# Patient Record
Sex: FEMALE | Race: OTHER | Employment: UNEMPLOYED | URBAN - METROPOLITAN AREA
[De-identification: names, ages, dates, MRNs, and addresses within clinical notes are randomized per-mention and may not be internally consistent; named-entity substitution may affect disease eponyms.]

---

## 2017-11-22 ENCOUNTER — APPOINTMENT (OUTPATIENT)
Dept: GENERAL RADIOLOGY | Age: 62
DRG: 460 | End: 2017-11-22
Attending: PHYSICIAN ASSISTANT
Payer: MEDICAID

## 2017-11-22 ENCOUNTER — HOSPITAL ENCOUNTER (INPATIENT)
Age: 62
LOS: 8 days | Discharge: HOME | DRG: 460 | End: 2017-11-30
Attending: EMERGENCY MEDICINE | Admitting: INTERNAL MEDICINE
Payer: MEDICAID

## 2017-11-22 ENCOUNTER — APPOINTMENT (OUTPATIENT)
Dept: ULTRASOUND IMAGING | Age: 62
DRG: 460 | End: 2017-11-22
Attending: INTERNAL MEDICINE
Payer: MEDICAID

## 2017-11-22 DIAGNOSIS — S92.911A CLOSED DISPLACED FRACTURE OF PROXIMAL PHALANX OF TOE OF RIGHT FOOT: ICD-10-CM

## 2017-11-22 DIAGNOSIS — N17.9 AKI (ACUTE KIDNEY INJURY) (HCC): Primary | ICD-10-CM

## 2017-11-22 DIAGNOSIS — E87.5 ACUTE HYPERKALEMIA: ICD-10-CM

## 2017-11-22 PROBLEM — E87.29 HIGH ANION GAP METABOLIC ACIDOSIS: Status: ACTIVE | Noted: 2017-11-22

## 2017-11-22 PROBLEM — S92.909A FOOT FRACTURE: Status: ACTIVE | Noted: 2017-11-22

## 2017-11-22 LAB
AMORPH CRY URNS QL MICRO: ABNORMAL
ANION GAP SERPL CALC-SCNC: 12 MMOL/L (ref 5–15)
ANION GAP SERPL CALC-SCNC: 19 MMOL/L (ref 5–15)
APPEARANCE UR: CLEAR
ATRIAL RATE: 75 BPM
BACTERIA URNS QL MICRO: ABNORMAL /HPF
BASOPHILS # BLD: 0.1 K/UL (ref 0–0.1)
BASOPHILS NFR BLD: 1 % (ref 0–1)
BILIRUB UR QL: NEGATIVE
BUN SERPL-MCNC: 92 MG/DL (ref 6–20)
BUN SERPL-MCNC: 98 MG/DL (ref 6–20)
BUN/CREAT SERPL: 17 (ref 12–20)
BUN/CREAT SERPL: 17 (ref 12–20)
CALCIUM SERPL-MCNC: 8.1 MG/DL (ref 8.5–10.1)
CALCIUM SERPL-MCNC: 8.4 MG/DL (ref 8.5–10.1)
CALCULATED P AXIS, ECG09: 46 DEGREES
CALCULATED R AXIS, ECG10: 19 DEGREES
CALCULATED T AXIS, ECG11: 31 DEGREES
CHLORIDE SERPL-SCNC: 106 MMOL/L (ref 97–108)
CHLORIDE SERPL-SCNC: 109 MMOL/L (ref 97–108)
CO2 SERPL-SCNC: 12 MMOL/L (ref 21–32)
CO2 SERPL-SCNC: 18 MMOL/L (ref 21–32)
COLOR UR: ABNORMAL
CREAT SERPL-MCNC: 5.4 MG/DL (ref 0.55–1.02)
CREAT SERPL-MCNC: 5.78 MG/DL (ref 0.55–1.02)
CREAT UR-MCNC: 61.11 MG/DL
CREAT UR-MCNC: 93.1 MG/DL
CRP SERPL-MCNC: 1.68 MG/DL
DIAGNOSIS, 93000: NORMAL
DIFFERENTIAL METHOD BLD: ABNORMAL
EOSINOPHIL # BLD: 0.2 K/UL (ref 0–0.4)
EOSINOPHIL #/AREA URNS HPF: 2 /[HPF]
EOSINOPHIL NFR BLD: 2 % (ref 0–7)
EPITH CASTS URNS QL MICRO: ABNORMAL /LPF
ERYTHROCYTE [DISTWIDTH] IN BLOOD BY AUTOMATED COUNT: 15 % (ref 11.5–14.5)
ERYTHROCYTE [SEDIMENTATION RATE] IN BLOOD: 115 MM/HR (ref 0–30)
GLUCOSE BLD STRIP.AUTO-MCNC: 180 MG/DL (ref 65–100)
GLUCOSE SERPL-MCNC: 133 MG/DL (ref 65–100)
GLUCOSE SERPL-MCNC: 237 MG/DL (ref 65–100)
GLUCOSE UR STRIP.AUTO-MCNC: 100 MG/DL
HCT VFR BLD AUTO: 31.5 % (ref 35–47)
HGB BLD-MCNC: 10.1 G/DL (ref 11.5–16)
HGB UR QL STRIP: ABNORMAL
KETONES UR QL STRIP.AUTO: NEGATIVE MG/DL
LACTATE SERPL-SCNC: 1.3 MMOL/L (ref 0.4–2)
LEUKOCYTE ESTERASE UR QL STRIP.AUTO: NEGATIVE
LYMPHOCYTES # BLD: 1.6 K/UL (ref 0.8–3.5)
LYMPHOCYTES NFR BLD: 19 % (ref 12–49)
MAGNESIUM SERPL-MCNC: 2.2 MG/DL (ref 1.6–2.4)
MAGNESIUM SERPL-MCNC: 2.3 MG/DL (ref 1.6–2.4)
MCH RBC QN AUTO: 25.9 PG (ref 26–34)
MCHC RBC AUTO-ENTMCNC: 32.1 G/DL (ref 30–36.5)
MCV RBC AUTO: 80.8 FL (ref 80–99)
MONOCYTES # BLD: 0.3 K/UL (ref 0–1)
MONOCYTES NFR BLD: 4 % (ref 5–13)
MUCOUS THREADS URNS QL MICRO: ABNORMAL /LPF
NEUTS SEG # BLD: 6.2 K/UL (ref 1.8–8)
NEUTS SEG NFR BLD: 74 % (ref 32–75)
NITRITE UR QL STRIP.AUTO: NEGATIVE
P-R INTERVAL, ECG05: 110 MS
PH UR STRIP: 5.5 [PH] (ref 5–8)
PLATELET # BLD AUTO: 416 K/UL (ref 150–400)
POTASSIUM SERPL-SCNC: 4.7 MMOL/L (ref 3.5–5.1)
POTASSIUM SERPL-SCNC: 5.9 MMOL/L (ref 3.5–5.1)
PROT UR STRIP-MCNC: >300 MG/DL
PROT UR-MCNC: 246 MG/DL (ref 0–11.9)
Q-T INTERVAL, ECG07: 386 MS
QRS DURATION, ECG06: 66 MS
QTC CALCULATION (BEZET), ECG08: 431 MS
RBC # BLD AUTO: 3.9 M/UL (ref 3.8–5.2)
RBC #/AREA URNS HPF: ABNORMAL /HPF (ref 0–5)
SERVICE CMNT-IMP: ABNORMAL
SODIUM SERPL-SCNC: 136 MMOL/L (ref 136–145)
SODIUM SERPL-SCNC: 140 MMOL/L (ref 136–145)
SODIUM UR-SCNC: 38 MMOL/L
SP GR UR REFRACTOMETRY: 1.02 (ref 1–1.03)
UROBILINOGEN UR QL STRIP.AUTO: 0.2 EU/DL (ref 0.2–1)
VENTRICULAR RATE, ECG03: 75 BPM
WBC # BLD AUTO: 8.4 K/UL (ref 3.6–11)
WBC URNS QL MICRO: ABNORMAL /HPF (ref 0–4)

## 2017-11-22 PROCEDURE — 84155 ASSAY OF PROTEIN SERUM: CPT | Performed by: INTERNAL MEDICINE

## 2017-11-22 PROCEDURE — 80048 BASIC METABOLIC PNL TOTAL CA: CPT | Performed by: PHYSICIAN ASSISTANT

## 2017-11-22 PROCEDURE — 83605 ASSAY OF LACTIC ACID: CPT | Performed by: INTERNAL MEDICINE

## 2017-11-22 PROCEDURE — 87086 URINE CULTURE/COLONY COUNT: CPT | Performed by: INTERNAL MEDICINE

## 2017-11-22 PROCEDURE — 83883 ASSAY NEPHELOMETRY NOT SPEC: CPT | Performed by: INTERNAL MEDICINE

## 2017-11-22 PROCEDURE — 87205 SMEAR GRAM STAIN: CPT | Performed by: INTERNAL MEDICINE

## 2017-11-22 PROCEDURE — 74011636637 HC RX REV CODE- 636/637: Performed by: PHYSICIAN ASSISTANT

## 2017-11-22 PROCEDURE — 93005 ELECTROCARDIOGRAM TRACING: CPT

## 2017-11-22 PROCEDURE — 86140 C-REACTIVE PROTEIN: CPT | Performed by: PHYSICIAN ASSISTANT

## 2017-11-22 PROCEDURE — 87077 CULTURE AEROBIC IDENTIFY: CPT | Performed by: INTERNAL MEDICINE

## 2017-11-22 PROCEDURE — 84300 ASSAY OF URINE SODIUM: CPT | Performed by: INTERNAL MEDICINE

## 2017-11-22 PROCEDURE — 81001 URINALYSIS AUTO W/SCOPE: CPT | Performed by: PHYSICIAN ASSISTANT

## 2017-11-22 PROCEDURE — 74011250637 HC RX REV CODE- 250/637: Performed by: INTERNAL MEDICINE

## 2017-11-22 PROCEDURE — 87186 SC STD MICRODIL/AGAR DIL: CPT | Performed by: INTERNAL MEDICINE

## 2017-11-22 PROCEDURE — 76770 US EXAM ABDO BACK WALL COMP: CPT

## 2017-11-22 PROCEDURE — 83735 ASSAY OF MAGNESIUM: CPT | Performed by: INTERNAL MEDICINE

## 2017-11-22 PROCEDURE — 83735 ASSAY OF MAGNESIUM: CPT | Performed by: PHYSICIAN ASSISTANT

## 2017-11-22 PROCEDURE — 65660000000 HC RM CCU STEPDOWN

## 2017-11-22 PROCEDURE — 85025 COMPLETE CBC W/AUTO DIFF WBC: CPT | Performed by: PHYSICIAN ASSISTANT

## 2017-11-22 PROCEDURE — 77030013140 HC MSK NEB VYRM -A

## 2017-11-22 PROCEDURE — 82570 ASSAY OF URINE CREATININE: CPT | Performed by: INTERNAL MEDICINE

## 2017-11-22 PROCEDURE — 83520 IMMUNOASSAY QUANT NOS NONAB: CPT | Performed by: INTERNAL MEDICINE

## 2017-11-22 PROCEDURE — 74011000250 HC RX REV CODE- 250: Performed by: INTERNAL MEDICINE

## 2017-11-22 PROCEDURE — 99283 EMERGENCY DEPT VISIT LOW MDM: CPT

## 2017-11-22 PROCEDURE — 74011000258 HC RX REV CODE- 258: Performed by: PHYSICIAN ASSISTANT

## 2017-11-22 PROCEDURE — 74011000250 HC RX REV CODE- 250: Performed by: PHYSICIAN ASSISTANT

## 2017-11-22 PROCEDURE — 85652 RBC SED RATE AUTOMATED: CPT | Performed by: PHYSICIAN ASSISTANT

## 2017-11-22 PROCEDURE — 74011250636 HC RX REV CODE- 250/636: Performed by: INTERNAL MEDICINE

## 2017-11-22 PROCEDURE — 77030013175 HC SHOE PSTOP DJOR -A

## 2017-11-22 PROCEDURE — 74011000258 HC RX REV CODE- 258: Performed by: INTERNAL MEDICINE

## 2017-11-22 PROCEDURE — 94640 AIRWAY INHALATION TREATMENT: CPT

## 2017-11-22 PROCEDURE — 74011250636 HC RX REV CODE- 250/636: Performed by: PHYSICIAN ASSISTANT

## 2017-11-22 PROCEDURE — 80048 BASIC METABOLIC PNL TOTAL CA: CPT | Performed by: INTERNAL MEDICINE

## 2017-11-22 PROCEDURE — 36415 COLL VENOUS BLD VENIPUNCTURE: CPT | Performed by: INTERNAL MEDICINE

## 2017-11-22 PROCEDURE — 74011636637 HC RX REV CODE- 636/637: Performed by: INTERNAL MEDICINE

## 2017-11-22 PROCEDURE — 84156 ASSAY OF PROTEIN URINE: CPT | Performed by: INTERNAL MEDICINE

## 2017-11-22 PROCEDURE — 96360 HYDRATION IV INFUSION INIT: CPT

## 2017-11-22 PROCEDURE — 73630 X-RAY EXAM OF FOOT: CPT

## 2017-11-22 PROCEDURE — 82962 GLUCOSE BLOOD TEST: CPT

## 2017-11-22 RX ORDER — SODIUM POLYSTYRENE SULFONATE 15 G/60ML
30 SUSPENSION ORAL; RECTAL
Status: COMPLETED | OUTPATIENT
Start: 2017-11-22 | End: 2017-11-22

## 2017-11-22 RX ORDER — FUROSEMIDE 40 MG/1
40 TABLET ORAL 2 TIMES DAILY
COMMUNITY
End: 2017-11-30

## 2017-11-22 RX ORDER — METOPROLOL TARTRATE 100 MG/1
100 TABLET ORAL
COMMUNITY
End: 2017-12-08 | Stop reason: SDUPTHER

## 2017-11-22 RX ORDER — SODIUM CHLORIDE 0.9 % (FLUSH) 0.9 %
5-10 SYRINGE (ML) INJECTION EVERY 8 HOURS
Status: DISCONTINUED | OUTPATIENT
Start: 2017-11-22 | End: 2017-11-30 | Stop reason: HOSPADM

## 2017-11-22 RX ORDER — ALBUTEROL SULFATE 0.83 MG/ML
2.5 SOLUTION RESPIRATORY (INHALATION)
Status: COMPLETED | OUTPATIENT
Start: 2017-11-22 | End: 2017-11-22

## 2017-11-22 RX ORDER — INSULIN LISPRO 100 [IU]/ML
INJECTION, SOLUTION INTRAVENOUS; SUBCUTANEOUS
Status: DISCONTINUED | OUTPATIENT
Start: 2017-11-22 | End: 2017-11-30 | Stop reason: HOSPADM

## 2017-11-22 RX ORDER — METOPROLOL TARTRATE 50 MG/1
50 TABLET ORAL 2 TIMES DAILY
Status: DISCONTINUED | OUTPATIENT
Start: 2017-11-22 | End: 2017-11-27

## 2017-11-22 RX ORDER — HEPARIN SODIUM 5000 [USP'U]/ML
5000 INJECTION, SOLUTION INTRAVENOUS; SUBCUTANEOUS EVERY 8 HOURS
Status: DISCONTINUED | OUTPATIENT
Start: 2017-11-22 | End: 2017-11-30 | Stop reason: HOSPADM

## 2017-11-22 RX ORDER — SODIUM CHLORIDE 0.9 % (FLUSH) 0.9 %
5-10 SYRINGE (ML) INJECTION AS NEEDED
Status: DISCONTINUED | OUTPATIENT
Start: 2017-11-22 | End: 2017-11-30 | Stop reason: HOSPADM

## 2017-11-22 RX ORDER — LEVOFLOXACIN 750 MG/1
750 TABLET ORAL
Status: DISCONTINUED | OUTPATIENT
Start: 2017-11-22 | End: 2017-11-24

## 2017-11-22 RX ORDER — DEXTROSE 50 % IN WATER (D50W) INTRAVENOUS SYRINGE
12.5-25 AS NEEDED
Status: DISCONTINUED | OUTPATIENT
Start: 2017-11-22 | End: 2017-11-30 | Stop reason: HOSPADM

## 2017-11-22 RX ORDER — SODIUM POLYSTYRENE SULFONATE 15 G/60ML
15 SUSPENSION ORAL; RECTAL
Status: DISCONTINUED | OUTPATIENT
Start: 2017-11-22 | End: 2017-11-22 | Stop reason: SDUPTHER

## 2017-11-22 RX ORDER — MAGNESIUM SULFATE 100 %
4 CRYSTALS MISCELLANEOUS AS NEEDED
Status: DISCONTINUED | OUTPATIENT
Start: 2017-11-22 | End: 2017-11-30 | Stop reason: HOSPADM

## 2017-11-22 RX ORDER — LANOLIN ALCOHOL/MO/W.PET/CERES
325 CREAM (GRAM) TOPICAL DAILY
COMMUNITY

## 2017-11-22 RX ORDER — METOPROLOL TARTRATE 100 MG/1
TABLET ORAL 2 TIMES DAILY
Status: ON HOLD | COMMUNITY
End: 2017-11-22

## 2017-11-22 RX ORDER — NALOXONE HYDROCHLORIDE 0.4 MG/ML
0.4 INJECTION, SOLUTION INTRAMUSCULAR; INTRAVENOUS; SUBCUTANEOUS AS NEEDED
Status: DISCONTINUED | OUTPATIENT
Start: 2017-11-22 | End: 2017-11-30 | Stop reason: HOSPADM

## 2017-11-22 RX ORDER — DEXTROSE MONOHYDRATE 25 G/50ML
50 INJECTION, SOLUTION INTRAVENOUS ONCE
Status: COMPLETED | OUTPATIENT
Start: 2017-11-22 | End: 2017-11-22

## 2017-11-22 RX ADMIN — DEXTROSE MONOHYDRATE 25 G: 25 INJECTION, SOLUTION INTRAVENOUS at 15:01

## 2017-11-22 RX ADMIN — METOPROLOL TARTRATE 50 MG: 50 TABLET ORAL at 22:59

## 2017-11-22 RX ADMIN — ALBUTEROL SULFATE 2.5 MG: 2.5 SOLUTION RESPIRATORY (INHALATION) at 15:01

## 2017-11-22 RX ADMIN — ALBUTEROL SULFATE 2.5 MG: 2.5 SOLUTION RESPIRATORY (INHALATION) at 14:13

## 2017-11-22 RX ADMIN — SODIUM CHLORIDE 1000 ML: 900 INJECTION, SOLUTION INTRAVENOUS at 13:57

## 2017-11-22 RX ADMIN — ALBUTEROL SULFATE 2.5 MG: 2.5 SOLUTION RESPIRATORY (INHALATION) at 15:11

## 2017-11-22 RX ADMIN — HEPARIN SODIUM 5000 UNITS: 5000 INJECTION, SOLUTION INTRAVENOUS; SUBCUTANEOUS at 22:59

## 2017-11-22 RX ADMIN — Medication 10 ML: at 23:00

## 2017-11-22 RX ADMIN — HUMAN INSULIN 5 UNITS: 100 INJECTION, SUSPENSION SUBCUTANEOUS at 20:00

## 2017-11-22 RX ADMIN — SODIUM BICARBONATE: 84 INJECTION, SOLUTION INTRAVENOUS at 15:01

## 2017-11-22 RX ADMIN — SODIUM POLYSTYRENE SULFONATE 30 G: 15 SUSPENSION ORAL; RECTAL at 15:02

## 2017-11-22 RX ADMIN — LEVOFLOXACIN 750 MG: 750 TABLET, FILM COATED ORAL at 19:26

## 2017-11-22 RX ADMIN — SODIUM BICARBONATE: 84 INJECTION, SOLUTION INTRAVENOUS at 21:22

## 2017-11-22 RX ADMIN — HUMAN INSULIN 10 UNITS: 100 INJECTION, SOLUTION SUBCUTANEOUS at 15:00

## 2017-11-22 RX ADMIN — DOXYCYCLINE 100 MG: 100 INJECTION, POWDER, LYOPHILIZED, FOR SOLUTION INTRAVENOUS at 19:27

## 2017-11-22 NOTE — ROUTINE PROCESS
TRANSFER - OUT REPORT:    Verbal report given to [facility] crew(name) on Josselyn Akers  being transferred to [address] [place] [address] 3rd floor(unit) for routine progression of care       Report consisted of patients Situation, Background, Assessment and   Recommendations(SBAR). Information from the following report(s) SBAR, ED Summary, MAR, Recent Results and Cardiac Rhythm sinus was reviewed with the receiving nurse. Lines:   Peripheral IV 09/02/14 Right Hand (Active)       Peripheral IV 11/22/17 Right;Mid Forearm (Active)   Site Assessment Clean, dry, & intact 11/22/2017 12:18 PM   Phlebitis Assessment 0 11/22/2017 12:18 PM   Infiltration Assessment 0 11/22/2017 12:18 PM   Dressing Status Clean, dry, & intact 11/22/2017 12:18 PM   Dressing Type Transparent 11/22/2017 12:18 PM   Hub Color/Line Status Pink;Patent; Flushed 11/22/2017 12:18 PM        Opportunity for questions and clarification was provided.       Patient transported with:   Monitor  Tech

## 2017-11-22 NOTE — ED TRIAGE NOTES
Interpretor #[id number] used to gain hx information, medication hx. Family at bedside and explained labs and reason for admission with them as well as pt.

## 2017-11-22 NOTE — IP AVS SNAPSHOT
Alie Tamayo 104 1007 [facility] 
[phone] Patient: Luis Jordan MRN: [id number] MULU:[birth year] About your hospitalization You were admitted on:  November 22, 2017 You last received care in the:  [facility] 4M POST SURG ORT 2 You were discharged on:  November 30, 2017 Why you were hospitalized Your primary diagnosis was:  Not on File Your diagnoses also included:  Og (Acute Kidney Injury) (Hcc), Type 2 Diabetes Mellitus With Renal Complication (Hcc), Htn (Hypertension), Hyperlipidemia, Foot Fracture, High Anion Gap Metabolic Acidosis, Ckd (Chronic Kidney Disease) Stage 5, Gfr Less Than 15 Ml/Min (Hcc), Diabetic Foot Infection (Hcc) Things You Need To Do (next 8 weeks) Follow up with Luiz Nunez DPM in 1 day(s) Phone:  [phone] Where:  [address], 1007 [facility] Schedule an appointment with Lacie Reyes MD as soon as possible for a visit today  
to schedule follow up and review of lab results Phone:  [phone] Where:  [address] [facility] [place], [address] [facility] [address], [facility] Thad Joni Rangelargo [phone] Discharge Orders None A check conner indicates which time of day the medication should be taken. My Medications STOP taking these medications EPOETIN GAETANO INJECTION  
   
  
 furosemide 40 mg tablet Commonly known as:  LASIX  
   
  
 hydroCHLOROthiazide 50 mg tablet Commonly known as:  HYDRODIURIL  
   
  
 irbesartan 300 mg tablet Commonly known as:  AVAPRO promethazine 25 mg tablet Commonly known as:  PHENERGAN  
   
  
 raNITIdine hcl 150 mg capsule TAKE these medications as instructed Instructions Each Dose to Equal  
 Morning Noon Evening Bedtime  
 amLODIPine 10 mg tablet Commonly known as:  Chuck Conine Start taking on:  12/1/2017 Your last dose was: Your next dose is: Take 1 Tab by mouth daily for 30 days. 10 mg  
    
   
   
   
  
 amoxicillin-clavulanate 875-125 mg per tablet Commonly known as:  AUGMENTIN Your last dose was: Your next dose is: Take 1 Tab by mouth daily for 7 days. 1 Tab  
    
   
   
   
  
 ciprofloxacin HCl 0.3 % ophthalmic solution Commonly known as:  Lily Mackenzie Your last dose was: Your next dose is:    
   
   
 Administer 1 Drop to right eye every two (2) hours. 1 Drop  
    
   
   
   
  
 ferrous sulfate 325 mg (65 mg iron) tablet Your last dose was: Your next dose is: Take 325 mg by mouth daily. 325 mg  
    
   
   
   
  
 insulin NPH/insulin regular 100 unit/mL (70-30) injection Commonly known as:  HumuLIN 70/30 Your last dose was: Your next dose is:    
   
   
 10 Units by SubCUTAneous route Before breakfast and dinner. 10 Units  
    
   
   
   
  
 metoprolol tartrate 100 mg IR tablet Commonly known as:  LOPRESSOR Your last dose was: Your next dose is: Take 100 mg by mouth nightly. 100 mg Where to Get Your Medications Information on where to get these meds will be given to you by the nurse or doctor. ! Ask your nurse or doctor about these medications  
  amLODIPine 10 mg tablet  
 amoxicillin-clavulanate 875-125 mg per tablet  
 insulin NPH/insulin regular 100 unit/mL (70-30) injection Discharge Instructions [facility] Podiatric Medicine - Foot Surgery [address] [place] [address]. [address] OFFICE [phone] CELL    [phone] You have just had surgery on your foot and/or ankle. Proper care during the post-operative period is an integral part of your surgical treatment program.  It is imperative that these instructions are followed to insure proper healing and to obtain the best results. 1. GO directly home and keep your feet elevated on the way. 2. DRESSING OR CAST - Keep your dressing or cast clean and dry. Change dressings daily with Santyl and Dry sterle dressings. 3. ELEVATION - Place two pillows under the knee and leg. Make sure to support underneath your knees. You should elevate your leg whenever you are sitting or lying down. 4. Exercise your legs frequently by bending your knees to stimulate circulation and speed healing. 5. You are to be:  
NON-WEIGHT BEARING - you are not allowed to touch your foot to the floor and/or ground. You must use crutches or a walker at all times. 6. PRESCRIPTIONS - Please fill and take as directed. If you have any difficulties or experience any side effects after taking this medication please discontinue and call the office and/or go to your closest Emergency Room immediately. Call our office to make your next appointment; Also, if you have any of the following: · Temperature above 100.5 degrees · Your bandage becomes overly stained, falls off or gets wet · You bump or injure the wound site. · Your medication does not stop discomfort WE WANT YOUR RECOVERY TO BE SUCCESSFUL AND AS COMFORTABLE AS POSSIBLE. THANK YOU FOR FOLLOWING THESE INSTRUCTIONS. IF YOU HAVE ANY PROBLEMS OR QUESTIONS PLEASE CALL OUR OFFICE. HOSPITALIST DISCHARGE INSTRUCTIONS 
 
NAME:             Maribeth Singh :  1955 MRN:  [id number] Date:     2017 ADMIT DATE: 2017 DISCHARGE DATE: 2017 PRINCIPAL ADMITTING DIAGNOSIS: 
MELVA (acute kidney injury) ([facility] DISCHARGE DIAGNOSES: 
Active Problems: 
  Diabetic foot infection ([facility] (2017) Foot fracture (2017) MELVA (acute kidney injury) ([facility].) (2017) CKD (chronic kidney disease) stage 5, GFR less than 15 ml/min ([facility]) (2017) Type 2 diabetes mellitus with renal complication ([facility]) (3/88/1274) HTN (hypertension) (2014) Hyperlipidemia (6/13/2014) MEDICATIONS: 
 
· It is important that medications are taken exactly as they are prescribed on the discharge medication instructions and keep them your  in the bottles provided by the pharmacist.  
· Keep a list of the medication names, dosages, and times to be taken at all times. · Do not take other medications without consulting your doctor. · Have blood drawn for a BMP ar Care A Van Monday 12/4 to monitor kidney function Recommended diet:  Diabetic Diet and Renal Diet Recommended activity: Activity as tolerated Post discharge care: 
 
Notify follow up health care provider or return to the emergency department if you cannot get hold of your doctor if you feel worse or experience symptoms similar to those that brought you to hospital 
 
Follow-up Information Follow up With Details Comments Contact Info Luiz Nunez DPM In 1 day  [address] [phone] Enoc Wyman MD Schedule an appointment as soon as possible for a visit to schedule follow up and review of lab results [facility] [place] [facility] [address] [place] [address] [address] [phone] Information obtained by : 
I understand that if any problems occur once I am at home I am to contact my physician and I understand and acknowledge receipt of the instructions indicated above. Physician's or R.N.'s Signature                                                                  Date/Time Patient or Representative Signature                                                          Date/Time MyChart Announcement We are excited to announce that we are making your provider's discharge notes available to you in WePlannhart. You will see these notes when they are completed and signed by the physician that discharged you from your recent hospital stay. If you have any questions or concerns about any information you see in YAZUOt, please call the Health Information Department where you were seen or reach out to your Primary Care Provider for more information about your plan of care. Introducing [facility]! [facility] introduce portal paciente MyChart . Ahora se puede acceder a partes de deras expediente médico, enviar por correo electrónico la oficina de deras médico y solicitar renovaciones de medicamentos en línea. En deras navegador de Internet , Alba Figueroa a [email or web address]-[email or web address]. [email or web address] Argenis clic en el usuario por Williams Massa? Dev Ill clic aquí en la sesión Tyron Giron. Verá la página de registro [place]. Ingrese deras código de Bank [facility] marcelo y tim aparece a continuación. Usted no tendrá que [employer] código después de dougie completado el proceso de registro . Si usted no se inscribe antes de la fecha de caducidad , debe solicitar un nuevo código. · MyCVergas Código de acceso : [id number] Expires: 2/20/2018 11:19 AM 
 
Ingresa los últimos cuatro dígitos de deras Número de Seguro Social ( xxxx ) y fecha de nacimiento ( dd / mm / aaaa ) tim se indica y argenis clic en Enviar. Godwin será llevado a la siguiente página de registro . Crear un ID MyChart . Esta será deras ID de inicio de sesión de MyChart y no puede ser [place] , por lo que pensar en tia que es Tacy Click y fácil de recordar . Crear tia contraseña MyChart . ted puede cambiar deras contraseña en cualquier momento . Ingrese deras Password Reset de preguntas y Garcia . [place] se puede utilizar en un momento posterior si usted olvida deras contraseña.  
Introduzca deras dirección de correo electrónico . Delvin Serna recibirá Prasanna Samayoa notificación por correo electrónico cuando la nueva información está disponible en MyChart . Malcolm Mchugh brayan en Registrarse. Denisha Sermon attila y descargar porciones de deras expediente médico. 
Atul brayan en el enlace de descarga del menú Resumen para descargar tia copia portátil de deras información médica . Si tiene [employer] , por favor visite la sección de preguntas frecuentes del sitio web MyChart . Recuerde, MyChart NO es que se utilizará para las necesidades urgentes. Para emergencias médicas , llame al 911 . Ahora disponible en deras iPhone y Android ! Providers Seen During Your Hospitalization Provider Specialty Primary office phone Saw Barillas MD Emergency Medicine [phone] Jc Joseph MD Internal Medicine [phone] [facility] Internal Medicine [phone] Jose Farnsworth MD Internal Medicine [phone] Immunizations Administered for This Admission Name Date Influenza Vaccine (Quad) PF 11/30/2017 Your Primary Care Physician (PCP) Primary Care Physician Office Phone Office Fax OTHER, PHYS ** None ** ** None ** You are allergic to the following No active allergies Recent Documentation Height Weight Breastfeeding? BMI OB Status Smoking Status 1.499 m 63.5 kg No 28.27 kg/m2 Postmenopausal Never Smoker Emergency Contacts Name Discharge Info Relation Home Work Mobile Cancino,Afua DISCHARGE CAREGIVER [3] Spouse [3] [phone] JulitaAdriana  Child [2] [phone] Alda Em  Child [2]   [phone] Patient Belongings The following personal items are in your possession at time of discharge: 
  Dental Appliances: None  Visual Aid: Glasses, At bedside, With patient      Home Medications: None   Jewelry: None  Clothing: Footwear, Undergarments, Jacket/Coat, Pajamas    Other Valuables:  (blanket) Please provide this summary of care documentation to your next provider. Signatures-by signing, you are acknowledging that this After Visit Summary has been reviewed with you and you have received a copy. Patient Signature:  ____________________________________________________________ Date:  ____________________________________________________________  
  
Tammy Thomas Provider Signature:  ____________________________________________________________ Date:  ____________________________________________________________  
  
  
   
  
[address] [place] [address] Road [facility] 788 [phone] Patient: Rebecca Gregg MRN: [id number] ITY:[birth year] Sobre carrillo hospitalización Le admitieron el:  November 22, 2017 Carrillo tratamiento más reciente fue el:  [facility] 4M POST SURG ORT 2 Le dieron de yonatan el:  November 30, 2017 Por qué le ingresaron Carrillo diagnosis primaria es:  No está archivado/a Carrillo diagnosis también incluye:  Og (Acute Kidney Injury) (Hcc), Type 2 Diabetes Mellitus With Renal Complication (Hcc), Htn (Hypertension), Hyperlipidemia, Foot Fracture, High Anion Gap Metabolic Acidosis, Ckd (Chronic Kidney Disease) Stage 5, Gfr Less Than 15 Ml/Min (Hcc), Diabetic Foot Infection (Hcc) Things You Need To Do (next 8 weeks) Follow up with Luiz Nunez DPM in 1 day(s) Phone:  [phone] Where:  [address], [address] Schedule an appointment with Kaleb Brown MD as soon as possible for a visit today  
to schedule follow up and review of lab results Phone:  [phone] Where:  [address] [place], 301 [facility] 200, [facility] [address] Discharge Orders StatAce A check conner indicates which time of day the medication should be taken. My Medications STOP taking these medications EPOETIN GAETANO INJECTION  
   
  
 furosemide 40 mg tablet También conocido tim:  LASIX  
   
  
 hydroCHLOROthiazide 50 mg tablet También conocido tim:  HYDRODIURIL  
   
  
 irbesartan 300 mg tablet También conocido tim:  AVAPRO promethazine 25 mg tablet También conocido tim: PHENERGAN  
   
  
 raNITIdine hcl 150 mg capsule TAKE these medications as instructed Instructions Each Dose to Equal  
 Morning Noon Evening Bedtime  
 amLODIPine 10 mg tablet También conocido tim:  Jo-Ann Warnerting Empiece a luh el:  12/1/2017 Your last dose was: Your next dose is: Take 1 Tab by mouth daily for 30 days. 10 mg  
    
   
   
   
  
 amoxicillin-clavulanate 875-125 mg per tablet También conocido tim:  AUGMENTIN Your last dose was: Your next dose is: Take 1 Tab by mouth daily for 7 days. 1 Tab  
    
   
   
   
  
 ciprofloxacin HCl 0.3 % ophthalmic solution También conocido tim:  Lisa Your last dose was: Your next dose is:    
   
   
 Administer 1 Drop to right eye every two (2) hours. 1 Drop  
    
   
   
   
  
 ferrous sulfate 325 mg (65 mg iron) tablet Your last dose was: Your next dose is: Take 325 mg by mouth daily. 325 mg  
    
   
   
   
  
 insulin NPH/insulin regular 100 unit/mL (70-30) injection También conocido tim:  HumuLIN 70/30 Your last dose was: Your next dose is:    
   
   
 10 Units by SubCUTAneous route Before breakfast and dinner. 10 Units  
    
   
   
   
  
 metoprolol tartrate 100 mg IR tablet También conocido tim:  Linda Garcia Your last dose was: Your next dose is: Take 100 mg by mouth nightly. 100 mg  
    
   
   
   
  
  
  
Dónde puede recoger rafy medicamentos Información sobre dónde obtener estos medicamentos se le dará a usted por la enfermera o el médico.   
 ! Pregunte a deras médico o enfermero/a sobre estos medicamentos  
  amLODIPine 10 mg tablet  
 amoxicillin-clavulanate 875-125 mg per tablet insulin NPH/insulin regular 100 unit/mL (70-30) injection Instrucciones a mohsen rinaldi [facility] Podiatric Medicine - Foot Surgery [address] [place], [place]. [address] OFFICE [phone] CELL    [phone] You have just had surgery on your foot and/or ankle. Proper care during the post-operative period is an integral part of your surgical treatment program.  It is imperative that these instructions are followed to insure proper healing and to obtain the best results. 1. GO directly home and keep your feet elevated on the way. 2. DRESSING OR CAST - Keep your dressing or cast clean and dry. Change dressings daily with Santyl and Dry sterle dressings. 3. ELEVATION - Place two pillows under the knee and leg. Make sure to support underneath your knees. You should elevate your leg whenever you are sitting or lying down. 4. Exercise your legs frequently by bending your knees to stimulate circulation and speed healing. 5. You are to be:  
NON-WEIGHT BEARING - you are not allowed to touch your foot to the floor and/or ground. You must use crutches or a walker at all times. 6. PRESCRIPTIONS - Please fill and take as directed. If you have any difficulties or experience any side effects after taking this medication please discontinue and call the office and/or go to your closest Emergency Room immediately. Call our office to make your next appointment; Also, if you have any of the following: · Temperature above 100.5 degrees · Your bandage becomes overly stained, falls off or gets wet · You bump or injure the wound site. · Your medication does not stop discomfort WE WANT YOUR RECOVERY TO BE SUCCESSFUL AND AS COMFORTABLE AS POSSIBLE. THANK YOU FOR FOLLOWING THESE INSTRUCTIONS. IF YOU HAVE ANY PROBLEMS OR QUESTIONS PLEASE CALL OUR OFFICE. HOSPITALIST DISCHARGE INSTRUCTIONS 
 
NAME:             Ann Haim :  1955 MRN:  [id number] Date:     11/30/2017 ADMIT DATE: 11/22/2017 DISCHARGE DATE: 11/30/2017 PRINCIPAL ADMITTING DIAGNOSIS: 
MELVA (acute kidney injury) ([facility] DISCHARGE DIAGNOSES: 
Active Problems: 
  Diabetic foot infection ([facility].) (11/30/2017) Foot fracture (11/22/2017) MELVA (acute kidney injury) ([facility].) (11/22/2017) CKD (chronic kidney disease) stage 5, GFR less than 15 ml/min ([facility]) (11/30/2017) Type 2 diabetes mellitus with renal complication ([facility]) (3/06/1056) HTN (hypertension) (6/12/2014) Hyperlipidemia (6/13/2014) MEDICATIONS: 
 
· It is important that medications are taken exactly as they are prescribed on the discharge medication instructions and keep them your  in the bottles provided by the pharmacist.  
· Keep a list of the medication names, dosages, and times to be taken at all times. · Do not take other medications without consulting your doctor. · Have blood drawn for a BMP ar [facility] A Missael Monday 12/4 to monitor kidney function Recommended diet:  Diabetic Diet and Renal Diet Recommended activity: Activity as tolerated Post discharge care: 
 
Notify follow up health care provider or return to the emergency department if you cannot get hold of your doctor if you feel worse or experience symptoms similar to those that brought you to hospital 
 
Follow-up Information Follow up With Details Comments Contact Info Luiz Nunez DPM In 1 day  [address] 100[address] [facility] 
[phone] Mayela Taylor MD Schedule an appointment as soon as possible for a visit to schedule follow up and review of lab results Lis [address]ott [address] [place] [address] 
[phone] Information obtained by : 
I understand that if any problems occur once I am at home I am to contact my physician and I understand and acknowledge receipt of the instructions indicated above. Physician's or R.N.'s Signature                                                                  Date/Time Patient or Representative Signature                                                          Date/Time [employer] Times Restaurantshart Announcement We are excited to announce that we are making your provider's discharge notes available to you in Zliot. You will see these notes when they are completed and signed by the physician that discharged you from your recent hospital stay. If you have any questions or concerns about any information you see in Zliot, please call the Health Information Department where you were seen or reach out to your Primary Care Provider for more information about your plan of care. Introducing [facility] SERVICES! Nic Vasquez introduce portal paciente [employer] . Ahora se puede acceder a partes de deras expediente médico, enviar por correo electrónico la oficina de deras médico y solicitar renovaciones de medicamentos en línea. En deras navegador de Internet , Bj tran [email or web address] Netmining. com/Collective IPt Argenis brayan en el usuario por Delilah Luciano? Lidya schmidt aquí en la sesión Jelly Barfield. Verá la página de registro [place]. Ingrese deras código de Bank of [employer]a marcelo y tim aparece a continuación. Usted no tendrá que [employer] código después de dougie completado el proceso de registro . Si usted no se inscribe antes de la fecha de caducidad , debe solicitar un nuevo código. · MyCGarfield Código de acceso : [id number] Expires: 2/20/2018 11:19 AM 
 
Ingresa los últimos cuatro dígitos de deras Número de Seguro Social ( xxxx ) y fecha de nacimiento ( dd / mm / aaaa ) tim se indica y argenis clic en Enviar. Usted será llevado a la siguiente página de registro . Crear un ID MyChart . Esta será carrillo ID de inicio de sesión de MyChart y no puede ser [place] , por lo que pensar en tia que es Harriet Thurston y fácil de recordar . Crear tia contraseña MyChart . Usted puede cambiar carrillo contraseña en cualquier momento . Ingrese carrillo Password Reset de preguntas y Garcia . Bright se puede utilizar en un momento posterior si usted olvida carrillo contraseña. Introduzca carrillo dirección de correo electrónico . Tahira Mcgowan recibirá tia notificación por correo electrónico cuando la nueva información está disponible en MyChart . Jaky Gaw clic en Registrarse. Niecy Valentin attila y descargar porciones de carrillo expediente médico. 
Atul clic en el enlace de descarga del menú Resumen para descargar tia copia portátil de carrillo información médica . Si tiene [employer] , por favor visite la sección de preguntas frecuentes del sitio web MyChart . Recuerde, MyChart NO es que se utilizará para las necesidades urgentes. Para emergencias médicas , llame al 911 . Ahora disponible en carrillo iPhone y Android ! Providers Seen During Your Hospitalization Personal Médico Especialidad Teléfono principal de la oficina Genia Toribio MD Emergency Medicine [phone] Alec Alfred MD Internal Medicine [phone] 2449 [facility] Internal Medicine [phone] Anthony Sargent MD Internal Medicine [phone] Silvia Booker Administradas en esta admisión:    
   
 Aisha Bernal Influenza Vaccine (Quad) PF 11/30/2017 Carrillo médico de atención primaria (PCP ) Primary Care Physician Office Phone Office Fax OTHER, PHYS ** None ** ** None ** Tiene alergias a lo siguiente No tiene alergias Documentación recientes Height Weight Está amamantando? BMI [facility]) Estado obstétrico Estatus de tabaquísmo 1.499 m 63.5 kg No 28.27 kg/m2 Postmenopausal Never Smoker Emergency Contacts Name Discharge Info Relation Home Work Mobile Afua Cancino DISCHARGE CAREGIVER [3] Spouse [3] [phone] Adriana Cancino  Child [2] [phone] Jeffery Amen  Child [2]   [phone] Patient Belongings The following personal items are in your possession at time of discharge: 
  Dental Appliances: None  Visual Aid: Glasses, At bedside, With patient      Home Medications: None   Jewelry: None  Clothing: Footwear, Undergarments, Jacket/Coat, Pajamas    Other Valuables:  (blanket) Please provide this summary of care documentation to your next provider. Signatures-by signing, you are acknowledging that this After Visit Summary has been reviewed with you and you have received a copy. Patient Signature:  ____________________________________________________________ Date:  ____________________________________________________________  
  
Josph Perfect Provider Signature:  ____________________________________________________________ Date:  ____________________________________________________________

## 2017-11-22 NOTE — ED PROVIDER NOTES
HPI Comments: Alia Colunga is a 65 y.o. 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 Pt notes that pain to toes came on randomly several days ago and has continued to worsen since onset. Notes pain spreading up her foot. Notes bruising and swelling to the toes and distal foot. Denies any injury/trauma. No medicine at home for sx. Notes insulin dependent and takes 40units in the morning and 15 at night. Does not regularly check her glucose at home and cannot remember the last time she did check it at home. No other complaints. She specifically denies any fevers, chills, nausea, vomiting, chest pain, shortness of breath, headache, rash, diarrhea, abdominal pain, urinary/bowel changes, sweating or weight loss. Hx limited secondary to pt being a poor historian and language barrier despite     PCP: Julieta Aguayo, MD   PMHx significant for: Past Medical History:  No date: Arrhythmia      Comment: fast heart rate and palpitations dec 2010  No date: Arthritis      Comment: generalized  No date: Asthma  No date: Chronic pain      Comment: headaches and stomach pain x several months  No date: Diabetes ([facility] [address]  No date: Diabetic foot ulcer associated with type 2 nicki*  No date: GERD (gastroesophageal reflux disease)  No date: Hypertension  No date: Psychiatric disorder      Comment: depression    PSHx significant for:  Past Surgical History:  No date: HX GYN      Comment: LBTL        -- Pcn (Penicillins) -- Hives    --  Patient states she is not allergic 09/2/14    There are no other complaints, changes or physical findings at this time. The history is provided by the patient. The history is limited by a language barrier. A  was used (Nayatek  #[id number]).         Past Medical History:   Diagnosis Date    Arrhythmia fast heart rate and palpitations dec 2010    Arthritis     generalized    Asthma     Chronic pain     headaches and stomach pain x several months    Diabetes ([facility] [address]     Diabetic foot ulcer associated with type 2 diabetes mellitus ([facility] [address]     GERD (gastroesophageal reflux disease)     Hypertension     Psychiatric disorder     depression       Past Surgical History:   Procedure Laterality Date    HX GYN      LBTL         Family History:   Problem Relation Age of Onset    Heart Disease Mother     Diabetes Father     Heart Disease Father     Diabetes Brother     Cancer Paternal Aunt 40     uterine cancer    Diabetes Brother        Social History     Social History    Marital status: LEGALLY      Spouse name: N/A    Number of children: N/A    Years of education: N/A     Occupational History    Not on file. Social History Main Topics    Smoking status: Never Smoker    Smokeless tobacco: Never Used    Alcohol use No    Drug use: No    Sexual activity: Not on file     Other Topics Concern    Not on file     Social History Narrative         ALLERGIES: Pcn [penicillins]    Review of Systems   Constitutional: Negative. Negative for appetite change, chills, fatigue and fever. HENT: Negative. Negative for congestion and sore throat. Eyes: Negative. Negative for visual disturbance. Respiratory: Negative. Negative for cough and shortness of breath. Cardiovascular: Negative. Negative for chest pain, palpitations and leg swelling. Gastrointestinal: Negative. Negative for abdominal pain, constipation, diarrhea, nausea and vomiting. Genitourinary: Negative. Negative for dysuria, flank pain and hematuria. Musculoskeletal: Positive for arthralgias (R foot and 3rd, 4th, 5th toe pain, swelling). Negative for back pain and neck pain. Skin: Negative. Negative for rash. Neurological: Negative. Negative for dizziness, syncope, weakness, numbness and headaches.    Hematological: Negative. Psychiatric/Behavioral: Negative. All other systems reviewed and are negative. Vitals:    11/22/17 1133   BP: 145/66   Pulse: 79   Resp: 16   Temp: 98 °F (36.7 °C)   SpO2: 99%   Weight: 64.3 kg (141 lb 12.1 oz)            Physical Exam   Constitutional: She is oriented to person, place, and time. She appears well-developed and well-nourished. No distress. HENT:   Head: Normocephalic and atraumatic. Mouth/Throat: Oropharynx is clear and moist.   Neck: Normal range of motion. Cardiovascular: Normal rate, regular rhythm, S1 normal, S2 normal, normal heart sounds, intact distal pulses and normal pulses. Exam reveals no gallop and no friction rub. No murmur heard. Pulses:       Dorsalis pedis pulses are 2+ on the right side, and 2+ on the left side. Pulmonary/Chest: Effort normal and breath sounds normal. No accessory muscle usage. No respiratory distress. She has no decreased breath sounds. She has no wheezes. She has no rhonchi. She has no rales. Abdominal: Soft. Normal appearance and bowel sounds are normal. She exhibits no distension. There is no hepatosplenomegaly. There is no tenderness. There is no rebound, no guarding and no CVA tenderness. Musculoskeletal: Normal range of motion. She exhibits no edema or tenderness. Neurological: She is alert and oriented to person, place, and time. She has normal strength. No sensory deficit. Skin: Skin is warm and dry. No rash noted. She is not diaphoretic. No erythema. No pallor. Right 3rd and 4th toe TTP with overlying edema and ecchymosis to proximal 3rd and 4th toe and distal portion foot over 3rd and 4th metatarsals. Minimal erythema, warmth, no erythematous streaking. NVI distally, DP pulse 2+. Brisk cap refill all digits. Ambulatory without difficulty. FROM all joints RLE without difficulty  Prior left midfoot amputation. Psychiatric: She has a normal mood and affect.  Her behavior is normal.   Nursing note and vitals reviewed. MDM  Number of Diagnoses or Management Options  Acute hyperkalemia:   MELVA (acute kidney injury) [facility]):   Closed displaced fracture of proximal phalanx of toe of right foot:   Diagnosis management comments: DDx: cellulitis, osteomyelitis, diabetic foot infection       Amount and/or Complexity of Data Reviewed  Clinical lab tests: ordered and reviewed  Tests in the radiology section of CPT®: ordered and reviewed  Tests in the medicine section of CPT®: ordered and reviewed  Discuss the patient with other providers: yes (Dr. Jalen Weber)  Independent visualization of images, tracings, or specimens: yes    Critical Care  Total time providing critical care: 30-74 minutes    Patient Progress  Patient progress: stable    ED Course       Procedures                       12:16 PM   Glenis Weston PA-C discussed patient with Bettie Bernal MD who is in agreement with care plan as outlined. No further recommendations. Glenis Weston PA-C          1:33 PM  Glenis Weston PA-C  into reevaluate patient at this time. Updated on available results. All questions answered. Pt continues to deny any injury to her foot. Denies any hx of kidney disease. Denies any cardiac symptoms. Discussed plan to admit for ARF and hyperkalemia. All questions answered. Glenis Weston PA-C     CONSULT NOTE:   1:35 PM  Glenis Weston PA-C  spoke with Dr. Kaci Gonzales,   Specialty: hospitalist  Discussed pt's hx, disposition, and available diagnostic and imaging results. Reviewed care plans. Consultant agrees with plans as outlined. Will admit patient. Glenis Weston PA-C    Total critical care time spent exclusive of procedures:  35minutes. Pt in ARF with hyperkalemia. tx in ER with insulin, albuterol nebs x 3, kayexelate. Glenis Weston PA-C      1:35 PM  Patient is being admitted to the hospital.  The results of their tests and reasons for their admission have been discussed with them and/or available family.   They convey agreement and understanding for the need to be admitted and for their admission diagnosis. Consultation has been made with the inpatient physician specialist for hospitalization. LABORATORY TESTS:  Recent Results (from the past 12 hour(s))   CBC WITH AUTOMATED DIFF    Collection Time: 11/22/17 12:13 PM   Result Value Ref Range    WBC 8.4 3.6 - 11.0 K/uL    RBC 3.90 3.80 - 5.20 M/uL    HGB 10.1 (L) 11.5 - 16.0 g/dL    HCT 31.5 (L) 35.0 - 47.0 %    MCV 80.8 80.0 - 99.0 FL    MCH 25.9 (L) 26.0 - 34.0 PG    MCHC 32.1 30.0 - 36.5 g/dL    RDW 15.0 (H) 11.5 - 14.5 %    PLATELET 931 (H) 658 - 400 K/uL    NEUTROPHILS 74 32 - 75 %    LYMPHOCYTES 19 12 - 49 %    MONOCYTES 4 (L) 5 - 13 %    EOSINOPHILS 2 0 - 7 %    BASOPHILS 1 0 - 1 %    ABS. NEUTROPHILS 6.2 1.8 - 8.0 K/UL    ABS. LYMPHOCYTES 1.6 0.8 - 3.5 K/UL    ABS. MONOCYTES 0.3 0.0 - 1.0 K/UL    ABS. EOSINOPHILS 0.2 0.0 - 0.4 K/UL    ABS. BASOPHILS 0.1 0.0 - 0.1 K/UL    DF AUTOMATED     METABOLIC PANEL, BASIC    Collection Time: 11/22/17 12:13 PM   Result Value Ref Range    Sodium 136 136 - 145 mmol/L    Potassium 5.9 (H) 3.5 - 5.1 mmol/L    Chloride 106 97 - 108 mmol/L    CO2 18 (L) 21 - 32 mmol/L    Anion gap 12 5 - 15 mmol/L    Glucose 133 (H) 65 - 100 mg/dL    BUN 98 (H) 6 - 20 MG/DL    Creatinine 5.78 (H) 0.55 - 1.02 MG/DL    BUN/Creatinine ratio 17 12 - 20      GFR est AA 9 (L) >60 ml/min/1.73m2    GFR est non-AA 7 (L) >60 ml/min/1.73m2    Calcium 8.4 (L) 8.5 - 10.1 MG/DL   SED RATE (ESR)    Collection Time: 11/22/17 12:13 PM   Result Value Ref Range    Sed rate, automated 115 (H) 0 - 30 mm/hr   C REACTIVE PROTEIN, QT    Collection Time: 11/22/17 12:13 PM   Result Value Ref Range    C-Reactive protein 1.68 (H) <0.60 mg/dL       IMAGING RESULTS:  XR FOOT RT MIN 3 V   Final Result        Xr Foot Rt Min 3 V    Result Date: 11/22/2017  EXAM:  XR FOOT RT MIN 3 V INDICATION:   r/o osteo; pain, swelling 3-5th finger. COMPARISON:  None.  FINDINGS:  Three views of the right foot demonstrate fractures of the proximal phalanges of the third and fourth toes with overriding of the fracture fragments. There is no radiographic evidence of osteomyelitis. The soft tissues show no foreign body or emphysema. Vascular calcifications are noted. IMPRESSION:  Fractures of the right third and fourth toes, proximal phalanges. MEDICATIONS GIVEN:  Medications   sodium chloride 0.9 % bolus infusion 1,000 mL (not administered)       IMPRESSION:  1. MELVA (acute kidney injury) ([address]    2. Closed displaced fracture of proximal phalanx of toe of right foot    3. Acute hyperkalemia        PLAN:  1. Admit to hospitalist          2:04 PM  Juan Antonio Vela PA-C reviewed results with patient's daughter using [employer] interpretor #[id number]. All questions answered. Juan Antonio Vela PA-C     EKG interpretation: (Preliminary)  Rhythm: normal sinus rhythm; and regular .  Rate (approx.): 75; Axis: normal; NV interval: short; QRS interval: normal ; ST/T wave: normal;  Other findings: normal. Juan Antonio Vela PA-C

## 2017-11-22 NOTE — IP AVS SNAPSHOT
Tony Ego 
 
 
 [address] 
[phone] Patient: Felipa Rivas MRN: [id number] WRJ:8/3/1905 My Medications STOP taking these medications EPOETIN GAETANO INJECTION  
   
  
 furosemide 40 mg tablet Commonly known as:  LASIX  
   
  
 hydroCHLOROthiazide 50 mg tablet Commonly known as:  HYDRODIURIL  
   
  
 irbesartan 300 mg tablet Commonly known as:  AVAPRO promethazine 25 mg tablet Commonly known as:  PHENERGAN  
   
  
 raNITIdine hcl 150 mg capsule TAKE these medications as instructed Instructions Each Dose to Equal  
 Morning Noon Evening Bedtime  
 amLODIPine 10 mg tablet Commonly known as:  Maria Del Rosario Rock Start taking on:  12/1/2017 Your last dose was: Your next dose is: Take 1 Tab by mouth daily for 30 days. 10 mg  
    
   
   
   
  
 amoxicillin-clavulanate 875-125 mg per tablet Commonly known as:  AUGMENTIN Your last dose was: Your next dose is: Take 1 Tab by mouth daily for 7 days. 1 Tab  
    
   
   
   
  
 ciprofloxacin HCl 0.3 % ophthalmic solution Commonly known as:  Valri Pulling Your last dose was: Your next dose is:    
   
   
 Administer 1 Drop to right eye every two (2) hours. 1 Drop  
    
   
   
   
  
 ferrous sulfate 325 mg (65 mg iron) tablet Your last dose was: Your next dose is: Take 325 mg by mouth daily. 325 mg  
    
   
   
   
  
 insulin NPH/insulin regular 100 unit/mL (70-30) injection Commonly known as:  HumuLIN 70/30 Your last dose was: Your next dose is:    
   
   
 10 Units by SubCUTAneous route Before breakfast and dinner. 10 Units  
    
   
   
   
  
 metoprolol tartrate 100 mg IR tablet Commonly known as:  LOPRESSOR Your last dose was: Your next dose is: Take 100 mg by mouth nightly.   
 100 mg  
    
   
 Where to Get Your Medications Information on where to get these meds will be given to you by the nurse or doctor. ! Ask your nurse or doctor about these medications  
  amLODIPine 10 mg tablet  
 amoxicillin-clavulanate 875-125 mg per tablet  
 insulin NPH/insulin regular 100 unit/mL (70-30) injection

## 2017-11-23 LAB
ALBUMIN SERPL-MCNC: 2.5 G/DL (ref 3.5–5)
ALBUMIN SERPL-MCNC: 2.6 G/DL (ref 3.5–5)
ALBUMIN/GLOB SERPL: 0.6 {RATIO} (ref 1.1–2.2)
ALP SERPL-CCNC: 128 U/L (ref 45–117)
ALT SERPL-CCNC: 13 U/L (ref 12–78)
ANION GAP SERPL CALC-SCNC: 12 MMOL/L (ref 5–15)
ANION GAP SERPL CALC-SCNC: 13 MMOL/L (ref 5–15)
AST SERPL-CCNC: 15 U/L (ref 15–37)
BASOPHILS # BLD: 0 K/UL (ref 0–0.1)
BASOPHILS NFR BLD: 0 % (ref 0–1)
BILIRUB SERPL-MCNC: 0.1 MG/DL (ref 0.2–1)
BUN SERPL-MCNC: 81 MG/DL (ref 6–20)
BUN SERPL-MCNC: 86 MG/DL (ref 6–20)
BUN/CREAT SERPL: 17 (ref 12–20)
BUN/CREAT SERPL: 17 (ref 12–20)
CALCIUM SERPL-MCNC: 7.9 MG/DL (ref 8.5–10.1)
CALCIUM SERPL-MCNC: 8 MG/DL (ref 8.5–10.1)
CHLORIDE SERPL-SCNC: 107 MMOL/L (ref 97–108)
CHLORIDE SERPL-SCNC: 108 MMOL/L (ref 97–108)
CHOLEST SERPL-MCNC: 199 MG/DL
CO2 SERPL-SCNC: 20 MMOL/L (ref 21–32)
CO2 SERPL-SCNC: 24 MMOL/L (ref 21–32)
CREAT SERPL-MCNC: 4.89 MG/DL (ref 0.55–1.02)
CREAT SERPL-MCNC: 4.93 MG/DL (ref 0.55–1.02)
EOSINOPHIL # BLD: 0.1 K/UL (ref 0–0.4)
EOSINOPHIL NFR BLD: 1 % (ref 0–7)
ERYTHROCYTE [DISTWIDTH] IN BLOOD BY AUTOMATED COUNT: 15.2 % (ref 11.5–14.5)
EST. AVERAGE GLUCOSE BLD GHB EST-MCNC: 200 MG/DL
GLOBULIN SER CALC-MCNC: 4.3 G/DL (ref 2–4)
GLUCOSE BLD STRIP.AUTO-MCNC: 100 MG/DL (ref 65–100)
GLUCOSE BLD STRIP.AUTO-MCNC: 103 MG/DL (ref 65–100)
GLUCOSE BLD STRIP.AUTO-MCNC: 158 MG/DL (ref 65–100)
GLUCOSE BLD STRIP.AUTO-MCNC: 79 MG/DL (ref 65–100)
GLUCOSE BLD STRIP.AUTO-MCNC: 91 MG/DL (ref 65–100)
GLUCOSE SERPL-MCNC: 143 MG/DL (ref 65–100)
GLUCOSE SERPL-MCNC: 79 MG/DL (ref 65–100)
HBA1C MFR BLD: 8.6 % (ref 4.2–6.3)
HCT VFR BLD AUTO: 25 % (ref 35–47)
HDLC SERPL-MCNC: 40 MG/DL
HDLC SERPL: 5 {RATIO} (ref 0–5)
HGB BLD-MCNC: 8.2 G/DL (ref 11.5–16)
LDLC SERPL CALC-MCNC: 102.4 MG/DL (ref 0–100)
LIPID PROFILE,FLP: ABNORMAL
LYMPHOCYTES # BLD: 1.4 K/UL (ref 0.8–3.5)
LYMPHOCYTES NFR BLD: 20 % (ref 12–49)
MAGNESIUM SERPL-MCNC: 1.9 MG/DL (ref 1.6–2.4)
MCH RBC QN AUTO: 25.7 PG (ref 26–34)
MCHC RBC AUTO-ENTMCNC: 32.8 G/DL (ref 30–36.5)
MCV RBC AUTO: 78.4 FL (ref 80–99)
MONOCYTES # BLD: 0.3 K/UL (ref 0–1)
MONOCYTES NFR BLD: 4 % (ref 5–13)
NEUTS SEG # BLD: 5.4 K/UL (ref 1.8–8)
NEUTS SEG NFR BLD: 75 % (ref 32–75)
PHOSPHATE SERPL-MCNC: 4.9 MG/DL (ref 2.6–4.7)
PHOSPHATE SERPL-MCNC: 5.1 MG/DL (ref 2.6–4.7)
PLATELET # BLD AUTO: 344 K/UL (ref 150–400)
POTASSIUM SERPL-SCNC: 4.1 MMOL/L (ref 3.5–5.1)
POTASSIUM SERPL-SCNC: 4.5 MMOL/L (ref 3.5–5.1)
PROT SERPL-MCNC: 6.8 G/DL (ref 6.4–8.2)
RBC # BLD AUTO: 3.19 M/UL (ref 3.8–5.2)
SERVICE CMNT-IMP: ABNORMAL
SERVICE CMNT-IMP: ABNORMAL
SERVICE CMNT-IMP: NORMAL
SODIUM SERPL-SCNC: 141 MMOL/L (ref 136–145)
SODIUM SERPL-SCNC: 143 MMOL/L (ref 136–145)
TRIGL SERPL-MCNC: 283 MG/DL (ref ?–150)
VLDLC SERPL CALC-MCNC: 56.6 MG/DL
WBC # BLD AUTO: 7.2 K/UL (ref 3.6–11)

## 2017-11-23 PROCEDURE — 74011000250 HC RX REV CODE- 250: Performed by: INTERNAL MEDICINE

## 2017-11-23 PROCEDURE — 84100 ASSAY OF PHOSPHORUS: CPT | Performed by: INTERNAL MEDICINE

## 2017-11-23 PROCEDURE — 74011250637 HC RX REV CODE- 250/637: Performed by: INTERNAL MEDICINE

## 2017-11-23 PROCEDURE — 74011250636 HC RX REV CODE- 250/636: Performed by: INTERNAL MEDICINE

## 2017-11-23 PROCEDURE — 80061 LIPID PANEL: CPT | Performed by: INTERNAL MEDICINE

## 2017-11-23 PROCEDURE — 74011000258 HC RX REV CODE- 258: Performed by: INTERNAL MEDICINE

## 2017-11-23 PROCEDURE — 80053 COMPREHEN METABOLIC PANEL: CPT | Performed by: INTERNAL MEDICINE

## 2017-11-23 PROCEDURE — 85025 COMPLETE CBC W/AUTO DIFF WBC: CPT | Performed by: INTERNAL MEDICINE

## 2017-11-23 PROCEDURE — 74011636637 HC RX REV CODE- 636/637: Performed by: INTERNAL MEDICINE

## 2017-11-23 PROCEDURE — 65270000029 HC RM PRIVATE

## 2017-11-23 PROCEDURE — 83036 HEMOGLOBIN GLYCOSYLATED A1C: CPT | Performed by: INTERNAL MEDICINE

## 2017-11-23 PROCEDURE — 80069 RENAL FUNCTION PANEL: CPT | Performed by: INTERNAL MEDICINE

## 2017-11-23 PROCEDURE — 83735 ASSAY OF MAGNESIUM: CPT | Performed by: INTERNAL MEDICINE

## 2017-11-23 PROCEDURE — 36415 COLL VENOUS BLD VENIPUNCTURE: CPT | Performed by: INTERNAL MEDICINE

## 2017-11-23 PROCEDURE — 82962 GLUCOSE BLOOD TEST: CPT

## 2017-11-23 RX ORDER — HYDROCHLOROTHIAZIDE 50 MG/1
50 TABLET ORAL DAILY
COMMUNITY
End: 2017-11-30

## 2017-11-23 RX ORDER — ATORVASTATIN CALCIUM 20 MG/1
40 TABLET, FILM COATED ORAL
Status: DISCONTINUED | OUTPATIENT
Start: 2017-11-23 | End: 2017-11-30 | Stop reason: HOSPADM

## 2017-11-23 RX ORDER — CIPROFLOXACIN HYDROCHLORIDE 3.5 MG/ML
1 SOLUTION/ DROPS TOPICAL
COMMUNITY
End: 2018-03-12

## 2017-11-23 RX ORDER — HYDRALAZINE HYDROCHLORIDE 20 MG/ML
10 INJECTION INTRAMUSCULAR; INTRAVENOUS
Status: DISCONTINUED | OUTPATIENT
Start: 2017-11-23 | End: 2017-11-30 | Stop reason: HOSPADM

## 2017-11-23 RX ORDER — IRBESARTAN 300 MG/1
300 TABLET ORAL DAILY
COMMUNITY
End: 2017-11-30

## 2017-11-23 RX ORDER — LANOLIN ALCOHOL/MO/W.PET/CERES
325 CREAM (GRAM) TOPICAL DAILY
Status: DISCONTINUED | OUTPATIENT
Start: 2017-11-23 | End: 2017-11-30 | Stop reason: HOSPADM

## 2017-11-23 RX ORDER — METOPROLOL TARTRATE 50 MG/1
50 TABLET ORAL ONCE
Status: COMPLETED | OUTPATIENT
Start: 2017-11-24 | End: 2017-11-23

## 2017-11-23 RX ORDER — VALSARTAN 320 MG/1
300 TABLET ORAL DAILY
Status: ON HOLD | COMMUNITY
End: 2017-11-23 | Stop reason: CLARIF

## 2017-11-23 RX ADMIN — METOPROLOL TARTRATE 50 MG: 50 TABLET ORAL at 17:52

## 2017-11-23 RX ADMIN — METOPROLOL TARTRATE 50 MG: 50 TABLET ORAL at 09:43

## 2017-11-23 RX ADMIN — HEPARIN SODIUM 5000 UNITS: 5000 INJECTION, SOLUTION INTRAVENOUS; SUBCUTANEOUS at 06:46

## 2017-11-23 RX ADMIN — METOPROLOL TARTRATE 50 MG: 50 TABLET ORAL at 23:28

## 2017-11-23 RX ADMIN — DOXYCYCLINE 100 MG: 100 INJECTION, POWDER, LYOPHILIZED, FOR SOLUTION INTRAVENOUS at 09:43

## 2017-11-23 RX ADMIN — HEPARIN SODIUM 5000 UNITS: 5000 INJECTION, SOLUTION INTRAVENOUS; SUBCUTANEOUS at 21:34

## 2017-11-23 RX ADMIN — ATORVASTATIN CALCIUM 40 MG: 20 TABLET, FILM COATED ORAL at 21:33

## 2017-11-23 RX ADMIN — HEPARIN SODIUM 5000 UNITS: 5000 INJECTION, SOLUTION INTRAVENOUS; SUBCUTANEOUS at 14:55

## 2017-11-23 RX ADMIN — Medication 10 ML: at 14:56

## 2017-11-23 RX ADMIN — HUMAN INSULIN 10 UNITS: 100 INJECTION, SUSPENSION SUBCUTANEOUS at 08:38

## 2017-11-23 RX ADMIN — Medication 10 ML: at 21:35

## 2017-11-23 RX ADMIN — HYDRALAZINE HYDROCHLORIDE 10 MG: 20 INJECTION INTRAMUSCULAR; INTRAVENOUS at 21:34

## 2017-11-23 RX ADMIN — DOXYCYCLINE 100 MG: 100 INJECTION, POWDER, LYOPHILIZED, FOR SOLUTION INTRAVENOUS at 21:34

## 2017-11-23 RX ADMIN — FERROUS SULFATE TAB 325 MG (65 MG ELEMENTAL FE) 325 MG: 325 (65 FE) TAB at 09:43

## 2017-11-23 RX ADMIN — Medication 10 ML: at 06:00

## 2017-11-23 RX ADMIN — SODIUM BICARBONATE: 84 INJECTION, SOLUTION INTRAVENOUS at 09:52

## 2017-11-23 RX ADMIN — HUMAN INSULIN 5 UNITS: 100 INJECTION, SUSPENSION SUBCUTANEOUS at 17:51

## 2017-11-23 NOTE — PROGRESS NOTES
Problem: Falls - Risk of  Goal: *Absence of Falls  Document Zuly Fall Risk and appropriate interventions in the flowsheet.    Outcome: Progressing Towards Goal  Fall Risk Interventions:            Medication Interventions: Patient to call before getting OOB

## 2017-11-23 NOTE — H&P
[place] [address]  [phone]    Hospitalist Admission Note      NAME:  Ann Whitmore   :   1955   MRN:  [id number]     PCP:  Brittaney Zapien MD     Date/Time:  2017 7:15 PM          Subjective:     CHIEF COMPLAINT: R foot pain    HISTORY OF PRESENT ILLNESS:     Ms. Jade Jaime is a 65 y.o. female w/ hx of DM, HTN who presents with R foot pain. Tells me it's been going on for ~9 days, involving toes, sharp, intermittent, severe, with small skin opening with scant drainage, no bleeding. Febrile to 39.0 celsius at home. No trauma. ED workup showed fractures of the right third and fourth toes, proximal phalanges. Cr was 5.78 with K 5.9. Ms. Jade Jaime is admitted for further evaluation and management of severe MELVA with hyperkalemia.        Past Medical History:   Diagnosis Date    Arrhythmia     fast heart rate and palpitations dec 2010    Arthritis     generalized    Asthma     Chronic pain     headaches and stomach pain x several months    Diabetes ([address]     Diabetic foot ulcer associated with type 2 diabetes mellitus (HCC)     GERD (gastroesophageal reflux disease)     Hypertension     Psychiatric disorder     depression        Past Surgical History:   Procedure Laterality Date    HX CATARACT REMOVAL      cataract removal and lens placement in right eye    HX GYN      LBTL    HX ORTHOPAEDIC      partial amputation of left foot       Social History   Substance Use Topics    Smoking status: Never Smoker    Smokeless tobacco: Never Used    Alcohol use No        Family History   Problem Relation Age of Onset    Heart Disease Mother     Diabetes Father     Heart Disease Father     Diabetes Brother     Cancer Paternal Aunt 39     uterine cancer    Diabetes Brother         Allergies   Allergen Reactions    Pcn [Penicillins] Hives     Patient states she is not allergic 14        Prior to Admission medications Medication Sig Start Date End Date Taking? Authorizing Provider   Tamra Pinaluten 30mg po qd   Yes Phys MD Dede   metoprolol (LOPRESSOR) 100 mg tablet Take 1 tablet by mouth two (2) times a day. Patient taking differently: Take 100 mg by mouth daily. 10/9/14  Yes CHRISTIANO Moffett   insulin NPH/insulin regular (NOVOLIN 70/30, HUMULIN 70/30) 100 unit/mL (70-30) injection 25 ac B, 12 ac S  Patient taking differently: by SubCUTAneous route. 40 units in AM and 15 units in PM 8/21/14  Yes Chuckie Jesus MD   CIPROFLOXACIN HCL/DEXAMETH (CIPRODEX OT) by Otic route. For right eye    Phys MD Dede       Review of Systems:  (bold if positive, if negative)    Gen:  feverEyes:  ENT:  CVS:  Pulm:  GI:    :    MS:  PainswellingSkin:  Psych:  Endo:    Hem:  Renal:    Neuro:            Objective:      VITALS:    Vital signs reviewed; most recent are:    Visit Vitals    /69    Pulse (!) 108    Temp 98 °F (36.7 °C)    Resp 18    Ht 4' 11.06\" (1.5 m)    Wt 64.3 kg (141 lb 12.1 oz)    SpO2 98%    BMI 28.58 kg/m2     SpO2 Readings from Last 6 Encounters:   11/22/17 98%   09/02/14 98%   08/07/14 99%   01/25/11 94%        No intake or output data in the 24 hours ending 11/22/17 1915         Exam:     Physical Exam:    Gen:  Well-developed, well-nourished, in no acute distress  HEENT:  No scleral icterus, hearing intact to voice, moist mucous membranes  Neck:  Supple, without masses. Resp:  No accessory muscle use. CTAB without wheezing, rales, rhonchi  Card: RRR. Normal S1 and S2 without murmurs, rubs, or gallops. No peripheral lower extremity edema. No JVD. Peripheral pulses in tact. Abd:  Normoactive bowel sounds. Soft, non-tender, non-distended. No rebound, no guarding. No appreciable hepatosplenomegaly   Lymph:  No cervical adenopathy  Musc:  No cyanosis or clubbing. R 3rd and 4th toe TTP. Scant drainage  Skin:  No rashes or ulcers; turgor intact.    Neuro:  Cranial nerves are grossly intact, no focal motor weakness, follows commands appropriately  Psych:  Good insight, normal affect. Alert, oriented x 3. Answers questions appropriately       Labs:    Recent Labs      11/22/17   1213   WBC  8.4   HGB  10.1*   HCT  31.5*   PLT  416*     Recent Labs      11/22/17   1213   NA  136   K  5.9*   CL  106   CO2  18*   GLU  133*   BUN  98*   CREA  5.78*   CA  8.4*   MG  2.3     No components found for: GLPOC  No results for input(s): PH, PCO2, PO2, HCO3, FIO2 in the last 72 hours. No results for input(s): INR in the last 72 hours. No lab exists for component: INREXT  Lab Results   Component Value Date/Time    Specimen Description: URINE 09/14/2010 02:20 PM     Lab Results   Component Value Date/Time    Culture result: NO SIGNIFICANT GROWTH 06/02/2014 10:04 AM    Culture result: MIXED UROGENITAL CORINNE ISOLATED 09/14/2010 02:20 PM     All other current labs reviewed in the computer. Imaging/Studies:    R foot xray:  IMPRESSION:  Fractures of the right third and fourth toes, proximal phalanges. EKG: SR, no ST-T changes. Hattie 110  EKG personally reviewed         Assessment / Plan:         MELVA (acute kidney injury): severe, unclear etiology. States she has been taking acetaminophen at home for pain, and not NSAIDS (Motrin, Aleve, ibuprofen, Naproxen, etc). Renal US showed no acute process  -- BMP repeated, now with worsening gap acidosis. Check lactate. Was started on bicarb gtt at [facility], increase bicarb concentration. No fevers or leukocytosis to suggest sepsis/severe sepsis  -- send urine lytes, eos  -- nephrology consulted and following  -- on Levaquin as below, has pyuria and possible UTI. Follow urine culture     Diabetes ([facility] [address] (6/12/2014): type 2, appears uncontrolled. On Novolin 70/30 mix 40u in AM and 15u in PM  -- given severe MELVA, use NPH alone, 5u QPM and 10u QAM  -- SSI  -- send A1c      Foot fracture: unclear etiology. Scant drainage. May have diabetic foot infection.  No radiographic evidence of osteomyelitis on plain film  -- send wound culture  -- start Levaquin and doxycyline  -- ortho consult. Defer to ortho on need for MRI      HTN (hypertension) (6/12/2014): BP controlled  -- cont metoprolol       Hyperlipidemia (6/13/2014)  -- not on statin, check FLP. May benefit from it given DM      Code Status: FULL     Surrogate decision maker: , who is also admitted at [facility]      ED notes and lab results reviewed.        Total time spent with patient: 79 Minutes     Risk of deterioration: High                 Care Plan discussed with: ED provider, Patient, Family, Nursing Staff and >50% of time spent in counseling and coordination of care    Discussed:  Care Plan and D/C Planning    Prophylaxis:  Hep SQ    Disposition:  Home w/Family       ___________________________________________________    Attending Physician: Octavio Almazan MD

## 2017-11-23 NOTE — PROGRESS NOTES
Bedside and Verbal shift change report given to Sinha Labs RN (oncoming nurse) by Dwayne Lares RN (offgoing nurse). Report included the following information SBAR, Kardex, Procedure Summary, Intake/Output, MAR, Accordion, Recent Results, Med Rec Status and Cardiac Rhythm sinus rhythm. Put in consult for inpatient diabetes education to assess patient's home diet and knowledge of how to manage her diabetes. 3:01 PM  Spoke with  [id number] to explain medications given and discuss eye drops sent down to pharmacy. Will call pharmacy to clarify eye drops that patient needs and takes every 5 hours. 4:32 PM  Patient's BG at approximately 3:57 was 79, gave two orange juice and upon recheck at approximately 4:20 it as 103    5:45 PM  Spoke wit Dr Kesha Hendrix about results from lab work.   Sodium Bicarb drip can be stopped at 8pm.

## 2017-11-23 NOTE — PROGRESS NOTES
[facility]  [address] [facility], [address] [place] [address]  [phone]      Medical Progress Note      NAME: Laura Christianson   :  1955  MRM:  [id number]    Date/Time: 2017  1:29 PM       Assessment and Plan:       MEVLA (acute kidney injury) / Hyperkalemia / Non-anion gap metabolic acidosis: severe, unclear etiology. Improving on bicarb gtt and with assistance of Nephrology. K+ is down as well. Continue bicarb gtt at lower rate per nephrology. Serologies sent but may actually be acute on chronic that was exacerbated be dehydration/illness.      Diabetes ([facility] [address] (2014): type 2, appears uncontrolled. On Novolin 70/30 mix 40u in AM and 15u in PM at home. Continue NPH alone. A1c in 8.6, goal for her is less than 7. Continue SSI and FSBG.     Foot fracture: unclear etiology. Scant drainage. May have diabetic foot infection. No radiographic evidence of osteomyelitis on plain film. Wound culture sent. ESR very high, CRP mildly elevated. Continue levaquin and doxycycline. Ortho consult. May need MRI.      HTN (hypertension) (2014): BP controlled. Continue metoprolol     Hyperlipidemia (2014). LDL above 100, will add high intensity statin given DM presence          Subjective:     Chief Complaint:  Patient seen and examined. Chart reviewed. Patient reports that her foot doesn't hurt very much at this time. ROS:  (bold if positive, if negative)      Tolerating PT  Tolerating Diet        Objective:     Last 24hrs VS reviewed since prior progress note.  Most recent are:    Visit Vitals    /83 (BP 1 Location: Right arm, BP Patient Position: At rest;Supine)    Pulse 93    Temp 98.2 °F (36.8 °C)    Resp 18    Ht 4' 11.06\" (1.5 m)    Wt 63.5 kg (139 lb 15.9 oz)    SpO2 96%    BMI 28.22 kg/m2     SpO2 Readings from Last 6 Encounters:   17 96%   14 98%   14 99%   11 94%          Intake/Output Summary (Last 24 hours) at 17 1329  Last data filed at 11/22/17 2303   Gross per 24 hour   Intake                0 ml   Output              825 ml   Net             -825 ml        Physical Exam:    Gen:  Well-developed, well-nourished, in no acute distress  HEENT:  Pink conjunctivae, PERRL, hearing intact to voice, moist mucous membranes  Neck:  Supple, without masses, thyroid non-tender  Resp:  No accessory muscle use, clear breath sounds without wheezes rales or rhonchi  Card:  No murmurs, normal S1, S2 without thrills, bruits or peripheral edema  Abd:  Soft, non-tender, non-distended, normoactive bowel sounds are present, no palpable organomegaly and no detectable hernias  Lymph:  No cervical or inguinal adenopathy  Musc:  No cyanosis or clubbing  Skin:  Right foot with discoloration of 2nd through 5th toes and MTP heads.  Left TMA  Neuro:  Cranial nerves are grossly intact, no focal motor weakness, follows commands appropriately  Psych:  Good insight, oriented to person, place and time, alert    Telemetry reviewed:   normal sinus rhythm  __________________________________________________________________  Medications Reviewed: (see below)  Medications:     Current Facility-Administered Medications   Medication Dose Route Frequency    ferrous sulfate tablet 325 mg  325 mg Oral DAILY    sodium bicarbonate (8.4%) 100 mEq in sterile water 1,000 mL infusion   IntraVENous CONTINUOUS    sodium chloride (NS) flush 5-10 mL  5-10 mL IntraVENous Q8H    sodium chloride (NS) flush 5-10 mL  5-10 mL IntraVENous PRN    naloxone (NARCAN) injection 0.4 mg  0.4 mg IntraVENous PRN    insulin lispro (HUMALOG) injection   SubCUTAneous AC&HS    glucose chewable tablet 16 g  4 Tab Oral PRN    dextrose (D50W) injection syrg 12.5-25 g  12.5-25 g IntraVENous PRN    glucagon (GLUCAGEN) injection 1 mg  1 mg IntraMUSCular PRN    levoFLOXacin (LEVAQUIN) tablet 750 mg  750 mg Oral Q48H    doxycycline (VIBRAMYCIN) 100 mg in 0.9% sodium chloride (MBP/ADV) 100 mL  100 mg IntraVENous Q12H    heparin (porcine) injection 5,000 Units  5,000 Units SubCUTAneous Q8H    metoprolol tartrate (LOPRESSOR) tablet 50 mg  50 mg Oral BID    insulin NPH (NOVOLIN N, HUMULIN N) injection 5 Units  5 Units SubCUTAneous ACD    insulin NPH (NOVOLIN N, HUMULIN N) injection 10 Units  10 Units SubCUTAneous ACB        Lab Data Reviewed: (see below)  Lab Review:     Recent Labs      11/23/17   0116  11/22/17   1213   WBC  7.2  8.4   HGB  8.2*  10.1*   HCT  25.0*  31.5*   PLT  344  416*     Recent Labs      11/23/17   0116 11/22/17 2008 11/22/17   1806  11/22/17   1213   NA  141   --   140  136   K  4.5   --   4.7  5.9*   CL  108   --   109*  106   CO2  20*   --   12*  18*   GLU  143*   --   237*  133*   BUN  86*   --   92*  98*   CREA  4.93*   --   5.40*  5.78*   CA  7.9*   --   8.1*  8.4*   MG  1.9  2.2   --   2.3   PHOS  5.1*   --    --    --    ALB  2.5*   --    --    --    TBILI  0.1*   --    --    --    SGOT  15   --    --    --    ALT  13   --    --    --      Lab Results   Component Value Date/Time    Glucose (POC) 100 11/23/2017 11:20 AM    Glucose (POC) 91 11/23/2017 07:38 AM    Glucose (POC) 180 11/22/2017 09:22 PM    Glucose (POC) 158 09/02/2014 10:19 AM    Glucose (POC) 100 08/07/2014 10:11 PM    Glucose  non fasting 09/11/2014 01:43 PM    Glucose  08/21/2014 11:01 AM     No results for input(s): PH, PCO2, PO2, HCO3, FIO2 in the last 72 hours. No results for input(s): INR in the last 72 hours.     No lab exists for component: INREXT  All Micro Results     Procedure Component Value Units Date/Time    LAKESHA, Yadira Galindo [[id number]] Collected:  11/22/17 2015    Order Status:  Completed Specimen:  Wound from Foot Updated:  11/23/17 1121     Special Requests: NO SPECIAL REQUESTS        GRAM STAIN RARE WBCS SEEN         NO ORGANISMS SEEN        Culture result:         CULTURE IN [address] UPDATES TO FOLLOW    CULTURE, URINE [[id number]] Collected:  11/22/17 6065 Order Status:  Completed Specimen:  Urine from Clean catch Updated:  11/22/17 8438          I have reviewed notes of prior 24hr.     Other pertinent lab: None    Total time spent with patient: 35 mins                  Care Plan discussed with: Patient, Family, Nursing Staff and >50% of time spent in counseling and coordination of care    Discussed:  Care Plan and D/C Planning    Prophylaxis:  Hep SQ    Disposition:   PT, OT, RN           ___________________________________________________    Attending Physician: Kimberly Dc, DO

## 2017-11-23 NOTE — CONSULTS
[facility]                                                       Renal Physician Consult Note     Patient: Joanie Hanson MRN: [id number]  PATIENT PCP:Julieta Aguayo MD   YOB: 1955  Age: 65 y.o. Sex: female      ADMITTED:  11/22/2017 to Rosanne Mcdowell MD by Rosanne Mcdowell MD for MELVA (acute kidney injury) [facility])  ADMIT DATE:11/22/2017    CONSULTATION DATE:11/22/2017     REASON FOR CONSULTATION: I have been asked to see this patient by  for Advise/Opinion for Renal Failure    IMPRESSION:  Severe renal failure. Hyperkalemia. NAGMA  Heavy proteinuria. DM foot infection. Long term DM. Prob underlying DM nephropathy.     Unclear if acute or chronic.     We have no labs since 2014. At that time she had slight increase creat, proteinuria and hyperkallemia.     No prior renal evaluation.     RECOMMEND:  1. Repeat K and control. 2. Agree with IV. 3. Scan bladder. May need U/S.  4. Serologies. 5. Serial labs. Subjective:   HPI: 65 y.o. female w/ hx of DM, HTN who presents with R foot pain for ~9 days, involving toes, sharp, intermittent, severe. Febrile. No trauma. ED workup showed fractures of the right third and fourth toes, proximal phalanges. Cr was 5.78 with K 5.9. Patient has no episode of hypotension. Patient has no history of exposure to intravenous iodinated contrast.   Patient has no history of significant NSAID use. Patient indicates no previous knowledge of renal failure. The patient has no previous history of hematuria; there is no history of proteinuria but prior U/A and PCR suggests otherwise. The patient has no history of nephrolithiasis and no knowledge of pyelonephritis. Patient has  no history of hepatitis, jaundice. Review of Systems: Total of 11 systems reviewed they are negative except per HPI.   Allergies   Allergen Reactions    Pcn [Penicillins] Hives     Patient states she is not allergic 09/2/14       PMHx: has a past medical history of Arrhythmia; Arthritis; Asthma; Chronic pain; Diabetes ([address] Diabetic foot ulcer associated with type 2 diabetes mellitus ([address] GERD (gastroesophageal reflux disease); Hypertension; and Psychiatric disorder. PSurgHx:  has a past surgical history that includes gyn; cataract removal; and orthopaedic. PSocHx:  reports that she has never smoked. She has never used smokeless tobacco. She reports that she does not drink alcohol or use illicit drugs. Family History   Problem Relation Age of Onset    Heart Disease Mother     Diabetes Father     Heart Disease Father     Diabetes Brother     Cancer Paternal Aunt 39     uterine cancer    Diabetes Brother           Objective:    Vitals:    Vitals:    11/22/17 1630 11/22/17 1700 11/22/17 1753 11/22/17 2048   BP: 138/53 110/47 141/69 135/77   Pulse: 99 (!) 105 (!) 108 (!) 106   Resp: 18 21 18 16   Temp:    98.5 °F (36.9 °C)   SpO2: 97% 95% 98% 100%   Weight:       Height:            Physical Exam:  General:Alert, No distress,   Eyes:No scleral icterus, No conjunctival pallor  Neck:Supple,no mass palpable,no thyromegaly  Lungs:Clears to auscultation Bilaterally, normal respiratory effort  CVS:RRR, S1 S2 normal,  No rub, no LE edema  Abdomen:Soft, Non tender, No hepatosplenomegaly  Extremities:No cyanosis, No clubbing, normal gait. Skin:No rash or lesions, Warm and DRY with foot lesions as described. Psych: AAO X 3, appropriate affect   : not examined  NEURO: A&O x 3, non-focal.            Care Plan discussed with:  Pt, nurse   Chart reviewed.   Total time spent with patient:  45 minutes    Lab Data Personally Reviewed: (see below)  Recent Labs      11/22/17   1213   WBC  8.4   HGB  10.1*   HCT  31.5*   PLT  416*     Recent Labs      11/22/17 2008 11/22/17   1806  11/22/17   1213   NA   --   140  136   K   --   4.7  5.9*   CL   --   109*  106   CO2   --   12*  18*   BUN   --   92*  98*   CREA   --   5.40*  5.78*   GLU   --   237* 133*   CA   --   8.1*  8.4*   MG  2.2   --   2.3     Lab Results   Component Value Date/Time    Color YELLOW/STRAW 2017 01:54 PM    Appearance CLEAR 2017 01:54 PM    Specific gravity 1.020 2017 01:54 PM    Specific gravity 1.017 2014 09:59 PM    pH (UA) 5.5 2017 01:54 PM    Protein >300 2017 01:54 PM    Glucose 100 2017 01:54 PM    Ketone NEGATIVE  2017 01:54 PM    Bilirubin NEGATIVE  2017 01:54 PM    Urobilinogen 0.2 2017 01:54 PM    Nitrites NEGATIVE  2017 01:54 PM    Leukocyte Esterase NEGATIVE  2017 01:54 PM    Epithelial cells FEW 2017 01:54 PM    Bacteria 1+ 2017 01:54 PM    WBC 10-20 2017 01:54 PM    RBC 0-5 2017 01:54 PM     Lab Results   Component Value Date/Time    Specimen Description: URINE 2010 02:20 PM     Lab Results   Component Value Date/Time    Culture result: NO SIGNIFICANT GROWTH 2014 10:04 AM    Culture result: MIXED UROGENITAL CORINNE ISOLATED 2010 02:20 PM     Prior to Admission Medications   Prescriptions Last Dose Informant Patient Reported? Taking? CIPROFLOXACIN HCL/DEXAMETH (CIPRODEX OT) Unknown at Unknown time  Yes No   Sig: Administer  to right eye every two (2) hours (while awake). For right eye   EPOETIN GAETANO INJECTION   Yes Yes   Sig: by Injection route. ferrous sulfate 325 mg (65 mg iron) tablet   Yes Yes   Sig: Take 325 mg by mouth daily. furosemide (LASIX) 40 mg tablet   Yes Yes   Sig: Take 40 mg by mouth two (2) times a day. insulin NPH/insulin regular (HUMULIN 70/30) 100 unit/mL (70-30) injection   Yes Yes   Si Units by SubCUTAneous route Daily (before breakfast). insulin NPH/insulin regular (HUMULIN 70/30) 100 unit/mL (70-30) injection   Yes Yes   Sig: 15 Units by SubCUTAneous route Daily (before dinner). metoprolol tartrate (LOPRESSOR) 100 mg IR tablet   Yes Yes   Sig: Take 100 mg by mouth nightly.       Facility-Administered Medications: None Current Medications list Personally Reviewed   [x]     Yes        []           No      Thank you for allowing us to participate in the care this patient. We will follow patient with you. Signed By: Dodie Raygoza MD                         11/22/2017    [facility]  [address]  [place], [address] [place] [address]  Phone - [phone]  Fax - [phone]  [email or web address]

## 2017-11-23 NOTE — CONSULTS
Orthopedic History and Physical     Patient: Angel Mata MRN: [id number]  SSN: [id number]    YOB: 1955  Age: 65 y.o. Sex: female          Subjective:     Patient is a 65 y.o.  female who complains of pain in 3rd and 4th right toes. Pt admitted for hyperkalemia, MELVA. Pt reports she fell one month ago injuring her right foot. She reports ongoing complaints of pain. Xrays confirm fractures of the right third and fourth toes, proximal phalanges, without evidence of osteo. Pt denies fevers/ chills. Ortho consulted for evaluation. HPI limited due to language barrier. Ramona helped translate. Hx of left foot transmetatarsal amputation in [place] several years ago due to infection. Patient Active Problem List    Diagnosis Date Noted    MELVA (acute kidney injury) ([address] 11/22/2017    Foot fracture 11/22/2017    High anion gap metabolic acidosis 06/22/1301    Hyperlipidemia 06/13/2014    Anemia 06/13/2014    Diabetes ([address] 06/12/2014    HTN (hypertension) 06/12/2014    Edema 06/12/2014    Diabetic foot ulcer ([address] 06/12/2014     Past Medical History:   Diagnosis Date    Arrhythmia     fast heart rate and palpitations dec 2010    Arthritis     generalized    Asthma     Chronic pain     headaches and stomach pain x several months    Diabetes ([address]     Diabetic foot ulcer associated with type 2 diabetes mellitus ([address]     GERD (gastroesophageal reflux disease)     Hypertension     Psychiatric disorder     depression      Past Surgical History:   Procedure Laterality Date    HX CATARACT REMOVAL      cataract removal and lens placement in right eye    HX GYN      LBTL    HX ORTHOPAEDIC      partial amputation of left foot      Prior to Admission medications    Medication Sig Start Date End Date Taking? Authorizing Provider   irbesartan (AVAPRO) 300 mg tablet Take 300 mg by mouth daily.    Yes Historical Provider   hydroCHLOROthiazide (HYDRODIURIL) 50 mg tablet Take 50 mg by mouth daily. Yes Historical Provider   ciprofloxacin HCl (CILOXAN) 0.3 % ophthalmic solution Administer 1 Drop to right eye every two (2) hours. Yes Historical Provider   insulin NPH/insulin regular (HUMULIN 70/30) 100 unit/mL (70-30) injection 40 Units by SubCUTAneous route Daily (before breakfast). Yes Historical Provider   insulin NPH/insulin regular (HUMULIN 70/30) 100 unit/mL (70-30) injection 15 Units by SubCUTAneous route Daily (before dinner). Yes Historical Provider   metoprolol tartrate (LOPRESSOR) 100 mg IR tablet Take 100 mg by mouth nightly. Yes Historical Provider   furosemide (LASIX) 40 mg tablet Take 40 mg by mouth two (2) times a day. Yes Historical Provider   ferrous sulfate 325 mg (65 mg iron) tablet Take 325 mg by mouth daily. Yes Historical Provider   EPOETIN GAETANO INJECTION by Injection route.    Yes Historical Provider     Current Facility-Administered Medications   Medication Dose Route Frequency    ferrous sulfate tablet 325 mg  325 mg Oral DAILY    sodium bicarbonate (8.4%) 100 mEq in sterile water 1,000 mL infusion   IntraVENous CONTINUOUS    sodium chloride (NS) flush 5-10 mL  5-10 mL IntraVENous Q8H    sodium chloride (NS) flush 5-10 mL  5-10 mL IntraVENous PRN    naloxone (NARCAN) injection 0.4 mg  0.4 mg IntraVENous PRN    insulin lispro (HUMALOG) injection   SubCUTAneous AC&HS    glucose chewable tablet 16 g  4 Tab Oral PRN    dextrose (D50W) injection syrg 12.5-25 g  12.5-25 g IntraVENous PRN    glucagon (GLUCAGEN) injection 1 mg  1 mg IntraMUSCular PRN    levoFLOXacin (LEVAQUIN) tablet 750 mg  750 mg Oral Q48H    doxycycline (VIBRAMYCIN) 100 mg in 0.9% sodium chloride (MBP/ADV) 100 mL  100 mg IntraVENous Q12H    heparin (porcine) injection 5,000 Units  5,000 Units SubCUTAneous Q8H    metoprolol tartrate (LOPRESSOR) tablet 50 mg  50 mg Oral BID    insulin NPH (NOVOLIN N, HUMULIN N) injection 5 Units  5 Units SubCUTAneous ACD    insulin NPH (NOVOLIN N, HUMULIN N) injection 10 Units  10 Units SubCUTAneous ACB      Allergies   Allergen Reactions    Pcn [Penicillins] Hives     Patient states she is not allergic 14      Social History   Substance Use Topics    Smoking status: Never Smoker    Smokeless tobacco: Never Used    Alcohol use No      Family History   Problem Relation Age of Onset    Heart Disease Mother     Diabetes Father     Heart Disease Father     Diabetes Brother     Cancer Paternal Aunt 39     uterine cancer    Diabetes Brother         Review of Systems  A comprehensive review of systems was negative except for that written in the HPI. Objective:     Patient Vitals for the past 1 hrs:   BP Temp Pulse Resp SpO2   17 1119 191/83 98.2 °F (36.8 °C) 93 18 96 %     Temp (24hrs), Av.3 °F (36.8 °C), Min:98 °F (36.7 °C), Max:98.6 °F (37 °C)      EXAM:   Physical Exam:   Patient appears generally well, mood and affect are appropriate and pleasant. Extremities: Right foot with minor bruising noted at 4th and 5th phalanx. Scant amount of tissue weeping noted between 4th and 5th toe. Skin appears macerated. No wound noted. No opening of skin. Foot warm. PP +2. Full sensation noted. Vascular: 2+ dorsalis pedis pulses bilaterally. Imaging Review    Imaging Review:EXAM:  XR FOOT RT MIN 3 V     INDICATION:   r/o osteo; pain, swelling 3-5th toes.     COMPARISON:  None.     FINDINGS:  Three views of the right foot demonstrate fractures of the proximal  phalanges of the third and fourth toes with overriding of the fracture  fragments. There is no radiographic evidence of osteomyelitis. The soft tissues  show no foreign body or emphysema.  Vascular calcifications are noted.     IMPRESSION  IMPRESSION:  Fractures of the right third and fourth toes, proximal phalanges.       Labs:   Recent Results (from the past 12 hour(s))   METABOLIC PANEL, COMPREHENSIVE    Collection Time: 17  1:16 AM   Result Value Ref Range Sodium 141 136 - 145 mmol/L    Potassium 4.5 3.5 - 5.1 mmol/L    Chloride 108 97 - 108 mmol/L    CO2 20 (L) 21 - 32 mmol/L    Anion gap 13 5 - 15 mmol/L    Glucose 143 (H) 65 - 100 mg/dL    BUN 86 (H) 6 - 20 MG/DL    Creatinine 4.93 (H) 0.55 - 1.02 MG/DL    BUN/Creatinine ratio 17 12 - 20      GFR est AA 11 (L) >60 ml/min/1.73m2    GFR est non-AA 9 (L) >60 ml/min/1.73m2    Calcium 7.9 (L) 8.5 - 10.1 MG/DL    Bilirubin, total 0.1 (L) 0.2 - 1.0 MG/DL    ALT (SGPT) 13 12 - 78 U/L    AST (SGOT) 15 15 - 37 U/L    Alk. phosphatase 128 (H) 45 - 117 U/L    Protein, total 6.8 6.4 - 8.2 g/dL    Albumin 2.5 (L) 3.5 - 5.0 g/dL    Globulin 4.3 (H) 2.0 - 4.0 g/dL    A-G Ratio 0.6 (L) 1.1 - 2.2     CBC WITH AUTOMATED DIFF    Collection Time: 11/23/17  1:16 AM   Result Value Ref Range    WBC 7.2 3.6 - 11.0 K/uL    RBC 3.19 (L) 3.80 - 5.20 M/uL    HGB 8.2 (L) 11.5 - 16.0 g/dL    HCT 25.0 (L) 35.0 - 47.0 %    MCV 78.4 (L) 80.0 - 99.0 FL    MCH 25.7 (L) 26.0 - 34.0 PG    MCHC 32.8 30.0 - 36.5 g/dL    RDW 15.2 (H) 11.5 - 14.5 %    PLATELET 924 946 - 375 K/uL    NEUTROPHILS 75 32 - 75 %    LYMPHOCYTES 20 12 - 49 %    MONOCYTES 4 (L) 5 - 13 %    EOSINOPHILS 1 0 - 7 %    BASOPHILS 0 0 - 1 %    ABS. NEUTROPHILS 5.4 1.8 - 8.0 K/UL    ABS. LYMPHOCYTES 1.4 0.8 - 3.5 K/UL    ABS. MONOCYTES 0.3 0.0 - 1.0 K/UL    ABS. EOSINOPHILS 0.1 0.0 - 0.4 K/UL    ABS.  BASOPHILS 0.0 0.0 - 0.1 K/UL   PHOSPHORUS    Collection Time: 11/23/17  1:16 AM   Result Value Ref Range    Phosphorus 5.1 (H) 2.6 - 4.7 MG/DL   HEMOGLOBIN A1C WITH EAG    Collection Time: 11/23/17  1:16 AM   Result Value Ref Range    Hemoglobin A1c 8.6 (H) 4.2 - 6.3 %    Est. average glucose 200 mg/dL   MAGNESIUM    Collection Time: 11/23/17  1:16 AM   Result Value Ref Range    Magnesium 1.9 1.6 - 2.4 mg/dL   LIPID PANEL    Collection Time: 11/23/17  1:16 AM   Result Value Ref Range    LIPID PROFILE          Cholesterol, total 199 <200 MG/DL    Triglyceride 283 (H) <150 MG/DL    HDL Cholesterol 40 MG/DL    LDL, calculated 102.4 (H) 0 - 100 MG/DL    VLDL, calculated 56.6 MG/DL    CHOL/HDL Ratio 5.0 0 - 5.0     GLUCOSE, POC    Collection Time: 11/23/17  7:38 AM   Result Value Ref Range    Glucose (POC) 91 65 - 100 mg/dL    Performed by Swapnil Elam (PCT)    GLUCOSE, POC    Collection Time: 11/23/17 11:20 AM   Result Value Ref Range    Glucose (POC) 100 65 - 100 mg/dL    Performed by Swapnil Elam (PCT)        Assessment:     Patient Active Problem List    Diagnosis Date Noted    [facility] 11/22/2017    Foot fracture 11/22/2017    High anion gap metabolic acidosis 53/20/9446    Hyperlipidemia 06/13/2014    Anemia 06/13/2014    Diabetes ([facility] 06/12/2014    HTN (hypertension) 06/12/2014    Edema 06/12/2014    Diabetic foot ulcer ([facility] 06/12/2014         Plan:   Right 4th and 5th toe fractures without evidence of osteomyelitis, no wound, wbc normal, afebrile. Post op shoe, haile tape right 4th and 5th toes. No need for MRI at this time. Recommend outpatient follow up. Discussed case with Dr. Arian Gifford, he agrees with above plan.     Martha Bradley NP  Orthopaedic Nurse Practitioner    [address] [place] [address] (P:  [phone])

## 2017-11-23 NOTE — PROGRESS NOTES
[facility]: MED RECONCILIATION    Family brought in three of patient's home medications. Medications appear to have been purchased outside of the [facility]. Pharmacist updated the three medications on the PTA medication list based on the medication bottles provided. 1. Hydrochlorothiazide 50 mg tablets  2. Irbesartan 300 mg tablets  3. Ciprodex (Ciprofloxacin + Dexamethasone) ophthalmic suspension. Note: Ciprodex ophthalmic suspension is not available in the [place]. If medication is needed for eye infection, [facility] pharmacy stocks Ciprofloxacin opthalmic.     Thank you,    Skipper Romberg, Pharmacist

## 2017-11-23 NOTE — PROGRESS NOTES
..                              [address] [facility]  YOB: 1955          Assessment & Plan:   1. MELVA  - Cr is better  - UO almost 1L  - No indication for RRT   - Watch for now  2. M. Acidosis  - Improved on bicarb gtt  - cut back to prevent overshoot  - repeat labs later today  3. DM2 nephropathy/CKD/renal US with atrophic right kidney         Subjective:   CC: MELVA/CKD - unknown stage  HPI: History limited by language barrier. However, she denied cp, sob, fever, abd pain, edema. ROS: see above   Current Facility-Administered Medications   Medication Dose Route Frequency    ferrous sulfate tablet 325 mg  325 mg Oral DAILY    sodium chloride (NS) flush 5-10 mL  5-10 mL IntraVENous Q8H    sodium chloride (NS) flush 5-10 mL  5-10 mL IntraVENous PRN    naloxone (NARCAN) injection 0.4 mg  0.4 mg IntraVENous PRN    insulin lispro (HUMALOG) injection   SubCUTAneous AC&HS    glucose chewable tablet 16 g  4 Tab Oral PRN    dextrose (D50W) injection syrg 12.5-25 g  12.5-25 g IntraVENous PRN    glucagon (GLUCAGEN) injection 1 mg  1 mg IntraMUSCular PRN    levoFLOXacin (LEVAQUIN) tablet 750 mg  750 mg Oral Q48H    doxycycline (VIBRAMYCIN) 100 mg in 0.9% sodium chloride (MBP/ADV) 100 mL  100 mg IntraVENous Q12H    heparin (porcine) injection 5,000 Units  5,000 Units SubCUTAneous Q8H    metoprolol tartrate (LOPRESSOR) tablet 50 mg  50 mg Oral BID    sodium bicarbonate (8.4%) 150 mEq in sterile water 1,000 mL infusion   IntraVENous CONTINUOUS    insulin NPH (NOVOLIN N, HUMULIN N) injection 5 Units  5 Units SubCUTAneous ACD    insulin NPH (NOVOLIN N, HUMULIN N) injection 10 Units  10 Units SubCUTAneous ACB          Objective:     Vitals:  Blood pressure 160/81, pulse 96, temperature 98.6 °F (37 °C), resp. rate 20, height 4' 11.06\" (1.5 m), weight 63.5 kg (139 lb 15.9 oz), SpO2 99 %.   Temp (24hrs), Av.4 °F (36.9 °C), Min:98 °F (36.7 °C), Max:98.6 °F (37 °C)      Intake and Output:     11/21 1901 - 11/23 0700  In: -   Out: 825 [Urine:825]    Physical Exam:                Patient is intubated:  No    Physical Examination:   GENERAL ASSESSMENT: NAD  HEENT:Nontraumatic   CHEST: CTA  HEART: S1S2  ABDOMEN: Soft,NT,  :Valentin:   EXTREMITY: EDEMA  NEURO:Grossly non focal          ECG/rhythm:    Data Review      No results for input(s): TNIPOC in the last 72 hours. No lab exists for component: ITNL   No results for input(s): CPK, CKMB, TROIQ in the last 72 hours. Recent Labs      11/23/17   0116  11/22/17 2008 11/22/17   1806  11/22/17   1213   NA  141   --   140  136   K  4.5   --   4.7  5.9*   CL  108   --   109*  106   CO2  20*   --   12*  18*   BUN  86*   --   92*  98*   CREA  4.93*   --   5.40*  5.78*   GLU  143*   --   237*  133*   PHOS  5.1*   --    --    --    MG  1.9  2.2   --   2.3   CA  7.9*   --   8.1*  8.4*   ALB  2.5*   --    --    --    WBC  7.2   --    --   8.4   HGB  8.2*   --    --   10.1*   HCT  25.0*   --    --   31.5*   PLT  344   --    --   416*      No results for input(s): INR, PTP, APTT in the last 72 hours. No lab exists for component: INREXT  Needs: urine analysis, urine sodium, protein and creatinine  Lab Results   Component Value Date/Time    Sodium urine, random 38 11/22/2017 01:54 PM    Creatinine, urine 61.11 11/22/2017 08:08 PM         Discussed with:  Pt     : Lacey Hickman MD  11/23/2017        [facility]:  [email or web address]  Jerica Gray office:  [address] [place] [address] [address]  [place], [address] [facility]  Phone: [phone]  Fax :     [phone]    [place] office:  [address] [facility], [address] [place], [place] [address]y  Phone - [phone]  Fax - [phone]

## 2017-11-23 NOTE — PROGRESS NOTES
[facility]: MED RECONCILIATION      Valsartan  - pt may be taking this (entered as other, non-formulary as Vassluten 30mg daily- this is not a medication)  - pt states takes something for kidney and pressure and grand-daughter has medication but she did know her phone number  - never a prescription at [facility] (had some records from 2014)  - admitting MD aware of this    Metoprolol  - pt states taking only at night  - prescribed as BID    Medications added:   · Furosemide  · Epo  · Ferrous sulfate      Medications adjusted:  · CiproDex- uncertain if this is correct. Pt no longer had medications with her (entered by [employer])- did not remove    Information obtained from: Patient (used [employer] phone)      Allergies: Pcn [penicillins]    Prior to Admission Medications:     Medication Documentation Review Audit       Reviewed by Ximena Robb. Gabo Carroll (Pharmacist) on 11/22/17 at 2028         Medication Sig Documenting Provider Last Dose Status Taking? CIPROFLOXACIN HCL/DEXAMETH (CIPRODEX OT) Administer  to right eye every two (2) hours (while awake). For right eye Phys Other, MD Unknown Unknown time Active No             Med Note (Carla Rodriguez. Wed Nov 22, 2017  8:26 PM): Entered by [employer]. Pt no longer had medications with her, nor did she have number of her grand-daughter whom had the medications. Stated right eye every 2 hours      EPOETIN GAETANO INJECTION by Injection route. Historical Provider  Active Yes             Med Note (Carla Semen. Wed Nov 22, 2017  8:21 PM): Pt states 2x/week but has not had recently      ferrous sulfate 325 mg (65 mg iron) tablet Take 325 mg by mouth daily. Historical Provider  Active Yes    furosemide (LASIX) 40 mg tablet Take 40 mg by mouth two (2) times a day. Historical Provider  Active Yes             Med Note (Carla Semen. Wed Nov 22, 2017  8:25 PM):  May take another BP medication- possibly valsartan [employer] entered as Vassluten 30mg daily?). [facility] pharmacy does not have this in their records from 2014 (unlike other meds which are on file from 2014) but pt states it's for kidney and pressure      insulin NPH/insulin regular (HUMULIN 70/30) 100 unit/mL (70-30) injection 40 Units by SubCUTAneous route Daily (before breakfast). Historical Provider  Active Yes    insulin NPH/insulin regular (HUMULIN 70/30) 100 unit/mL (70-30) injection 15 Units by SubCUTAneous route Daily (before dinner). Historical Provider  Active Yes    metoprolol tartrate (LOPRESSOR) 100 mg IR tablet Take 100 mg by mouth nightly. Historical Provider  Active Yes             Med Note (Andrew Caballero. Wed Nov 22, 2017  8:28 PM): Prescribed as BID but pt only takes at night                      Thank you,  Mckayla Rodríguez, Pharm. D.

## 2017-11-23 NOTE — PROGRESS NOTES
TRANSFER - IN REPORT:    Verbal report received from [facility]) on Roxanne Domingo  being received from [facility] ED(unit) for routine progression of care      Report consisted of patients Situation, Background, Assessment and   Recommendations(SBAR). Information from the following report(s) SBAR, Kardex, [facility], Recent Results and Cardiac Rhythm   was reviewed with the receiving nurse. Opportunity for questions and clarification was provided. Assessment completed upon patients arrival to unit and care assumed. Communicated with patient Via Russian. Patient had good understanding. Blue phone is a bedside. Meal heated for patient. Primary Nurse Amy Garsia RN and Blanca RN performed a dual skin assessment on this patient Impairment noted- see wound doc flow sheet  Javier score is 20      New orders to send urine and lactic acid. Multiple labs drawn and sent down. Wound culture sent to lab. New bicarb orders started     Critical CO2 reported to Dr. Fer Quijano. IV infiltrated. New IV placed by Jose Maria Cadena RN    Notified pharmacy of med brought in my grand daughter barbi irbesartan 300 mg daily. Bedside and Verbal shift change report given to [facility] (oncoming nurse) by Sandra Wheatley RN (offgoing nurse). Report included the following information SBAR, Kardex, MAR, Recent Results and Cardiac Rhythm  .

## 2017-11-24 ENCOUNTER — APPOINTMENT (OUTPATIENT)
Dept: MRI IMAGING | Age: 62
DRG: 460 | End: 2017-11-24
Attending: INTERNAL MEDICINE
Payer: MEDICAID

## 2017-11-24 LAB
ALBUMIN SERPL-MCNC: 2.4 G/DL (ref 3.5–5)
ANION GAP SERPL CALC-SCNC: 12 MMOL/L (ref 5–15)
BACTERIA SPEC CULT: NORMAL
BASOPHILS # BLD: 0 K/UL (ref 0–0.1)
BASOPHILS NFR BLD: 0 % (ref 0–1)
BUN SERPL-MCNC: 74 MG/DL (ref 6–20)
BUN/CREAT SERPL: 16 (ref 12–20)
CALCIUM SERPL-MCNC: 8.3 MG/DL (ref 8.5–10.1)
CC UR VC: NORMAL
CHLORIDE SERPL-SCNC: 109 MMOL/L (ref 97–108)
CO2 SERPL-SCNC: 22 MMOL/L (ref 21–32)
CREAT SERPL-MCNC: 4.75 MG/DL (ref 0.55–1.02)
EOSINOPHIL # BLD: 0.2 K/UL (ref 0–0.4)
EOSINOPHIL NFR BLD: 2 % (ref 0–7)
ERYTHROCYTE [DISTWIDTH] IN BLOOD BY AUTOMATED COUNT: 15.2 % (ref 11.5–14.5)
GLUCOSE BLD STRIP.AUTO-MCNC: 128 MG/DL (ref 65–100)
GLUCOSE BLD STRIP.AUTO-MCNC: 156 MG/DL (ref 65–100)
GLUCOSE BLD STRIP.AUTO-MCNC: 179 MG/DL (ref 65–100)
GLUCOSE BLD STRIP.AUTO-MCNC: 181 MG/DL (ref 65–100)
GLUCOSE SERPL-MCNC: 187 MG/DL (ref 65–100)
HCT VFR BLD AUTO: 25.9 % (ref 35–47)
HGB BLD-MCNC: 8.5 G/DL (ref 11.5–16)
LYMPHOCYTES # BLD: 1.7 K/UL (ref 0.8–3.5)
LYMPHOCYTES NFR BLD: 24 % (ref 12–49)
MAGNESIUM SERPL-MCNC: 1.9 MG/DL (ref 1.6–2.4)
MCH RBC QN AUTO: 25.9 PG (ref 26–34)
MCHC RBC AUTO-ENTMCNC: 32.8 G/DL (ref 30–36.5)
MCV RBC AUTO: 79 FL (ref 80–99)
MONOCYTES # BLD: 0.2 K/UL (ref 0–1)
MONOCYTES NFR BLD: 3 % (ref 5–13)
NEUTS SEG # BLD: 5 K/UL (ref 1.8–8)
NEUTS SEG NFR BLD: 71 % (ref 32–75)
PHOSPHATE SERPL-MCNC: 4.5 MG/DL (ref 2.6–4.7)
PLATELET # BLD AUTO: 334 K/UL (ref 150–400)
POTASSIUM SERPL-SCNC: 4.7 MMOL/L (ref 3.5–5.1)
RBC # BLD AUTO: 3.28 M/UL (ref 3.8–5.2)
SERVICE CMNT-IMP: ABNORMAL
SERVICE CMNT-IMP: NORMAL
SODIUM SERPL-SCNC: 143 MMOL/L (ref 136–145)
WBC # BLD AUTO: 7.1 K/UL (ref 3.6–11)

## 2017-11-24 PROCEDURE — 74011250636 HC RX REV CODE- 250/636: Performed by: INTERNAL MEDICINE

## 2017-11-24 PROCEDURE — 97161 PT EVAL LOW COMPLEX 20 MIN: CPT

## 2017-11-24 PROCEDURE — 74011000250 HC RX REV CODE- 250: Performed by: INTERNAL MEDICINE

## 2017-11-24 PROCEDURE — 80069 RENAL FUNCTION PANEL: CPT | Performed by: INTERNAL MEDICINE

## 2017-11-24 PROCEDURE — 83735 ASSAY OF MAGNESIUM: CPT | Performed by: INTERNAL MEDICINE

## 2017-11-24 PROCEDURE — 74011636637 HC RX REV CODE- 636/637: Performed by: INTERNAL MEDICINE

## 2017-11-24 PROCEDURE — 74011250637 HC RX REV CODE- 250/637: Performed by: INTERNAL MEDICINE

## 2017-11-24 PROCEDURE — 85025 COMPLETE CBC W/AUTO DIFF WBC: CPT | Performed by: INTERNAL MEDICINE

## 2017-11-24 PROCEDURE — 65660000000 HC RM CCU STEPDOWN

## 2017-11-24 PROCEDURE — 74011000258 HC RX REV CODE- 258: Performed by: INTERNAL MEDICINE

## 2017-11-24 PROCEDURE — 97116 GAIT TRAINING THERAPY: CPT

## 2017-11-24 PROCEDURE — 77030019895 HC PCKNG STRP IODO -A

## 2017-11-24 PROCEDURE — 73718 MRI LOWER EXTREMITY W/O DYE: CPT

## 2017-11-24 PROCEDURE — 36415 COLL VENOUS BLD VENIPUNCTURE: CPT | Performed by: INTERNAL MEDICINE

## 2017-11-24 PROCEDURE — 82962 GLUCOSE BLOOD TEST: CPT

## 2017-11-24 RX ORDER — AMLODIPINE BESYLATE 5 MG/1
10 TABLET ORAL DAILY
Status: DISCONTINUED | OUTPATIENT
Start: 2017-11-24 | End: 2017-11-30 | Stop reason: HOSPADM

## 2017-11-24 RX ORDER — TOBRAMYCIN AND DEXAMETHASONE 3; 1 MG/ML; MG/ML
1 SUSPENSION/ DROPS OPHTHALMIC
Status: DISCONTINUED | OUTPATIENT
Start: 2017-11-24 | End: 2017-11-24 | Stop reason: CLARIF

## 2017-11-24 RX ORDER — LEVOFLOXACIN 5 MG/ML
500 INJECTION, SOLUTION INTRAVENOUS
Status: DISCONTINUED | OUTPATIENT
Start: 2017-11-24 | End: 2017-11-25

## 2017-11-24 RX ORDER — ONDANSETRON 2 MG/ML
4 INJECTION INTRAMUSCULAR; INTRAVENOUS
Status: DISCONTINUED | OUTPATIENT
Start: 2017-11-24 | End: 2017-11-30 | Stop reason: HOSPADM

## 2017-11-24 RX ORDER — NEOMYCIN SULFATE, POLYMYXIN B SULFATE AND DEXAMETHASONE 3.5; 10000; 1 MG/ML; [USP'U]/ML; MG/ML
1 SUSPENSION/ DROPS OPHTHALMIC
Status: DISCONTINUED | OUTPATIENT
Start: 2017-11-24 | End: 2017-11-30 | Stop reason: HOSPADM

## 2017-11-24 RX ORDER — DOXYCYCLINE HYCLATE 100 MG
100 TABLET ORAL EVERY 12 HOURS
Status: DISCONTINUED | OUTPATIENT
Start: 2017-11-24 | End: 2017-11-25

## 2017-11-24 RX ORDER — LEVOFLOXACIN 5 MG/ML
750 INJECTION, SOLUTION INTRAVENOUS
Status: DISCONTINUED | OUTPATIENT
Start: 2017-11-24 | End: 2017-11-24

## 2017-11-24 RX ADMIN — Medication 10 ML: at 06:35

## 2017-11-24 RX ADMIN — AMLODIPINE BESYLATE 10 MG: 5 TABLET ORAL at 09:00

## 2017-11-24 RX ADMIN — LEVOFLOXACIN 500 MG: 500 INJECTION, SOLUTION INTRAVENOUS at 20:33

## 2017-11-24 RX ADMIN — FERROUS SULFATE TAB 325 MG (65 MG ELEMENTAL FE) 325 MG: 325 (65 FE) TAB at 09:17

## 2017-11-24 RX ADMIN — INSULIN LISPRO 2 UNITS: 100 INJECTION, SOLUTION INTRAVENOUS; SUBCUTANEOUS at 11:30

## 2017-11-24 RX ADMIN — DOXYCYCLINE 100 MG: 100 INJECTION, POWDER, LYOPHILIZED, FOR SOLUTION INTRAVENOUS at 09:17

## 2017-11-24 RX ADMIN — HEPARIN SODIUM 5000 UNITS: 5000 INJECTION, SOLUTION INTRAVENOUS; SUBCUTANEOUS at 06:35

## 2017-11-24 RX ADMIN — METOPROLOL TARTRATE 50 MG: 50 TABLET ORAL at 17:26

## 2017-11-24 RX ADMIN — NEOMYCIN SULFATE, POLYMYXIN B SULFATE AND DEXAMETHASONE 1 DROP: 3.5; 10000; 1 SUSPENSION/ DROPS OPHTHALMIC at 23:50

## 2017-11-24 RX ADMIN — HEPARIN SODIUM 5000 UNITS: 5000 INJECTION, SOLUTION INTRAVENOUS; SUBCUTANEOUS at 22:19

## 2017-11-24 RX ADMIN — HEPARIN SODIUM 5000 UNITS: 5000 INJECTION, SOLUTION INTRAVENOUS; SUBCUTANEOUS at 15:01

## 2017-11-24 RX ADMIN — HUMAN INSULIN 5 UNITS: 100 INJECTION, SUSPENSION SUBCUTANEOUS at 17:25

## 2017-11-24 RX ADMIN — NEOMYCIN SULFATE, POLYMYXIN B SULFATE AND DEXAMETHASONE 1 DROP: 3.5; 10000; 1 SUSPENSION/ DROPS OPHTHALMIC at 13:00

## 2017-11-24 RX ADMIN — ONDANSETRON 4 MG: 2 INJECTION INTRAMUSCULAR; INTRAVENOUS at 00:25

## 2017-11-24 RX ADMIN — HUMAN INSULIN 10 UNITS: 100 INJECTION, SUSPENSION SUBCUTANEOUS at 09:15

## 2017-11-24 RX ADMIN — INSULIN LISPRO 2 UNITS: 100 INJECTION, SOLUTION INTRAVENOUS; SUBCUTANEOUS at 08:15

## 2017-11-24 RX ADMIN — DOXYCYCLINE HYCLATE 100 MG: 100 TABLET, COATED ORAL at 20:33

## 2017-11-24 RX ADMIN — METOPROLOL TARTRATE 50 MG: 50 TABLET ORAL at 09:14

## 2017-11-24 RX ADMIN — Medication 10 ML: at 10:20

## 2017-11-24 RX ADMIN — ATORVASTATIN CALCIUM 40 MG: 20 TABLET, FILM COATED ORAL at 22:18

## 2017-11-24 NOTE — DIABETES MGMT
DTC Consult Note    Recommendations/ Comments: Spoke with patient using [employer] [id number] and  is present. Pt reports was being seen by Bar Muhammad but has not been back in a while. No one has ever provided her with a meter to check her BS. She is having sx of low BS at home and eats fruit or drinks juice to feel better but can't check. She only recently has decreased her portions since she was feeling bad of rice and noodles. Pt gets very little exercise. Pt reports vision issues and needs print material to be big print. Provided our [place] DM self care guide and if print not big enough her  can read it to her per pt. Pt has had low BS in past 24 hours. Is not on mixed insulin at this time and had not received any correction insulin prior to low BS. She may need the morning NPH dose decreased to 8 units if low BS mid afternoon continues     Pt will need to get a meter at [facility] so she can test twice a day once she is home. Pharmacist will be able to help her if she has written list of what she needs. Consult received for:  [x]             Assessment of home management                Chart reviewed and initial evaluation complete on Josselyn Aviles. Patient is a 65 y.o. female with known  Type 2 Diabetes on insulin injections: NPH/Reg 70/30: Humulin 70/30 40 units at breakfast; 15 units at supper at home.     BG monitoring at home 0 times per day    Assessed and instructed patient on the following:   ·  interpretation of lab results-  Pt is not aware of A1c test; explained importance and need to be < 7%; her result  · complications of diabetes mellitus,   · hypoglycemia prevention and treatment- is experiencing sx but has no way to check BS  · Exercise- walks as able   · SMBG skills - has never been provided a meter per pt  · Nutrition- has recently cut back on portions of food; showed her food section of book to sheryl    Encouraged the following:   · dietary modifications: eat 3 meals a day limit portions of rice, beans, tortillas  ·  regular blood sugar monitorin times daily before breakfast and supper    Provided patient with the following: [x]             Survival skills education materials-Wolof version                []             Insulin education materials               []             CHO counting education materials               [x]             Outpatient DTC contact number               []             Glucometer                 A1c:   Lab Results   Component Value Date/Time    Hemoglobin A1c 8.6 2017 01:16 AM       Recent Glucose Results: Lab Results   Component Value Date/Time     (H) 2017 05:19 AM    GLU 79 2017 03:24 PM    GLUCPOC 179 (H) 2017 07:14 AM    GLUCPOC 158 (H) 2017 09:29 PM    GLUCPOC 103 (H) 2017 04:22 PM        Lab Results   Component Value Date/Time    Creatinine 4.75 2017 05:19 AM     Estimated Creatinine Clearance: 10.7 mL/min (based on Cr of 4.75). Active Orders   Diet    DIET DIABETIC CONSISTENT CARB Regular; AHA-LOW-CHOL FAT        PO intake: Patient Vitals for the past 72 hrs:   % Diet Eaten   17 1447 75 %       Current hospital DM medication: NPH 10 units am; 5 units pm;  Lispro insulin correction scale    Will continue to follow as needed. Thank you.     Mariia Lay RD, CDE

## 2017-11-24 NOTE — PROGRESS NOTES
11- CASE MANAGEMENT NOTE:  I met with the pt (does not speak [place]), daughter and son-in-law, Iraida Almazan ([phone]) who acted as a  to determine potential discharge needs. The pt and her  are here from [place] visiting their daughter, son-in-law and Sharon Masker and staying in their 2-story house. She is independent with her ADL's but only walks short distances. She does have a wheelchait and per the MD order, I sent a referral thru [employer] to [facility] for a Jr. Rolling walker and got it [facility]. In the past when she was here she has used the [facility] for medical services. No further discharge needs are anticipated at this time. Care Management Interventions  PCP Verified by CM:  Yes (No local PCP-lives in [address] [facility])  Transition of Care Consult (CM Consult):  Joshua Sink)  Discharge Durable Medical Equipment: Yes Joshua Sink from [facility])  Physical Therapy Consult: Yes  Occupational Therapy Consult: Yes  Current Support Network:  (Pt and  here visiting daughter and son-in-law in their 2-story house)  Confirm Follow Up Transport: Family  Plan discussed with Pt/Family/Caregiver: Yes  Freedom of Choice Offered: Yes  Discharge Location  Discharge Placement:  (Home with family)    Robbin Enriquez, [address], CM

## 2017-11-24 NOTE — PROGRESS NOTES
[address] Dosing Services: Antimicrobial Stewardship Progress Note  Levaquin dose change per P&T renal protocol  Physician: Dr Iva Barboza  Indication: UTI/Diabetic foot infection possible  Day of Therapy: 3    Plan:  Non-Kinetic Antimicrobial Dosing:   Current Regimen:  Levaquin 750 mg IV q48hr  Recommendation: Changed Levaquin to 500 mg IV q48hr    Other Antimicrobial  (not dosed by pharmacist)   Doxycycline 100 mg po q12hr   Cultures     11/22/2017: Foot: pending  11/22/2017: Urine: NG final  UA: 1+bactereia   Serum Creatinine     Lab Results   Component Value Date/Time    Creatinine 4.75 11/24/2017 05:19 AM    Creatinine (POC) 0.8 12/02/2010 05:52 PM       Creatinine Clearance Estimated Creatinine Clearance: 10.7 mL/min (based on Cr of 4.75).      Temp   98.2 °F (36.8 °C)    WBC   Lab Results   Component Value Date/Time    WBC 7.1 11/24/2017 05:19 AM       H/H   Lab Results   Component Value Date/Time    HGB 8.5 11/24/2017 05:19 AM        Platelets   Lab Results   Component Value Date/Time    PLATELET 178 10/32/0974 05:19 AM        Pharmacist: Signed Wilfredo Roblero Contact information: [phone]

## 2017-11-24 NOTE — PROGRESS NOTES
..                              [address] [facility]  YOB: 1955          Assessment & Plan:   1.  1.  MELVA  - Cr is better  - UO recorded  - No indication for RRT   - Watch for now  2. M. Acidosis  - resolved  - stopped bicarb gtt  3. DM2 nephropathy/CKD/renal US with atrophic right kidney       Subjective:   CC: MELVA/CKD unknown stage  HPI: Patient seen. Sat on translation phone for 10 min but no one would come on (yes it was done correctly from our side.) Pt's  translated. She still has some toe discomfort but otherwise feels well. No f/c/ns, sob/cp, n/v/d/abdominal pain.    ROS:See HPI  Current Facility-Administered Medications   Medication Dose Route Frequency    ondansetron (ZOFRAN) injection 4 mg  4 mg IntraVENous Q8H PRN    amLODIPine (NORVASC) tablet 10 mg  10 mg Oral DAILY    tobramycin-dexamethasone (TOBRADEX) 0.3-0.1 % ophthalmic suspension 1 Drop  1 Drop Right Eye Q4HWA    ferrous sulfate tablet 325 mg  325 mg Oral DAILY    atorvastatin (LIPITOR) tablet 40 mg  40 mg Oral QHS    artificial tears (dextran 70-hypromellose) (NATURAL BALANCE) 0.1-0.3 % ophthalmic solution 1 Drop  1 Drop Both Eyes PRN    hydrALAZINE (APRESOLINE) 20 mg/mL injection 10 mg  10 mg IntraVENous Q6H PRN    sodium chloride (NS) flush 5-10 mL  5-10 mL IntraVENous Q8H    sodium chloride (NS) flush 5-10 mL  5-10 mL IntraVENous PRN    naloxone (NARCAN) injection 0.4 mg  0.4 mg IntraVENous PRN    insulin lispro (HUMALOG) injection   SubCUTAneous AC&HS    glucose chewable tablet 16 g  4 Tab Oral PRN    dextrose (D50W) injection syrg 12.5-25 g  12.5-25 g IntraVENous PRN    glucagon (GLUCAGEN) injection 1 mg  1 mg IntraMUSCular PRN    heparin (porcine) injection 5,000 Units  5,000 Units SubCUTAneous Q8H    metoprolol tartrate (LOPRESSOR) tablet 50 mg  50 mg Oral BID    insulin NPH (NOVOLIN N, HUMULIN N) injection 5 Units  5 Units SubCUTAneous ACD    insulin NPH (Deidra Lo N, HUMULIN N) injection 10 Units  10 Units SubCUTAneous ACB          Objective:     Vitals:  Blood pressure 180/90, pulse 75, temperature 97.7 °F (36.5 °C), resp. rate 12, height 4' 11.06\" (1.5 m), weight 63.5 kg (139 lb 15.9 oz), SpO2 96 %. Temp (24hrs), Av.2 °F (36.8 °C), Min:97.7 °F (36.5 °C), Max:98.5 °F (36.9 °C)      Intake and Output:      1901 -  0700  In: 580 [P.O.:480; I.V.:100]  Out: 525 [Urine:525]    Physical Exam:                Patient is intubated: NO    Physical Examination:   GENERAL ASSESSMENT: NAD  HEENT:Nontraumatic   CHEST: CTA  HEART: S1S2  ABDOMEN: Soft,NT,  :Valentin:   EXTREMITY: EDEMA  NEURO:Grossly non focal          ECG/rhythm:    Data Review      No results for input(s): TNIPOC in the last 72 hours. No lab exists for component: ITNL   No results for input(s): CPK, CKMB, TROIQ in the last 72 hours. Recent Labs      17   0519  17   1524  17   0116  17   2008   17   1213   NA  143  143  141   --    < >  136   K  4.7  4.1  4.5   --    < >  5.9*   CL  109*  107  108   --    < >  106   CO2  22  24  20*   --    < >  18*   BUN  74*  81*  86*   --    < >  98*   CREA  4.75*  4.89*  4.93*   --    < >  5.78*   GLU  187*  79  143*   --    < >  133*   PHOS  4.5  4.9*  5.1*   --    --    --    MG  1.9   --   1.9  2.2   --   2.3   CA  8.3*  8.0*  7.9*   --    < >  8.4*   ALB  2.4*  2.6*  2.5*   --    --    --    WBC  7.1   --   7.2   --    --   8.4   HGB  8.5*   --   8.2*   --    --   10.1*   HCT  25.9*   --   25.0*   --    --   31.5*   PLT  334   --   344   --    --   416*    < > = values in this interval not displayed. No results for input(s): INR, PTP, APTT in the last 72 hours.     No lab exists for component: INREXT  Needs: urine analysis, urine sodium, protein and creatinine  Lab Results   Component Value Date/Time    Sodium urine, random 38 2017 01:54 PM    Creatinine, urine 61.11 2017 08:08 PM         Discussed with: Pt and her     : Tonny Reyes MD  11/24/2017        [facility]:  [email or web address]  Xander Cazares office:  [address] [place], [address] [place] [address],[address]  [address]  Phone: [phone]  Fax :     [phone]    [facility] office:  [address] [facility]  Phone - [phone]  Fax - [phone]

## 2017-11-24 NOTE — PROGRESS NOTES
Problem: Mobility Impaired (Adult and Pediatric)  Goal: *Acute Goals and Plan of Care (Insert Text)  Physical Therapy Goals  Initiated 11/24/2017  1. Patient will transfer from bed to chair and chair to bed with modified independence using the least restrictive device within 7 day(s). 2.  Patient will perform sit to stand with modified independence within 7 day(s). 3.  Patient will ambulate with modified independence for 100 feet with the least restrictive device within 7 day(s). 4.  Patient will ascend/descend 13 stairs with single handrail(s) with supervision/set-up within 7 day(s). physical Therapy EVALUATION  Patient: Darleen Hung (53 y.o. female)  Date: 11/24/2017  Primary Diagnosis: MELVA (acute kidney injury) ([facility] [address]        Precautions:   Fall, WBAT (RLE)    ASSESSMENT :  Based on the objective data described below, the patient presents with impaired gait and balance s/p admission for c/o R foot pain. Patient found to have R 3rd and 4th toe fractures being managed conservatively with haile tape and post-op shoe. Patient with history of L midfoot amputation. Patient received supine in bed and agreeable to therapy. Family present. Patient is in town from [facility] visiting family. Prior to admission, pt was ambulatory without assistive device, lives with spouse. Patient currently is staying at her daughter and son-in-law's home. Patient reported she has been walking short distances and has a wheelchair for long distances. Patient tolerated evaluation well. Patient completed bed mobility independently, sit<>stand without AD with standby assist. Patient ambulated 50 feet without assistive device and patient reported L foot pain > R, and noted antalgic gait pattern and minor path deviations. Provided patient with a RW and patient reported much less pain and noted improved gait mechanics and improved balance; pt also reported she felt more steady with the support of the RW.  Patient will continue to benefit from physical therapy to progress mobility, improve balance and gait, continue gait training with RW. Would recommend RW upon discharge; placed order today. Anticipate no further PT needs upon discharge. Patient will benefit from skilled intervention to address the above impairments. Patients rehabilitation potential is considered to be Good  Factors which may influence rehabilitation potential include:   []         None noted  []         Mental ability/status  []         Medical condition  []         Home/family situation and support systems  []         Safety awareness  [x]         Pain tolerance/management  []         Other:      PLAN :  Recommendations and Planned Interventions:  []           Bed Mobility Training             [x]    Neuromuscular Re-Education  [x]           Transfer Training                   []    Orthotic/Prosthetic Training  [x]           Gait Training                         []    Modalities  [x]           Therapeutic Exercises           []    Edema Management/Control  [x]           Therapeutic Activities            [x]    Patient and Family Training/Education  []           Other (comment):    Frequency/Duration: Patient will be followed by physical therapy  5 times a week to address goals. Discharge Recommendations: None  Further Equipment Recommendations for Discharge: anne marie rolling walker     SUBJECTIVE:   Patient stated I feel fine.     OBJECTIVE DATA SUMMARY:   HISTORY:    Past Medical History:   Diagnosis Date    Arrhythmia     fast heart rate and palpitations dec 2010    Arthritis     generalized    Asthma     Chronic pain     headaches and stomach pain x several months    Diabetes ([facility] [address]     Diabetic foot ulcer associated with type 2 diabetes mellitus (HCC)     GERD (gastroesophageal reflux disease)     Hypertension     Psychiatric disorder     depression     Past Surgical History:   Procedure Laterality Date    HX CATARACT REMOVAL      cataract removal and lens placement in right eye    HX GYN      LBTL    HX ORTHOPAEDIC      partial amputation of left foot     Prior Level of Function/Home Situation: see above  Personal factors and/or comorbidities impacting plan of care:     Home Situation  Home Environment: Private residence  One/Two Story Residence: Two story  Living Alone: No  Support Systems: Spouse/Significant Other/Partner, Child(tadeo), Family member(s)  Patient Expects to be Discharged to[de-identified] Private residence  Current DME Used/Available at Home: None    EXAMINATION/PRESENTATION/DECISION MAKING:   Critical Behavior:              Hearing: Auditory  Auditory Impairment: None  Skin:    Edema:   Range Of Motion:  AROM: Within functional limits           PROM: Within functional limits           Strength:    Strength: Within functional limits                    Tone & Sensation:                                  Coordination:     Vision:      Functional Mobility:  Bed Mobility:     Supine to Sit: Independent  Sit to Supine: Independent     Transfers:  Sit to Stand: Stand-by asssistance  Stand to Sit: Stand-by asssistance                       Balance:   Sitting: Intact  Standing: Impaired; Without support  Standing - Static: Good  Standing - Dynamic : Good (w/RW)  Ambulation/Gait Training:  Distance (ft): 100 Feet (ft)  Assistive Device: Gait belt;Walker, rolling (RW--30 feet)  Ambulation - Level of Assistance: Contact guard assistance;Stand-by asssistance        Gait Abnormalities: Antalgic        Base of Support: Narrowed     Speed/Helen: Fluctuations  Step Length: Right shortened;Left shortened       Functional Measure:  Tinetti test:    Sitting Balance: 1  Arises: 1  Attempts to Rise: 2  Immediate Standing Balance: 2  Standing Balance: 2  Nudged: 1  Eyes Closed: 1  Turn 360 Degrees - Continuous/Discontinuous: 0  Turn 360 Degrees - Steady/Unsteady: 1  Sitting Down: 1  Balance Score: 12  Indication of Gait: 1  R Step Length/Height: 1  L Step Length/Height: 1  R Foot Clearance: 1  L Foot Clearance: 1  Step Symmetry: 1  Step Continuity: 1  Path: 1  Trunk: 0  Walking Time: 1  Gait Score: 9  Total Score: 21       Tinetti Test and G-code impairment scale:  Percentage of Impairment CH    0%   CI    1-19% CJ    20-39% CK    40-59% CL    60-79% CM    80-99% CN     100%   Tinetti  Score 0-28 28 23-27 17-22 12-16 6-11 1-5 0       Tinetti Tool Score Risk of Falls  <19 = High Fall Risk  19-24 = Moderate Fall Risk  25-28 = Low Fall Risk  Tinetti ME. Performance-Oriented Assessment of Mobility Problems in Elderly Patients. [facility]; [id number]. (Scoring Description: PT Bulletin Feb. 10, 1993)    Older adults: Sonam Bardales et al, 2009; n = 1000 [facility] elderly evaluated with ABC, BRIE, ADL, and IADL)  · Mean BRIE score for males aged 69-68 years = 26.21(3.40)  · Mean BRIE score for females age 69-68 years = 25.16(4.30)  · Mean BRIE score for males over 80 years = 23.29(6.02)  · Mean BRIE score for females over 80 years = 17.20(8.32)         G codes: In compliance with CMSs Claims Based Outcome Reporting, the following G-code set was chosen for this patient based on their primary functional limitation being treated: The outcome measure chosen to determine the severity of the functional limitation was the Tinetti with a score of 21/28 which was correlated with the impairment scale. ? Mobility - Walking and Moving Around:     - CURRENT STATUS: CJ - 20%-39% impaired, limited or restricted    - GOAL STATUS: CI - 1%-19% impaired, limited or restricted    - D/C STATUS:  ---------------To be determined---------------       Based on the above components, the patient evaluation is determined to be of the following complexity level: LOW     Pain:  Pain Scale 1: Numeric (0 - 10)  Pain Intensity 1: 0              Activity Tolerance:   Good. VSS  Please refer to the flowsheet for vital signs taken during this treatment.   After treatment:   []         Patient left in no apparent distress sitting up in chair  [x]         Patient left in no apparent distress in bed  [x]         Call bell left within reach  [x]         Nursing notified  [x]         Caregiver present  []         Bed alarm activated    COMMUNICATION/EDUCATION:   The patients plan of care was discussed with: Registered Nurse. [x]         Fall prevention education was provided and the patient/caregiver indicated understanding. [x]         Patient/family have participated as able in goal setting and plan of care. [x]         Patient/family agree to work toward stated goals and plan of care. []         Patient understands intent and goals of therapy, but is neutral about his/her participation. []         Patient is unable to participate in goal setting and plan of care.     Thank you for this referral.  Felisha Taylor, PT, DPT   Time Calculation: 15 mins

## 2017-11-24 NOTE — WOUND CARE
Wound care consult: Initial inpatient wound care consult to evaluate wound to right foot. Orthopedic PA present, changing \"buddy tape\" on right 4th and 5th toe. Pt is non english speaking, family present, used blue phone to explain care. Pt in agreement. Assessment  All skin folds and bony prominences assessed, turned with staff assistance. All bony prominences intact. Left transmetatarsal amputation noted. To right foot there is swelling near the 3rd, 4th and 5th toes. Between the 4th and 5th toes there is skin breakdown, skin is macerated and white between toes, on the lateral 4th toe there is small ulceration measuring 0.5 x 0.3 x 0.4 cm, with yellow slough and serous drainage. No foul smell. Treatment  Packing strip placed inside the ulceration, calcium alginate placed between the 4th and 5th toe, gauze wrapped between and around toes, secured with tape. Repositioned in bed   Heels floated    Recommendations/Plan  Remove packing strip from right 4th toe ulceration daily, replace calcium alginate placed between the 4th and 5th toe, gauze wrapped between and around toes, secured with tape. Change daily and as needed to manage drainage. Turn, reposition every 2 hours as tolerated, float heels, place in prevalon boots. Incontinent care with comfort shields. Apply Zinc to all open areas, moisture barrier as needed. Will follow, reconsult as needed.     Thank you,   Mariusz Puckett RN

## 2017-11-24 NOTE — PROGRESS NOTES
Problem: Falls - Risk of  Goal: *Absence of Falls  Document Zuly Fall Risk and appropriate interventions in the flowsheet. Outcome: Progressing Towards Goal  Fall Risk Interventions:            0700- Bedside and Verbal shift change report given to Jaime Zimmerman RN (oncoming nurse) by TIFFANY vasquez (offgoing nurse). Report included the following information SBAR, Kardex and Recent Results.

## 2017-11-24 NOTE — PROGRESS NOTES
Problem: Falls - Risk of  Goal: *Absence of Falls  Document Zuly Fall Risk and appropriate interventions in the flowsheet. Outcome: Progressing Towards Goal  Fall Risk Interventions:    1500- TRANSFER - OUT REPORT:    Verbal report given to [employer] (name) on Joanna Nguyen  being transferred to [facility] (unit) for routine progression of care       Report consisted of patients Situation, Background, Assessment and   Recommendations(SBAR). Information from the following report(s) SBAR, Kardex and Recent Results was reviewed with the receiving nurse. Lines:   Peripheral IV 09/02/14 Right Hand (Active)       Peripheral IV 11/22/17 Left Antecubital (Active)   Site Assessment Clean, dry, & intact 11/24/2017  9:00 AM   Phlebitis Assessment 0 11/24/2017  9:00 AM   Infiltration Assessment 0 11/24/2017  9:00 AM   Dressing Status Clean, dry, & intact 11/24/2017  9:00 AM   Dressing Type Transparent 11/24/2017  9:00 AM   Hub Color/Line Status Blue;Capped;Flushed 11/24/2017  9:00 AM   Action Taken Open ports on tubing capped 11/24/2017  9:00 AM   Alcohol Cap Used Yes 11/24/2017  9:00 AM        Opportunity for questions and clarification was provided.       Patient transported with:   [employer]

## 2017-11-24 NOTE — PROGRESS NOTES
[facility]o [facility]  [address] [facility], [address] [place] [address]  [phone]      Medical Progress Note      NAME: Noe Miller   :  1955  MRM:  [id number]    Date/Time: 2017  1:29 PM       Assessment and Plan:     MELVA (acute kidney injury) / Hyperkalemia / Non-anion gap metabolic acidosis: severe, unclear etiology. SCr slowly improving. Acidosis resolved. Can stop bicarb gtt. Continue to hold ARB and loop. Serologies sent but may actually be acute on chronic that was exacerbated be dehydration/illness.     Diabetes ([facility] [address] (2014): type 2, appears uncontrolled. On Novolin 70/30 mix 40u in AM and 15u in PM at home. Continue NPH alone. A1c in 8.6, goal for her is less than 7. Continue SSI and FSBG.     Foot fracture: unclear etiology. Scant drainage. May have diabetic foot infection. No radiographic evidence of osteomyelitis on plain film. Wound culture NGTD. Conservative management per Ortho.     HTN (hypertension) (2014): BP elevated. Continue metoprolol. Add amlodipine intermittently.     Hyperlipidemia (2014). LDL above 100, will add high intensity statin given DM presence    Right eye pain. Apparently had recent cataract surgery. Has been on ciprodex for 15 days but still having pain. No evidence of intraocular infection. Continue artifical tears. Add tobradex if available.          Subjective:     Chief Complaint:  Patient seen and examined. Chart reviewed. Patient reports that her foot is hurting today. ROS:  (bold if positive, if negative)      Tolerating PT  Tolerating Diet        Objective:     Last 24hrs VS reviewed since prior progress note.  Most recent are:    Visit Vitals    /90    Pulse 75    Temp 97.7 °F (36.5 °C)    Resp 12    Ht 4' 11.06\" (1.5 m)    Wt 63.5 kg (139 lb 15.9 oz)    SpO2 96%    BMI 28.22 kg/m2     SpO2 Readings from Last 6 Encounters:   17 96%   14 98%   14 99%   11 94%            Intake/Output Summary (Last 24 hours) at 11/24/17 1004  Last data filed at 11/24/17 0651   Gross per 24 hour   Intake              580 ml   Output                0 ml   Net              580 ml        Physical Exam:    Gen:  Well-developed, well-nourished, in no acute distress  HEENT:  Pink conjunctivae, PERRL, hearing intact to voice, moist mucous membranes  Neck:  Supple, without masses, thyroid non-tender  Resp:  No accessory muscle use, clear breath sounds without wheezes rales or rhonchi  Card:  No murmurs, normal S1, S2 without thrills, bruits or peripheral edema  Abd:  Soft, non-tender, non-distended, normoactive bowel sounds are present, no palpable organomegaly and no detectable hernias  Lymph:  No cervical or inguinal adenopathy  Musc:  No cyanosis or clubbing  Skin:  Right foot with discoloration of 2nd through 5th toes and MTP heads.  Left TMA  Neuro:  Cranial nerves are grossly intact, no focal motor weakness, follows commands appropriately  Psych:  Good insight, oriented to person, place and time, alert    Telemetry reviewed:   normal sinus rhythm  __________________________________________________________________  Medications Reviewed: (see below)  Medications:     Current Facility-Administered Medications   Medication Dose Route Frequency    ondansetron (ZOFRAN) injection 4 mg  4 mg IntraVENous Q8H PRN    amLODIPine (NORVASC) tablet 10 mg  10 mg Oral DAILY    tobramycin-dexamethasone (TOBRADEX) 0.3-0.1 % ophthalmic suspension 1 Drop  1 Drop Right Eye Q4HWA    ferrous sulfate tablet 325 mg  325 mg Oral DAILY    atorvastatin (LIPITOR) tablet 40 mg  40 mg Oral QHS    artificial tears (dextran 70-hypromellose) (NATURAL BALANCE) 0.1-0.3 % ophthalmic solution 1 Drop  1 Drop Both Eyes PRN    hydrALAZINE (APRESOLINE) 20 mg/mL injection 10 mg  10 mg IntraVENous Q6H PRN    sodium chloride (NS) flush 5-10 mL  5-10 mL IntraVENous Q8H    sodium chloride (NS) flush 5-10 mL  5-10 mL IntraVENous PRN    naloxone (NARCAN) injection 0.4 mg  0.4 mg IntraVENous PRN    insulin lispro (HUMALOG) injection   SubCUTAneous AC&HS    glucose chewable tablet 16 g  4 Tab Oral PRN    dextrose (D50W) injection syrg 12.5-25 g  12.5-25 g IntraVENous PRN    glucagon (GLUCAGEN) injection 1 mg  1 mg IntraMUSCular PRN    levoFLOXacin (LEVAQUIN) tablet 750 mg  750 mg Oral Q48H    doxycycline (VIBRAMYCIN) 100 mg in 0.9% sodium chloride (MBP/ADV) 100 mL  100 mg IntraVENous Q12H    heparin (porcine) injection 5,000 Units  5,000 Units SubCUTAneous Q8H    metoprolol tartrate (LOPRESSOR) tablet 50 mg  50 mg Oral BID    insulin NPH (NOVOLIN N, HUMULIN N) injection 5 Units  5 Units SubCUTAneous ACD    insulin NPH (NOVOLIN N, HUMULIN N) injection 10 Units  10 Units SubCUTAneous ACB        Lab Data Reviewed: (see below)  Lab Review:     Recent Labs      11/24/17 0519 11/23/17   0116  11/22/17   1213   WBC  7.1  7.2  8.4   HGB  8.5*  8.2*  10.1*   HCT  25.9*  25.0*  31.5*   PLT  334  344  416*     Recent Labs      11/24/17 0519 11/23/17   1524  11/23/17 0116 11/22/17 2008   NA  143  143  141   --    K  4.7  4.1  4.5   --    CL  109*  107  108   --    CO2  22  24  20*   --    GLU  187*  79  143*   --    BUN  74*  81*  86*   --    CREA  4.75*  4.89*  4.93*   --    CA  8.3*  8.0*  7.9*   --    MG  1.9   --   1.9  2.2   PHOS  4.5  4.9*  5.1*   --    ALB  2.4*  2.6*  2.5*   --    TBILI   --    --   0.1*   --    SGOT   --    --   15   --    ALT   --    --   13   --      Lab Results   Component Value Date/Time    Glucose (POC) 179 11/24/2017 07:14 AM    Glucose (POC) 158 11/23/2017 09:29 PM    Glucose (POC) 103 11/23/2017 04:22 PM    Glucose (POC) 79 11/23/2017 03:57 PM    Glucose (POC) 100 11/23/2017 11:20 AM     No results for input(s): PH, PCO2, PO2, HCO3, FIO2 in the last 72 hours. No results for input(s): INR in the last 72 hours.     No lab exists for component: INREXT, INREXT  All Micro Results     Procedure Component Value Units Date/Time CULTURE, URINE [[id number]] Collected:  11/22/17 1830    Order Status:  Completed Specimen:  Urine from Clean catch Updated:  11/24/17 0915     Special Requests: --     URINE CULTURE ADD  ON FROM PREVIOUS UA       [place] Count --        [id number]  COLONIES/mL       Culture result:         MIXED UROGENITAL CORINNE ISOLATED    CULTURE, Germanemma Elias STAIN [[id number]] Collected:  11/22/17 2015    Order Status:  Completed Specimen:  Wound from Foot Updated:  11/23/17 1121     Special Requests: NO SPECIAL REQUESTS        GRAM STAIN RARE WBCS SEEN         NO ORGANISMS SEEN        Culture result:         CULTURE IN PROGRESS,FURTHER UPDATES TO FOLLOW          I have reviewed notes of prior 24hr.     Other pertinent lab: None    Total time spent with patient: 40 mins                  Care Plan discussed with: Patient, Family, Nursing Staff and >50% of time spent in counseling and coordination of care    Discussed:  Care Plan and D/C Planning    Prophylaxis:  Hep SQ    Disposition:   PT, OT, RN           ___________________________________________________    Attending Physician: Jacki Velazco DO

## 2017-11-24 NOTE — PROGRESS NOTES
2100 Bedside and Verbal shift change report given to Davis Fritz (oncoming nurse) by Naila Veliz (offgoing nurse). Report included the following information SBAR, Kardex, [facility] and Cardiac Rhythm NSR.     2130  Patient's BP up to 190's, called to Dr. Danita Haji. Order given for hydralazine 10mg IV prn for BP>160.    2200  Patient's right toe washed with soap and water per patient request, and left open to air per PT instructions. 0015  Patient found vomiting. Dr. Danita Haji called, no meds available for N&V. Order given for zofran PRN. 0730  Bedside and Verbal shift change report given to Billie (oncoming nurse) by Naila Veliz (offgoing nurse). Report included the following information SBAR, Kardex, MAR and Recent Results.

## 2017-11-24 NOTE — PROGRESS NOTES
TRANSFER - OUT REPORT:    Verbal report given to [facility] RN(name) on Tori Simmonds  being transferred to 5th Floor(unit) for routine progression of care       Report consisted of patients Situation, Background, Assessment and   Recommendations(SBAR). Information from the following report(s) SBAR, Kardex, Procedure Summary, Intake/Output, MAR, Accordion, Recent Results, Med Rec Status and Cardiac Rhythm sinus rhythm was reviewed with the receiving nurse. Lines:   Peripheral IV 09/02/14 Right Hand (Active)       Peripheral IV 11/22/17 Left Antecubital (Active)   Site Assessment Clean, dry, & intact 11/23/2017  4:30 PM   Phlebitis Assessment 0 11/23/2017  4:30 PM   Infiltration Assessment 0 11/23/2017  4:30 PM   Dressing Status Clean, dry, & intact 11/23/2017  4:30 PM   Dressing Type Transparent 11/23/2017  4:30 PM   Hub Color/Line Status Flushed; Infusing 11/23/2017  4:30 PM   Action Taken Open ports on tubing capped 11/23/2017  4:30 PM   Alcohol Cap Used Yes 11/23/2017  4:30 PM        Opportunity for questions and clarification was provided. Patient transported with:   Monitor  Tech     8:52 PM  Spoke with Dr. Funmilayo Fraga about patient's persistent high blood pressure, transfer is being discontinued and patient will stay on telemetry status.    9:01 PM  Bedside and Verbal shift change report given to JuanM iguel Yanez (oncoming nurse) by Samm Bae RN (offgoing nurse). Report included the following information SBAR, Kardex, Procedure Summary, Intake/Output, MAR, Accordion, Recent Results, Med Rec Status and Cardiac Rhythm sinus rhythm.

## 2017-11-25 LAB
ALBUMIN SERPL-MCNC: 2.5 G/DL (ref 3.5–5)
ANION GAP SERPL CALC-SCNC: 12 MMOL/L (ref 5–15)
BACTERIA SPEC CULT: ABNORMAL
BACTERIA SPEC CULT: ABNORMAL
BUN SERPL-MCNC: 70 MG/DL (ref 6–20)
BUN/CREAT SERPL: 15 (ref 12–20)
CALCIUM SERPL-MCNC: 8.5 MG/DL (ref 8.5–10.1)
CHLORIDE SERPL-SCNC: 107 MMOL/L (ref 97–108)
CO2 SERPL-SCNC: 21 MMOL/L (ref 21–32)
CREAT SERPL-MCNC: 4.66 MG/DL (ref 0.55–1.02)
GLUCOSE BLD STRIP.AUTO-MCNC: 128 MG/DL (ref 65–100)
GLUCOSE BLD STRIP.AUTO-MCNC: 132 MG/DL (ref 65–100)
GLUCOSE BLD STRIP.AUTO-MCNC: 174 MG/DL (ref 65–100)
GLUCOSE BLD STRIP.AUTO-MCNC: 185 MG/DL (ref 65–100)
GLUCOSE SERPL-MCNC: 163 MG/DL (ref 65–100)
GRAM STN SPEC: ABNORMAL
GRAM STN SPEC: ABNORMAL
MAGNESIUM SERPL-MCNC: 1.6 MG/DL (ref 1.6–2.4)
PHOSPHATE SERPL-MCNC: 4.1 MG/DL (ref 2.6–4.7)
POTASSIUM SERPL-SCNC: 4.9 MMOL/L (ref 3.5–5.1)
SERVICE CMNT-IMP: ABNORMAL
SODIUM SERPL-SCNC: 140 MMOL/L (ref 136–145)

## 2017-11-25 PROCEDURE — 36415 COLL VENOUS BLD VENIPUNCTURE: CPT | Performed by: INTERNAL MEDICINE

## 2017-11-25 PROCEDURE — 74011250637 HC RX REV CODE- 250/637: Performed by: INTERNAL MEDICINE

## 2017-11-25 PROCEDURE — 65660000000 HC RM CCU STEPDOWN

## 2017-11-25 PROCEDURE — 80069 RENAL FUNCTION PANEL: CPT | Performed by: INTERNAL MEDICINE

## 2017-11-25 PROCEDURE — 82962 GLUCOSE BLOOD TEST: CPT

## 2017-11-25 PROCEDURE — 74011000258 HC RX REV CODE- 258: Performed by: INTERNAL MEDICINE

## 2017-11-25 PROCEDURE — 83735 ASSAY OF MAGNESIUM: CPT | Performed by: INTERNAL MEDICINE

## 2017-11-25 PROCEDURE — 74011250636 HC RX REV CODE- 250/636: Performed by: INTERNAL MEDICINE

## 2017-11-25 PROCEDURE — 74011636637 HC RX REV CODE- 636/637: Performed by: INTERNAL MEDICINE

## 2017-11-25 RX ORDER — AMPICILLIN 1 G/1
2 INJECTION, POWDER, FOR SOLUTION INTRAMUSCULAR; INTRAVENOUS EVERY 12 HOURS
Status: DISCONTINUED | OUTPATIENT
Start: 2017-11-25 | End: 2017-11-25 | Stop reason: SDUPTHER

## 2017-11-25 RX ADMIN — HEPARIN SODIUM 5000 UNITS: 5000 INJECTION, SOLUTION INTRAVENOUS; SUBCUTANEOUS at 13:21

## 2017-11-25 RX ADMIN — HUMAN INSULIN 10 UNITS: 100 INJECTION, SUSPENSION SUBCUTANEOUS at 08:26

## 2017-11-25 RX ADMIN — HEPARIN SODIUM 5000 UNITS: 5000 INJECTION, SOLUTION INTRAVENOUS; SUBCUTANEOUS at 22:41

## 2017-11-25 RX ADMIN — METOPROLOL TARTRATE 50 MG: 50 TABLET ORAL at 17:06

## 2017-11-25 RX ADMIN — Medication 10 ML: at 06:00

## 2017-11-25 RX ADMIN — HUMAN INSULIN 5 UNITS: 100 INJECTION, SUSPENSION SUBCUTANEOUS at 17:06

## 2017-11-25 RX ADMIN — INSULIN LISPRO 2 UNITS: 100 INJECTION, SOLUTION INTRAVENOUS; SUBCUTANEOUS at 17:07

## 2017-11-25 RX ADMIN — NEOMYCIN SULFATE, POLYMYXIN B SULFATE AND DEXAMETHASONE 1 DROP: 3.5; 10000; 1 SUSPENSION/ DROPS OPHTHALMIC at 06:00

## 2017-11-25 RX ADMIN — Medication 10 ML: at 22:42

## 2017-11-25 RX ADMIN — FERROUS SULFATE TAB 325 MG (65 MG ELEMENTAL FE) 325 MG: 325 (65 FE) TAB at 13:20

## 2017-11-25 RX ADMIN — DOXYCYCLINE HYCLATE 100 MG: 100 TABLET, COATED ORAL at 08:25

## 2017-11-25 RX ADMIN — AMPICILLIN SODIUM 2 G: 2 INJECTION, POWDER, FOR SOLUTION INTRAMUSCULAR; INTRAVENOUS at 17:14

## 2017-11-25 RX ADMIN — AMLODIPINE BESYLATE 10 MG: 5 TABLET ORAL at 08:25

## 2017-11-25 RX ADMIN — ATORVASTATIN CALCIUM 40 MG: 20 TABLET, FILM COATED ORAL at 22:41

## 2017-11-25 RX ADMIN — METOPROLOL TARTRATE 50 MG: 50 TABLET ORAL at 08:25

## 2017-11-25 RX ADMIN — HEPARIN SODIUM 5000 UNITS: 5000 INJECTION, SOLUTION INTRAVENOUS; SUBCUTANEOUS at 06:00

## 2017-11-25 RX ADMIN — NEOMYCIN SULFATE, POLYMYXIN B SULFATE AND DEXAMETHASONE 1 DROP: 3.5; 10000; 1 SUSPENSION/ DROPS OPHTHALMIC at 17:07

## 2017-11-25 RX ADMIN — INSULIN LISPRO 2 UNITS: 100 INJECTION, SOLUTION INTRAVENOUS; SUBCUTANEOUS at 08:25

## 2017-11-25 RX ADMIN — NEOMYCIN SULFATE, POLYMYXIN B SULFATE AND DEXAMETHASONE 1 DROP: 3.5; 10000; 1 SUSPENSION/ DROPS OPHTHALMIC at 10:44

## 2017-11-25 RX ADMIN — NEOMYCIN SULFATE, POLYMYXIN B SULFATE AND DEXAMETHASONE 1 DROP: 3.5; 10000; 1 SUSPENSION/ DROPS OPHTHALMIC at 13:22

## 2017-11-25 RX ADMIN — NEOMYCIN SULFATE, POLYMYXIN B SULFATE AND DEXAMETHASONE 1 DROP: 3.5; 10000; 1 SUSPENSION/ DROPS OPHTHALMIC at 22:44

## 2017-11-25 NOTE — PROGRESS NOTES
..                              [address] [facility]  YOB: 1955          Assessment & Plan:   Alicia. Segundo Ruvalcaba  - Cr is better again  - UO recorded  - No indication for RRT   - Watch for now  2. M. Acidosis  - resolved  - stopped bicarb gtt  3. DM2 nephropathy/CKD/renal US with atrophic right kidney       Subjective:   CC: MELVA/CKD  HPI: Patient denies sob/cp, f/c/ns, abd pain.    ROS: See above  Current Facility-Administered Medications   Medication Dose Route Frequency    ondansetron (ZOFRAN) injection 4 mg  4 mg IntraVENous Q8H PRN    amLODIPine (NORVASC) tablet 10 mg  10 mg Oral DAILY    neomycin-polymyxin-dexamethasone (DEXACIDIN, MAXITROL) 3.5mg/mL-10,000 unit/mL-0.1 % ophthalmic suspension 1 Drop  1 Drop Right Eye Q4HWA    doxycycline (VIBRA-TABS) tablet 100 mg  100 mg Oral Q12H    levoFLOXacin (LEVAQUIN) 500 mg in D5W IVPB  500 mg IntraVENous Q48H    ferrous sulfate tablet 325 mg  325 mg Oral DAILY    atorvastatin (LIPITOR) tablet 40 mg  40 mg Oral QHS    artificial tears (dextran 70-hypromellose) (NATURAL BALANCE) 0.1-0.3 % ophthalmic solution 1 Drop  1 Drop Both Eyes PRN    hydrALAZINE (APRESOLINE) 20 mg/mL injection 10 mg  10 mg IntraVENous Q6H PRN    sodium chloride (NS) flush 5-10 mL  5-10 mL IntraVENous Q8H    sodium chloride (NS) flush 5-10 mL  5-10 mL IntraVENous PRN    naloxone (NARCAN) injection 0.4 mg  0.4 mg IntraVENous PRN    insulin lispro (HUMALOG) injection   SubCUTAneous AC&HS    glucose chewable tablet 16 g  4 Tab Oral PRN    dextrose (D50W) injection syrg 12.5-25 g  12.5-25 g IntraVENous PRN    glucagon (GLUCAGEN) injection 1 mg  1 mg IntraMUSCular PRN    heparin (porcine) injection 5,000 Units  5,000 Units SubCUTAneous Q8H    metoprolol tartrate (LOPRESSOR) tablet 50 mg  50 mg Oral BID    insulin NPH (NOVOLIN N, HUMULIN N) injection 5 Units  5 Units SubCUTAneous ACD    insulin NPH (NOVOLIN N, HUMULIN N) injection 10 Units  10 Units SubCUTAneous ACB          Objective:     Vitals:  Blood pressure 138/67, pulse 76, temperature 98 °F (36.7 °C), resp. rate 15, height 4' 11.06\" (1.5 m), weight 63.5 kg (139 lb 15.9 oz), SpO2 97 %. Temp (24hrs), Av.1 °F (36.7 °C), Min:97.8 °F (36.6 °C), Max:98.5 °F (36.9 °C)      Intake and Output:      1901 -  0700  In: 9084 [P.O.:990; I.V.:100]  Out: 700 [Urine:700]    Physical Exam:                Patient is intubated: NO    Physical Examination:   GENERAL ASSESSMENT: NAD  HEENT:Nontraumatic   CHEST: CTA  HEART: S1S2  ABDOMEN: Soft,NT,  :Valentin:   EXTREMITY: EDEMA  NEURO:Grossly non focal          ECG/rhythm:    Data Review      No results for input(s): TNIPOC in the last 72 hours. No lab exists for component: ITNL   No results for input(s): CPK, CKMB, TROIQ in the last 72 hours. Recent Labs      17   0537  17   0519  17   1524  17   0116   17   1213   NA  140  143  143  141   < >  136   K  4.9  4.7  4.1  4.5   < >  5.9*   CL  107  109*  107  108   < >  106   CO2  21  22  24  20*   < >  18*   BUN  70*  74*  81*  86*   < >  98*   CREA  4.66*  4.75*  4.89*  4.93*   < >  5.78*   GLU  163*  187*  79  143*   < >  133*   PHOS  4.1  4.5  4.9*  5.1*   < >   --    MG  1.6  1.9   --   1.9   < >  2.3   CA  8.5  8.3*  8.0*  7.9*   < >  8.4*   ALB  2.5*  2.4*  2.6*  2.5*   < >   --    WBC   --   7.1   --   7.2   --   8.4   HGB   --   8.5*   --   8.2*   --   10.1*   HCT   --   25.9*   --   25.0*   --   31.5*   PLT   --   334   --   344   --   416*    < > = values in this interval not displayed. No results for input(s): INR, PTP, APTT in the last 72 hours.     No lab exists for component: INREXT  Needs: urine analysis, urine sodium, protein and creatinine  Lab Results   Component Value Date/Time    Sodium urine, random 38 2017 01:54 PM    Creatinine, urine 61.11 2017 08:08 PM         Discussed with:  Pt and  and nursing    : Zeeshan Harry, MD  11/25/2017        [facility]:  [email or web address]  [place] office:  [address] [place] [address], [address] [place] [address],[address]  [address]  Phone: [phone]  Fax :     [phone]    [place] office:  [address] [facility]  Phone - [phone]  Fax - [phone]

## 2017-11-25 NOTE — PROGRESS NOTES
Per family at bedside - patient is not allergic to any medications. Removed penicillin allergy that was listed on chart. Patient verified she is not allergic to any meds - family members translated this info.     Thank you  Nicole Whiting, PharmD  [phone]

## 2017-11-25 NOTE — PROGRESS NOTES
Leo Hickman [facility] [place] [facility]  [address] [facility], [place], [address] [place] [address]  [phone]      Medical Progress Note      NAME: Oliver Lowe   :  1955  MRM:  [id number]    Date/Time: 2017  1:29 PM       Assessment and Plan:     MELVA (acute kidney injury) / Hyperkalemia / Non-anion gap metabolic acidosis: severe, unclear etiology. SCr slowly improving. Acidosis resolved. Continue to hold ARB and loop. Serologies sent but may actually be acute on chronic that was exacerbated be dehydration/illness. 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 MRI positive for osteo and c/w findings by wound care. Cx are Positive for Enterococcus Group D. Stop levofloxacin and doxycycline, start Ampicillin. Likely needs a ray amputation. Podiatry consult pending.     Diabetes ([facility] [address] (2014): type 2, appears uncontrolled. On Novolin 70/30 mix 40u in AM and 15u in PM at home. Continue NPH alone. A1c in 8.6, goal for her is less than 7. Continue SSI and FSBG.      HTN (hypertension) (2014): BP elevated. Continue metoprolol and amlodipine     Hyperlipidemia (2014). LDL above 100, will add high intensity statin given DM presence    Right eye pain. Apparently had recent cataract surgery. Has been on ciprodex for 15 days but still having pain. No evidence of intraocular infection. Continue artifical tears. Add tobradex if available.          Subjective:     Chief Complaint:  Patient seen and examined. Chart reviewed. Patient reports no foot pain. ROS:  (bold if positive, if negative)      Tolerating PT  Tolerating Diet        Objective:     Last 24hrs VS reviewed since prior progress note.  Most recent are:    Visit Vitals    /72    Pulse 84    Temp 98.3 °F (36.8 °C)    Resp 16    Ht 4' 11.06\" (1.5 m)    Wt 63.5 kg (139 lb 15.9 oz)    SpO2 95%    BMI 28.22 kg/m2     SpO2 Readings from Last 6 Encounters:   17 95%   14 98%   14 99%   11 94% No intake or output data in the 24 hours ending 11/25/17 0186     Physical Exam:    Gen:  Well-developed, well-nourished, in no acute distress  HEENT:  Pink conjunctivae, PERRL, hearing intact to voice, moist mucous membranes  Neck:  Supple, without masses, thyroid non-tender  Resp:  No accessory muscle use, clear breath sounds without wheezes rales or rhonchi  Card:  No murmurs, normal S1, S2 without thrills, bruits or peripheral edema  Abd:  Soft, non-tender, non-distended, normoactive bowel sounds are present, no palpable organomegaly and no detectable hernias  Lymph:  No cervical or inguinal adenopathy  Musc:  No cyanosis or clubbing  Skin:  Right foot with discoloration of 2nd through 5th toes and MTP heads.  Left TMA, small lateral punctate lesion on 4th digit of right toe  Neuro:  Cranial nerves are grossly intact, no focal motor weakness, follows commands appropriately  Psych:  Good insight, oriented to person, place and time, alert    Telemetry reviewed:   normal sinus rhythm  __________________________________________________________________  Medications Reviewed: (see below)  Medications:     Current Facility-Administered Medications   Medication Dose Route Frequency    ondansetron (ZOFRAN) injection 4 mg  4 mg IntraVENous Q8H PRN    amLODIPine (NORVASC) tablet 10 mg  10 mg Oral DAILY    neomycin-polymyxin-dexamethasone (DEXACIDIN, MAXITROL) 3.5mg/mL-10,000 unit/mL-0.1 % ophthalmic suspension 1 Drop  1 Drop Right Eye Q4HWA    doxycycline (VIBRA-TABS) tablet 100 mg  100 mg Oral Q12H    levoFLOXacin (LEVAQUIN) 500 mg in D5W IVPB  500 mg IntraVENous Q48H    ferrous sulfate tablet 325 mg  325 mg Oral DAILY    atorvastatin (LIPITOR) tablet 40 mg  40 mg Oral QHS    artificial tears (dextran 70-hypromellose) (NATURAL BALANCE) 0.1-0.3 % ophthalmic solution 1 Drop  1 Drop Both Eyes PRN    hydrALAZINE (APRESOLINE) 20 mg/mL injection 10 mg  10 mg IntraVENous Q6H PRN    sodium chloride (NS) flush 5-10 mL  5-10 mL IntraVENous Q8H    sodium chloride (NS) flush 5-10 mL  5-10 mL IntraVENous PRN    naloxone (NARCAN) injection 0.4 mg  0.4 mg IntraVENous PRN    insulin lispro (HUMALOG) injection   SubCUTAneous AC&HS    glucose chewable tablet 16 g  4 Tab Oral PRN    dextrose (D50W) injection syrg 12.5-25 g  12.5-25 g IntraVENous PRN    glucagon (GLUCAGEN) injection 1 mg  1 mg IntraMUSCular PRN    heparin (porcine) injection 5,000 Units  5,000 Units SubCUTAneous Q8H    metoprolol tartrate (LOPRESSOR) tablet 50 mg  50 mg Oral BID    insulin NPH (NOVOLIN N, HUMULIN N) injection 5 Units  5 Units SubCUTAneous ACD    insulin NPH (NOVOLIN N, HUMULIN N) injection 10 Units  10 Units SubCUTAneous ACB        Lab Data Reviewed: (see below)  Lab Review:     Recent Labs      11/24/17   0519  11/23/17   0116   WBC  7.1  7.2   HGB  8.5*  8.2*   HCT  25.9*  25.0*   PLT  334  344     Recent Labs      11/25/17   0537  11/24/17   0519  11/23/17   1524  11/23/17   0116   NA  140  143  143  141   K  4.9  4.7  4.1  4.5   CL  107  109*  107  108   CO2  21  22  24  20*   GLU  163*  187*  79  143*   BUN  70*  74*  81*  86*   CREA  4.66*  4.75*  4.89*  4.93*   CA  8.5  8.3*  8.0*  7.9*   MG  1.6  1.9   --   1.9   PHOS  4.1  4.5  4.9*  5.1*   ALB  2.5*  2.4*  2.6*  2.5*   TBILI   --    --    --   0.1*   SGOT   --    --    --   15   ALT   --    --    --   13     Lab Results   Component Value Date/Time    Glucose (POC) 132 11/25/2017 12:17 PM    Glucose (POC) 174 11/25/2017 07:09 AM    Glucose (POC) 181 11/24/2017 09:07 PM    Glucose (POC) 128 11/24/2017 04:08 PM    Glucose (POC) 156 11/24/2017 11:19 AM     No results for input(s): PH, PCO2, PO2, HCO3, FIO2 in the last 72 hours. No results for input(s): INR in the last 72 hours.     No lab exists for component: INREXT, INREXT  All Micro Results     Procedure Component Value Units Date/Time    CULTURE, Elena Gal STAIN [[id number]]  (Abnormal)  (Susceptibility) Collected:  11/22/17 2015 Order Status:  Completed Specimen:  Wound from Foot Updated:  11/25/17 1543     Special Requests: NO SPECIAL REQUESTS        GRAM STAIN RARE WBCS SEEN         NO ORGANISMS SEEN        Culture result: LIGHT  NORMAL SKIN CORINNE ISOLATED                 ENTEROCOCCUS FAECALIS GROUP D (A)    CULTURE, URINE [[id number]] Collected:  11/22/17 1830    Order Status:  Completed Specimen:  Urine from Clean catch Updated:  11/24/17 0915     Special Requests: --     URINE CULTURE ADD  ON FROM PREVIOUS UA       [place] Count --        [id number]  COLONIES/mL       Culture result:         MIXED UROGENITAL CORINNE ISOLATED          I have reviewed notes of prior 24hr.     Other pertinent lab: None    Total time spent with patient: 40 mins                  Care Plan discussed with: Patient, Family, Nursing Staff and >50% of time spent in counseling and coordination of care    Discussed:  Care Plan and D/C Planning    Prophylaxis:  Hep SQ    Disposition:  [facility] PT, OT, RN           ___________________________________________________    Attending Physician: Julius Cranker, DO

## 2017-11-25 NOTE — ROUTINE PROCESS
Primary Nurse Nataliia Sanders RN and Yulissa Sprague RN performed a dual skin assessment on this patient Impairment noted- see wound doc flow sheet  Javier score is 22    Pt has wound between 4th and 5th digit on the right foot. Pt also has healed amputation of all digits on left foot.

## 2017-11-25 NOTE — ROUTINE PROCESS
Primary Nurse Nikki Mondragon RN and Devika Chen RN performed a dual skin assessment on this patient Impairment noted- see wound doc flow sheet  Javier score is 22

## 2017-11-26 LAB
ALBUMIN SERPL-MCNC: 2.5 G/DL (ref 3.5–5)
ANION GAP SERPL CALC-SCNC: 13 MMOL/L (ref 5–15)
BASOPHILS # BLD: 0 K/UL (ref 0–0.1)
BASOPHILS NFR BLD: 0 % (ref 0–1)
BUN SERPL-MCNC: 73 MG/DL (ref 6–20)
BUN/CREAT SERPL: 15 (ref 12–20)
CALCIUM SERPL-MCNC: 8.6 MG/DL (ref 8.5–10.1)
CHLORIDE SERPL-SCNC: 108 MMOL/L (ref 97–108)
CO2 SERPL-SCNC: 21 MMOL/L (ref 21–32)
CREAT SERPL-MCNC: 4.83 MG/DL (ref 0.55–1.02)
EOSINOPHIL # BLD: 0.2 K/UL (ref 0–0.4)
EOSINOPHIL NFR BLD: 3 % (ref 0–7)
ERYTHROCYTE [DISTWIDTH] IN BLOOD BY AUTOMATED COUNT: 15.2 % (ref 11.5–14.5)
GLUCOSE BLD STRIP.AUTO-MCNC: 104 MG/DL (ref 65–100)
GLUCOSE BLD STRIP.AUTO-MCNC: 151 MG/DL (ref 65–100)
GLUCOSE BLD STRIP.AUTO-MCNC: 164 MG/DL (ref 65–100)
GLUCOSE BLD STRIP.AUTO-MCNC: 210 MG/DL (ref 65–100)
GLUCOSE SERPL-MCNC: 123 MG/DL (ref 65–100)
HCT VFR BLD AUTO: 27.8 % (ref 35–47)
HGB BLD-MCNC: 9 G/DL (ref 11.5–16)
LYMPHOCYTES # BLD: 1.8 K/UL (ref 0.8–3.5)
LYMPHOCYTES NFR BLD: 25 % (ref 12–49)
MAGNESIUM SERPL-MCNC: 1.7 MG/DL (ref 1.6–2.4)
MCH RBC QN AUTO: 26.1 PG (ref 26–34)
MCHC RBC AUTO-ENTMCNC: 32.4 G/DL (ref 30–36.5)
MCV RBC AUTO: 80.6 FL (ref 80–99)
MONOCYTES # BLD: 0.4 K/UL (ref 0–1)
MONOCYTES NFR BLD: 6 % (ref 5–13)
NEUTS SEG # BLD: 5 K/UL (ref 1.8–8)
NEUTS SEG NFR BLD: 66 % (ref 32–75)
PHOSPHATE SERPL-MCNC: 4.3 MG/DL (ref 2.6–4.7)
PLATELET # BLD AUTO: 340 K/UL (ref 150–400)
POTASSIUM SERPL-SCNC: 4.9 MMOL/L (ref 3.5–5.1)
RBC # BLD AUTO: 3.45 M/UL (ref 3.8–5.2)
SERVICE CMNT-IMP: ABNORMAL
SODIUM SERPL-SCNC: 142 MMOL/L (ref 136–145)
WBC # BLD AUTO: 7.4 K/UL (ref 3.6–11)

## 2017-11-26 PROCEDURE — 97535 SELF CARE MNGMENT TRAINING: CPT

## 2017-11-26 PROCEDURE — 74011000258 HC RX REV CODE- 258: Performed by: INTERNAL MEDICINE

## 2017-11-26 PROCEDURE — 74011250637 HC RX REV CODE- 250/637: Performed by: INTERNAL MEDICINE

## 2017-11-26 PROCEDURE — 80069 RENAL FUNCTION PANEL: CPT | Performed by: INTERNAL MEDICINE

## 2017-11-26 PROCEDURE — 82962 GLUCOSE BLOOD TEST: CPT

## 2017-11-26 PROCEDURE — 83735 ASSAY OF MAGNESIUM: CPT | Performed by: INTERNAL MEDICINE

## 2017-11-26 PROCEDURE — 74011636637 HC RX REV CODE- 636/637: Performed by: INTERNAL MEDICINE

## 2017-11-26 PROCEDURE — 36415 COLL VENOUS BLD VENIPUNCTURE: CPT | Performed by: INTERNAL MEDICINE

## 2017-11-26 PROCEDURE — 65660000000 HC RM CCU STEPDOWN

## 2017-11-26 PROCEDURE — 74011250636 HC RX REV CODE- 250/636: Performed by: INTERNAL MEDICINE

## 2017-11-26 PROCEDURE — 97165 OT EVAL LOW COMPLEX 30 MIN: CPT

## 2017-11-26 PROCEDURE — 85025 COMPLETE CBC W/AUTO DIFF WBC: CPT | Performed by: INTERNAL MEDICINE

## 2017-11-26 RX ADMIN — ATORVASTATIN CALCIUM 40 MG: 20 TABLET, FILM COATED ORAL at 23:13

## 2017-11-26 RX ADMIN — HEPARIN SODIUM 5000 UNITS: 5000 INJECTION, SOLUTION INTRAVENOUS; SUBCUTANEOUS at 05:05

## 2017-11-26 RX ADMIN — METOPROLOL TARTRATE 50 MG: 50 TABLET ORAL at 09:06

## 2017-11-26 RX ADMIN — HEPARIN SODIUM 5000 UNITS: 5000 INJECTION, SOLUTION INTRAVENOUS; SUBCUTANEOUS at 14:04

## 2017-11-26 RX ADMIN — AMPICILLIN SODIUM 2 G: 2 INJECTION, POWDER, FOR SOLUTION INTRAMUSCULAR; INTRAVENOUS at 05:05

## 2017-11-26 RX ADMIN — METOPROLOL TARTRATE 50 MG: 50 TABLET ORAL at 17:55

## 2017-11-26 RX ADMIN — INSULIN LISPRO 2 UNITS: 100 INJECTION, SOLUTION INTRAVENOUS; SUBCUTANEOUS at 02:00

## 2017-11-26 RX ADMIN — Medication 10 ML: at 23:13

## 2017-11-26 RX ADMIN — FERROUS SULFATE TAB 325 MG (65 MG ELEMENTAL FE) 325 MG: 325 (65 FE) TAB at 12:40

## 2017-11-26 RX ADMIN — HUMAN INSULIN 5 UNITS: 100 INJECTION, SUSPENSION SUBCUTANEOUS at 17:54

## 2017-11-26 RX ADMIN — Medication 10 ML: at 14:05

## 2017-11-26 RX ADMIN — NEOMYCIN SULFATE, POLYMYXIN B SULFATE AND DEXAMETHASONE 1 DROP: 3.5; 10000; 1 SUSPENSION/ DROPS OPHTHALMIC at 05:12

## 2017-11-26 RX ADMIN — INSULIN LISPRO 3 UNITS: 100 INJECTION, SOLUTION INTRAVENOUS; SUBCUTANEOUS at 17:54

## 2017-11-26 RX ADMIN — INSULIN LISPRO 2 UNITS: 100 INJECTION, SOLUTION INTRAVENOUS; SUBCUTANEOUS at 12:39

## 2017-11-26 RX ADMIN — NEOMYCIN SULFATE, POLYMYXIN B SULFATE AND DEXAMETHASONE 1 DROP: 3.5; 10000; 1 SUSPENSION/ DROPS OPHTHALMIC at 09:06

## 2017-11-26 RX ADMIN — HEPARIN SODIUM 5000 UNITS: 5000 INJECTION, SOLUTION INTRAVENOUS; SUBCUTANEOUS at 23:12

## 2017-11-26 RX ADMIN — AMPICILLIN SODIUM 2 G: 2 INJECTION, POWDER, FOR SOLUTION INTRAMUSCULAR; INTRAVENOUS at 17:55

## 2017-11-26 RX ADMIN — HUMAN INSULIN 10 UNITS: 100 INJECTION, SUSPENSION SUBCUTANEOUS at 07:39

## 2017-11-26 RX ADMIN — NEOMYCIN SULFATE, POLYMYXIN B SULFATE AND DEXAMETHASONE 1 DROP: 3.5; 10000; 1 SUSPENSION/ DROPS OPHTHALMIC at 23:13

## 2017-11-26 RX ADMIN — Medication 10 ML: at 05:05

## 2017-11-26 RX ADMIN — AMLODIPINE BESYLATE 10 MG: 5 TABLET ORAL at 09:06

## 2017-11-26 RX ADMIN — NEOMYCIN SULFATE, POLYMYXIN B SULFATE AND DEXAMETHASONE 1 DROP: 3.5; 10000; 1 SUSPENSION/ DROPS OPHTHALMIC at 14:05

## 2017-11-26 RX ADMIN — NEOMYCIN SULFATE, POLYMYXIN B SULFATE AND DEXAMETHASONE 1 DROP: 3.5; 10000; 1 SUSPENSION/ DROPS OPHTHALMIC at 17:55

## 2017-11-26 NOTE — PROGRESS NOTES
Occupational Therapy EVALUATION/discharge  Patient: Joanna Nguyen (88 y.o. female)  Date: 11/26/2017  Primary Diagnosis: MELVA (acute kidney injury) ([facility] [address]        Precautions:  Fall, WBAT (RLE)    ASSESSMENT:   Based on the objective data described below, the patient presents at baseline in regards to functional abilities following admission on 11/22 for MELVA. Patient with c/o R foot pain and found to have fractured R 3rd and 4th proximal phalanges. She is cleared for activity and recommended haile taping and post op shoe for R LE. Patient lives with her  however is visiting her daughter locally from [place]. PTA, patient was independent with ADLs and functional mobility however admits to walking only short distances. Patient required supervision for both bed mobility and ADL transfers. She was independent to mod I level for all ADLs. Patient was educated on energy conservation techniques and safe transfer techniques. Patient has no further skilled OT needs and is cleared from OT services at this time. Further skilled acute occupational therapy is not indicated at this time. Discharge Recommendations: None  Further Equipment Recommendations for Discharge: none      SUBJECTIVE:   Patient agreeable to OT evaluation.     OBJECTIVE DATA SUMMARY:   HISTORY:   Past Medical History:   Diagnosis Date    Arrhythmia     fast heart rate and palpitations dec 2010    Arthritis     generalized    Asthma     Chronic pain     headaches and stomach pain x several months    Diabetes ([facility] [address]     Diabetic foot ulcer associated with type 2 diabetes mellitus (HCC)     GERD (gastroesophageal reflux disease)     Hypertension     Psychiatric disorder     depression     Past Surgical History:   Procedure Laterality Date    HX CATARACT REMOVAL      cataract removal and lens placement in right eye    HX GYN      LBTL    HX ORTHOPAEDIC      partial amputation of left foot       Prior Level of Function/Environment/Context: Patient lives with her  and is staying with her daughter locally. See assessment for PLOF information. Home Situation  Home Environment: Private residence  # Steps to Enter: 1  One/Two Story Residence: Two story  # of Interior Steps: 9  Height of Each Step (in): 0 inches  Interior Rails: Left  Lift Chair Available: No  Living Alone: No  Support Systems: Spouse/Significant Other/Partner, Child(tadeo)  Patient Expects to be Discharged to[de-identified] Private residence  Current DME Used/Available at Home: Wheelchair  [x]  Right hand dominant   []  Left hand dominant    EXAMINATION OF PERFORMANCE DEFICITS:  Cognitive/Behavioral Status:  Neurologic State: Alert  Orientation Level: Oriented X4  Cognition: Appropriate decision making; Appropriate for age attention/concentration; Appropriate safety awareness  Perception: Appears intact  Perseveration: No perseveration noted  Safety/Judgement: Awareness of environment    Skin: Intact in the uppers    Edema: None noted in the uppers    Hearing: Auditory  Auditory Impairment: None    Vision/Perceptual:    Tracking: Able to track stimulus in all quadrants w/o difficulty    Diplopia: No    Acuity: Within Defined Limits       Range of Motion:  WDL in the uppers    Strength:  WDL in the uppers    Coordination:  Fine Motor Skills-Upper: Left Intact; Right Intact    Gross Motor Skills-Upper: Left Intact; Right Intact    Tone & Sensation:  Tone: normal  Sensation: intact    Balance:  Sitting: Intact  Standing: Intact; With support    Functional Mobility and Transfers for ADLs:  Bed Mobility:  Rolling: Supervision  Supine to Sit: Supervision  Sit to Supine: Supervision  Scooting: Supervision    Transfers:  Sit to Stand: Supervision  Stand to Sit: Supervision  Bed to Chair: Supervision  Toilet Transfer : Supervision    ADL Assessment:  Feeding: Independent    Oral Facial Hygiene/Grooming: Independent    Bathing: Modified independent    Upper Body Dressing: Independent    Lower Body Dressing: Modified independent    Toileting: Modified independent     Cognitive Retraining  Safety/Judgement: Awareness of environment    Functional Measure:  Barthel Index:    Bathin  Bladder: 10  Bowels: 10  Groomin  Dressing: 10  Feeding: 10  Mobility: 10  Stairs: 5  Toilet Use: 10  Transfer (Bed to Chair and Back): 15  Total: 90       Barthel and G-code impairment scale:  Percentage of impairment CH  0% CI  1-19% CJ  20-39% CK  40-59% CL  60-79% CM  80-99% CN  100%   Barthel Score 0-100 100 99-80 79-60 59-40 20-39 1-19   0   Barthel Score 0-20 20 17-19 13-16 9-12 5-8 1-4 0      The Barthel ADL Index: Guidelines  1. The index should be used as a record of what a patient does, not as a record of what a patient could do. 2. The main aim is to establish degree of independence from any help, physical or verbal, however minor and for whatever reason. 3. The need for supervision renders the patient not independent. 4. A patient's performance should be established using the best available evidence. Asking the patient, friends/relatives and nurses are the usual sources, but direct observation and common sense are also important. However direct testing is not needed. 5. Usually the patient's performance over the preceding 24-48 hours is important, but occasionally longer periods will be relevant. 6. Middle categories imply that the patient supplies over 50 per cent of the effort. 7. Use of aids to be independent is allowed. Link ., Barthel, D.W. (8089). Functional evaluation: the Barthel Index. [address] [facility] (14)2. Bing Zaldivar reagan SONIA Briceno, Missael Kim., Norberto Magana., [place], [address] [place] [address] (). Measuring the change indisability after inpatient rehabilitation; comparison of the responsiveness of the Barthel Index and Functional Traver Measure. Journal of Neurology, Neurosurgery, and Psychiatry, 66(4), 359-723.   RASHEED Sweet, Ritchie Mcgill  WDayanaraJVERONIKA, & Donna, M.A. (2004.) Assessment of post-stroke quality of life in cost-effectiveness studies: The usefulness of the Barthel Index and the EuroQoL-5D. Quality of Life Research, 13, 433-74       G codes: In compliance with CMSs Claims Based Outcome Reporting, the following G-code set was chosen for this patient based on their primary functional limitation being treated: The outcome measure chosen to determine the severity of the functional limitation was the Barthel Index with a score of 90/100 which was correlated with the impairment scale. ? Self Care:     - CURRENT STATUS: CI - 1%-19% impaired, limited or restricted    - GOAL STATUS: CI - 1%-19% impaired, limited or restricted    - D/C STATUS:  CI - 1%-19% impaired, limited or restricted       Occupational Therapy Evaluation Charge Determination   History Examination Decision-Making   LOW Complexity : Brief history review  LOW Complexity : 1-3 performance deficits relating to physical, cognitive , or psychosocial skils that result in activity limitations and / or participation restrictions  LOW Complexity : No comorbidities that affect functional and no verbal or physical assistance needed to complete eval tasks       Based on the above components, the patient evaluation is determined to be of the following complexity level: LOW   Pain:Pain Scale 1: Numeric (0 - 10)  Pain Intensity 1: 0              Activity Tolerance:   Patient tolerated eval well. After treatment:   []  Patient left in no apparent distress sitting up in chair  [x]  Patient left in no apparent distress in bed  [x]  Call bell left within reach  [x]  Nursing notified  []  Caregiver present  []  Bed alarm activated    COMMUNICATION/EDUCATION:   Communication/Collaboration:  [x]      Home safety education was provided and the patient/caregiver indicated understanding. [x]      Patient/family have participated as able and agree with findings and recommendations.   []      Patient is unable to participate in plan of care at this time. Findings and recommendations were discussed with: Registered Nurse and patient.     eKn Colvin OTR/L  Time Calculation: 23 mins

## 2017-11-26 NOTE — PROGRESS NOTES
Leo Hickman [facility] 79  566 [facility], [address] [place] [address]  [phone]      Medical Progress Note      NAME: Jules Cabrera   :  1955  MRM:  [id number]    Date/Time: 2017  1:29 PM       Assessment and Plan:     MELVA (acute kidney injury) / Hyperkalemia / Non-anion gap metabolic acidosis: severe, unclear etiology. SCr slowly improving. Acidosis resolved. Continue to hold ARB and loop. Serologies sent but may actually be acute on chronic that was exacerbated be dehydration/illness. 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 MRI positive for osteo and c/w findings by wound care. Cx are Positive for Enterococcus Group D. Stopped levofloxacin and doxycycline, started Ampicillin on . Likely needs a ray amputation. Podiatry consult pending.     Diabetes ([address] (2014): type 2, appears uncontrolled. On Novolin 70/30 mix 40u in AM and 15u in PM at home. Continue NPH alone. A1c in 8.6, goal for her is less than 7. Continue SSI and FSBG.      HTN (hypertension) (2014): BP elevated. Continue metoprolol and amlodipine     Hyperlipidemia (2014). LDL above 100, will add high intensity statin given DM presence    Right eye pain. Apparently had recent cataract surgery. Has been on ciprodex for 15 days but still having pain. No evidence of intraocular infection. Continue artifical tears and dexa gtts          Subjective:     Chief Complaint:  Patient seen and examined. Chart reviewed. Patient reports no foot pain. ROS:  (bold if positive, if negative)      Tolerating PT  Tolerating Diet        Objective:     Last 24hrs VS reviewed since prior progress note.  Most recent are:    Visit Vitals    /75 (BP 1 Location: Right arm, BP Patient Position: Head of bed elevated (Comment degrees))    Pulse 80    Temp 98.3 °F (36.8 °C)    Resp 16    Ht 4' 11.06\" (1.5 m)    Wt 63.5 kg (139 lb 15.9 oz)    SpO2 97%    Breastfeeding No    BMI 28.22 kg/m2     SpO2 Readings from Last 6 Encounters:   11/26/17 97%   09/02/14 98%   08/07/14 99%   01/25/11 94%          No intake or output data in the 24 hours ending 11/26/17 1217     Physical Exam:    Gen:  Well-developed, well-nourished, in no acute distress  HEENT:  Pink conjunctivae, PERRL, hearing intact to voice, moist mucous membranes  Neck:  Supple, without masses, thyroid non-tender  Resp:  No accessory muscle use, clear breath sounds without wheezes rales or rhonchi  Card:  No murmurs, normal S1, S2 without thrills, bruits or peripheral edema  Abd:  Soft, non-tender, non-distended, normoactive bowel sounds are present, no palpable organomegaly and no detectable hernias  Lymph:  No cervical or inguinal adenopathy  Musc:  No cyanosis or clubbing  Skin:  Right foot with discoloration of 2nd through 5th toes and MTP heads.  Left TMA, small lateral punctate lesion on 4th digit of right toe  Neuro:  Cranial nerves are grossly intact, no focal motor weakness, follows commands appropriately  Psych:  Good insight, oriented to person, place and time, alert    Telemetry reviewed:   normal sinus rhythm  __________________________________________________________________  Medications Reviewed: (see below)  Medications:     Current Facility-Administered Medications   Medication Dose Route Frequency    ampicillin (OMNIPEN) 2 g in 0.9% sodium chloride (MBP/ADV) 100 mL  2 g IntraVENous Q12H    influenza vaccine 2017-18 (3 yrs+)(PF) (FLUZONE QUAD/FLUARIX QUAD) injection 0.5 mL  0.5 mL IntraMUSCular PRIOR TO DISCHARGE    ondansetron (ZOFRAN) injection 4 mg  4 mg IntraVENous Q8H PRN    amLODIPine (NORVASC) tablet 10 mg  10 mg Oral DAILY    neomycin-polymyxin-dexamethasone (DEXACIDIN, MAXITROL) 3.5mg/mL-10,000 unit/mL-0.1 % ophthalmic suspension 1 Drop  1 Drop Right Eye Q4HWA    ferrous sulfate tablet 325 mg  325 mg Oral DAILY    atorvastatin (LIPITOR) tablet 40 mg  40 mg Oral QHS    artificial tears (dextran 70-hypromellose) (NATURAL BALANCE) 0.1-0.3 % ophthalmic solution 1 Drop  1 Drop Both Eyes PRN    hydrALAZINE (APRESOLINE) 20 mg/mL injection 10 mg  10 mg IntraVENous Q6H PRN    sodium chloride (NS) flush 5-10 mL  5-10 mL IntraVENous Q8H    sodium chloride (NS) flush 5-10 mL  5-10 mL IntraVENous PRN    naloxone (NARCAN) injection 0.4 mg  0.4 mg IntraVENous PRN    insulin lispro (HUMALOG) injection   SubCUTAneous AC&HS    glucose chewable tablet 16 g  4 Tab Oral PRN    dextrose (D50W) injection syrg 12.5-25 g  12.5-25 g IntraVENous PRN    glucagon (GLUCAGEN) injection 1 mg  1 mg IntraMUSCular PRN    heparin (porcine) injection 5,000 Units  5,000 Units SubCUTAneous Q8H    metoprolol tartrate (LOPRESSOR) tablet 50 mg  50 mg Oral BID    insulin NPH (NOVOLIN N, HUMULIN N) injection 5 Units  5 Units SubCUTAneous ACD    insulin NPH (NOVOLIN N, HUMULIN N) injection 10 Units  10 Units SubCUTAneous ACB        Lab Data Reviewed: (see below)  Lab Review:     Recent Labs      11/26/17 0428 11/24/17 0519   WBC  7.4  7.1   HGB  9.0*  8.5*   HCT  27.8*  25.9*   PLT  340  334     Recent Labs      11/26/17 0428 11/25/17 0537  11/24/17 0519   NA  142  140  143   K  4.9  4.9  4.7   CL  108  107  109*   CO2  21  21  22   GLU  123*  163*  187*   BUN  73*  70*  74*   CREA  4.83*  4.66*  4.75*   CA  8.6  8.5  8.3*   MG  1.7  1.6  1.9   PHOS  4.3  4.1  4.5   ALB  2.5*  2.5*  2.4*     Lab Results   Component Value Date/Time    Glucose (POC) 164 11/26/2017 11:04 AM    Glucose (POC) 151 11/26/2017 07:17 AM    Glucose (POC) 128 11/25/2017 10:50 PM    Glucose (POC) 185 11/25/2017 04:09 PM    Glucose (POC) 132 11/25/2017 12:17 PM     No results for input(s): PH, PCO2, PO2, HCO3, FIO2 in the last 72 hours. No results for input(s): INR in the last 72 hours.     No lab exists for component: INREXT, INREXT  All Micro Results     Procedure Component Value Units Date/Time    CULTURE, [place] STAIN [[id number]]  (Abnormal) (Susceptibility) Collected:  11/22/17 2015    Order Status:  Completed Specimen:  Wound from Foot Updated:  11/25/17 1543     Special Requests: NO SPECIAL REQUESTS        GRAM STAIN RARE WBCS SEEN         NO ORGANISMS SEEN        Culture result: LIGHT  NORMAL SKIN CORINNE ISOLATED                 ENTEROCOCCUS FAECALIS GROUP D (A)    CULTURE, URINE [[id number]] Collected:  11/22/17 1830    Order Status:  Completed Specimen:  Urine from Clean catch Updated:  11/24/17 0915     Special Requests: --     URINE CULTURE ADD  ON FROM PREVIOUS UA       [place] Count --        [id number]  COLONIES/mL       Culture result:         MIXED UROGENITAL CORINNE ISOLATED          I have reviewed notes of prior 24hr.     Other pertinent lab: None    Total time spent with patient: 40 mins                  Care Plan discussed with: Patient, Family, Nursing Staff and >50% of time spent in counseling and coordination of care    Discussed:  Care Plan and D/C Planning    Prophylaxis:  Hep SQ    Disposition:   PT, OT, RN           ___________________________________________________    Attending Physician: Davina Rodriguez DO

## 2017-11-26 NOTE — DISCHARGE SUMMARY
Physician Interim Summary   Patient ID:  Rebecca Gregg  [id number]  65 y.o.  1955    PCP: Kevin Javier MD     Consults: Nephrology and Orthopedic Surgery    Covering dates: 11/22/2017 through 11/26/2017    Admission Diagnoses: MELVA (acute kidney injury) [facility])    Hospital Course: Active Problems:    Diabetes ([facility] (6/12/2014)      HTN (hypertension) (6/12/2014)      Hyperlipidemia (6/13/2014)      MELVA (acute kidney injury) ([facility] (11/22/2017)      Foot fracture (11/22/2017)      High anion gap metabolic acidosis (05/40/5506)      Ms. Bass Never was admitted for ARF, hyperkalemia, and atraumatic fracture of right foot. ARF and hyperkalemia are slowly resolved. An MRI of her foot revealed presumptive osteo. Podiatry consult is pending. She is on ampicillin based on wound cx data. Additional hospital course and discharge summary will be done by discharging physician.

## 2017-11-27 LAB
ALBUMIN SERPL ELPH-MCNC: 3.2 G/DL (ref 2.9–4.4)
ALBUMIN SERPL-MCNC: 2.4 G/DL (ref 3.5–5)
ALBUMIN/GLOB SERPL: 0.8 {RATIO} (ref 0.7–1.7)
ALPHA1 GLOB SERPL ELPH-MCNC: 0.3 G/DL (ref 0–0.4)
ALPHA2 GLOB SERPL ELPH-MCNC: 1.2 G/DL (ref 0.4–1)
ANION GAP SERPL CALC-SCNC: 11 MMOL/L (ref 5–15)
B-GLOBULIN SERPL ELPH-MCNC: 1.2 G/DL (ref 0.7–1.3)
BUN SERPL-MCNC: 74 MG/DL (ref 6–20)
BUN/CREAT SERPL: 15 (ref 12–20)
C-ANCA TITR SER IF: NORMAL TITER
CALCIUM SERPL-MCNC: 8.4 MG/DL (ref 8.5–10.1)
CHLORIDE SERPL-SCNC: 108 MMOL/L (ref 97–108)
CO2 SERPL-SCNC: 22 MMOL/L (ref 21–32)
CREAT SERPL-MCNC: 4.9 MG/DL (ref 0.55–1.02)
GAMMA GLOB SERPL ELPH-MCNC: 1.4 G/DL (ref 0.4–1.8)
GLOBULIN SER CALC-MCNC: 4 G/DL (ref 2.2–3.9)
GLUCOSE BLD STRIP.AUTO-MCNC: 139 MG/DL (ref 65–100)
GLUCOSE BLD STRIP.AUTO-MCNC: 142 MG/DL (ref 65–100)
GLUCOSE BLD STRIP.AUTO-MCNC: 142 MG/DL (ref 65–100)
GLUCOSE BLD STRIP.AUTO-MCNC: 165 MG/DL (ref 65–100)
GLUCOSE SERPL-MCNC: 97 MG/DL (ref 65–100)
KAPPA LC FREE SER-MCNC: 106.6 MG/L (ref 3.3–19.4)
KAPPA LC FREE/LAMBDA FREE SER: 1.03 {RATIO} (ref 0.26–1.65)
LAMBDA LC FREE SERPL-MCNC: 103.8 MG/L (ref 5.7–26.3)
M PROTEIN SERPL ELPH-MCNC: ABNORMAL G/DL
MAGNESIUM SERPL-MCNC: 1.7 MG/DL (ref 1.6–2.4)
MYELOPEROXIDASE AB SER IA-ACNC: <9 U/ML (ref 0–9)
P-ANCA ATYPICAL TITR SER IF: NORMAL TITER
P-ANCA TITR SER IF: NORMAL TITER
PHOSPHATE SERPL-MCNC: 5 MG/DL (ref 2.6–4.7)
POTASSIUM SERPL-SCNC: 4.8 MMOL/L (ref 3.5–5.1)
PROT SERPL-MCNC: 7.2 G/DL (ref 6–8.5)
PROTEINASE3 AB SER IA-ACNC: <3.5 U/ML (ref 0–3.5)
SERVICE CMNT-IMP: ABNORMAL
SODIUM SERPL-SCNC: 141 MMOL/L (ref 136–145)

## 2017-11-27 PROCEDURE — 74011636637 HC RX REV CODE- 636/637: Performed by: INTERNAL MEDICINE

## 2017-11-27 PROCEDURE — 74011250636 HC RX REV CODE- 250/636: Performed by: INTERNAL MEDICINE

## 2017-11-27 PROCEDURE — 36415 COLL VENOUS BLD VENIPUNCTURE: CPT | Performed by: INTERNAL MEDICINE

## 2017-11-27 PROCEDURE — 74011250637 HC RX REV CODE- 250/637: Performed by: INTERNAL MEDICINE

## 2017-11-27 PROCEDURE — 82962 GLUCOSE BLOOD TEST: CPT

## 2017-11-27 PROCEDURE — 83735 ASSAY OF MAGNESIUM: CPT | Performed by: INTERNAL MEDICINE

## 2017-11-27 PROCEDURE — 65660000000 HC RM CCU STEPDOWN

## 2017-11-27 PROCEDURE — 74011000258 HC RX REV CODE- 258: Performed by: INTERNAL MEDICINE

## 2017-11-27 PROCEDURE — 80069 RENAL FUNCTION PANEL: CPT | Performed by: INTERNAL MEDICINE

## 2017-11-27 RX ORDER — GABAPENTIN 100 MG/1
100 CAPSULE ORAL 3 TIMES DAILY
Status: DISCONTINUED | OUTPATIENT
Start: 2017-11-27 | End: 2017-11-30 | Stop reason: HOSPADM

## 2017-11-27 RX ADMIN — NEOMYCIN SULFATE, POLYMYXIN B SULFATE AND DEXAMETHASONE 1 DROP: 3.5; 10000; 1 SUSPENSION/ DROPS OPHTHALMIC at 21:52

## 2017-11-27 RX ADMIN — NEOMYCIN SULFATE, POLYMYXIN B SULFATE AND DEXAMETHASONE 1 DROP: 3.5; 10000; 1 SUSPENSION/ DROPS OPHTHALMIC at 19:07

## 2017-11-27 RX ADMIN — HEPARIN SODIUM 5000 UNITS: 5000 INJECTION, SOLUTION INTRAVENOUS; SUBCUTANEOUS at 14:17

## 2017-11-27 RX ADMIN — GABAPENTIN 100 MG: 100 CAPSULE ORAL at 14:17

## 2017-11-27 RX ADMIN — HEPARIN SODIUM 5000 UNITS: 5000 INJECTION, SOLUTION INTRAVENOUS; SUBCUTANEOUS at 21:44

## 2017-11-27 RX ADMIN — METOPROLOL TARTRATE 50 MG: 50 TABLET ORAL at 09:06

## 2017-11-27 RX ADMIN — GABAPENTIN 100 MG: 100 CAPSULE ORAL at 21:44

## 2017-11-27 RX ADMIN — ATORVASTATIN CALCIUM 40 MG: 20 TABLET, FILM COATED ORAL at 21:44

## 2017-11-27 RX ADMIN — NEOMYCIN SULFATE, POLYMYXIN B SULFATE AND DEXAMETHASONE 1 DROP: 3.5; 10000; 1 SUSPENSION/ DROPS OPHTHALMIC at 16:13

## 2017-11-27 RX ADMIN — NEOMYCIN SULFATE, POLYMYXIN B SULFATE AND DEXAMETHASONE 1 DROP: 3.5; 10000; 1 SUSPENSION/ DROPS OPHTHALMIC at 12:44

## 2017-11-27 RX ADMIN — Medication 10 ML: at 21:52

## 2017-11-27 RX ADMIN — INSULIN LISPRO 2 UNITS: 100 INJECTION, SOLUTION INTRAVENOUS; SUBCUTANEOUS at 07:30

## 2017-11-27 RX ADMIN — AMPICILLIN SODIUM 2 G: 2 INJECTION, POWDER, FOR SOLUTION INTRAMUSCULAR; INTRAVENOUS at 17:42

## 2017-11-27 RX ADMIN — HEPARIN SODIUM 5000 UNITS: 5000 INJECTION, SOLUTION INTRAVENOUS; SUBCUTANEOUS at 06:21

## 2017-11-27 RX ADMIN — FERROUS SULFATE TAB 325 MG (65 MG ELEMENTAL FE) 325 MG: 325 (65 FE) TAB at 12:44

## 2017-11-27 RX ADMIN — Medication 10 ML: at 06:22

## 2017-11-27 RX ADMIN — AMPICILLIN SODIUM 2 G: 2 INJECTION, POWDER, FOR SOLUTION INTRAMUSCULAR; INTRAVENOUS at 06:22

## 2017-11-27 RX ADMIN — METOPROLOL TARTRATE 75 MG: 25 TABLET ORAL at 17:42

## 2017-11-27 RX ADMIN — INSULIN LISPRO 2 UNITS: 100 INJECTION, SOLUTION INTRAVENOUS; SUBCUTANEOUS at 11:30

## 2017-11-27 RX ADMIN — Medication 10 ML: at 16:13

## 2017-11-27 RX ADMIN — NEOMYCIN SULFATE, POLYMYXIN B SULFATE AND DEXAMETHASONE 1 DROP: 3.5; 10000; 1 SUSPENSION/ DROPS OPHTHALMIC at 06:22

## 2017-11-27 RX ADMIN — HUMAN INSULIN 5 UNITS: 100 INJECTION, SUSPENSION SUBCUTANEOUS at 17:51

## 2017-11-27 RX ADMIN — Medication 10 ML: at 17:43

## 2017-11-27 RX ADMIN — AMLODIPINE BESYLATE 10 MG: 5 TABLET ORAL at 09:06

## 2017-11-27 RX ADMIN — HUMAN INSULIN 10 UNITS: 100 INJECTION, SUSPENSION SUBCUTANEOUS at 09:07

## 2017-11-27 NOTE — PROGRESS NOTES
Physical Therapy    1335 Chart reviewed. Pt received in bed, two family members at bedside, daughter agreeable to translate for patient. Pt reports she has been up, walking using RW in room and declining to participate with physical therapy of OOB activity at this time. PT will continue to follow. Brittney [facility]

## 2017-11-27 NOTE — PROGRESS NOTES
[facility] [place]  380 [facility], [address] [place] [address]  [phone]      Medical Progress Note      NAME:         Marcela Cody   :        1955  MRM:        [id number]    Date:          2017      Subjective: Patient has been seen and examined as a follow up for MELVA. Chart, labs, diagnostics reviewed. She is Montenegrin speaking and I have discussed with her through the [employer] phone interpretor. She denies any nausea or vomiting. No fever or chills. Objective:    Vital Signs:    Visit Vitals    /73 (BP 1 Location: Right arm, BP Patient Position: Sitting)    Pulse 82    Temp 98.3 °F (36.8 °C)    Resp 16    Ht 4' 11.06\" (1.5 m)    Wt 63.5 kg (139 lb 15.9 oz)    SpO2 98%    Breastfeeding No    BMI 28.22 kg/m2      No intake or output data in the 24 hours ending 17 0954     Physical Examination:    General:   Weak looking but not in any acute distress   Eyes:   pink conjunctivae, PERRLA with no discharge. ENT:   no ottorrhea or rhinorrhea with dry mucous membranes  Neck: no masses, thyroid non-tender and trachea central.  Pulm:  no accessory muscle use, clear breath sounds without crackles or wheezes  Card:  no JVD or murmurs, has regular and normal S1, S2 without thrills, bruits or peripheral edema  Abd:  Soft, non-tender, non-distended, normoactive bowel sounds with no palpable organomegaly  Musc:  No cyanosis, clubbing, atrophy. Dressed right foot   Skin:  No rashes, bruising or ulcers. Neuro: Awake and alert.  Generally a non focal exam. Follows commands appropriately  Psych:  Has a fair insight and is oriented x 3    Current Facility-Administered Medications   Medication Dose Route Frequency    ampicillin (OMNIPEN) 2 g in 0.9% sodium chloride (MBP/ADV) 100 mL  2 g IntraVENous Q12H    influenza vaccine - (3 yrs+)(PF) (FLUZONE QUAD/FLUARIX QUAD) injection 0.5 mL  0.5 mL IntraMUSCular PRIOR TO DISCHARGE    ondansetron (ZOFRAN) injection 4 mg  4 mg IntraVENous Q8H PRN    amLODIPine (NORVASC) tablet 10 mg  10 mg Oral DAILY    neomycin-polymyxin-dexamethasone (DEXACIDIN, MAXITROL) 3.5mg/mL-10,000 unit/mL-0.1 % ophthalmic suspension 1 Drop  1 Drop Right Eye Q4HWA    ferrous sulfate tablet 325 mg  325 mg Oral DAILY    atorvastatin (LIPITOR) tablet 40 mg  40 mg Oral QHS    artificial tears (dextran 70-hypromellose) (NATURAL BALANCE) 0.1-0.3 % ophthalmic solution 1 Drop  1 Drop Both Eyes PRN    hydrALAZINE (APRESOLINE) 20 mg/mL injection 10 mg  10 mg IntraVENous Q6H PRN    sodium chloride (NS) flush 5-10 mL  5-10 mL IntraVENous Q8H    sodium chloride (NS) flush 5-10 mL  5-10 mL IntraVENous PRN    naloxone (NARCAN) injection 0.4 mg  0.4 mg IntraVENous PRN    insulin lispro (HUMALOG) injection   SubCUTAneous AC&HS    glucose chewable tablet 16 g  4 Tab Oral PRN    dextrose (D50W) injection syrg 12.5-25 g  12.5-25 g IntraVENous PRN    glucagon (GLUCAGEN) injection 1 mg  1 mg IntraMUSCular PRN    heparin (porcine) injection 5,000 Units  5,000 Units SubCUTAneous Q8H    metoprolol tartrate (LOPRESSOR) tablet 50 mg  50 mg Oral BID    insulin NPH (NOVOLIN N, HUMULIN N) injection 5 Units  5 Units SubCUTAneous ACD    insulin NPH (NOVOLIN N, HUMULIN N) injection 10 Units  10 Units SubCUTAneous ACB        Laboratory data and review:    Recent Labs      11/26/17 0428   WBC  7.4   HGB  9.0*   HCT  27.8*   PLT  340     Recent Labs      11/27/17   0331  11/26/17   0428  11/25/17   0537   NA  141  142  140   K  4.8  4.9  4.9   CL  108  108  107   CO2  22  21  21   GLU  97  123*  163*   BUN  74*  73*  70*   CREA  4.90*  4.83*  4.66*   CA  8.4*  8.6  8.5   MG  1.7  1.7  1.6   PHOS  5.0*  4.3  4.1   ALB  2.4*  2.5*  2.5*     No components found for: GLPOC    Assessment and Plan:    MELVA (acute kidney injury) / Hyperkalemia / Non-anion gap metabolic acidosis: severe, unclear etiology.  SCr

## 2017-11-27 NOTE — PROGRESS NOTES
101 [facility]  YOB: 1955          Assessment & Plan:   ARF/CKD  · Some improvement since admission but Cr now fairly stable   · Could be at baseline  · Likely has diabetic nephropathy  · No acute indication HD  · If no improvement in renal function, will need close follow up. And likely needs to be advised that international travel is not recommended. · Monitor renal function. Follow up on seros. HTN  · OK  · Continue current medications    DM  · Insulin    Pruritis/neuropathy  · Try gabapentin 100 mg TID. No not titrate       Subjective:   CC: f/u ARF, CKD  HPI: Creat stable in high 4s. Not uremic. Having itching and neuropathic pains in upper and lower extremities. HTN is stable.     ROS: +pruritis/shooting pains in extremities, no n/v/sob  Current Facility-Administered Medications   Medication Dose Route Frequency    metoprolol tartrate (LOPRESSOR) tablet 75 mg  75 mg Oral BID    gabapentin (NEURONTIN) capsule 100 mg  100 mg Oral TID    ampicillin (OMNIPEN) 2 g in 0.9% sodium chloride (MBP/ADV) 100 mL  2 g IntraVENous Q12H    influenza vaccine 2017-18 (3 yrs+)(PF) (FLUZONE QUAD/FLUARIX QUAD) injection 0.5 mL  0.5 mL IntraMUSCular PRIOR TO DISCHARGE    ondansetron (ZOFRAN) injection 4 mg  4 mg IntraVENous Q8H PRN    amLODIPine (NORVASC) tablet 10 mg  10 mg Oral DAILY    neomycin-polymyxin-dexamethasone (DEXACIDIN, MAXITROL) 3.5mg/mL-10,000 unit/mL-0.1 % ophthalmic suspension 1 Drop  1 Drop Right Eye Q4HWA    ferrous sulfate tablet 325 mg  325 mg Oral DAILY    atorvastatin (LIPITOR) tablet 40 mg  40 mg Oral QHS    artificial tears (dextran 70-hypromellose) (NATURAL BALANCE) 0.1-0.3 % ophthalmic solution 1 Drop  1 Drop Both Eyes PRN    hydrALAZINE (APRESOLINE) 20 mg/mL injection 10 mg  10 mg IntraVENous Q6H PRN    sodium chloride (NS) flush 5-10 mL  5-10 mL IntraVENous Q8H    sodium chloride (NS) flush 5-10 mL  5-10 mL IntraVENous PRN    naloxone (NARCAN) injection 0.4 mg  0.4 mg IntraVENous PRN    insulin lispro (HUMALOG) injection   SubCUTAneous AC&HS    glucose chewable tablet 16 g  4 Tab Oral PRN    dextrose (D50W) injection syrg 12.5-25 g  12.5-25 g IntraVENous PRN    glucagon (GLUCAGEN) injection 1 mg  1 mg IntraMUSCular PRN    heparin (porcine) injection 5,000 Units  5,000 Units SubCUTAneous Q8H    insulin NPH (NOVOLIN N, HUMULIN N) injection 5 Units  5 Units SubCUTAneous ACD    insulin NPH (NOVOLIN N, HUMULIN N) injection 10 Units  10 Units SubCUTAneous ACB          Objective:     Vitals:  Blood pressure 158/79, pulse 78, temperature 98.4 °F (36.9 °C), resp. rate 15, height 4' 11.06\" (1.5 m), weight 63.5 kg (139 lb 15.9 oz), SpO2 98 %, not currently breastfeeding. Temp (24hrs), Av.1 °F (36.7 °C), Min:97.7 °F (36.5 °C), Max:98.4 °F (36.9 °C)      Intake and Output:          Physical Exam:               GENERAL ASSESSMENT: NAD  CHEST: CTA  HEART: S1S2  ABDOMEN: Soft,NT  EXTREMITY: min EDEMA  NEURO: Grossly non focal          ECG/rhythm:    Data Review      No results for input(s): TNIPOC in the last 72 hours. No lab exists for component: ITNL   No results for input(s): CPK, CKMB, TROIQ in the last 72 hours. Recent Labs      17   0331  17   0428  17   0537   NA  141  142  140   K  4.8  4.9  4.9   CL  108  108  107   CO2  22  21  21   BUN  74*  73*  70*   CREA  4.90*  4.83*  4.66*   GLU  97  123*  163*   PHOS  5.0*  4.3  4.1   MG  1.7  1.7  1.6   CA  8.4*  8.6  8.5   ALB  2.4*  2.5*  2.5*   WBC   --   7.4   --    HGB   --   9.0*   --    HCT   --   27.8*   --    PLT   --   340   --       No results for input(s): INR, PTP, APTT in the last 72 hours.     No lab exists for component: INREXT  Needs: urine analysis, urine sodium, protein and creatinine  Lab Results   Component Value Date/Time    Sodium urine, random 38 2017 01:54 PM    Creatinine, urine 61.11 2017 08:08 PM           : Chetan Veliz MD  11/27/2017        [facility]:  [email or web address]  [place] office:  [address] [place] [address], [address] [place] [address],[address]  [address]  Phone: [phone]  Fax :     [phone]    [place] office:  200 [facility], [facility] [address] [facility]  Phone - [phone]  Fax - [phone]

## 2017-11-28 LAB
ALBUMIN SERPL-MCNC: 2.4 G/DL (ref 3.5–5)
ANION GAP SERPL CALC-SCNC: 13 MMOL/L (ref 5–15)
BUN SERPL-MCNC: 78 MG/DL (ref 6–20)
BUN/CREAT SERPL: 15 (ref 12–20)
CALCIUM SERPL-MCNC: 8 MG/DL (ref 8.5–10.1)
CHLORIDE SERPL-SCNC: 107 MMOL/L (ref 97–108)
CO2 SERPL-SCNC: 19 MMOL/L (ref 21–32)
CREAT SERPL-MCNC: 5.36 MG/DL (ref 0.55–1.02)
GLUCOSE BLD STRIP.AUTO-MCNC: 157 MG/DL (ref 65–100)
GLUCOSE BLD STRIP.AUTO-MCNC: 159 MG/DL (ref 65–100)
GLUCOSE BLD STRIP.AUTO-MCNC: 239 MG/DL (ref 65–100)
GLUCOSE BLD STRIP.AUTO-MCNC: 97 MG/DL (ref 65–100)
GLUCOSE SERPL-MCNC: 108 MG/DL (ref 65–100)
MAGNESIUM SERPL-MCNC: 1.7 MG/DL (ref 1.6–2.4)
PHOSPHATE SERPL-MCNC: 5.4 MG/DL (ref 2.6–4.7)
POTASSIUM SERPL-SCNC: 5 MMOL/L (ref 3.5–5.1)
SERVICE CMNT-IMP: ABNORMAL
SERVICE CMNT-IMP: NORMAL
SODIUM SERPL-SCNC: 139 MMOL/L (ref 136–145)

## 2017-11-28 PROCEDURE — 74011000258 HC RX REV CODE- 258: Performed by: INTERNAL MEDICINE

## 2017-11-28 PROCEDURE — 83735 ASSAY OF MAGNESIUM: CPT | Performed by: INTERNAL MEDICINE

## 2017-11-28 PROCEDURE — 74011250636 HC RX REV CODE- 250/636: Performed by: INTERNAL MEDICINE

## 2017-11-28 PROCEDURE — 74011250637 HC RX REV CODE- 250/637: Performed by: INTERNAL MEDICINE

## 2017-11-28 PROCEDURE — 80069 RENAL FUNCTION PANEL: CPT | Performed by: INTERNAL MEDICINE

## 2017-11-28 PROCEDURE — 74011636637 HC RX REV CODE- 636/637: Performed by: INTERNAL MEDICINE

## 2017-11-28 PROCEDURE — 36415 COLL VENOUS BLD VENIPUNCTURE: CPT | Performed by: INTERNAL MEDICINE

## 2017-11-28 PROCEDURE — 65660000000 HC RM CCU STEPDOWN

## 2017-11-28 PROCEDURE — 82962 GLUCOSE BLOOD TEST: CPT

## 2017-11-28 PROCEDURE — 97116 GAIT TRAINING THERAPY: CPT

## 2017-11-28 RX ORDER — SODIUM CHLORIDE 9 MG/ML
75 INJECTION, SOLUTION INTRAVENOUS CONTINUOUS
Status: DISCONTINUED | OUTPATIENT
Start: 2017-11-28 | End: 2017-11-29

## 2017-11-28 RX ORDER — METOPROLOL TARTRATE 50 MG/1
100 TABLET ORAL 2 TIMES DAILY
Status: DISCONTINUED | OUTPATIENT
Start: 2017-11-28 | End: 2017-11-30 | Stop reason: HOSPADM

## 2017-11-28 RX ADMIN — NEOMYCIN SULFATE, POLYMYXIN B SULFATE AND DEXAMETHASONE 1 DROP: 3.5; 10000; 1 SUSPENSION/ DROPS OPHTHALMIC at 10:52

## 2017-11-28 RX ADMIN — HEPARIN SODIUM 5000 UNITS: 5000 INJECTION, SOLUTION INTRAVENOUS; SUBCUTANEOUS at 14:16

## 2017-11-28 RX ADMIN — HUMAN INSULIN 5 UNITS: 100 INJECTION, SUSPENSION SUBCUTANEOUS at 16:40

## 2017-11-28 RX ADMIN — HUMAN INSULIN 10 UNITS: 100 INJECTION, SUSPENSION SUBCUTANEOUS at 08:12

## 2017-11-28 RX ADMIN — ATORVASTATIN CALCIUM 40 MG: 20 TABLET, FILM COATED ORAL at 22:31

## 2017-11-28 RX ADMIN — Medication 10 ML: at 05:53

## 2017-11-28 RX ADMIN — NEOMYCIN SULFATE, POLYMYXIN B SULFATE AND DEXAMETHASONE 1 DROP: 3.5; 10000; 1 SUSPENSION/ DROPS OPHTHALMIC at 14:17

## 2017-11-28 RX ADMIN — AMPICILLIN SODIUM 2 G: 2 INJECTION, POWDER, FOR SOLUTION INTRAMUSCULAR; INTRAVENOUS at 05:51

## 2017-11-28 RX ADMIN — METOPROLOL TARTRATE 100 MG: 50 TABLET ORAL at 18:50

## 2017-11-28 RX ADMIN — AMLODIPINE BESYLATE 10 MG: 5 TABLET ORAL at 08:12

## 2017-11-28 RX ADMIN — FERROUS SULFATE TAB 325 MG (65 MG ELEMENTAL FE) 325 MG: 325 (65 FE) TAB at 12:43

## 2017-11-28 RX ADMIN — HEPARIN SODIUM 5000 UNITS: 5000 INJECTION, SOLUTION INTRAVENOUS; SUBCUTANEOUS at 22:31

## 2017-11-28 RX ADMIN — AMPICILLIN SODIUM 2 G: 2 INJECTION, POWDER, FOR SOLUTION INTRAMUSCULAR; INTRAVENOUS at 16:40

## 2017-11-28 RX ADMIN — GABAPENTIN 100 MG: 100 CAPSULE ORAL at 16:39

## 2017-11-28 RX ADMIN — INSULIN LISPRO 2 UNITS: 100 INJECTION, SOLUTION INTRAVENOUS; SUBCUTANEOUS at 12:43

## 2017-11-28 RX ADMIN — Medication 10 ML: at 14:18

## 2017-11-28 RX ADMIN — HEPARIN SODIUM 5000 UNITS: 5000 INJECTION, SOLUTION INTRAVENOUS; SUBCUTANEOUS at 05:53

## 2017-11-28 RX ADMIN — NEOMYCIN SULFATE, POLYMYXIN B SULFATE AND DEXAMETHASONE 1 DROP: 3.5; 10000; 1 SUSPENSION/ DROPS OPHTHALMIC at 05:53

## 2017-11-28 RX ADMIN — SODIUM CHLORIDE 75 ML/HR: 900 INJECTION, SOLUTION INTRAVENOUS at 16:19

## 2017-11-28 RX ADMIN — INSULIN LISPRO 3 UNITS: 100 INJECTION, SOLUTION INTRAVENOUS; SUBCUTANEOUS at 16:39

## 2017-11-28 RX ADMIN — GABAPENTIN 100 MG: 100 CAPSULE ORAL at 08:12

## 2017-11-28 RX ADMIN — GABAPENTIN 100 MG: 100 CAPSULE ORAL at 22:32

## 2017-11-28 RX ADMIN — METOPROLOL TARTRATE 75 MG: 25 TABLET ORAL at 08:12

## 2017-11-28 NOTE — PROGRESS NOTES
101 [facility]  YOB: 1955          Assessment & Plan:   ARF/CKD  · Creat essentially stable this admission (may have more variability at lower GFR/Higher creatinine)  · Serologies are negative  · She has longstanding poorly controlled complicated DM with heavy proteinuria and is presumed to have diabetic nephropathy  · She is likely to progress to ESRD soon  · If she is not eligible for Medicare or Medicaid, dialysis placement may be extremely problematic. Will ask case management to assess. HTN  · OK  · Continue current medications    DM  · Insulin    Pruritis/neuropathy  · Better with gabapentin       Subjective:   CC: f/u ARF, CKD  HPI: Creat 5.3. Pruritis/neuropathy is better. HTN is fairly controlled.     ROS: No sob/n/v, mild diarrhea  Current Facility-Administered Medications   Medication Dose Route Frequency    metoprolol tartrate (LOPRESSOR) tablet 75 mg  75 mg Oral BID    gabapentin (NEURONTIN) capsule 100 mg  100 mg Oral TID    ampicillin (OMNIPEN) 2 g in 0.9% sodium chloride (MBP/ADV) 100 mL  2 g IntraVENous Q12H    influenza vaccine 2017-18 (3 yrs+)(PF) (FLUZONE QUAD/FLUARIX QUAD) injection 0.5 mL  0.5 mL IntraMUSCular PRIOR TO DISCHARGE    ondansetron (ZOFRAN) injection 4 mg  4 mg IntraVENous Q8H PRN    amLODIPine (NORVASC) tablet 10 mg  10 mg Oral DAILY    neomycin-polymyxin-dexamethasone (DEXACIDIN, MAXITROL) 3.5mg/mL-10,000 unit/mL-0.1 % ophthalmic suspension 1 Drop  1 Drop Right Eye Q4HWA    ferrous sulfate tablet 325 mg  325 mg Oral DAILY    atorvastatin (LIPITOR) tablet 40 mg  40 mg Oral QHS    artificial tears (dextran 70-hypromellose) (NATURAL BALANCE) 0.1-0.3 % ophthalmic solution 1 Drop  1 Drop Both Eyes PRN    hydrALAZINE (APRESOLINE) 20 mg/mL injection 10 mg  10 mg IntraVENous Q6H PRN    sodium chloride (NS) flush 5-10 mL  5-10 mL IntraVENous Q8H    sodium chloride (NS) flush 5-10 mL  5-10 mL IntraVENous PRN    naloxone (NARCAN) injection 0.4 mg  0.4 mg IntraVENous PRN    insulin lispro (HUMALOG) injection   SubCUTAneous AC&HS    glucose chewable tablet 16 g  4 Tab Oral PRN    dextrose (D50W) injection syrg 12.5-25 g  12.5-25 g IntraVENous PRN    glucagon (GLUCAGEN) injection 1 mg  1 mg IntraMUSCular PRN    heparin (porcine) injection 5,000 Units  5,000 Units SubCUTAneous Q8H    insulin NPH (NOVOLIN N, HUMULIN N) injection 5 Units  5 Units SubCUTAneous ACD    insulin NPH (NOVOLIN N, HUMULIN N) injection 10 Units  10 Units SubCUTAneous ACB          Objective:     Vitals:  Blood pressure 163/71, pulse 78, temperature 97.8 °F (36.6 °C), resp. rate 16, height 4' 11.06\" (1.5 m), weight 63.5 kg (139 lb 15.9 oz), SpO2 100 %, not currently breastfeeding. Temp (24hrs), Av °F (36.7 °C), Min:97.8 °F (36.6 °C), Max:98.3 °F (36.8 °C)      Intake and Output:      1901 -  0700  In: 500 [I.V.:500]  Out: -     Physical Exam:               GENERAL ASSESSMENT: NAD  CHEST: CTA  HEART: S1S2  ABDOMEN: Soft,NT  EXTREMITY: min EDEMA  NEURO: Grossly non focal          ECG/rhythm:    Data Review      No results for input(s): TNIPOC in the last 72 hours. No lab exists for component: ITNL   No results for input(s): CPK, CKMB, TROIQ in the last 72 hours. Recent Labs      17   0215  17   0331  17   0428   NA  139  141  142   K  5.0  4.8  4.9   CL  107  108  108   CO2  19*  22  21   BUN  78*  74*  73*   CREA  5.36*  4.90*  4.83*   GLU  108*  97  123*   PHOS  5.4*  5.0*  4.3   MG  1.7  1.7  1.7   CA  8.0*  8.4*  8.6   ALB  2.4*  2.4*  2.5*   WBC   --    --   7.4   HGB   --    --   9.0*   HCT   --    --   27.8*   PLT   --    --   340      No results for input(s): INR, PTP, APTT in the last 72 hours.     No lab exists for component: INREXT, INREXT  Needs: urine analysis, urine sodium, protein and creatinine  Lab Results   Component Value Date/Time    Sodium urine, random 38 2017 01:54 PM Creatinine, urine 61.11 11/22/2017 08:08 PM           : Marbin Barrett MD  11/28/2017        [facility]:  [email or web address]  [place] office:  [address], [address] [place] [address],[address]  [place], [address] [place] [address]  Phone: [phone]  Fax :     [phone]    [place] office:  [address] [facility] [place], [address] [place] [address]  Phone - [phone]  Fax - [phone]

## 2017-11-28 NOTE — PROGRESS NOTES
Leo [facility]  566 [facility], [address] [place] [address]  [phone]      Medical Progress Note      NAME:         Tori Simmonds   :        1955  MRM:        [id number]    Date:          2017      Subjective: Patient has been seen and examined as a follow up for MELVA. Chart, labs, diagnostics reviewed. She is Kyrgyz speaking and I have discussed with her through the [email or web address] phone interpretor. She has no new symptoms. Afebrile      Objective:    Vital Signs:    Visit Vitals    /69 (BP 1 Location: Right arm, BP Patient Position: At rest)    Pulse 71    Temp 97.6 °F (36.4 °C)    Resp 16    Ht 4' 11.06\" (1.5 m)    Wt 63.5 kg (139 lb 15.9 oz)    SpO2 98%    Breastfeeding No    BMI 28.22 kg/m2          Intake/Output Summary (Last 24 hours) at 17 1502  Last data filed at 17 1742   Gross per 24 hour   Intake              100 ml   Output                0 ml   Net              100 ml        Physical Examination:    General:   Weak looking but not in any acute distress   Eyes:   pink conjunctivae, PERRLA with no discharge. ENT:   no ottorrhea or rhinorrhea with dry mucous membranes  Pulm: clear breath sounds without crackles or wheezes  Card:  no JVD or murmurs, has regular and normal S1, S2 without thrills, bruits or peripheral edema  Abd:  Soft, non-tender, non-distended, normoactive bowel sounds with no palpable organomegaly  Musc:  No cyanosis, clubbing, atrophy. Dressed right foot   Skin:  No rashes, bruising or ulcers. Neuro: Awake and alert.  Generally a non focal exam. Follows commands appropriately  Psych:  Has a fair insight and is oriented x 3    Current Facility-Administered Medications   Medication Dose Route Frequency    metoprolol tartrate (LOPRESSOR) tablet 75 mg  75 mg Oral BID    gabapentin (NEURONTIN) capsule 100 mg  100 mg Oral TID    ampicillin (OMNIPEN) 2 g in 0.9% sodium chloride (MBP/ADV) 100 mL  2 g IntraVENous Q12H    influenza vaccine 2017-18 (3 yrs+)(PF) (FLUZONE QUAD/FLUARIX QUAD) injection 0.5 mL  0.5 mL IntraMUSCular PRIOR TO DISCHARGE    ondansetron (ZOFRAN) injection 4 mg  4 mg IntraVENous Q8H PRN    amLODIPine (NORVASC) tablet 10 mg  10 mg Oral DAILY    neomycin-polymyxin-dexamethasone (DEXACIDIN, MAXITROL) 3.5mg/mL-10,000 unit/mL-0.1 % ophthalmic suspension 1 Drop  1 Drop Right Eye Q4HWA    ferrous sulfate tablet 325 mg  325 mg Oral DAILY    atorvastatin (LIPITOR) tablet 40 mg  40 mg Oral QHS    artificial tears (dextran 70-hypromellose) (NATURAL BALANCE) 0.1-0.3 % ophthalmic solution 1 Drop  1 Drop Both Eyes PRN    hydrALAZINE (APRESOLINE) 20 mg/mL injection 10 mg  10 mg IntraVENous Q6H PRN    sodium chloride (NS) flush 5-10 mL  5-10 mL IntraVENous Q8H    sodium chloride (NS) flush 5-10 mL  5-10 mL IntraVENous PRN    naloxone (NARCAN) injection 0.4 mg  0.4 mg IntraVENous PRN    insulin lispro (HUMALOG) injection   SubCUTAneous AC&HS    glucose chewable tablet 16 g  4 Tab Oral PRN    dextrose (D50W) injection syrg 12.5-25 g  12.5-25 g IntraVENous PRN    glucagon (GLUCAGEN) injection 1 mg  1 mg IntraMUSCular PRN    heparin (porcine) injection 5,000 Units  5,000 Units SubCUTAneous Q8H    insulin NPH (NOVOLIN N, HUMULIN N) injection 5 Units  5 Units SubCUTAneous ACD    insulin NPH (NOVOLIN N, HUMULIN N) injection 10 Units  10 Units SubCUTAneous ACB        Laboratory data and review:    Recent Labs      11/26/17 0428   WBC  7.4   HGB  9.0*   HCT  27.8*   PLT  340     Recent Labs      11/28/17   0215  11/27/17   0331  11/26/17 0428   NA  139  141  142   K  5.0  4.8  4.9   CL  107  108  108   CO2  19*  22  21   GLU  108*  97  123*   BUN  78*  74*  73*   CREA  5.36*  4.90*  4.83*   CA  8.0*  8.4*  8.6   MG  1.7  1.7  1.7   PHOS  5.4*  5.0*  4.3   ALB  2.4*  2.4*  2.5*     No components found for: [place]    Assessment and Plan:    MELVA (acute kidney injury) / Hyperkalemia / Non-anion gap metabolic acidosis: severe, unclear etiology. SCr worsening. Acidosis resolved. Continue to hold ARB and loop. Serologies unremarkable. Seen by nephrology. No indication for RRT at this time. Monitor Cr and urine output.      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 MRI positive for osteo and c/w findings by wound care. Cx are Positive for Enterococcus Group D. Continue IV Ampicillin. Still awaiting podiatry consult and review for possible surgery       Diabetes ([facility] (6/12/2014): type 2, appears uncontrolled. On Novolin 70/30 mix 40u in AM and 15u in PM at home. Blood glucose stable now. Continue NPH. A1c in 8.6, goal for her is less than 7. Continue SSI and FSBG.     HTN (hypertension) (6/12/2014): BP remains elevated. Continue Amlodipine. Increase Metoprolol further      Hyperlipidemia (6/13/2014). LDL above 100. Continue Lipitor      Right eye pain. Apparently had recent cataract surgery. Has been on ciprodex for 15 days but still having pain. No evidence of intraocular infection. Continue artifical tears.  Monitor       Total time spent for the patient's care: [address] [place] [address] discussed with: Patient, Care Manager and Nursing Staff    Discussed:  Care Plan    Prophylaxis:  Hep SQ    Anticipated Disposition:  Home w/Family           ___________________________________________________    Attending Physician:   Farrah Levy MD

## 2017-11-28 NOTE — PROGRESS NOTES
Nutrition Assessment:    RECOMMENDATIONS/INTERVENTION(S):   RD to adjust diet to CCD, 60 g protein, Low-Phosphorus    Monitor plan of care, PO intakes, BG, renal labs & will adjust diet accordingly  ASSESSMENT:   11/28: Pt seen for LOS. 65 yof admitted for MELVA. Nephrology following, note possible progression to ESRD soon w/ possible dialysis placement. Podiatry consulted for osteomyelitis of R toe. Labs/meds reviewed. BG controlled, 159-; A1C 8.6%. K high on admission, now WDL. BUN, Cr elevated - trending up. Phos elevated - trending up. Will adjust diet accordingly. Visited pt this afternoon. Daughter at bedside interpreted (Austrian speaking). Eating well w/ good appetite. Observed 100% PO of lunch meal.  Unsure of UBW, feels some possible wt loss recently. Overwt per BMI, wt stable per hx. Not checking BG at home. Provided brief verbal and written Renal diet education w/ recs for meals & snacks. Encouraged continued CCD & Renal diet until possible HD decided upon. Relayed to pt addition of Low-Phosphorus to current diet. Pt receptive. Will monitor and provide further nutrition recs/interventions prn. SUBJECTIVE/OBJECTIVE:     Diet Order: Consistent carb (AHA Low-fat, chol)  % Eaten:  No data found.     Pertinent Medications: [x] Reviewed - insulin, statin, fe sulf, zofran     Past Medical History:   Diagnosis Date    Arrhythmia     fast heart rate and palpitations dec 2010    Arthritis     generalized    Asthma     Chronic pain     headaches and stomach pain x several months    Diabetes ([address]     Diabetic foot ulcer associated with type 2 diabetes mellitus ([address]     GERD (gastroesophageal reflux disease)     Hypertension     Psychiatric disorder     depression       Chemistries:  Lab Results   Component Value Date/Time    Sodium 139 11/28/2017 02:15 AM    Potassium 5.0 11/28/2017 02:15 AM    Chloride 107 11/28/2017 02:15 AM    CO2 19 11/28/2017 02:15 AM    Anion gap 13 11/28/2017 02:15 AM    Glucose 108 11/28/2017 02:15 AM    BUN 78 11/28/2017 02:15 AM    Creatinine 5.36 11/28/2017 02:15 AM    BUN/Creatinine ratio 15 11/28/2017 02:15 AM    GFR est AA 10 11/28/2017 02:15 AM    GFR est non-AA 8 11/28/2017 02:15 AM    Calcium 8.0 11/28/2017 02:15 AM    AST (SGOT) 15 11/23/2017 01:16 AM    Alk. phosphatase 128 11/23/2017 01:16 AM    Protein, total 6.8 11/23/2017 01:16 AM    Albumin 2.4 11/28/2017 02:15 AM    Globulin 4.3 11/23/2017 01:16 AM    A-G Ratio 0.6 11/23/2017 01:16 AM    ALT (SGPT) 13 11/23/2017 01:16 AM      Anthropometrics: Height: 4' 11\" (149.9 cm) Weight: 63.5 kg (139 lb 15.9 oz)    IBW (%IBW): 47.5 kg (104 lb 11.5 oz) (133.68 %) UBW (%UBW):   (  %)    BMI: Body mass index is 28.27 kg/(m^2). This BMI is indicative of:   [] Underweight    [] Normal    [x] Overweight    []  Obesity    []  Extreme Obesity (BMI>40)  Estimated Nutrition Needs (Based on): 1436 Kcals/day (BMR (1105) x 1.3 AF) , 44 g (-57 (0.7-0.9 g/kg x actual BW)) Protein  Carbohydrate:  At Least 130 g/day  Fluids: 1450 mL/day    Last BM: 11/27, abd WDL   [x]Active     []Hyperactive  []Hypoactive       [] Absent   BS  Skin:    [] Intact   [] Incision  [] Breakdown   [] DTI   [] Tears/Excoriation/Abrasion  []Edema [x] Other: R toe infection    Wt Readings from Last 30 Encounters:   11/28/17 63.5 kg (139 lb 15.9 oz)   09/11/14 64.4 kg (142 lb)   08/21/14 64.9 kg (143 lb)   08/07/14 64.9 kg (143 lb)   08/07/14 64.9 kg (143 lb)   07/10/14 67.1 kg (148 lb)   06/12/14 62.6 kg (138 lb)   06/02/14 63 kg (139 lb)   05/29/14 63.5 kg (140 lb)   05/08/14 61.2 kg (135 lb)   01/25/11 59 kg (130 lb)      NUTRITION DIAGNOSES:   Problem:  Altered nutrition-related lab values     Etiology: related to endocrine, renal dysfunction     Signs/Symptoms: as evidenced by elevated BG, A1C 8.6%, elevated Cr, Phos      NUTRITION INTERVENTIONS:  Meals/Snacks: General/healthful diet, Modify diet/texture/consistency/nutrients Initial/Brief Nutrition Education: Purpose of nutrition education        GOAL:   PO of meals 75% w/ stable BG & renal labs in the next 2-4 days    Cultural, Anabaptism, or Ethnic Dietary Needs: None     EDUCATION & DISCHARGE NEEDS:    [] None Identified   [x] Identified and Education Provided/Documented - ESRD diet education (lower protein, low Na+, phos &/or potassium if appropriate)   [] Identified and Pt declined/was not appropriate      [x] Interdisciplinary Care Plan Reviewed/Documented    [x] Discharge Needs:    TBD   [] No Nutrition Related Discharge Needs    NUTRITION RISK:   Pt Is At Nutrition Risk  [x]     No Nutrition Risk Identified  []       PT SEEN FOR:    []  MD Consult: []Calorie Count      []Diabetic Diet Education        []Diet Education     []Electrolyte Management     []General Nutrition Management and Supplements     []Management of Tube Feeding     []TPN Recommendations    []  RN Referral:  []MST score >=2     []Enteral/Parenteral Nutrition PTA     []Pregnant: Gestational DM or Multigestation                 [] Pressure Ulcer    []  Low BMI      [x]  Length of Stay       [] Dysphagia Diet         [] Ventilator  []  Follow-up     Previous Recommendations:   [] Implemented          [] Not Implemented          [x] Not Applicable    Previous Goal:   [] Met              [] Progressing Towards Goal              [] Not Progressing Towards Goal   [x] Not Applicable            Castro Ruvalcaba, [address] [place] [address]  Pager [phone]

## 2017-11-28 NOTE — PROGRESS NOTES
Rounded on patient. Patient is from [place] and has been in the [address] [place] [address],[address] for 12 years. She stated that she lives with her , daughter and 6year old grandson. The daughter is the sole provider for the family. Patient also stated that she doesn't have medical insurance, but that she had been given the Care Card application. Patient expressed understanding. Provided support of presence.

## 2017-11-28 NOTE — PROGRESS NOTES
Problem: Mobility Impaired (Adult and Pediatric)  Goal: *Acute Goals and Plan of Care (Insert Text)  Physical Therapy Goals  Initiated 11/24/2017  1. Patient will transfer from bed to chair and chair to bed with modified independence using the least restrictive device within 7 day(s). 2.  Patient will perform sit to stand with modified independence within 7 day(s). 3.  Patient will ambulate with modified independence for 100 feet with the least restrictive device within 7 day(s). 4.  Patient will ascend/descend 13 stairs with single handrail(s) with supervision/set-up within 7 day(s). physical Therapy TREATMENT  Patient: Sabra Ahumada (89 y.o. female)  Date: 11/28/2017  Diagnosis: MELVA (acute kidney injury) ([facility] <principal problem not specified>       Precautions: Fall, WBAT (RLE)    ASSESSMENT:  Pt received in bed, with two family members present, daughter agreeable to translate for patient. Pt denies pain. Mod I for all bed mobility and functional transfer usign RW for steadying gait training using RW x 200' with SBA. No LOB. Progression toward goals:  [x]    Improving appropriately and progressing toward goals  []    Improving slowly and progressing toward goals  []    Not making progress toward goals and plan of care will be adjusted     PLAN:  Patient continues to benefit from skilled intervention to address the above impairments. Continue treatment per established plan of care. Discharge Recommendations:  none  Further Equipment Recommendations for Discharge:  none     SUBJECTIVE:   Patient stated I ok no pain.     OBJECTIVE DATA SUMMARY:   Critical Behavior:  Neurologic State: Alert, Appropriate for age  Orientation Level: Oriented X4  Cognition: Appropriate decision making, Appropriate for age attention/concentration, Appropriate safety awareness, Follows commands  Safety/Judgement: Awareness of environment  Functional Mobility Training:  Bed Mobility:  Rolling: Modified independent  Supine to Sit: Modified independent  Sit to Supine: Modified independent           Transfers:  Sit to Stand: Stand-by asssistance  Stand to Sit: Stand-by asssistance                             Balance:  Sitting: Intact  Standing: Intact; With support  Standing - Static: Good;Constant support  Standing - Dynamic : Good (with RW for steading)  Ambulation/Gait Training:  Distance (ft): 200 Feet (ft)  Assistive Device: Walker, rolling;Gait belt  Ambulation - Level of Assistance: Stand-by asssistance        Gait Abnormalities: Antalgic        Base of Support: Narrowed                             Stairs:            Neuro Re-Education:    Therapeutic Exercises:     Pain:  Pain Scale 1: Numeric (0 - 10)  Pain Intensity 1: 0              Activity Tolerance:   Good  Please refer to the flowsheet for vital signs taken during this treatment.   After treatment:   []    Patient left in no apparent distress sitting up in chair  [x]    Patient left in no apparent distress in bed  [x]    Call bell left within reach  [x]    Nursing notified  [x]    Caregiver present  []    Bed alarm activated    COMMUNICATION/COLLABORATION:   The patients plan of care was discussed with: Registered Nurse    Jaycee Reason   Time Calculation: 15 mins

## 2017-11-29 ENCOUNTER — APPOINTMENT (OUTPATIENT)
Dept: GENERAL RADIOLOGY | Age: 62
DRG: 460 | End: 2017-11-29
Attending: PODIATRIST
Payer: MEDICAID

## 2017-11-29 LAB
ALBUMIN SERPL-MCNC: 2.4 G/DL (ref 3.5–5)
ANION GAP SERPL CALC-SCNC: 12 MMOL/L (ref 5–15)
BUN SERPL-MCNC: 79 MG/DL (ref 6–20)
BUN/CREAT SERPL: 15 (ref 12–20)
CALCIUM SERPL-MCNC: 7.9 MG/DL (ref 8.5–10.1)
CHLORIDE SERPL-SCNC: 109 MMOL/L (ref 97–108)
CO2 SERPL-SCNC: 19 MMOL/L (ref 21–32)
CREAT SERPL-MCNC: 5.38 MG/DL (ref 0.55–1.02)
GLUCOSE BLD STRIP.AUTO-MCNC: 113 MG/DL (ref 65–100)
GLUCOSE BLD STRIP.AUTO-MCNC: 138 MG/DL (ref 65–100)
GLUCOSE BLD STRIP.AUTO-MCNC: 152 MG/DL (ref 65–100)
GLUCOSE BLD STRIP.AUTO-MCNC: 156 MG/DL (ref 65–100)
GLUCOSE SERPL-MCNC: 113 MG/DL (ref 65–100)
MAGNESIUM SERPL-MCNC: 1.7 MG/DL (ref 1.6–2.4)
PHOSPHATE SERPL-MCNC: 5.4 MG/DL (ref 2.6–4.7)
POTASSIUM SERPL-SCNC: 5.8 MMOL/L (ref 3.5–5.1)
SERVICE CMNT-IMP: ABNORMAL
SODIUM SERPL-SCNC: 140 MMOL/L (ref 136–145)

## 2017-11-29 PROCEDURE — 82962 GLUCOSE BLOOD TEST: CPT

## 2017-11-29 PROCEDURE — 80069 RENAL FUNCTION PANEL: CPT | Performed by: INTERNAL MEDICINE

## 2017-11-29 PROCEDURE — 74011636637 HC RX REV CODE- 636/637: Performed by: INTERNAL MEDICINE

## 2017-11-29 PROCEDURE — 74011250637 HC RX REV CODE- 250/637: Performed by: INTERNAL MEDICINE

## 2017-11-29 PROCEDURE — 74011000258 HC RX REV CODE- 258: Performed by: INTERNAL MEDICINE

## 2017-11-29 PROCEDURE — 74011000250 HC RX REV CODE- 250: Performed by: INTERNAL MEDICINE

## 2017-11-29 PROCEDURE — 73630 X-RAY EXAM OF FOOT: CPT

## 2017-11-29 PROCEDURE — 83735 ASSAY OF MAGNESIUM: CPT | Performed by: INTERNAL MEDICINE

## 2017-11-29 PROCEDURE — 74011250636 HC RX REV CODE- 250/636: Performed by: INTERNAL MEDICINE

## 2017-11-29 PROCEDURE — 65660000000 HC RM CCU STEPDOWN

## 2017-11-29 PROCEDURE — 36415 COLL VENOUS BLD VENIPUNCTURE: CPT | Performed by: INTERNAL MEDICINE

## 2017-11-29 RX ORDER — SODIUM POLYSTYRENE SULFONATE 15 G/60ML
15 SUSPENSION ORAL; RECTAL
Status: COMPLETED | OUTPATIENT
Start: 2017-11-29 | End: 2017-11-29

## 2017-11-29 RX ADMIN — GABAPENTIN 100 MG: 100 CAPSULE ORAL at 17:05

## 2017-11-29 RX ADMIN — HEPARIN SODIUM 5000 UNITS: 5000 INJECTION, SOLUTION INTRAVENOUS; SUBCUTANEOUS at 21:56

## 2017-11-29 RX ADMIN — NEOMYCIN SULFATE, POLYMYXIN B SULFATE AND DEXAMETHASONE 1 DROP: 3.5; 10000; 1 SUSPENSION/ DROPS OPHTHALMIC at 15:05

## 2017-11-29 RX ADMIN — SODIUM POLYSTYRENE SULFONATE 15 G: 15 SUSPENSION ORAL; RECTAL at 12:05

## 2017-11-29 RX ADMIN — AMPICILLIN SODIUM 2 G: 2 INJECTION, POWDER, FOR SOLUTION INTRAMUSCULAR; INTRAVENOUS at 17:05

## 2017-11-29 RX ADMIN — AMPICILLIN SODIUM 2 G: 2 INJECTION, POWDER, FOR SOLUTION INTRAMUSCULAR; INTRAVENOUS at 06:14

## 2017-11-29 RX ADMIN — Medication 10 ML: at 15:08

## 2017-11-29 RX ADMIN — METOPROLOL TARTRATE 100 MG: 50 TABLET ORAL at 09:24

## 2017-11-29 RX ADMIN — ATORVASTATIN CALCIUM 40 MG: 20 TABLET, FILM COATED ORAL at 21:55

## 2017-11-29 RX ADMIN — INSULIN LISPRO 2 UNITS: 100 INJECTION, SOLUTION INTRAVENOUS; SUBCUTANEOUS at 11:30

## 2017-11-29 RX ADMIN — GABAPENTIN 100 MG: 100 CAPSULE ORAL at 21:56

## 2017-11-29 RX ADMIN — GABAPENTIN 100 MG: 100 CAPSULE ORAL at 09:24

## 2017-11-29 RX ADMIN — Medication 10 ML: at 21:57

## 2017-11-29 RX ADMIN — HUMAN INSULIN 5 UNITS: 100 INJECTION, SUSPENSION SUBCUTANEOUS at 17:05

## 2017-11-29 RX ADMIN — Medication 10 ML: at 06:15

## 2017-11-29 RX ADMIN — AMLODIPINE BESYLATE 10 MG: 5 TABLET ORAL at 09:24

## 2017-11-29 RX ADMIN — NEOMYCIN SULFATE, POLYMYXIN B SULFATE AND DEXAMETHASONE 1 DROP: 3.5; 10000; 1 SUSPENSION/ DROPS OPHTHALMIC at 09:24

## 2017-11-29 RX ADMIN — HUMAN INSULIN 10 UNITS: 100 INJECTION, SUSPENSION SUBCUTANEOUS at 09:23

## 2017-11-29 RX ADMIN — NEOMYCIN SULFATE, POLYMYXIN B SULFATE AND DEXAMETHASONE 1 DROP: 3.5; 10000; 1 SUSPENSION/ DROPS OPHTHALMIC at 06:15

## 2017-11-29 RX ADMIN — METOPROLOL TARTRATE 100 MG: 50 TABLET ORAL at 17:05

## 2017-11-29 RX ADMIN — HEPARIN SODIUM 5000 UNITS: 5000 INJECTION, SOLUTION INTRAVENOUS; SUBCUTANEOUS at 15:05

## 2017-11-29 RX ADMIN — NEOMYCIN SULFATE, POLYMYXIN B SULFATE AND DEXAMETHASONE 1 DROP: 3.5; 10000; 1 SUSPENSION/ DROPS OPHTHALMIC at 21:56

## 2017-11-29 RX ADMIN — NEOMYCIN SULFATE, POLYMYXIN B SULFATE AND DEXAMETHASONE 1 DROP: 3.5; 10000; 1 SUSPENSION/ DROPS OPHTHALMIC at 17:39

## 2017-11-29 RX ADMIN — HEPARIN SODIUM 5000 UNITS: 5000 INJECTION, SOLUTION INTRAVENOUS; SUBCUTANEOUS at 06:14

## 2017-11-29 RX ADMIN — FERROUS SULFATE TAB 325 MG (65 MG ELEMENTAL FE) 325 MG: 325 (65 FE) TAB at 12:05

## 2017-11-29 NOTE — PROGRESS NOTES
Problem: Falls - Risk of  Goal: *Absence of Falls  Document Zuly Fall Risk and appropriate interventions in the flowsheet.    Outcome: Progressing Towards Goal  Fall Risk Interventions:  Mobility Interventions: Bed/chair exit alarm         Medication Interventions: Bed/chair exit alarm    Elimination Interventions: Bed/chair exit alarm

## 2017-11-29 NOTE — PROGRESS NOTES
Leo Hickman [facility] 79  380 [facility], [address] [place] [address]  [phone]      Medical Progress Note      NAME:         Kadi Luther   :        1955  MRM:        [id number]    Date:          2017      Subjective: Patient has been seen and examined as a follow up for MELVA and osteomyelitis foot. Chart, labs, diagnostics reviewed. She is Albanian speaking and I have discussed with her through the [employer] phone interpretor. She has no new symptoms. No nausea or vomiting. No pain      Objective:    Vital Signs:    Visit Vitals    /72 (BP 1 Location: Right arm, BP Patient Position: At rest)    Pulse 74    Temp 98 °F (36.7 °C)    Resp 17    Ht 4' 11\" (1.499 m)    Wt 63.5 kg (139 lb 15.9 oz)    SpO2 94%    Breastfeeding No    BMI 28.27 kg/m2        No intake or output data in the 24 hours ending 17 0837     Physical Examination:    General:   Weak looking but not in any acute distress   Eyes:   pink conjunctivae, PERRLA with no discharge. ENT:   no ottorrhea or rhinorrhea with dry mucous membranes  Pulm: clear breath sounds without crackles or wheezes  Card:  has regular and normal S1, S2 without thrills, bruits or peripheral edema  Abd:   non-distended, normoactive bowel sounds with no palpable organomegaly  Musc:  No cyanosis, clubbing, atrophy. Dressed right foot   Skin:  No rashes, bruising or ulcers. Neuro: Awake and alert.  Generally a non focal exam.   Psych:  Has a fair insight and is oriented x 3    Current Facility-Administered Medications   Medication Dose Route Frequency    metoprolol tartrate (LOPRESSOR) tablet 100 mg  100 mg Oral BID    gabapentin (NEURONTIN) capsule 100 mg  100 mg Oral TID    ampicillin (OMNIPEN) 2 g in 0.9% sodium chloride (MBP/ADV) 100 mL  2 g IntraVENous Q12H    influenza vaccine  (3 yrs+)(PF) (FLUZONE QUAD/FLUARIX QUAD) injection 0.5 mL  0.5 mL IntraMUSCular PRIOR TO DISCHARGE    ondansetron (ZOFRAN) injection 4 mg  4 mg IntraVENous Q8H PRN    amLODIPine (NORVASC) tablet 10 mg  10 mg Oral DAILY    neomycin-polymyxin-dexamethasone (DEXACIDIN, MAXITROL) 3.5mg/mL-10,000 unit/mL-0.1 % ophthalmic suspension 1 Drop  1 Drop Right Eye Q4HWA    ferrous sulfate tablet 325 mg  325 mg Oral DAILY    atorvastatin (LIPITOR) tablet 40 mg  40 mg Oral QHS    artificial tears (dextran 70-hypromellose) (NATURAL BALANCE) 0.1-0.3 % ophthalmic solution 1 Drop  1 Drop Both Eyes PRN    hydrALAZINE (APRESOLINE) 20 mg/mL injection 10 mg  10 mg IntraVENous Q6H PRN    sodium chloride (NS) flush 5-10 mL  5-10 mL IntraVENous Q8H    sodium chloride (NS) flush 5-10 mL  5-10 mL IntraVENous PRN    naloxone (NARCAN) injection 0.4 mg  0.4 mg IntraVENous PRN    insulin lispro (HUMALOG) injection   SubCUTAneous AC&HS    glucose chewable tablet 16 g  4 Tab Oral PRN    dextrose (D50W) injection syrg 12.5-25 g  12.5-25 g IntraVENous PRN    glucagon (GLUCAGEN) injection 1 mg  1 mg IntraMUSCular PRN    heparin (porcine) injection 5,000 Units  5,000 Units SubCUTAneous Q8H    insulin NPH (NOVOLIN N, HUMULIN N) injection 5 Units  5 Units SubCUTAneous ACD    insulin NPH (NOVOLIN N, HUMULIN N) injection 10 Units  10 Units SubCUTAneous ACB        Laboratory data and review:    No results for input(s): WBC, HGB, HCT, PLT, HGBEXT, HCTEXT, PLTEXT, HGBEXT, HCTEXT, PLTEXT in the last 72 hours. 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 MRI positive for osteo and c/w findings by wound care.  Cx are Positive for Enterococcus Group D. Continue IV Ampicillin. I have discussed with Dr Aracely Barajas who will review her today. MELVA (acute kidney injury) / Hyperkalemia / Non-anion gap metabolic acidosis: severe, unclear etiology. May have underlying CKD. SCr continues to worsen. Seen by renal. Continue to hold ARB and loop. Serologies unremarkable. For now, keep monitoring Cr and urine output.      Diabetes ([facility] (6/12/2014): type 2, appears uncontrolled. On Novolin 70/30 mix 40u in AM and 15u in PM at home. Blood glucose remains. Continue NPH. A1c in 8.6, goal for her is less than 7. Continue SSI and FSBG.     HTN (hypertension) (6/12/2014): BP elevated. Continue Amlodipine, Metoprolol. Monitor for now       Hyperlipidemia (6/13/2014). LDL above 100. Continue Lipitor      Right eye pain. Apparently had recent cataract surgery. Has been on ciprodex for 15 days but still having pain. No evidence of intraocular infection. Continue artifical tears.  Monitor       Total time spent for the patient's care: [address] [place] [address] discussed with: Patient, Care Manager and Nursing Staff    Discussed:  Care Plan    Prophylaxis:  Hep SQ    Anticipated Disposition:  Home w/Family           ___________________________________________________    Attending Physician:   Harjit Rivera MD

## 2017-11-29 NOTE — PROGRESS NOTES
Rounded on [employer] patients and provided Anointing of the Sick at request of family.     Melanie [place]
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
## 2017-11-29 NOTE — PROGRESS NOTES
[address] [facility]  YOB: 1955          Assessment & Plan:   ARF/CKD  · Creat essentially stable this admission (may have more variability at lower GFR/Higher creatinine)  · Serologies are negative  · She has longstanding poorly controlled complicated DM with heavy proteinuria and is presumed to have diabetic nephropathy  · She is likely to progress to ESRD soon  · Case management looking into medicaid eligibility with impending need for dialysis  · I spoke with her, with her daughter serving as , and explained the diagnosis (diabetic nephropathy), likely need for dialysis within months, and the need to determine options for coverage    Hyperkalemia  · Rx SPS  · Low K diet    HTN  · BP up and down  · Continue current medications for now    DM  · Insulin    Pruritis/neuropathy  · Better with gabapentin       Subjective:   CC: f/u ARF, CKD  HPI: Creat stable. K is high today.  HTN has generally been controlled but BP is higher this pm.    ROS: No sob/n/v  Current Facility-Administered Medications   Medication Dose Route Frequency    metoprolol tartrate (LOPRESSOR) tablet 100 mg  100 mg Oral BID    gabapentin (NEURONTIN) capsule 100 mg  100 mg Oral TID    ampicillin (OMNIPEN) 2 g in 0.9% sodium chloride (MBP/ADV) 100 mL  2 g IntraVENous Q12H    influenza vaccine 2017-18 (3 yrs+)(PF) (FLUZONE QUAD/FLUARIX QUAD) injection 0.5 mL  0.5 mL IntraMUSCular PRIOR TO DISCHARGE    ondansetron (ZOFRAN) injection 4 mg  4 mg IntraVENous Q8H PRN    amLODIPine (NORVASC) tablet 10 mg  10 mg Oral DAILY    neomycin-polymyxin-dexamethasone (DEXACIDIN, MAXITROL) 3.5mg/mL-10,000 unit/mL-0.1 % ophthalmic suspension 1 Drop  1 Drop Right Eye Q4HWA    ferrous sulfate tablet 325 mg  325 mg Oral DAILY    atorvastatin (LIPITOR) tablet 40 mg  40 mg Oral QHS    artificial tears (dextran 70-hypromellose) (NATURAL BALANCE) 0.1-0.3 % ophthalmic solution 1 Drop  1 Drop Both Eyes PRN    hydrALAZINE (APRESOLINE) 20 mg/mL injection 10 mg  10 mg IntraVENous Q6H PRN    sodium chloride (NS) flush 5-10 mL  5-10 mL IntraVENous Q8H    sodium chloride (NS) flush 5-10 mL  5-10 mL IntraVENous PRN    naloxone (NARCAN) injection 0.4 mg  0.4 mg IntraVENous PRN    insulin lispro (HUMALOG) injection   SubCUTAneous AC&HS    glucose chewable tablet 16 g  4 Tab Oral PRN    dextrose (D50W) injection syrg 12.5-25 g  12.5-25 g IntraVENous PRN    glucagon (GLUCAGEN) injection 1 mg  1 mg IntraMUSCular PRN    heparin (porcine) injection 5,000 Units  5,000 Units SubCUTAneous Q8H    insulin NPH (NOVOLIN N, HUMULIN N) injection 5 Units  5 Units SubCUTAneous ACD    insulin NPH (NOVOLIN N, HUMULIN N) injection 10 Units  10 Units SubCUTAneous ACB          Objective:     Vitals:  Blood pressure 177/70, pulse 77, temperature 98.1 °F (36.7 °C), resp. rate 18, height 4' 11\" (1.499 m), weight 63.5 kg (139 lb 15.9 oz), SpO2 99 %, not currently breastfeeding. Temp (24hrs), Av.1 °F (36.7 °C), Min:97.7 °F (36.5 °C), Max:98.5 °F (36.9 °C)      Intake and Output:          Physical Exam:               GENERAL ASSESSMENT: NAD  CHEST: CTA  HEART: S1S2  ABDOMEN: Soft,NT  EXTREMITY: min EDEMA  NEURO: Grossly non focal          ECG/rhythm:    Data Review      No results for input(s): TNIPOC in the last 72 hours. No lab exists for component: ITNL   No results for input(s): CPK, CKMB, TROIQ in the last 72 hours. Recent Labs      17   0225  17   0215  17   0331   NA  140  139  141   K  5.8*  5.0  4.8   CL  109*  107  108   CO2  19*  19*  22   BUN  79*  78*  74*   CREA  5.38*  5.36*  4.90*   GLU  113*  108*  97   PHOS  5.4*  5.4*  5.0*   MG  1.7  1.7  1.7   CA  7.9*  8.0*  8.4*   ALB  2.4*  2.4*  2.4*      No results for input(s): INR, PTP, APTT in the last 72 hours.     No lab exists for component: INREXT, INREXT  Needs: urine analysis, urine sodium, protein and creatinine  Lab Results   Component Value Date/Time    Sodium urine, random 38 11/22/2017 01:54 PM    Creatinine, urine 61.11 11/22/2017 08:08 PM           : Kim Arechiga MD  11/29/2017        [facility]:  [email or web address]  Azalia Guadalupe office:  [address], [address] [place] [address],[address]  [place], [address] [place] [address]  Phone: [phone]  Fax :     [phone]    [facility] office:  [address] [facility], [address]  Phone - [phone]  Fax - [phone]

## 2017-11-29 NOTE — PROGRESS NOTES
Nutrition:  Noted new consult for diet education. Initial assessment completed and verbal/written Renal diet education was provided yesterday 11/28 (daughter was present to translate). Will f/u prn for additional questions/concerns. Thank you.     Alexander Stringer, [address] [place] [address]  Clinical Dietitian  Pager [phone]

## 2017-11-29 NOTE — PROGRESS NOTES
PHYSICAL THERAPY    1530 Chart reviewed, spoke with RN. Blue phone used to translate for patient. Pt politely declining to participate with gait training with physical therapy, secondary to diarrhea and frequent trip to bathroom. Up ad edy in room with no LOB.  at bedside. Susie Enriquez

## 2017-11-29 NOTE — PROGRESS NOTES
Spiritual Care Partner Volunteer visited patient in post surgical unit on 11/28/2017. This is a late chart documentation of visit. Documented by:  Visit by: Rev. Sneha Clayton.  Colette Roberts MA, [facility]    Lead  Profession Development [employer] Advancement
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
## 2017-11-29 NOTE — PROGRESS NOTES
Sent email to Francoise DEWEY with the information about the possibility of the patient being an ESRD patient and needing HD as an outpatient at d/c and the need to investigate for eligibility for Medicaid at this time for this purpose and if not now for in the future. Livia Self Manager of Case Management

## 2017-11-30 VITALS
BODY MASS INDEX: 28.22 KG/M2 | TEMPERATURE: 98 F | WEIGHT: 139.99 LBS | HEART RATE: 73 BPM | SYSTOLIC BLOOD PRESSURE: 140 MMHG | DIASTOLIC BLOOD PRESSURE: 64 MMHG | RESPIRATION RATE: 18 BRPM | HEIGHT: 59 IN | OXYGEN SATURATION: 97 %

## 2017-11-30 PROBLEM — L08.9 DIABETIC FOOT INFECTION (HCC): Status: ACTIVE | Noted: 2017-11-30

## 2017-11-30 PROBLEM — N18.5 CKD (CHRONIC KIDNEY DISEASE) STAGE 5, GFR LESS THAN 15 ML/MIN (HCC): Status: ACTIVE | Noted: 2017-11-30

## 2017-11-30 PROBLEM — E11.628 DIABETIC FOOT INFECTION (HCC): Status: ACTIVE | Noted: 2017-11-30

## 2017-11-30 PROBLEM — E87.29 HIGH ANION GAP METABOLIC ACIDOSIS: Status: RESOLVED | Noted: 2017-11-22 | Resolved: 2017-11-30

## 2017-11-30 LAB
ALBUMIN SERPL-MCNC: 2.2 G/DL (ref 3.5–5)
ANION GAP SERPL CALC-SCNC: 13 MMOL/L (ref 5–15)
BUN SERPL-MCNC: 79 MG/DL (ref 6–20)
BUN/CREAT SERPL: 15 (ref 12–20)
CALCIUM SERPL-MCNC: 7.8 MG/DL (ref 8.5–10.1)
CHLORIDE SERPL-SCNC: 111 MMOL/L (ref 97–108)
CO2 SERPL-SCNC: 17 MMOL/L (ref 21–32)
CREAT SERPL-MCNC: 5.43 MG/DL (ref 0.55–1.02)
GLUCOSE BLD STRIP.AUTO-MCNC: 100 MG/DL (ref 65–100)
GLUCOSE BLD STRIP.AUTO-MCNC: 198 MG/DL (ref 65–100)
GLUCOSE SERPL-MCNC: 106 MG/DL (ref 65–100)
MAGNESIUM SERPL-MCNC: 1.7 MG/DL (ref 1.6–2.4)
PHOSPHATE SERPL-MCNC: 5.5 MG/DL (ref 2.6–4.7)
POTASSIUM SERPL-SCNC: 5.2 MMOL/L (ref 3.5–5.1)
SERVICE CMNT-IMP: ABNORMAL
SERVICE CMNT-IMP: NORMAL
SODIUM SERPL-SCNC: 141 MMOL/L (ref 136–145)

## 2017-11-30 PROCEDURE — 82962 GLUCOSE BLOOD TEST: CPT

## 2017-11-30 PROCEDURE — 74011000258 HC RX REV CODE- 258: Performed by: INTERNAL MEDICINE

## 2017-11-30 PROCEDURE — 74011250637 HC RX REV CODE- 250/637: Performed by: INTERNAL MEDICINE

## 2017-11-30 PROCEDURE — 80069 RENAL FUNCTION PANEL: CPT | Performed by: INTERNAL MEDICINE

## 2017-11-30 PROCEDURE — 90686 IIV4 VACC NO PRSV 0.5 ML IM: CPT | Performed by: INTERNAL MEDICINE

## 2017-11-30 PROCEDURE — 83735 ASSAY OF MAGNESIUM: CPT | Performed by: INTERNAL MEDICINE

## 2017-11-30 PROCEDURE — 74011250636 HC RX REV CODE- 250/636: Performed by: INTERNAL MEDICINE

## 2017-11-30 PROCEDURE — 90471 IMMUNIZATION ADMIN: CPT

## 2017-11-30 PROCEDURE — 74011636637 HC RX REV CODE- 636/637: Performed by: INTERNAL MEDICINE

## 2017-11-30 PROCEDURE — 36415 COLL VENOUS BLD VENIPUNCTURE: CPT | Performed by: INTERNAL MEDICINE

## 2017-11-30 RX ORDER — GABAPENTIN 100 MG/1
100 CAPSULE ORAL 3 TIMES DAILY
Qty: 90 CAP | Refills: 0 | Status: SHIPPED | OUTPATIENT
Start: 2017-11-30 | End: 2017-11-30

## 2017-11-30 RX ORDER — AMOXICILLIN AND CLAVULANATE POTASSIUM 875; 125 MG/1; MG/1
1 TABLET, FILM COATED ORAL EVERY 12 HOURS
Qty: 14 TAB | Refills: 0 | Status: SHIPPED | OUTPATIENT
Start: 2017-11-30 | End: 2017-11-30

## 2017-11-30 RX ORDER — SODIUM POLYSTYRENE SULFONATE 15 G/60ML
30 SUSPENSION ORAL; RECTAL
Status: COMPLETED | OUTPATIENT
Start: 2017-11-30 | End: 2017-11-30

## 2017-11-30 RX ORDER — AMOXICILLIN AND CLAVULANATE POTASSIUM 875; 125 MG/1; MG/1
1 TABLET, FILM COATED ORAL DAILY
Qty: 7 TAB | Refills: 0 | Status: SHIPPED | OUTPATIENT
Start: 2017-11-30 | End: 2017-12-08 | Stop reason: ALTCHOICE

## 2017-11-30 RX ORDER — AMLODIPINE BESYLATE 10 MG/1
10 TABLET ORAL DAILY
Qty: 30 TAB | Refills: 0 | Status: SHIPPED | OUTPATIENT
Start: 2017-12-01 | End: 2017-12-08 | Stop reason: SDUPTHER

## 2017-11-30 RX ORDER — ATORVASTATIN CALCIUM 40 MG/1
40 TABLET, FILM COATED ORAL
Qty: 30 TAB | Refills: 0 | Status: SHIPPED | OUTPATIENT
Start: 2017-11-30 | End: 2017-11-30

## 2017-11-30 RX ADMIN — HUMAN INSULIN 10 UNITS: 100 INJECTION, SUSPENSION SUBCUTANEOUS at 09:41

## 2017-11-30 RX ADMIN — AMPICILLIN SODIUM 2 G: 2 INJECTION, POWDER, FOR SOLUTION INTRAMUSCULAR; INTRAVENOUS at 05:40

## 2017-11-30 RX ADMIN — HEPARIN SODIUM 5000 UNITS: 5000 INJECTION, SOLUTION INTRAVENOUS; SUBCUTANEOUS at 14:14

## 2017-11-30 RX ADMIN — HEPARIN SODIUM 5000 UNITS: 5000 INJECTION, SOLUTION INTRAVENOUS; SUBCUTANEOUS at 05:40

## 2017-11-30 RX ADMIN — INSULIN LISPRO 2 UNITS: 100 INJECTION, SOLUTION INTRAVENOUS; SUBCUTANEOUS at 11:30

## 2017-11-30 RX ADMIN — SODIUM POLYSTYRENE SULFONATE 30 G: 15 SUSPENSION ORAL; RECTAL at 11:59

## 2017-11-30 RX ADMIN — INFLUENZA VIRUS VACCINE 0.5 ML: 15; 15; 15; 15 SUSPENSION INTRAMUSCULAR at 15:21

## 2017-11-30 RX ADMIN — NEOMYCIN SULFATE, POLYMYXIN B SULFATE AND DEXAMETHASONE 1 DROP: 3.5; 10000; 1 SUSPENSION/ DROPS OPHTHALMIC at 12:10

## 2017-11-30 RX ADMIN — FERROUS SULFATE TAB 325 MG (65 MG ELEMENTAL FE) 325 MG: 325 (65 FE) TAB at 11:59

## 2017-11-30 RX ADMIN — METOPROLOL TARTRATE 100 MG: 50 TABLET ORAL at 09:42

## 2017-11-30 RX ADMIN — AMLODIPINE BESYLATE 10 MG: 5 TABLET ORAL at 09:42

## 2017-11-30 RX ADMIN — GABAPENTIN 100 MG: 100 CAPSULE ORAL at 09:42

## 2017-11-30 RX ADMIN — Medication 10 ML: at 05:40

## 2017-11-30 RX ADMIN — NEOMYCIN SULFATE, POLYMYXIN B SULFATE AND DEXAMETHASONE 1 DROP: 3.5; 10000; 1 SUSPENSION/ DROPS OPHTHALMIC at 08:00
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
## 2017-11-30 NOTE — PROGRESS NOTES
Patient discharged home, went over discharge instructions with adult daughter in room, patient denies pain, understood instructions and given extra papers from case management to help with prescriptions.

## 2017-11-30 NOTE — PROGRESS NOTES
11/30/17  CM spoke with [facility] to follow up to see if pt eligible for medicaid to allow for coverage for dialysis - when pt needs it in the future. [facility] reports that, although it may take several months, they have initiated the process fo pt applying for emergency medicaid and they will follow up with pt post d/c.  JOSHUA MayerW

## 2017-11-30 NOTE — DISCHARGE SUMMARY
Leo Hickman [facility] 79  [facility], [address] [place] [address]  Tel: [phone]    Physician Discharge Summary    Patient ID: Ann Whitmore  Age:           65 y.o.    : 1955  MRN:          [id number]     PCP: Brittaney Zapien MD     Admit date: 2017    Discharge date: 2017    Principal admission Diagnosis:   MELVA (acute kidney injury) [facility])    Discharge Diagnoses: Active Problems:    Diabetic foot infection ([facility] (2017)    MELVA (acute kidney injury) ([facility] (2017)    CKD (chronic kidney disease) stage 5, GFR less than 15 ml/min ([facility] (2017)    Foot fracture (2017)    Type 2 diabetes mellitus with renal complication ([facility] (3/89/8929)    HTN (hypertension) (2014)    Hyperlipidemia (2014)    Consults: Nephrology and podiatry    Hospital Course:     Ms. Jade Jaime is a 65 y.o. admitted to [facility] and treated for the following:    Atraumatic right foot fracture / Diabetic foot infection ([facility] (2017): MRI positive for osteo but I discussed with Dr Lu Suarez who attributed MRI changes to acute fracture rather than osteo. No surgery recommended but continued wound care and PO antibiotics. Cultures grew ? enterococcus Group D. She had been on IV Ampicillin and she will continue with PO Augmenting and follow up with Dr Lu Suarez. She remains at risk for osteomyelitis     MELVA (acute kidney injury) / Hyperkalemia / Non-anion gap metabolic acidosis/ CKD 5: severe, unclear etiology. Likely has underlying CKD. SCr remains steady. Seen by renal and no RRT indicated at the [facility] but she may require dialysis soon. Patient and family advised to follow up at the [facility] clinic next week to have BMP drawn and if worsening and symptomatic, she may need HD started. She is at risk for re-admission due to progression of CKD.        Diabetes ([facility] (2014): type 2, appears uncontrolled.  On Novolin 70/30 mix 40u in AM and 15u in PM at home. Blood glucose remains. Continue adjusted NPH. A1c in 8.6, goal for her is less than 7.       HTN (hypertension) (6/12/2014): BP elevated. Continue Amlodipine, Metoprolol.        Hyperlipidemia (6/13/2014). LDL above 100. Continue Lipitor       Right eye pain. Apparently had recent cataract surgery. Has been on ciprodex for 15 days but still having pain. No evidence of intraocular infection. Continue artifical tears.        Discharge Exam:    Visit Vitals    /77 (BP 1 Location: Right arm, BP Patient Position: At rest)    Pulse 74    Temp 97.6 °F (36.4 °C)    Resp 18    Ht 4' 11\" (1.499 m)    Wt 63.5 kg (139 lb 15.9 oz)    SpO2 96%    Breastfeeding No    BMI 28.27 kg/m2      General: well looking and stable patient in no acute distress  Pulm: clear breath sounds without wheezes  Card: no murmurs, normal S1, S2 without thrills, bruits   Abd:    soft, non-tender, normoactive bowel sounds  Skin: no rashes or ulcers, skin turgor is good. Dressed wounds feet  Neuro: awake, alert and has a non focal     Activity: Activity as tolerated    Diet: Diabetic Diet and Renal Diet    Current Discharge Medication List      START taking these medications    Details   amLODIPine (NORVASC) 10 mg tablet Take 1 Tab by mouth daily for 30 days. Qty: 30 Tab, Refills: 0      amoxicillin-clavulanate (AUGMENTIN) 875-125 mg per tablet Take 1 Tab by mouth daily for 7 days. Qty: 7 Tab, Refills: 0         CONTINUE these medications which have CHANGED    Details   insulin NPH/insulin regular (HUMULIN 70/30) 100 unit/mL (70-30) injection 10 Units by SubCUTAneous route Before breakfast and dinner. Qty: 10 mL, Refills: 0         CONTINUE these medications which have NOT CHANGED    Details   ciprofloxacin HCl (CILOXAN) 0.3 % ophthalmic solution Administer 1 Drop to right eye every two (2) hours. metoprolol tartrate (LOPRESSOR) 100 mg IR tablet Take 100 mg by mouth nightly.       ferrous sulfate 325 mg (65 mg iron) tablet Take 325 mg by mouth daily. STOP taking these medications       irbesartan (AVAPRO) 300 mg tablet Comments:   Reason for Stopping:         hydroCHLOROthiazide (HYDRODIURIL) 50 mg tablet Comments:   Reason for Stopping:         furosemide (LASIX) 40 mg tablet Comments:   Reason for Stopping:         EPOETIN GAETANO INJECTION Comments:   Reason for Stopping:         furosemide (LASIX) 40 mg tablet Comments:   Reason for Stopping:         ranitidine hcl 150 mg capsule Comments:   Reason for Stopping:         promethazine (PHENERGAN) 25 mg tablet Comments:   Reason for Stopping: Follow-up Information     Follow up With Details Comments Contact Info    Joaquin Kussmaul, DPM In 1 day  [facility] 72  [phone]      Michelle Somers MD Schedule an appointment as soon as possible for a visit to schedule follow up and review of lab results [address]  [facility] [phone]            Follow-up tests or labs: None    Discharge Condition: Stable    Disposition: home    Time taken to arrange discharge:  35 minutes.     Signed:  Edwin Macedo MD     [facility]  11/30/2017   2:38 PM

## 2017-11-30 NOTE — PROGRESS NOTES
Bedside and Verbal shift change report given to Gurdeep Fraga RN (oncoming nurse) by KETAN Cintron (offgoing nurse). Report given with SBAR, Kardex, OR Summary, Intake/Output, MAR and Recent Results.

## 2017-11-30 NOTE — PROGRESS NOTES
[address] [facility]  YOB: 1955          Assessment & Plan:   ARF/CKD  · Creat essentially stable this admission (may have more variability at lower GFR/Higher creatinine)  · Serologies are negative  · She has longstanding poorly controlled complicated DM with heavy proteinuria and is presumed to have diabetic nephropathy  · She is likely to progress to ESRD soon  · Application for medicaid has been started  · To follow up with [facility]. · No acute indication for HD.  Should come to the hospital for symptomatic renal failure, hyperkalemia, or sever progression    Hyperkalemia  · Better  · Low K diet    HTN  · Improved    DM  · Insulin    Pruritis/neuropathy  · Better with gabapentin       Subjective:   CC: f/u ARF, CKD  HPI: Renal function stable    ROS: No sob/n/v  Current Facility-Administered Medications   Medication Dose Route Frequency    metoprolol tartrate (LOPRESSOR) tablet 100 mg  100 mg Oral BID    gabapentin (NEURONTIN) capsule 100 mg  100 mg Oral TID    ampicillin (OMNIPEN) 2 g in 0.9% sodium chloride (MBP/ADV) 100 mL  2 g IntraVENous Q12H    influenza vaccine 2017-18 (3 yrs+)(PF) (FLUZONE QUAD/FLUARIX QUAD) injection 0.5 mL  0.5 mL IntraMUSCular PRIOR TO DISCHARGE    ondansetron (ZOFRAN) injection 4 mg  4 mg IntraVENous Q8H PRN    amLODIPine (NORVASC) tablet 10 mg  10 mg Oral DAILY    neomycin-polymyxin-dexamethasone (DEXACIDIN, MAXITROL) 3.5mg/mL-10,000 unit/mL-0.1 % ophthalmic suspension 1 Drop  1 Drop Right Eye Q4HWA    ferrous sulfate tablet 325 mg  325 mg Oral DAILY    atorvastatin (LIPITOR) tablet 40 mg  40 mg Oral QHS    artificial tears (dextran 70-hypromellose) (NATURAL BALANCE) 0.1-0.3 % ophthalmic solution 1 Drop  1 Drop Both Eyes PRN    hydrALAZINE (APRESOLINE) 20 mg/mL injection 10 mg  10 mg IntraVENous Q6H PRN    sodium chloride (NS) flush 5-10 mL  5-10 mL IntraVENous Q8H    sodium chloride (NS) flush 5-10 mL  5-10 mL IntraVENous PRN    naloxone (NARCAN) injection 0.4 mg  0.4 mg IntraVENous PRN    insulin lispro (HUMALOG) injection   SubCUTAneous AC&HS    glucose chewable tablet 16 g  4 Tab Oral PRN    dextrose (D50W) injection syrg 12.5-25 g  12.5-25 g IntraVENous PRN    glucagon (GLUCAGEN) injection 1 mg  1 mg IntraMUSCular PRN    heparin (porcine) injection 5,000 Units  5,000 Units SubCUTAneous Q8H    insulin NPH (NOVOLIN N, HUMULIN N) injection 5 Units  5 Units SubCUTAneous ACD    insulin NPH (NOVOLIN N, HUMULIN N) injection 10 Units  10 Units SubCUTAneous ACB          Objective:     Vitals:  Blood pressure 140/64, pulse 73, temperature 98 °F (36.7 °C), resp. rate 18, height 4' 11\" (1.499 m), weight 63.5 kg (139 lb 15.9 oz), SpO2 97 %, not currently breastfeeding. Temp (24hrs), Av.1 °F (36.7 °C), Min:97.6 °F (36.4 °C), Max:98.4 °F (36.9 °C)      Intake and Output:          Physical Exam:               GENERAL ASSESSMENT: NAD  CHEST: CTA  HEART: S1S2  ABDOMEN: Soft,NT  EXTREMITY: min EDEMA  NEURO: Grossly non focal          ECG/rhythm:    Data Review      No results for input(s): TNIPOC in the last 72 hours. No lab exists for component: ITNL   No results for input(s): CPK, CKMB, TROIQ in the last 72 hours. Recent Labs      17   0347  17   0225  17   0215   NA  141  140  139   K  5.2*  5.8*  5.0   CL  111*  109*  107   CO2  17*  19*  19*   BUN  79*  79*  78*   CREA  5.43*  5.38*  5.36*   GLU  106*  113*  108*   PHOS  5.5*  5.4*  5.4*   MG  1.7  1.7  1.7   CA  7.8*  7.9*  8.0*   ALB  2.2*  2.4*  2.4*      No results for input(s): INR, PTP, APTT in the last 72 hours.     No lab exists for component: INREXT, INREXT  Needs: urine analysis, urine sodium, protein and creatinine  Lab Results   Component Value Date/Time    Sodium urine, random 38 2017 01:54 PM    Creatinine, urine 61.11 2017 08:08 PM           : Kim Arechiga MD  2017        [facility]:  [email or web address]  Pedro Able office:  [address] [place] [address], [address] [place] [address],[address]  [place], [address] [facility]  Phone: [phone]  Fax :     [phone]    [place] office:  [address] [facility]  Richar Saunders  Phone - [phone]  Fax - [phone]

## 2017-12-04 ENCOUNTER — HOSPITAL ENCOUNTER (OUTPATIENT)
Dept: LAB | Age: 62
Discharge: HOME | End: 2017-12-04

## 2017-12-04 ENCOUNTER — OFFICE VISIT (OUTPATIENT)
Dept: FAMILY MEDICINE CLINIC | Age: 62
End: 2017-12-04

## 2017-12-04 DIAGNOSIS — E11.9 DM TYPE 2, GOAL HBA1C < 7% (HCC): ICD-10-CM

## 2017-12-04 DIAGNOSIS — Z71.89 COUNSELING AND COORDINATION OF CARE: Primary | ICD-10-CM

## 2017-12-04 DIAGNOSIS — E03.8 OTHER SPECIFIED HYPOTHYROIDISM: Primary | ICD-10-CM

## 2017-12-04 DIAGNOSIS — E03.8 OTHER SPECIFIED HYPOTHYROIDISM: ICD-10-CM

## 2017-12-04 DIAGNOSIS — Z87.81 HISTORY OF TOE FRACTURE: ICD-10-CM

## 2017-12-04 DIAGNOSIS — N18.5 STAGE 5 CHRONIC KIDNEY DISEASE NOT ON CHRONIC DIALYSIS (HCC): ICD-10-CM

## 2017-12-04 DIAGNOSIS — N18.5 CHRONIC RENAL FAILURE, STAGE 5 (HCC): Primary | ICD-10-CM

## 2017-12-04 LAB
ALBUMIN SERPL-MCNC: 3.1 G/DL (ref 3.5–5)
ALBUMIN/GLOB SERPL: 0.8 {RATIO} (ref 1.1–2.2)
ALP SERPL-CCNC: 105 U/L (ref 45–117)
ALT SERPL-CCNC: 26 U/L (ref 12–78)
ANION GAP SERPL CALC-SCNC: 11 MMOL/L (ref 5–15)
AST SERPL-CCNC: 22 U/L (ref 15–37)
BILIRUB SERPL-MCNC: 0.2 MG/DL (ref 0.2–1)
BUN SERPL-MCNC: 76 MG/DL (ref 6–20)
BUN/CREAT SERPL: 18 (ref 12–20)
CALCIUM SERPL-MCNC: 8.1 MG/DL (ref 8.5–10.1)
CHLORIDE SERPL-SCNC: 114 MMOL/L (ref 97–108)
CO2 SERPL-SCNC: 19 MMOL/L (ref 21–32)
CREAT SERPL-MCNC: 4.3 MG/DL (ref 0.55–1.02)
GLOBULIN SER CALC-MCNC: 3.9 G/DL (ref 2–4)
GLUCOSE SERPL-MCNC: 71 MG/DL (ref 65–100)
POTASSIUM SERPL-SCNC: 5.3 MMOL/L (ref 3.5–5.1)
PROT SERPL-MCNC: 7 G/DL (ref 6.4–8.2)
SODIUM SERPL-SCNC: 144 MMOL/L (ref 136–145)
TSH SERPL DL<=0.05 MIU/L-ACNC: 4.72 UIU/ML (ref 0.36–3.74)

## 2017-12-04 PROCEDURE — 80053 COMPREHEN METABOLIC PANEL: CPT | Performed by: PHYSICIAN ASSISTANT

## 2017-12-04 PROCEDURE — 84443 ASSAY THYROID STIM HORMONE: CPT | Performed by: PHYSICIAN ASSISTANT

## 2017-12-04 RX ORDER — LEVOTHYROXINE SODIUM 100 UG/1
100 TABLET ORAL
Qty: 90 TAB | Refills: 0 | Status: SHIPPED | OUTPATIENT
Start: 2017-12-04 | End: 2017-12-08 | Stop reason: SDUPTHER

## 2017-12-04 NOTE — PROGRESS NOTES
Downtime form used. First appt since 2014 since she was out of the country for 2 years caring for her ailing father  While in [place], she was on insulin 70/30, lasix, l-thyroxine 100mcg, hctz and diovan. She presented to the ER with acute foot pain on 11/22/17 and was thought to have osteomyelitis and then it was determined that she actually just had inflammation from 2 toe fractures. Her kidney function had worsened. She had her meds adjusted (although her l-thyroxine was left off her list and TSH was not checked) so now she has been off l-thyroxine for aobut 2 weeks  She also was not re-prescribed atorvastatin which she has been on    She has not purchased the amlodipine yet (she was discharged home 4 days ago)  She has swelling in her ankles and pre tibia region  Lab Results   Component Value Date/Time    Sodium 141 11/30/2017 03:47 AM    Potassium 5.2 11/30/2017 03:47 AM    Chloride 111 11/30/2017 03:47 AM    CO2 17 11/30/2017 03:47 AM    Anion gap 13 11/30/2017 03:47 AM    Glucose 106 11/30/2017 03:47 AM    BUN 79 11/30/2017 03:47 AM    Creatinine 5.43 11/30/2017 03:47 AM    BUN/Creatinine ratio 15 11/30/2017 03:47 AM    GFR est AA 10 11/30/2017 03:47 AM    GFR est non-AA 8 11/30/2017 03:47 AM    Calcium 7.8 11/30/2017 03:47 AM    Bilirubin, total 0.1 11/23/2017 01:16 AM    AST (SGOT) 15 11/23/2017 01:16 AM    Alk. phosphatase 128 11/23/2017 01:16 AM    Protein, total 6.8 11/23/2017 01:16 AM    Albumin 2.2 11/30/2017 03:47 AM    Globulin 4.3 11/23/2017 01:16 AM    A-G Ratio 0.6 11/23/2017 01:16 AM    ALT (SGPT) 13 11/23/2017 01:16 AM       The plan is to get her into renal as fast as possible. The reality of the impending likely need for dialysis was discussed with the pt and her .   Her VSS are: weight 146.8, bp 139/69, HR 80  Lungs sound CTA becki at this time  2+ pitting edema ankles  Plan to check labs -follow for results  Except continued deterioration of her condition unfortunately

## 2017-12-06 NOTE — PROGRESS NOTES
Access Now application was completed and submitted to [facility] on 12/6/17.  [facility]

## 2017-12-07 NOTE — PROGRESS NOTES
This pt was not seen for 3 years (while out of the country), had preexisting kidney failure and then [place] and the next day was admitted with worsening of CKF. Please see my office note. Her f/up was made with you. The question is now if she should be back on a diuretic and how to get her into renal quickly. Please let me know if you have any additional suggestions. She was stable at recent office visit but I suspect without the diuretics she will become fluid overloaded soon. More Herman

## 2017-12-08 ENCOUNTER — OFFICE VISIT (OUTPATIENT)
Dept: FAMILY MEDICINE CLINIC | Age: 62
End: 2017-12-08

## 2017-12-08 VITALS
DIASTOLIC BLOOD PRESSURE: 76 MMHG | TEMPERATURE: 97.8 F | HEART RATE: 77 BPM | SYSTOLIC BLOOD PRESSURE: 156 MMHG | WEIGHT: 146 LBS | BODY MASS INDEX: 29.49 KG/M2 | OXYGEN SATURATION: 100 %

## 2017-12-08 DIAGNOSIS — Z23 ENCOUNTER FOR IMMUNIZATION: ICD-10-CM

## 2017-12-08 DIAGNOSIS — E03.8 OTHER SPECIFIED HYPOTHYROIDISM: ICD-10-CM

## 2017-12-08 DIAGNOSIS — N18.5 CKD (CHRONIC KIDNEY DISEASE) STAGE 5, GFR LESS THAN 15 ML/MIN (HCC): Primary | ICD-10-CM

## 2017-12-08 DIAGNOSIS — I15.0 RENOVASCULAR HYPERTENSION: ICD-10-CM

## 2017-12-08 RX ORDER — LEVOTHYROXINE SODIUM 100 UG/1
100 TABLET ORAL
Qty: 90 TAB | Refills: 0 | Status: SHIPPED | OUTPATIENT
Start: 2017-12-08

## 2017-12-08 RX ORDER — METOPROLOL TARTRATE 100 MG/1
100 TABLET ORAL
Qty: 30 TAB | Refills: 2 | Status: SHIPPED | OUTPATIENT
Start: 2017-12-08

## 2017-12-08 RX ORDER — AMLODIPINE BESYLATE 10 MG/1
10 TABLET ORAL DAILY
Qty: 30 TAB | Refills: 0 | Status: SHIPPED | OUTPATIENT
Start: 2017-12-08 | End: 2018-01-07

## 2017-12-08 RX ORDER — FUROSEMIDE 40 MG/1
40 TABLET ORAL 2 TIMES DAILY
Qty: 60 TAB | Refills: 1 | Status: SHIPPED | OUTPATIENT
Start: 2017-12-08 | End: 2018-03-30

## 2017-12-08 NOTE — PROGRESS NOTES
Assessment/Plan:    My original office note from this visit is not showing up in [facility]? At that visit, she was referred to both nephrology and podiatry  Nephrology for CKF (GFR less than 10) and for podiatry due to several broken toes in a diabetic pt. Follow-up Disposition: Not on File    [facility], JASON  [address] expressed understanding of this plan. An AVS was printed and given to the patient.      ----------------------------------------------------------------------    No chief complaint on file. History of Present Illness:  Summary of my note from 12/4/17 that is lost in [facility]? Recent hospitalization for CKF, notes reviewed. meds changed during admission  She had arrived the day before her admission by plane from her country where she has been for 3 years caring for her father. While there she had some medical care but not much. She was on some meds while there including a diuretic but now is no longer on one since her admission. She is in poor health and after a 3 year time period out of the country she seems to be faring worse even then when she was last here in 2014      Past Medical History:   Diagnosis Date    Arrhythmia     fast heart rate and palpitations dec 2010    Arthritis     generalized    Asthma     Chronic pain     headaches and stomach pain x several months    Diabetes ([facility]     Diabetic foot ulcer associated with type 2 diabetes mellitus ([facility]     GERD (gastroesophageal reflux disease)     Hypertension     Psychiatric disorder     depression       Current Outpatient Prescriptions   Medication Sig Dispense Refill    metoprolol tartrate (LOPRESSOR) 100 mg IR tablet Take 1 Tab by mouth nightly. 30 Tab 2    amLODIPine (NORVASC) 10 mg tablet Take 1 Tab by mouth daily for 30 days. 30 Tab 0    furosemide (LASIX) 40 mg tablet Take 1 Tab by mouth two (2) times a day.  60 Tab 1    levothyroxine (SYNTHROID) 100 mcg tablet Take 1 Tab by mouth Daily (before breakfast). 90 Tab 0    insulin NPH/insulin regular (HUMULIN 70/30) 100 unit/mL (70-30) injection 10 Units by SubCUTAneous route Before breakfast and dinner. 10 mL 0    ciprofloxacin HCl (CILOXAN) 0.3 % ophthalmic solution Administer 1 Drop to right eye every two (2) hours.  ferrous sulfate 325 mg (65 mg iron) tablet Take 325 mg by mouth daily. No Known Allergies    Social History   Substance Use Topics    Smoking status: Never Smoker    Smokeless tobacco: Never Used    Alcohol use No       Family History   Problem Relation Age of Onset    Heart Disease Mother     Diabetes Father     Heart Disease Father     Diabetes Brother     Cancer Paternal Aunt 39     uterine cancer    Diabetes Brother        Physical Exam:     There were no vitals taken for this visit.     A&Ox3  WDWN NAD  Respirations normal and non labored  Lungs were clear on exam   Feet 1+pitting edema ankle to pre tibial region

## 2017-12-08 NOTE — PATIENT INSTRUCTIONS
Furosemida (Por la boca)   Se [place] para el tratamiento de la retención de líquido (edema) y la presión arterial yonatan. Latrice medicamento es un diurético.  Adrian(s) : Active-Medicated Specimen Collection Kit, Diuscreen Multi-Drug Medicated Test Kit, Lasix, RX-Specimen Collection Kit, Specimen Collection Kit   Existen muchas otras marcas de Dueñas. Latrice medicamento no debe ser usado cuando:   Latrice medicamento no es adecuado para todas las personas. No lo use si alguna vez tuvo tia reacción alérgica a la furosemida. Forma de usar latrice medicamento:   Líquido, Tableta  · [place] rafy medicamentos tim se le haya indicado. Es probable que sea necesario cambiar deras dosis varias veces hasta encontrar la que funciona mejor para usted. · Si latrice medicamento le provoca malestar estomacal, usted lo puede luh con alimentos. · Solución oral: Mida el líquido oral con Hardin Widen, jeringa para uso oral o taza especialmente marcadas para medir medicamentos. · Tableta: Trague la tableta entera. No triture, rompa o mastique. · Si olvida tia dosis: Si olvida tia dosis de deras medicamento, tómelo lo más pronto posible. Si es marilee la hora para deras próxima dosis, espere hasta entonces para luh deras dosis regular. No use medicamento adicional para reponer la dosis olvidada. · Guarde el medicamento en un recipiente cerrado a temperatura ambiente y alejado del calor, la humedad y la alba directa. Medicamentos y [employer] que debe evitar:   Consulte con deras médico o farmacéutico antes de usar cualquier medicamento, incluyendo los que compra sin receta médica, las vitaminas y los productos herbales. · Algunos medicamentos pueden afectar cómo actúa la furosemida.  Informe a u médico si usted Gagan Estevez alguno de los siguientes medicamentos:  ¨ Cisplatino, ciclosporina, digoxina, ácido etacrínico, regaliz, litio, metotrexato o fenitoína  ¨ Hormona adrenocorticotrópica (HACT)  ¨ Laxante  ¨ Medicamentos para tratar Ashley Bourgeois infección  ¨ KELLI para el dolor o para la artritis (incluyendo aspirina, diclofenac, ibuprofeno, indometacina, naproxeno)  ¨ Otros medicamentos para la presión arterial  ¨ Medicamento esteroideo (incluyendo dexametasona, hidrocortisona, metilprednisolona, prednisolona, prednisona)  ¨ El medicamento tiroideo  · Si usted también carol sucralfato, permita que pasen al menos 2 horas entre el momento que se tome la furosemida y el sucralfato. · El alcohol, los analgésicos narcóticos, o las píldoras para dormir pueden intensificar la sensación de desvanecimiento, ryanne o Howard Sachayes cuando se usan con thelma medicamento. Precauciones bert el uso de thelma medicamento:   · Informe a deras médico si usted está embarazada o dando de lactar, o si tiene enfermedad renal, enfermedad hepática (incluyendo cirrosis), diabetes, gota, presión arterial baja, lupus, próstata agrandada, dificultad para orinar o tia alergia a las sulfamidas. Informe a deras médico si usted está siguiendo tia dieta baja en sal.  · Thelma medicamento puede causar los siguientes problemas:   ¨ Niveles bajos de minerales en deras sonya, tim potasio y sodio  ¨ Cambios en los niveles de azúcar en la sonya  ¨ Problemas de audición  · Informe al doctor o dentista que lo trate que esta utilizando Jose. · Thelma medicamento puede bajarle demasiado deras presión arterial, especialmente cuando lo use por primera vez o si usted sufre tia deshidratación. Si se siente desvanecer o mareado póngase de pie o siéntese lentamente. · Thelma medicamento puede hacer que deras piel se vuelva más sensible a la alba solar. Use protector solar. No use lámparas ni cámaras bronceadoras. · El médico solicitará exámenes de laboratorio bert las citas de rutina para revisar los efectos de Jaciel. Asista a todas rafy citas. · Guarde todos los medicamentos fuera del alcance de los niños. Nunca comparta rafy medicamentos con [employer].   Efectos secundarios que pueden presentarse bert el uso de thelma medicamento:   Consulte inmediatamente con el médico si nota cualquiera de estos efectos secundarios:  · Reacción alérgica: Comezón o ronchas, hinchazón del ana laura o las madhav, hinchazón u hormigueo en la boca o garganta, opresión en el pecho, dificultad para respirar  · Ampollas, despellejamiento, sarpullido brown en la piel. · Confusión, debilidad, espasmos musculares  · Sequedad en la boca, aumento de la sed, calambres musculares, latidos cardíacos irregulares  · Dolor de estómago repentino e intenso, náuseas, vómito, fiebre, desvanecimientos  · Pérdida de la audición, zumbido en los oídos  · Desvanecimientos, mareos, desmayos  · Diarrea intensa  · Sangrado o moretones inusuales  · Piel u ojos amarillos  Consulte con el médico si nota los siguientes efectos secundarios menos graves:   · Pérdida del apetito, calambres estomacales  Consulte con el médico si nota otros efectos secundarios que sallie son causados por thelma medicamento. Llame a carrillo médico para consultarle Trina. Usted puede notificar rafy efectos secundarios al FDA al 6-374-VOV-5622. © 2017 [address] Information is for End User's use only and may not be sold, redistributed or otherwise used for commercial purposes. Esta información es sólo para uso en educación. Carrillo intención no es darle un consejo médico sobre enfermedades o tratamientos. Colsulte con carrillo Baez Natalya farmacéutico antes de seguir cualquier régimen médico para saber si es seguro y efectivo para usted.

## 2017-12-08 NOTE — PROGRESS NOTES
Patient seen for discharge with assistance from mara Maguire. We reviewed the AVS, prescriptions, pharmacy location, and printed Good Rx coupon for amlodipine. The other prescriptions given today are either $4 or $10 and no coupon needed. Patient given PREVNAR 13 vaccine. Patient denied fever and tolerated vaccine well. Patient verbalized understanding of possible side effects from vaccine and when to seek emergency medical treatment. VIIS information sheet given to patient. Patient had no adverse reaction at time of discharge.  Jameson Cadena RN

## 2017-12-08 NOTE — PROGRESS NOTES
Coordination of Care  1. Have you been to the ER, urgent care clinic since your last visit? Hospitalized since your last visit? Yes When: 11/2017   [facility]  Toe Nail Fracture    2. Have you seen or consulted any other health care providers outside of the [address] [place] [address] since your last visit? Include any pap smears or colon screening. No    Medications  Does the patient need refills? YES    Learning Assessment Complete?  yes

## 2017-12-08 NOTE — MR AVS SNAPSHOT
Visit Information Luther Palomares Personal Médico Departamento Teléfono del Dep. Número de visita 12/8/2017 10:30 AM ERICK Medina Ruthann Keith Quivers [phone] Your Appointments 1/4/2018 11:00 AM  
Follow Up with Angela Black NP  
[address] ([facility]) Appt Note: follow up per MM; by ru [address] [address] [phone]  
  
   
 [address] [facility] [address] [address] Upcoming Health Maintenance Date Due Hepatitis C Screening 1955 EYE EXAM RETINAL OR DILATED Q1 3/8/1966 Pneumococcal 19-64 Medium Risk (1 of 1 - PPSV23) 3/8/1975 DTaP/Tdap/Td series (1 - Tdap) 3/8/1977 PAP AKA CERVICAL CYTOLOGY 3/8/1977 BREAST CANCER SCRN MAMMOGRAM 11/9/2012 FOBT Q 1 YEAR AGE 50-75 6/12/2015 MICROALBUMIN Q1 8/7/2015 ZOSTER VACCINE AGE 60> 1/8/2016 HEMOGLOBIN A1C Q6M 5/23/2018 LIPID PANEL Q1 11/23/2018 FOOT EXAM Q1 11/26/2018 Alergias  Review Complete El: 12/8/2017 Por: Angela Black NP  
 Sneha Man del:  12/8/2017 No Known Allergies Vacunas actuales Racquel Shoulders Epimenio Sill Influenza Vaccine (Quad) PF 11/30/2017  3:21 PM  
  
 No revisadas esta visita You Were Diagnosed With   
  
 Clent Oas CKD (chronic kidney disease) stage 5, GFR less than 15 ml/min (HCC)    -  Primary ICD-10-CM: N18.5 ICD-9-CM: 585.5 Other specified hypothyroidism     ICD-10-CM: E03.8 ICD-9-CM: 244.8 Renovascular hypertension     ICD-10-CM: I15.0 ICD-9-CM: 405.91 Partes vitales PS Pulso Temperatura Peso (percentil de crecimiento) SpO2 BMI (IMC)  
 156/76 (BP 1 Location: Right arm, BP Patient Position: Sitting) 77 97.8 °F (36.6 °C) (Oral) 146 lb (66.2 kg) 100% 29.49 kg/m2 Estado obstétrico Estatus de tabaquísmo Postmenopausal Never Smoker Historial de signos vitales BMI and BSA Data Body Mass Index Body Surface Area  
 29.49 kg/m 2 1.66 m 2 [facility] Name Phone [facility] [address] [employer] [phone] Deras lista de medicamentos actualizada Lista actualizada el: 12/8/17 12:57 PM.  Tylene Prima use deras lista de medicamentos más reciente. amLODIPine 10 mg tablet También conocido tim:  Fatemeh Peralta Take 1 Tab by mouth daily for 30 days. ciprofloxacin HCl 0.3 % ophthalmic solution También conocido tim:  Lisa Administer 1 Drop to right eye every two (2) hours. ferrous sulfate 325 mg (65 mg iron) tablet Take 325 mg by mouth daily. furosemide 40 mg tablet También conocido tim:  LASIX Take 1 Tab by mouth two (2) times a day. insulin NPH/insulin regular 100 unit/mL (70-30) injection También conocido tim:  HumuLIN 70/30  
10 Units by SubCUTAneous route Before breakfast and dinner. levothyroxine 100 mcg tablet También conocido tim:  SYNTHROID Take 1 Tab by mouth Daily (before breakfast). metoprolol tartrate 100 mg IR tablet También conocido tim:  Amaury Space Take 1 Tab by mouth nightly. Recetas Enviado a la Hilda Refills  
 metoprolol tartrate (LOPRESSOR) 100 mg IR tablet 2 Sig: Take 1 Tab by mouth nightly. Class: Normal  
 Pharmacy: [address] Ph #: [phone] Route: Oral  
 amLODIPine (NORVASC) 10 mg tablet 0 Sig: Take 1 Tab by mouth daily for 30 days. Class: Normal  
 Pharmacy: [place] [address], [address] [place] [address] Ph #: [phone] Route: Oral  
 furosemide (LASIX) 40 mg tablet 1 Sig: Take 1 Tab by mouth two (2) times a day. Class: Normal  
 Pharmacy: [address] Ph #: [phone]  Route: Oral  
 levothyroxine (SYNTHROID) 100 mcg tablet 0  
 Sig: Take 1 Tab by mouth Daily (before breakfast). Class: Normal  
 Pharmacy: [facility] [address]  #: [phone] Route: Oral  
  
Instrucciones para el Paciente Furosemida (Por la boca) Se [place] para el tratamiento de la retención de líquido (edema) y la presión arterial yonatan. Latrice medicamento es un diurético. 
Adrian(s) : Active-Medicated Specimen Collection Kit, Diuscreen Multi-Drug Medicated Test Kit, Lasix, RX-Specimen Collection Kit, Specimen Collection Kit Existen muchas otras marcas de Dueñas. Latrice medicamento no debe ser usado cuando:  
Latrice medicamento no es adecuado para todas las personas. No lo use si alguna vez tuvo tia reacción alérgica a la furosemida. [place] de usar latrice medicamento:  
Jaqui Bull · [place] rafy medicamentos tim se le haya indicado. Es probable que sea necesario cambiar deras dosis varias veces hasta encontrar la que funciona mejor para usted. · Si latrice medicamento le provoca malestar estomacal, usted lo puede luh con alimentos. · Solución oral: Mida el líquido oral con Mari Fulton, jeringa para uso oral o taza especialmente marcadas para medir medicamentos. · Tableta: Trague la tableta entera. No triture, rompa o mastique. · Si olvida tia dosis: Si olvida tia dosis de deras medicamento, tómelo lo más pronto posible. Si es marilee la hora para deras próxima dosis, espere hasta entonces para luh deras dosis regular. No use medicamento adicional para reponer la dosis olvidada. · Guarde el medicamento en un recipiente cerrado a temperatura ambiente y alejado del calor, la humedad y la alba directa. Medicamentos y [employer] que debe evitar:  
Consulte con deras médico o farmacéutico antes de usar cualquier medicamento, incluyendo los que compra sin receta médica, las vitaminas y los productos herbales. · Algunos medicamentos pueden afectar cómo actúa la furosemida.  Informe a u médico si usted Preen.Me usando alguno de los siguientes medicamentos: ¨ Cisplatino, ciclosporina, digoxina, ácido etacrínico, regaliz, litio, metotrexato o fenitoína ¨ Hormona adrenocorticotrópica (HACT) ¨ Laxante ¨ Medicamentos para tratar Pitney Martha ¨ KELLI para el dolor o para la artritis (incluyendo aspirina, diclofenac, ibuprofeno, indometacina, naproxeno) ¨ Otros medicamentos para la presión arterial 
¨ Medicamento esteroideo (incluyendo dexametasona, hidrocortisona, metilprednisolona, prednisolona, prednisona) ¨ El medicamento tiroideo · Si usted también carol sucralfato, permita que pasen al menos 2 horas entre el momento que se tome la furosemida y el sucralfato. · El alcohol, los analgésicos narcóticos, o las píldoras para dormir pueden intensificar la sensación de desvanecimiento, mareos o Howard Sachs cuando se usan con thelma medicamento. Precauciones bert el uso de Ana medicamento: · Informe a deras médico si usted está embarazada o dando de lactar, o si tiene enfermedad renal, enfermedad hepática (incluyendo cirrosis), diabetes, gota, presión arterial baja, lupus, próstata agrandada, dificultad para orinar o tia alergia a las sulfamidas. Informe a deras médico si usted está siguiendo tia dieta baja en sal. 
· Thelma medicamento puede causar los siguientes problemas: ¨ Niveles bajos de minerales en deras sonya, tim potasio y Kohler ¨ Cambios en los niveles de azúcar en la sonya [address] · Informe al doctor o dentista que lo trate que esta utilizando esta [place]. · Thelma medicamento puede bajarle demasiado deras presión arterial, especialmente cuando lo use por primera vez o si usted sufre tia deshidratación. Si se siente desvanecer o mareado póngase de pie o siéntese lentamente. · Thelma medicamento puede hacer que deras piel se vuelva más sensible a la alba solar. Use protector solar. No use lámparas ni cámaras bronceadoras. · El médico solicitará exámenes de laboratorio bert las citas de rutina para revisar los efectos de Jaciel. Asista a todas rafy citas. · Guarde todos los medicamentos fuera del alcance de los niños. Nunca comparta rafy medicamentos con [employer]. Efectos secundarios que pueden presentarse bert el uso de thelma medicamento:  
Consulte inmediatamente con el médico si nota cualquiera de estos efectos secundarios: 
· Reacción alérgica: Comezón o ronchas, hinchazón del ana laura o las madhav, hinchazón u hormigueo en la boca o garganta, opresión en el pecho, dificultad para respirar · Ampollas, despellejamiento, sarpullido brown en la piel. · Confusión, debilidad, espasmos musculares · Sequedad en la boca, aumento de la sed, calambres musculares, latidos cardíacos irregulares · Dolor de estómago repentino e intenso, náuseas, vómito, fiebre, desvanecimientos · Pérdida de la audición, zumbido [facility] · Desvanecimientos, mareos, desmayos · Elisa Wilberforce · [address] · Piel u ojos amarillos Consulte con el médico si nota los siguientes efectos secundarios menos graves:  
· Pérdida del apetito, calambres estomacales Consulte con el médico si nota otros efectos secundarios que sallie son causados por thelma medicamento. Llame a carrillo médico para consultarle Trina. Usted puede notificar rafy efectos secundarios al FDA al [phone]. © 2017 [address] Information is for End User's use only and may not be sold, redistributed or otherwise used for commercial purposes. Esta información es sólo para uso en educación. Carrillo intención no es darle un consejo médico sobre enfermedades o tratamientos. Colsulte con carrillo Raya Coup farmacéutico antes de seguir cualquier régimen médico para saber si es seguro y efectivo para usted. Introducing [facility]! [facility] introduce portal paciente MyChart .  Ahora se puede acceder a partes de deras expediente médico, enviar por correo electrónico la oficina de deras médico y solicitar renovaciones de medicamentos en línea. En deras navegador de Internet , Nicola Clark a [email or web address] Argenis clic en el usuario por Froylan Riser? Miesha Seed clic aquí en la sesión Maurilio Magnolia. Verá la página de registro [place]. Ingrese deras código de Bank [employer] Raya marcelo y tim aparece a continuación. Usted no tendrá que [employer] código después de dougie completado el proceso de registro . Si usted no se inscribe antes de la fecha de caducidad , debe solicitar un nuevo código. · MyChart Código de acceso : [id number] Expires: 2/20/2018 11:19 AM 
 
Ingresa los últimos cuatro dígitos de deras Número de Seguro Social ( xxxx ) y fecha de nacimiento ( dd / mm / aaaa ) tim se indica y argenis clic en Enviar. Usted será llevado a la siguiente página de registro . Crear un ID MyChart . Esta será deras ID de inicio de sesión de MyChart y no puede ser [place] , por lo que pensar en tia que es Julane Osier y fácil de recordar . Crear tia contraseña MyChart . Usted puede cambiar deras contraseña en cualquier momento . Ingrese deras Password Reset de preguntas y [place] . [place] se puede utilizar en un momento posterior si usted olvida deras contraseña. Introduzca deras dirección de correo electrónico . Kimberly Peed recibirá tia notificación por correo electrónico cuando la nueva información está disponible en MyChart . Chastity Hunger clic en Registrarse. Leida Oliva attila y descargar porciones de deras expediente médico. 
Argenis clic en el enlace de descarga del menú Resumen para descargar tia copia portátil de deras información médica . Si tiene [employer] , por favor visite la sección de preguntas frecuentes del sitio web MyChart . Recuerde, MyChart NO es que se utilizará para las necesidades urgentes. Para emergencias médicas , llame al 911 . Ahora disponible en deras iPhone y Android ! Por favor proporcione thelma resumen de la documentación de cuidado a deras próximo proveedor. Your primary care clinician is listed as Phys Other. If you have any questions after today's visit, please call [phone].

## 2017-12-08 NOTE — PROGRESS NOTES
Subjective:     Chief Complaint   Patient presents with    Cold Symptoms     Cough, shortness of breath  X about 1 week        She  is a 65 y.o. female who presents for evaluation of SOB, cough and congestion x 1 week. Onset approx 1 week ago. Cough is productive and clear. Notes some orthopnea S&S at night. Pt was previously taking IV lasix in the hospital but they stopped it. Well tolerated. ROS  Gen - no fever/chills  Resp - no dyspnea or cough  CV - no chest pain or TAYLOR  Rest per HPI    Past Medical History:   Diagnosis Date    Arrhythmia     fast heart rate and palpitations dec 2010    Arthritis     generalized    Asthma     Chronic pain     headaches and stomach pain x several months    Diabetes [facility]     Diabetic foot ulcer associated with type 2 diabetes mellitus ([facility]     GERD (gastroesophageal reflux disease)     Hypertension     Psychiatric disorder     depression     Past Surgical History:   Procedure Laterality Date    HX CATARACT REMOVAL      cataract removal and lens placement in right eye    HX GYN      LBTL    HX ORTHOPAEDIC      partial amputation of left foot     Current Outpatient Prescriptions on File Prior to Visit   Medication Sig Dispense Refill    levothyroxine (SYNTHROID) 100 mcg tablet Take 1 Tab by mouth Daily (before breakfast). 90 Tab 0    amLODIPine (NORVASC) 10 mg tablet Take 1 Tab by mouth daily for 30 days. 30 Tab 0    insulin NPH/insulin regular (HUMULIN 70/30) 100 unit/mL (70-30) injection 10 Units by SubCUTAneous route Before breakfast and dinner. 10 mL 0    [] amoxicillin-clavulanate (AUGMENTIN) 875-125 mg per tablet Take 1 Tab by mouth daily for 7 days. 7 Tab 0    ciprofloxacin HCl (CILOXAN) 0.3 % ophthalmic solution Administer 1 Drop to right eye every two (2) hours.  metoprolol tartrate (LOPRESSOR) 100 mg IR tablet Take 100 mg by mouth nightly.  ferrous sulfate 325 mg (65 mg iron) tablet Take 325 mg by mouth daily. No current facility-administered medications on file prior to visit. Objective:     Vitals:    12/08/17 1045   BP: 156/76   Pulse: 77   Temp: 97.8 °F (36.6 °C)   TempSrc: Oral   SpO2: 100%   Weight: 146 lb (66.2 kg)       Physical Examination:  General appearance - alert, well appearing, and in no distress  Eyes -sclera anicteric  Neck - supple, no significant adenopathy, no thyromegaly  Chest - clear to auscultation, no wheezes, rales or rhonchi, symmetric air entry  Heart - normal rate, regular rhythm, normal S1, S2, no murmurs, rubs, clicks or gallops  Neurological - alert, oriented, no focal findings or movement disorder noted  Abdomen-BS present/WNL x 4 quads, non-tender/distended, soft,no organomegaly    +3 pitting edema in R low ext, non-pitting in L     Assessment/ Plan:   Diagnoses and all orders for this visit:    1. CKD (chronic kidney disease) stage 5, GFR less than 15 ml/min (HCC)  -     furosemide (LASIX) 40 mg tablet; Take 1 Tab by mouth two (2) times a day. 2. Other specified hypothyroidism  -     levothyroxine (SYNTHROID) 100 mcg tablet; Take 1 Tab by mouth Daily (before breakfast). 3. Renovascular hypertension  -     metoprolol tartrate (LOPRESSOR) 100 mg IR tablet; Take 1 Tab by mouth nightly. -     amLODIPine (NORVASC) 10 mg tablet; Take 1 Tab by mouth daily for 30 days. -     furosemide (LASIX) 40 mg tablet; Take 1 Tab by mouth two (2) times a day. Pt has gained 7# in 10 days per review of vitals. Per recent labs and her S&S, suspect volume overload. Start Lasix 40mg daily x 2-3 days then as tolerated, BID. Advised to take no later than 3-4pm r/t increased urination hampering sleep. AN nephrology appt is pending, Pt can call 12/18. Refill Rx. Will have Pt receive PNA vaccine. Re-eval in 4-6 weeks. I have discussed the diagnosis with the patient and the intended plan as seen in the above orders.   The patient has received an after-visit summary and questions were answered concerning future plans. I have discussed medication side effects and warnings with the patient as well. The patient verbalizes understanding and agreement with the plan.     Follow-up Disposition: Not on File

## 2017-12-09 NOTE — PROGRESS NOTES
Mitchell Snell, saw your second note. She is scheduled to see Liv Jhaveri, not me, right now but you could change that (I'm at [facility] much more often lately than [facility]). She probably also needs a low K+ diet, we could get chuckie to see her.

## 2017-12-11 ENCOUNTER — DOCUMENTATION ONLY (OUTPATIENT)
Dept: FAMILY MEDICINE CLINIC | Age: 62
End: 2017-12-11

## 2017-12-11 DIAGNOSIS — E87.5 HIGH SERUM POTASSIUM LEVEL: Primary | ICD-10-CM

## 2017-12-11 NOTE — PROGRESS NOTES
Please have pt see our dietician as soon as possible in order to get started on a LOW potassium diet

## 2017-12-11 NOTE — PROGRESS NOTES
Assisted patient in scheduling appt with dietician tomorrow at [facility]. Aug clinic for 11am, although confirmed, it is not showing up in [place] or  yet, here is copy:       [facility] [address] [facility],[address]  [facility]    Follow Up  11:00 AM (30min)  Chris Kirkpatrick (63yo F) #[id number]  low K+ diet education per Meg Martinez

## 2017-12-12 ENCOUNTER — OFFICE VISIT (OUTPATIENT)
Dept: FAMILY MEDICINE CLINIC | Age: 62
End: 2017-12-12

## 2017-12-12 DIAGNOSIS — Z71.3 DIETARY COUNSELING AND SURVEILLANCE: Primary | ICD-10-CM

## 2017-12-12 NOTE — PROGRESS NOTES
Ms. Madelon Sever was referred to [facility] for low K++ nutrition education. No  used for this appt. Pt daughter attends this appt too. Ms. Madelon Sever is eating 3 meals/day. 24 hour recall performed  AM  Beans, 1/2 cup  Sausage, 1.5 pieces  Coffee  Milk, lowfat    Noon  Soup, egg white    PM  Coffee  Milk  Cracker, 3 each  Peanut butter    RD reviewed foods with low (>200mg per serving) K++. RD wrote these foods down for pt to take home. Using MyPlate and food models, we played a \"eat this not that\" game. Discussed eating small portions of meats. RD explained that when buying packaged foods, using a food label to choose foods low in K++ is a good idea and what to look for. D[facility]A will mail pt a sample menu and food guide.  F/U in 2 months

## 2017-12-19 ENCOUNTER — DOCUMENTATION ONLY (OUTPATIENT)
Dept: FAMILY MEDICINE CLINIC | Age: 62
End: 2017-12-19

## 2017-12-19 NOTE — PROGRESS NOTES
Access Now denied referrals as note says the patient has recently [place] in [place] of [place]. She has to have resided in the [facility] for 6 months before she is eligible for Access Now. Denial letter send to be scanned into chart.  Message being forwarded to Joseph Sanchez

## 2017-12-22 ENCOUNTER — TELEPHONE (OUTPATIENT)
Dept: FAMILY MEDICINE CLINIC | Age: 62
End: 2017-12-22

## 2017-12-22 DIAGNOSIS — N18.5 CKD (CHRONIC KIDNEY DISEASE) STAGE 5, GFR LESS THAN 15 ML/MIN (HCC): Primary | ICD-10-CM

## 2017-12-22 NOTE — TELEPHONE ENCOUNTER
Call placed to pt x 2 on numbers in demographics. Answered by a daughter not listed on HIPA form. Instructed to have mother come for labs on Tuesday 1/2/18 at [address] location and keep appt on 1/4/18. Instructed to have family members added to HIPA form.

## 2017-12-26 ENCOUNTER — DOCUMENTATION ONLY (OUTPATIENT)
Dept: FAMILY MEDICINE CLINIC | Age: 62
End: 2017-12-26

## 2017-12-26 NOTE — PROGRESS NOTES
Returned call to daughter, Evelio De La Rosa, whose name is on [facility], saying her mother had received a call. With the help of  [phone], I read her RN's note. Daughter said she understood to bring in her mother for labs on 1/2 and to bring her for appointment with NP on 1/4.     Nataliia Palacio

## 2018-01-02 ENCOUNTER — CLINICAL SUPPORT (OUTPATIENT)
Dept: FAMILY MEDICINE CLINIC | Age: 63
End: 2018-01-02

## 2018-01-02 ENCOUNTER — HOSPITAL ENCOUNTER (OUTPATIENT)
Dept: LAB | Age: 63
Discharge: HOME | End: 2018-01-02

## 2018-01-02 DIAGNOSIS — Z01.89 ENCOUNTER FOR ROUTINE LABORATORY TESTING: Primary | ICD-10-CM

## 2018-01-02 DIAGNOSIS — N18.5 CKD (CHRONIC KIDNEY DISEASE) STAGE 5, GFR LESS THAN 15 ML/MIN (HCC): ICD-10-CM

## 2018-01-02 PROCEDURE — 80048 BASIC METABOLIC PNL TOTAL CA: CPT | Performed by: FAMILY MEDICINE

## 2018-01-03 LAB
ANION GAP SERPL CALC-SCNC: 12 MMOL/L (ref 5–15)
BUN SERPL-MCNC: 69 MG/DL (ref 6–20)
BUN/CREAT SERPL: 13 (ref 12–20)
CALCIUM SERPL-MCNC: 8.4 MG/DL (ref 8.5–10.1)
CHLORIDE SERPL-SCNC: 110 MMOL/L (ref 97–108)
CO2 SERPL-SCNC: 18 MMOL/L (ref 21–32)
CREAT SERPL-MCNC: 5.26 MG/DL (ref 0.55–1.02)
GLUCOSE SERPL-MCNC: 88 MG/DL (ref 65–100)
POTASSIUM SERPL-SCNC: 5.5 MMOL/L (ref 3.5–5.1)
SODIUM SERPL-SCNC: 140 MMOL/L (ref 136–145)

## 2018-01-03 NOTE — PROGRESS NOTES
She is seeing you tomorrow, Chau Radford. Would make sure she is eating ok and following low K+ diet. You could check cbc and PO4, would keep meds about the same, except she is on immed release metoprolol only once daily, and this should be changed to bid, it's not altered for renal function. You can just split the dose into 2, 12 hours apart. Doesn't need kayexelate yet. Let them know that if she is not a citizen, the only way to get on dialysis is to go to ED when feeling bad --chf sx, n/v, chest pain/palpitations, syncope etc.  Let's follow her monthly.

## 2018-01-04 ENCOUNTER — TELEPHONE (OUTPATIENT)
Dept: FAMILY MEDICINE CLINIC | Age: 63
End: 2018-01-04

## 2018-01-04 NOTE — TELEPHONE ENCOUNTER
Due to inclement weather and clinic closed we had to re-schedule appointment on 2/8. While speaking to daughter Nicci Hurley she informed us that her mom has been having the following symptoms:  When she goes to sleep and lying down - shortness of breathe and has to sleep sitting up. Please triage.   It was recommended if symptoms are serious please take to ER at [facility].

## 2018-01-04 NOTE — TELEPHONE ENCOUNTER
Contacted her daughter to discuss the orthopnea. Discussed that this is likely a sx of fluid overload due to the kidney failure and that it is affecting heart and lungs. If that is her only sx and she wants to wait to see us, she can do that. Registrar needs to schedule appt for 1-4 days from now. If sx are severe or feels dyspnneic all day, needs to go to ED and may need dialysis there. Daughter said she will discuss with her. Sending to registrar to move up appt to 1-4 days from now.

## 2018-01-04 NOTE — TELEPHONE ENCOUNTER
RN called pt's granddaughter, Avel Farr, (on PHI) and scheduled an appt for 1/5/18 @ 11:30 AM at [facility]. She had no additional questions. SHe will bring pt to the clinic tomorrow at the appt time.   Destiney Colorado RN

## 2018-02-01 ENCOUNTER — TELEPHONE (OUTPATIENT)
Dept: FAMILY MEDICINE CLINIC | Age: 63
End: 2018-02-01

## 2018-02-01 NOTE — TELEPHONE ENCOUNTER
Ms. Jhonathan Bran, daughter of Denisha Carreon is calling about patient's medications - she is out. Her next appointment is on 2/8. No refills. She has had re-scheduling due to inclement weather.

## 2018-02-01 NOTE — TELEPHONE ENCOUNTER
Inocencia Jason is calling about medication refills for pt's insulin. Stated she will be out before the next appt. The appt date and time was reviewed with the pt. Celia No thought it was 02/15/18. The pt does have appt's then but has one 02/08/18 with provider Vanessa Gomez. Routing to provider, message about refilling insulin.   Diego Martinez RN

## 2018-02-07 NOTE — TELEPHONE ENCOUNTER
Telephone call made to the patient's daughter, Rafi Slaughter, as listed on the PHI. Explained that the [facility] will have 2 vials of 70/30 insulin (stock from [facility]) to give to her mother tomorrow after her appointment with the provider at 12:30. She also understands that they will be given a PAP application for the insulin and to call the [facility] office to reschedule the stock insulin delivery of they miss tomorrow's appointment. (I will bring the 2 vials from [facility] to [facility] tomorrow).  Elina Malin RN

## 2018-02-07 NOTE — TELEPHONE ENCOUNTER
Patient Calls            ERICK Sheppard, ANSHU 6 days ago                 I can't see any documentation re: PAP application. If Pt has completed one, it is ok for her to get a vial of stock NPH 70/30 to last until she completes application. If no PAP yola completed, please have Pt/dtr start/complete one. Either way, Pt can get a stock vial while her yola is pending.      Thank you,     Rich (Routing comment)

## 2018-02-08 ENCOUNTER — OFFICE VISIT (OUTPATIENT)
Dept: FAMILY MEDICINE CLINIC | Age: 63
End: 2018-02-08

## 2018-02-08 VITALS
DIASTOLIC BLOOD PRESSURE: 72 MMHG | BODY MASS INDEX: 26.86 KG/M2 | HEART RATE: 99 BPM | WEIGHT: 133 LBS | TEMPERATURE: 98.1 F | SYSTOLIC BLOOD PRESSURE: 148 MMHG

## 2018-02-08 DIAGNOSIS — Z13.9 ENCOUNTER FOR SCREENING: ICD-10-CM

## 2018-02-08 DIAGNOSIS — N18.6 ESRD (END STAGE RENAL DISEASE) (HCC): ICD-10-CM

## 2018-02-08 DIAGNOSIS — E87.70 HYPERVOLEMIA, UNSPECIFIED HYPERVOLEMIA TYPE: Primary | ICD-10-CM

## 2018-02-08 LAB — GLUCOSE POC: NORMAL MG/DL

## 2018-02-08 NOTE — PROGRESS NOTES
Coordination of Care  1. Have you been to the ER, urgent care clinic since your last visit? Hospitalized since your last visit? No    2. Have you seen or consulted any other health care providers outside of the [address] [place] [address] since your last visit? Include any pap smears or colon screening. No    Medications  Does the patient need refills?  YES    Learning Assessment Complete? yes    Results for orders placed or performed in visit on 02/08/18   AMB POC GLUCOSE BLOOD, BY GLUCOSE MONITORING DEVICE   Result Value Ref Range    Glucose POC  mg/dL

## 2018-02-08 NOTE — PROGRESS NOTES
Patient and her  seen for discharge with assistance from mara Rios. We reviewed the AVS and the ER referral form given to her by the provider. They were also given 2 vials of stock Novolin 70/30 (100u/ml, 10 ml/vial, lot # [id number], Exp. 6-30-18) today which wererecorded in the [facility] insulin log book. The patient confirmed the dose at 10 units before breakfast and 10 units before dinner each day. They were also given a PAP application for her insulin. They understand to focus on getting the patient to the ER now for evaluation and to return the PAP application to a [facility] clinic as a [place] as soon as they are able. The patient and her  expressed understanding and stated that they will go now to [address] [place] [address].  Keith Lerma RN

## 2018-02-08 NOTE — PROGRESS NOTES
Subjective:     Chief Complaint   Patient presents with    Diabetes     f/u not able to eat due to the nauesa while taking the insulin    Cough     nauesa and diarrhea x 2 days         She  is a 65 y.o. female who presents for evaluation of CKD and DM. Pt's  is also present for visit. For the last 3-4 days, Pt has complained of worsening N/V and poor appetite. Has been having reg emesis 3-4x/day. Pt also complains of SOB/orthopnea when she trys to lay down. Nausea has prevented her from eating. Only occasional bites/sips several times/day. Pt also complains of worsening lower ext edema x 2 weeks. Pt unable to get through to office re: needing refills. Ran out of Norvasc approx 2 weeks ago, took last lasix dose yesterday. Has also been having intermittent CP/palpitations/tachycardia. ROS  Gen - no fever/chills  Resp - no dyspnea or cough  CV - no chest pain or TAYLOR  Rest per HPI    Past Medical History:   Diagnosis Date    Arrhythmia     fast heart rate and palpitations dec 2010    Arthritis     generalized    Asthma     Chronic pain     headaches and stomach pain x several months    Diabetes ([address]     Diabetic foot ulcer associated with type 2 diabetes mellitus ([address]     GERD (gastroesophageal reflux disease)     Hypertension     Psychiatric disorder     depression     Past Surgical History:   Procedure Laterality Date    HX CATARACT REMOVAL      cataract removal and lens placement in right eye    HX GYN      LBTL    HX ORTHOPAEDIC      partial amputation of left foot     Current Outpatient Prescriptions on File Prior to Visit   Medication Sig Dispense Refill    metoprolol tartrate (LOPRESSOR) 100 mg IR tablet Take 1 Tab by mouth nightly. 30 Tab 2    furosemide (LASIX) 40 mg tablet Take 1 Tab by mouth two (2) times a day. 60 Tab 1    levothyroxine (SYNTHROID) 100 mcg tablet Take 1 Tab by mouth Daily (before breakfast).  90 Tab 0    insulin NPH/insulin regular (HUMULIN 70/30) 100 unit/mL (70-30) injection 10 Units by SubCUTAneous route Before breakfast and dinner. 10 mL 0    ciprofloxacin HCl (CILOXAN) 0.3 % ophthalmic solution Administer 1 Drop to right eye every two (2) hours.  ferrous sulfate 325 mg (65 mg iron) tablet Take 325 mg by mouth daily. No current facility-administered medications on file prior to visit. Objective:     Vitals:    02/08/18 1326   BP: 148/72   Pulse: 99   Temp: 98.1 °F (36.7 °C)   TempSrc: Oral   Weight: 133 lb (60.3 kg)       Physical Examination:  General appearance - alert, well appearing, and in no distress  Eyes -sclera anicteric  Neck - supple, no significant adenopathy, no thyromegaly  Chest - clear to auscultation, no wheezes, rales or rhonchi, symmetric air entry  Heart - normal rate, regular rhythm, normal S1, S2, no murmurs, rubs, clicks or gallops  Neurological - alert, oriented, no focal findings or movement disorder noted  Abdomen-BS present/WNL x 4 quads, non-tender/distended, soft,no organomegaly    Noted +3 pitting edema in bilateral low extremities below knees     Recent Results (from the past 12 hour(s))   AMB POC GLUCOSE BLOOD, BY GLUCOSE MONITORING DEVICE    Collection Time: 02/08/18  1:28 PM   Result Value Ref Range    Glucose POC  mg/dL         Assessment/ Plan:   Diagnoses and all orders for this visit:    1. Hypervolemia, unspecified hypervolemia type  -     REFERRAL TO EMERGENCY DEPARTMENT    2. Encounter for screening  -     AMB POC GLUCOSE BLOOD, BY GLUCOSE MONITORING DEVICE    3. ESRD (end stage renal disease) ([facility] [address]  -     REFERRAL TO EMERGENCY DEPARTMENT         Given Pt's last lab values and her current S&S, Pt needs to be eval'd urgently for S&S of volume overload and corresponding Tx. Discussed health risks if not promptly Tx'd. Refer to ED.  agreed to drive Pt. Cont POC w/ Rx, refill PRN based on hospital providers recs.      Re-eval PRN, CVAN will continue to follow. I have discussed the diagnosis with the patient and the intended plan as seen in the above orders. The patient has received an after-visit summary and questions were answered concerning future plans. I have discussed medication side effects and warnings with the patient as well. The patient verbalizes understanding and agreement with the plan.     Follow-up Disposition: Not on File

## 2018-02-08 NOTE — PATIENT INSTRUCTIONS
Aprenda sobre la hipervolemia - [ Learning About Fluid Overload ]  ¿Qué es la hipervolemia? Hipervolemia significa que el organismo tiene [address]. El líquido en exceso en el organismo puede elevar la presión arterial y obligar al corazón a esforzarse más. También puede darle dificultades para respirar. La mayor parte del cuerpo está compuesta de agua. El organismo [place] minerales tim el sodio y el potasio para ayudar a los órganos tim el corazón, los riñones y el hígado a equilibrar la cantidad de agua que usted necesita. Por ejemplo, el corazón bombea sonya para hacer circular el agua por el cuerpo. Y los riñones trabajan para deshacerse del agua que el cuerpo no necesita. Las afecciones de torrie tim la enfermedad renal, la insuficiencia cardíaca y la cirrosis pueden provocar hipervolemia. Otras cosas pueden hacer que se acumule líquido de [place]. Los líquidos por vía intravenosa, algunos medicamentos, demasiada sal (sodio) de los alimentos y ciertos tratamientos médicos a veces pueden causar thelma aumento de líquidos. ¿Cuáles son los síntomas? Algunos de los síntomas más comunes son:  · Subir de [place] en un breve período de [place]. · Hinchazón en los tobillos o las piernas. · Falta de aire. ¿Cómo se trata? La meta del tratamiento es eliminar el exceso de líquido del organismo. Deras tratamiento dependerá de la causa. Deras médico podría:  · Darle medicamentos, tim diuréticos. Estos ayudarán a que deras cuerpo se deshaga del líquido de [place]. · Limitarle el consumo de líquido o de sal.  La atención de seguimiento es tia parte clave de deras tratamiento y seguridad. Asegúrese de hacer y acudir a todas las citas, y llame a deras médico si está teniendo problemas. También es tia buena idea saber los resultados de los exámenes y mantener tia lista de los medicamentos que carol. ¿Dónde puede encontrar más información en inglés? Aundria Dates a http://bandar-mariluz.info/.   Escriba O110 en la búsqueda para aprender más acerca de \"Aprenda sobre la hipervolemia - [ Learning About Fluid Overload ]. \"  Revisado: 21 septiembre, 2016  Versión del contenido: 11.4  © 4341-7102 Healthwise, Incorporated. Las instrucciones de cuidado fueron adaptadas bajo licencia por [employer] (which disclaims liability or warranty for this information). Si usted tiene [place] afección médica o sobre estas instrucciones, siempre pregunte a deras profesional de torrie. Healthwise, Incorporated niega toda garantía o responsabilidad por deras uso de esta información.

## 2018-02-12 ENCOUNTER — PATIENT OUTREACH (OUTPATIENT)
Dept: FAMILY MEDICINE CLINIC | Age: 63
End: 2018-02-12

## 2018-02-12 NOTE — Clinical Note
She is not go to the ED. States that she has too many bills already. Denies KAMERON SALGADO at this time.

## 2018-02-12 NOTE — PROGRESS NOTES
2/12/18   Mary Barboza Np asked Nn to call patient fif she followed her instructions to go to ED after last PCP visit. Lenore Leiva Stated that she di not go to the hospital because she owes a lot of money there already. NN provided information regarding financial screening with Salome Johnson and importance of treating her abnormal labs results, indicative of renal insufficiency. \"I will go to the hospital next time\". Denies any NV, HA  At his time.  NN sent an in box to Mary Barboza NP.

## 2018-03-12 ENCOUNTER — APPOINTMENT (OUTPATIENT)
Dept: CT IMAGING | Age: 63
DRG: 341 | End: 2018-03-12
Attending: EMERGENCY MEDICINE
Payer: SUBSIDIZED

## 2018-03-12 ENCOUNTER — HOSPITAL ENCOUNTER (INPATIENT)
Age: 63
LOS: 18 days | Discharge: HOME | DRG: 341 | End: 2018-03-30
Attending: EMERGENCY MEDICINE | Admitting: INTERNAL MEDICINE
Payer: SUBSIDIZED

## 2018-03-12 DIAGNOSIS — N17.9 ACUTE RENAL FAILURE, UNSPECIFIED ACUTE RENAL FAILURE TYPE (HCC): Primary | ICD-10-CM

## 2018-03-12 PROBLEM — J18.9 PNEUMONIA: Status: ACTIVE | Noted: 2018-03-12

## 2018-03-12 PROBLEM — R11.2 NAUSEA & VOMITING: Status: ACTIVE | Noted: 2018-03-12

## 2018-03-12 PROBLEM — E87.20 METABOLIC ACIDOSIS: Status: ACTIVE | Noted: 2017-11-22

## 2018-03-12 PROBLEM — E87.1 HYPONATREMIA: Status: ACTIVE | Noted: 2018-03-12

## 2018-03-12 PROBLEM — N18.6 ESRD (END STAGE RENAL DISEASE) (HCC): Status: ACTIVE | Noted: 2018-03-12

## 2018-03-12 PROBLEM — R19.7 DIARRHEA: Status: ACTIVE | Noted: 2018-03-12

## 2018-03-12 LAB
ALBUMIN SERPL-MCNC: 2.8 G/DL (ref 3.5–5)
ALBUMIN/GLOB SERPL: 0.7 {RATIO} (ref 1.1–2.2)
ALP SERPL-CCNC: 222 U/L (ref 45–117)
ALT SERPL-CCNC: 76 U/L (ref 12–78)
ANION GAP SERPL CALC-SCNC: 20 MMOL/L (ref 5–15)
AST SERPL-CCNC: 36 U/L (ref 15–37)
BASOPHILS # BLD: 0 K/UL (ref 0–0.1)
BASOPHILS NFR BLD: 0 % (ref 0–1)
BILIRUB SERPL-MCNC: 0.4 MG/DL (ref 0.2–1)
BUN SERPL-MCNC: 100 MG/DL (ref 6–20)
BUN/CREAT SERPL: 10 (ref 12–20)
CALCIUM SERPL-MCNC: 6.2 MG/DL (ref 8.5–10.1)
CHLORIDE SERPL-SCNC: 89 MMOL/L (ref 97–108)
CO2 SERPL-SCNC: 15 MMOL/L (ref 21–32)
CREAT SERPL-MCNC: 9.84 MG/DL (ref 0.55–1.02)
DIFFERENTIAL METHOD BLD: ABNORMAL
EOSINOPHIL # BLD: 0 K/UL (ref 0–0.4)
EOSINOPHIL NFR BLD: 0 % (ref 0–7)
ERYTHROCYTE [DISTWIDTH] IN BLOOD BY AUTOMATED COUNT: 17.6 % (ref 11.5–14.5)
GLOBULIN SER CALC-MCNC: 3.9 G/DL (ref 2–4)
GLUCOSE SERPL-MCNC: 183 MG/DL (ref 65–100)
HCT VFR BLD AUTO: 23.6 % (ref 35–47)
HEMOCCULT STL QL: NEGATIVE
HGB BLD-MCNC: 7.4 G/DL (ref 11.5–16)
IMM GRANULOCYTES # BLD: 0.1 K/UL (ref 0–0.04)
IMM GRANULOCYTES NFR BLD AUTO: 2 % (ref 0–0.5)
LACTATE SERPL-SCNC: 0.6 MMOL/L (ref 0.4–2)
LIPASE SERPL-CCNC: 406 U/L (ref 73–393)
LYMPHOCYTES # BLD: 0.9 K/UL (ref 0.8–3.5)
LYMPHOCYTES NFR BLD: 13 % (ref 12–49)
MCH RBC QN AUTO: 24.3 PG (ref 26–34)
MCHC RBC AUTO-ENTMCNC: 31.4 G/DL (ref 30–36.5)
MCV RBC AUTO: 77.4 FL (ref 80–99)
MONOCYTES # BLD: 0.2 K/UL (ref 0–1)
MONOCYTES NFR BLD: 3 % (ref 5–13)
NEUTS SEG # BLD: 5.2 K/UL (ref 1.8–8)
NEUTS SEG NFR BLD: 81 % (ref 32–75)
NRBC # BLD: 0 K/UL (ref 0–0.01)
NRBC BLD-RTO: 0 PER 100 WBC
PLATELET # BLD AUTO: 222 K/UL (ref 150–400)
PMV BLD AUTO: 9.8 FL (ref 8.9–12.9)
POTASSIUM SERPL-SCNC: 5 MMOL/L (ref 3.5–5.1)
PROT SERPL-MCNC: 6.7 G/DL (ref 6.4–8.2)
RBC # BLD AUTO: 3.05 M/UL (ref 3.8–5.2)
SODIUM SERPL-SCNC: 124 MMOL/L (ref 136–145)
WBC # BLD AUTO: 6.4 K/UL (ref 3.6–11)

## 2018-03-12 PROCEDURE — 87040 BLOOD CULTURE FOR BACTERIA: CPT | Performed by: EMERGENCY MEDICINE

## 2018-03-12 PROCEDURE — 83690 ASSAY OF LIPASE: CPT | Performed by: EMERGENCY MEDICINE

## 2018-03-12 PROCEDURE — 85025 COMPLETE CBC W/AUTO DIFF WBC: CPT | Performed by: EMERGENCY MEDICINE

## 2018-03-12 PROCEDURE — 83605 ASSAY OF LACTIC ACID: CPT | Performed by: EMERGENCY MEDICINE

## 2018-03-12 PROCEDURE — 65660000000 HC RM CCU STEPDOWN

## 2018-03-12 PROCEDURE — 96365 THER/PROPH/DIAG IV INF INIT: CPT

## 2018-03-12 PROCEDURE — 80053 COMPREHEN METABOLIC PANEL: CPT | Performed by: EMERGENCY MEDICINE

## 2018-03-12 PROCEDURE — 36415 COLL VENOUS BLD VENIPUNCTURE: CPT | Performed by: EMERGENCY MEDICINE

## 2018-03-12 PROCEDURE — 99284 EMERGENCY DEPT VISIT MOD MDM: CPT

## 2018-03-12 PROCEDURE — 74176 CT ABD & PELVIS W/O CONTRAST: CPT

## 2018-03-12 PROCEDURE — 74011250636 HC RX REV CODE- 250/636: Performed by: EMERGENCY MEDICINE

## 2018-03-12 PROCEDURE — 65270000029 HC RM PRIVATE

## 2018-03-12 PROCEDURE — 74011000258 HC RX REV CODE- 258: Performed by: EMERGENCY MEDICINE

## 2018-03-12 PROCEDURE — 96376 TX/PRO/DX INJ SAME DRUG ADON: CPT

## 2018-03-12 PROCEDURE — 96375 TX/PRO/DX INJ NEW DRUG ADDON: CPT

## 2018-03-12 PROCEDURE — 82272 OCCULT BLD FECES 1-3 TESTS: CPT | Performed by: EMERGENCY MEDICINE

## 2018-03-12 RX ORDER — METRONIDAZOLE 500 MG/100ML
500 INJECTION, SOLUTION INTRAVENOUS EVERY 12 HOURS
Status: DISCONTINUED | OUTPATIENT
Start: 2018-03-13 | End: 2018-03-13

## 2018-03-12 RX ORDER — ONDANSETRON 2 MG/ML
4 INJECTION INTRAMUSCULAR; INTRAVENOUS
Status: COMPLETED | OUTPATIENT
Start: 2018-03-12 | End: 2018-03-12

## 2018-03-12 RX ORDER — AMLODIPINE BESYLATE 10 MG/1
10 TABLET ORAL DAILY
COMMUNITY

## 2018-03-12 RX ORDER — IPRATROPIUM BROMIDE AND ALBUTEROL SULFATE 2.5; .5 MG/3ML; MG/3ML
3 SOLUTION RESPIRATORY (INHALATION)
Status: DISCONTINUED | OUTPATIENT
Start: 2018-03-12 | End: 2018-03-30 | Stop reason: HOSPADM

## 2018-03-12 RX ORDER — MORPHINE SULFATE 2 MG/ML
4 INJECTION, SOLUTION INTRAMUSCULAR; INTRAVENOUS ONCE
Status: COMPLETED | OUTPATIENT
Start: 2018-03-12 | End: 2018-03-12

## 2018-03-12 RX ADMIN — SODIUM CHLORIDE 2 G: 900 INJECTION, SOLUTION INTRAVENOUS at 22:41

## 2018-03-12 RX ADMIN — SODIUM CHLORIDE 1000 ML: 900 INJECTION, SOLUTION INTRAVENOUS at 21:04

## 2018-03-12 RX ADMIN — ONDANSETRON 4 MG: 2 INJECTION INTRAMUSCULAR; INTRAVENOUS at 21:08

## 2018-03-12 RX ADMIN — MORPHINE SULFATE 4 MG: 2 INJECTION, SOLUTION INTRAMUSCULAR; INTRAVENOUS at 21:08

## 2018-03-12 RX ADMIN — ONDANSETRON 4 MG: 2 INJECTION INTRAMUSCULAR; INTRAVENOUS at 22:35

## 2018-03-12 NOTE — IP AVS SNAPSHOT
[facility] [address] [facility] 
[phone] Patient: Gerber Baron MRN: [id number] TMR:[id number]/[id number] About your hospitalization You were admitted on:  March 12, 2018 You last received care in the:  [facility] 4M POST SURG ORT 1 You were discharged on:  March 30, 2018 Why you were hospitalized Your primary diagnosis was:  Pneumonia Your diagnoses also included:  Nausea & Vomiting, Diarrhea, Esrd (End Stage Renal Disease) (Hcc), Metabolic Acidosis, Type 2 Diabetes Mellitus With Renal Complication (Hcc), Htn (Hypertension), Hyponatremia, Anemia Follow-up Information Follow up With Details Comments Contact Info Baltazar Parsons MD In 2 weeks  [address] [facility] 
[phone] Julieta Aguayo MD   Patient can only remember the practice name and not the physician Discharge Orders None A check conner indicates which time of day the medication should be taken. My Medications START taking these medications Instructions Each Dose to Equal  
 Morning Noon Evening Bedtime  
 calcitRIOL 0.25 mcg capsule Commonly known as:  ROCALTROL Your last dose was:  03/30/18 Your next dose is:  Tomorrow Take 1 Cap by mouth daily for 30 days. Indications: HYPERPARATHYROIDISM SECONDARY TO CHRONIC RENAL FAILURE  
 0.25 mcg  
    
   
   
   
  
 calcium acetate 667 mg Cap Commonly known as:  PHOSLO [facility] Your next dose is: With supper Take 1 Cap by mouth three (3) times daily (with meals) for 30 days. Indications: Renal Osteodystrophy with Hyperphosphatemia 1 Cap  
    
   
   
   
  
 cloNIDine HCl 0.1 mg tablet Commonly known as:  CATAPRES Your last dose was:  03/30/18 at 1pm  
Your next dose is:  tonight Take 1 Tab by mouth two (2) times a day for 30 days. 0.1 mg  
    
   
   
   
  
 hydrALAZINE 100 mg tablet Commonly known as:  APRESOLINE Your last dose was:  03/30/18 at 1pm  
Your next dose is:  tonight Take 1 Tab by mouth three (3) times daily for 30 days. 100 mg CONTINUE taking these medications Instructions Each Dose to Equal  
 Morning Noon Evening Bedtime  
 amLODIPine 10 mg tablet Commonly known as:  Haig Thorpe Your last dose was:  3/30/18 Your next dose is:  tomorrow Take 10 mg by mouth daily. 10 mg  
    
   
   
   
  
 ferrous sulfate 325 mg (65 mg iron) tablet Your last dose was: Today Your next dose is:  tomorrow Take 325 mg by mouth daily. 325 mg  
    
   
   
   
  
 levothyroxine 100 mcg tablet Commonly known as:  SYNTHROID Your last dose was:  today Your next dose is:  tomorrow Take 1 Tab by mouth Daily (before breakfast). 100 mcg  
    
   
   
   
  
 metoprolol tartrate 100 mg IR tablet Commonly known as:  LOPRESSOR Your last dose was: Last night Your next dose is: Tonight Take 1 Tab by mouth nightly. 100 mg  
    
   
   
   
  
  
STOP taking these medications   
 furosemide 40 mg tablet Commonly known as:  LASIX NovoLIN 70/30 U-100 Insulin 100 unit/mL (70-30) injection Generic drug:  insulin NPH/insulin regular Where to Get Your Medications Information on where to get these meds will be given to you by the nurse or doctor. ! Ask your nurse or doctor about these medications  
  calcitRIOL 0.25 mcg capsule  
 calcium acetate 667 mg Cap  
 cloNIDine HCl 0.1 mg tablet  
 hydrALAZINE 100 mg tablet Discharge Instructions PATIENT INSTRUCTIONS 
APPENDIX SURGERY 
(Appendectomy) 
  
FOLLOW-UP: Please make an appointment with your physician in 2 week(s).  Call your physician immediately if you have any fevers greater than 102.5, drainage from your wound that is not clear or looks infected, persistent bleeding, increasing abdominal pain, problems urinating, or persistent nausea/vomiting. You should be aware that you may have shoulder pain after surgery and that this will progressively go away. This is called 'referred pain' and is caused by gas that may be trapped under the diaphragm from the surgery, especially if it was performed laparoscopically through mini-incisions. This gas will progressively get reabsorbed by your body.  
  
WOUND CARE INSTRUCTIONS:  Dermabond liquid skin bandage applied. You may shower. Do not scrape off. Once the wound has quit draining you may leave it open to air. If clothing rubs against the wound or causes irritation and the wound is not draining you may cover it with a dry dressing during the daytime. Try to keep the wound dry and avoid ointments on the wound unless directed to do so. If the wound becomes bright red and painful or starts to drain infected material that is not clear, please contact your physician immediately. You should also call if you begin to drain fluid that is thin and greenish-brown from the wound and appears to look like bile. If the wound though is mildly pink and has a thick firm ridge underneath it, this is normal, and is referred to as a healing ridge. This will resolve over the next 4-6 weeks. 
  
DIET: You may eat any foods that you can tolerate. It is a good idea to eat a high fiber diet and take in plenty of fluids to prevent constipation. If you do become constipated you may want to take a mild laxative or take ducolax tablets on a daily basis until your bowel habits are regular. Constipation can be very uncomfortable, along with straining, after recent abdominal surgery. 
  
ACTIVITY: You are encouraged to cough and deep breath or use your incentive spirometer if you were given one, every 15-30 minutes when awake. This will help prevent respiratory complications and low grade fevers post-operatively.  You may want to hug a pillow when coughing and sneezing to add additional support to the surgical area(s) which will decrease pain during these times. You are encouraged to walk and engage in light activity for the next two weeks. You should not lift more than 20 pounds during this time frame as it could put you at increased risk for a post-operative hernia. Twenty pounds is roughly equivalent to a plastic bag of groceries.  
  
MEDICATIONS: Try to take narcotic medications and anti-inflammatory medications, such as tylenol, ibuprofen, naprosyn, etc., with food. This will minimize stomach upset from the medication. Should you develop nausea and vomiting from the pain medication, or develop a rash, please discontinue the medication and contact your physician. You should not drive, make important decisions, or operate machinery when taking narcotic pain medication. 
  
QUESTIONS: Please feel free to call your physician or the hospital  if you have any questions, and they will be glad to assist you. Resident Gifts Activation Thank you for enrolling in [address] Please follow the instructions below to securely access your online medical record. Resident Gifts allows you to send messages to your doctor, view your test results, renew your prescriptions, schedule appointments, and more. How Do I Sign Up? 1. In your internet browser, go to [email or web address]. 2. Click on the First Time User? Click Here link in the Sign In box. You will see the New Member Sign Up page. 3. Enter your Resident Gifts Access Code exactly as it appears below. You will not need to use this code after youve completed the sign-up process. If you do not sign up before the expiration date, you must request a new code. Resident Gifts Access Code: [email or web address] Expires: 6/12/2018  7:46 AM  
 
4. Enter the last four digits of your Social Security Number (xxxx) and Date of Birth (mm/dd/yyyy) as indicated and click Submit. You will be taken to the next sign-up page. 5. Create a Sape ID. This will be your Sape login ID and cannot be changed, so think of one that is secure and easy to remember. 6. Create a Sape password. You can change your password at any time. 7. Enter your Password Reset Question and Answer. This can be used at a later time if you forget your password. 8. Enter your e-mail address. You will receive e-mail notification when new information is available in [address] 9. Click Sign Up. You can now view your medical record. Additional Information Remember, Sape is NOT to be used for urgent needs. For medical emergencies, dial 911. Now available from your iPhone and Android! Sape Announcement We are excited to announce that we are making your provider's discharge notes available to you in Sape. You will see these notes when they are completed and signed by the physician that discharged you from your recent hospital stay. If you have any questions or concerns about any information you see in Sape, please call the Health Information Department where you were seen or reach out to your Primary Care Provider for more information about your plan of care. Introducing [facility] [place] [facility]! [facility] introduce portal paciente Sape . Ahora se puede acceder a partes de deras expediente médico, enviar por correo electrónico la oficina de deras médico y solicitar renovaciones de medicamentos en línea. En deras navegador de Internet , Dara Mouse a [email or web address] Atul clic en el usuario por Donneta Presser? Corene Bijou clic aquí en la sesión Patton Nail. Verá la página de registro [place]. Ingrese deras código de Bank [employer] Raya marcelo y tim aparece a continuación. Usted no tendrá que [employer] código después de dougie completado el proceso de registro . Si usted no se inscribe antes de la fecha de caducidad , debe solicitar un nuevo código.  
 
· [facility] Código de acceso : [id number] 
 Expires: 6/12/2018  7:46 AM 
 
Ingresa los últimos cuatro dígitos de deras Número de Seguro Social ( xxxx ) y fecha de nacimiento ( dd / mm / aaaa ) tim se indica y atul clic en Enviar. Usted será llevado a la siguiente página de registro . Crear un ID MyChart . Esta será deras ID de inicio de sesión de MyChart y no puede ser [place] , por lo que pensar en tia que es [place] y fácil de recordar . Crear tia contraseña MyChart . Usted puede cambiar deras contraseña en cualquier momento . Ingrese deras Password Reset de preguntas y [place] . Percy se puede utilizar en un momento posterior si usted olvida deras contraseña. Introduzca deras dirección de correo electrónico . [facility] recibirá tia notificación por correo electrónico cuando la nueva información está disponible en MyChart . Lizzeth Catching clic en Registrarse. Leata Feathers attila y descargar porciones de deras expediente médico. 
Atul clic en el enlace de descarga del menú Resumen para descargar tia copia portátil de deras información médica . Si tiene [employer] , por favor visite la sección de preguntas frecuentes del sitio web MyChart . Recuerde, MyChart NO es que se utilizará para las necesidades urgentes. Para emergencias médicas , llame al 911 . Ahora disponible en deras iPhone y Android ! Introducing Kirk Damon As a [facility] patient, I wanted to make you aware of our electronic visit tool called Kirk Ionanniecarolyne. [facility] 24/7 allows you to connect within minutes with a medical provider 24 hours a day, seven days a week via a mobile device or tablet or logging into a secure website from your computer. You can access Kirk Ryanfin from anywhere in the [place].  
 
A virtual visit might be right for you when you have a simple condition and feel like you just dont want to get out of bed, or cant get away from work for an appointment, when your regular [facility] provider is not available (evenings, weekends or holidays), or when youre out of town and need minor care. Electronic visits cost only $49 and if the Seat 14A 24/7 provider determines a prescription is needed to treat your condition, one can be electronically transmitted to a nearby pharmacy*. Please take a moment to enroll today if you have not already done so. The enrollment process is free and takes just a few minutes. To enroll, please download the Seat 14A 24/7 yola to your tablet or phone, or visit [email or web address] to enroll on your computer. And, as an [address] [place] [address] patient with a EletrogÃƒÂ³es account, the results of your visits will be scanned into your electronic medical record and your primary care provider will be able to view the scanned results. We urge you to continue to see your regular Lori Reusing provider for your ongoing medical care. And while your primary care provider may not be the one available when you seek a Siriona virtual visit, the peace of mind you get from getting a real diagnosis real time can be priceless. For more information on Siriona, view our Frequently Asked Questions (FAQs) at [email or web address]. Sincerely, 
 
Yolanda Ray MD 
Chief Medical Officer [facility] Sharon Sher *:  certain medications cannot be prescribed via Siriona Providers Seen During Your Hospitalization Provider Specialty Primary office phone Romana Richter, MD Emergency Medicine [phone] Gladys Montgomery MD Internal Medicine [phone] Danay Ritter MD Internal Medicine [phone] [facility],  Internal Medicine [phone] Shoshana Diamond MD Internal Medicine [phone] Haris Steven MD Internal Medicine [phone] Your Primary Care Physician (PCP) Primary Care Physician Office Phone Office Fax OTHER, PHYS ** None ** ** None ** You are allergic to the following No active allergies Recent Documentation Height Weight BMI OB Status Smoking Status 1.6 m 63 kg 24.62 kg/m2 Postmenopausal Never Smoker Emergency Contacts Name Discharge Info Relation Home Work Mobile [id number] CAREGIVER [3] Spouse [3] [phone] Adriana Cancino DISCHARGE CAREGIVER [3] Child [2] [phone] Ruma Payne DISCHARGE CAREGIVER [3] Other Relative [6] [phone] Patient Belongings The following personal items are in your possession at time of discharge: 
  Dental Appliances: None  Visual Aid: None      Home Medications: None   Jewelry: None  Clothing: Undergarments (To PACU)    Other Valuables: None Please provide this summary of care documentation to your next provider. Signatures-by signing, you are acknowledging that this After Visit Summary has been reviewed with you and you have received a copy. Patient Signature:  ____________________________________________________________ Date:  ____________________________________________________________  
  
[facility] Provider Signature:  ____________________________________________________________ Date:  ____________________________________________________________

## 2018-03-13 ENCOUNTER — APPOINTMENT (OUTPATIENT)
Dept: GENERAL RADIOLOGY | Age: 63
DRG: 341 | End: 2018-03-13
Attending: RADIOLOGY
Payer: SUBSIDIZED

## 2018-03-13 ENCOUNTER — APPOINTMENT (OUTPATIENT)
Dept: INTERVENTIONAL RADIOLOGY/VASCULAR | Age: 63
DRG: 341 | End: 2018-03-13
Attending: INTERNAL MEDICINE
Payer: SUBSIDIZED

## 2018-03-13 ENCOUNTER — APPOINTMENT (OUTPATIENT)
Dept: ULTRASOUND IMAGING | Age: 63
DRG: 341 | End: 2018-03-13
Attending: INTERNAL MEDICINE
Payer: SUBSIDIZED

## 2018-03-13 LAB
ALBUMIN SERPL-MCNC: 2.3 G/DL (ref 3.5–5)
ALBUMIN/GLOB SERPL: 0.6 {RATIO} (ref 1.1–2.2)
ALP SERPL-CCNC: 190 U/L (ref 45–117)
ALT SERPL-CCNC: 60 U/L (ref 12–78)
ANION GAP SERPL CALC-SCNC: 16 MMOL/L (ref 5–15)
ANION GAP SERPL CALC-SCNC: 18 MMOL/L (ref 5–15)
APPEARANCE UR: ABNORMAL
AST SERPL-CCNC: 27 U/L (ref 15–37)
ATRIAL RATE: 70 BPM
B PERT DNA SPEC QL NAA+PROBE: NOT DETECTED
BACTERIA URNS QL MICRO: NEGATIVE /HPF
BILIRUB DIRECT SERPL-MCNC: 0.1 MG/DL (ref 0–0.2)
BILIRUB SERPL-MCNC: 0.3 MG/DL (ref 0.2–1)
BILIRUB UR QL: NEGATIVE
BUN SERPL-MCNC: 94 MG/DL (ref 6–20)
BUN SERPL-MCNC: 97 MG/DL (ref 6–20)
BUN/CREAT SERPL: 10 (ref 12–20)
BUN/CREAT SERPL: 11 (ref 12–20)
C PNEUM DNA SPEC QL NAA+PROBE: NOT DETECTED
CALCIUM SERPL-MCNC: 5.8 MG/DL (ref 8.5–10.1)
CALCIUM SERPL-MCNC: 6.4 MG/DL (ref 8.5–10.1)
CALCIUM SERPL-MCNC: 6.4 MG/DL (ref 8.5–10.1)
CALCULATED P AXIS, ECG09: -15 DEGREES
CALCULATED R AXIS, ECG10: 51 DEGREES
CALCULATED T AXIS, ECG11: 49 DEGREES
CHLORIDE SERPL-SCNC: 93 MMOL/L (ref 97–108)
CHLORIDE SERPL-SCNC: 93 MMOL/L (ref 97–108)
CO2 SERPL-SCNC: 16 MMOL/L (ref 21–32)
CO2 SERPL-SCNC: 17 MMOL/L (ref 21–32)
COLOR UR: ABNORMAL
CREAT SERPL-MCNC: 9.14 MG/DL (ref 0.55–1.02)
CREAT SERPL-MCNC: 9.15 MG/DL (ref 0.55–1.02)
DIAGNOSIS, 93000: NORMAL
EPITH CASTS URNS QL MICRO: ABNORMAL /LPF
ERYTHROCYTE [DISTWIDTH] IN BLOOD BY AUTOMATED COUNT: 17.3 % (ref 11.5–14.5)
ERYTHROCYTE [DISTWIDTH] IN BLOOD BY AUTOMATED COUNT: 17.6 % (ref 11.5–14.5)
FERRITIN SERPL-MCNC: 448 NG/ML (ref 8–252)
FLUAV H1 2009 PAND RNA SPEC QL NAA+PROBE: NOT DETECTED
FLUAV H1 RNA SPEC QL NAA+PROBE: NOT DETECTED
FLUAV H3 RNA SPEC QL NAA+PROBE: NOT DETECTED
FLUAV SUBTYP SPEC NAA+PROBE: NOT DETECTED
FLUBV RNA SPEC QL NAA+PROBE: NOT DETECTED
FOLATE SERPL-MCNC: 12.4 NG/ML (ref 5–21)
GLOBULIN SER CALC-MCNC: 3.6 G/DL (ref 2–4)
GLUCOSE BLD STRIP.AUTO-MCNC: 112 MG/DL (ref 65–100)
GLUCOSE BLD STRIP.AUTO-MCNC: 127 MG/DL (ref 65–100)
GLUCOSE BLD STRIP.AUTO-MCNC: 176 MG/DL (ref 65–100)
GLUCOSE BLD STRIP.AUTO-MCNC: 186 MG/DL (ref 65–100)
GLUCOSE SERPL-MCNC: 183 MG/DL (ref 65–100)
GLUCOSE SERPL-MCNC: 207 MG/DL (ref 65–100)
GLUCOSE UR STRIP.AUTO-MCNC: 100 MG/DL
GRAN CASTS URNS QL MICRO: ABNORMAL /LPF
HADV DNA SPEC QL NAA+PROBE: NOT DETECTED
HCOV 229E RNA SPEC QL NAA+PROBE: NOT DETECTED
HCOV HKU1 RNA SPEC QL NAA+PROBE: NOT DETECTED
HCOV NL63 RNA SPEC QL NAA+PROBE: NOT DETECTED
HCOV OC43 RNA SPEC QL NAA+PROBE: NOT DETECTED
HCT VFR BLD AUTO: 20.9 % (ref 35–47)
HCT VFR BLD AUTO: 23.4 % (ref 35–47)
HGB BLD-MCNC: 6.7 G/DL (ref 11.5–16)
HGB BLD-MCNC: 8 G/DL (ref 11.5–16)
HGB UR QL STRIP: ABNORMAL
HMPV RNA SPEC QL NAA+PROBE: NOT DETECTED
HPIV1 RNA SPEC QL NAA+PROBE: NOT DETECTED
HPIV2 RNA SPEC QL NAA+PROBE: NOT DETECTED
HPIV3 RNA SPEC QL NAA+PROBE: NOT DETECTED
HPIV4 RNA SPEC QL NAA+PROBE: NOT DETECTED
HYALINE CASTS URNS QL MICRO: ABNORMAL /LPF (ref 0–5)
IRON SATN MFR SERPL: 10 % (ref 20–50)
IRON SERPL-MCNC: 17 UG/DL (ref 35–150)
KETONES UR QL STRIP.AUTO: NEGATIVE MG/DL
LACTATE SERPL-SCNC: 0.4 MMOL/L (ref 0.4–2)
LEUKOCYTE ESTERASE UR QL STRIP.AUTO: NEGATIVE
LIPASE SERPL-CCNC: 437 U/L (ref 73–393)
M PNEUMO DNA SPEC QL NAA+PROBE: NOT DETECTED
MAGNESIUM SERPL-MCNC: 1.7 MG/DL (ref 1.6–2.4)
MCH RBC QN AUTO: 24.7 PG (ref 26–34)
MCH RBC QN AUTO: 26.1 PG (ref 26–34)
MCHC RBC AUTO-ENTMCNC: 32.1 G/DL (ref 30–36.5)
MCHC RBC AUTO-ENTMCNC: 34.2 G/DL (ref 30–36.5)
MCV RBC AUTO: 76.2 FL (ref 80–99)
MCV RBC AUTO: 77.1 FL (ref 80–99)
NITRITE UR QL STRIP.AUTO: NEGATIVE
NRBC # BLD: 0 K/UL (ref 0–0.01)
NRBC # BLD: 0 K/UL (ref 0–0.01)
NRBC BLD-RTO: 0 PER 100 WBC
NRBC BLD-RTO: 0 PER 100 WBC
OTHER,OTHU: ABNORMAL
P-R INTERVAL, ECG05: 122 MS
PH UR STRIP: 5.5 [PH] (ref 5–8)
PHOSPHATE SERPL-MCNC: 6 MG/DL (ref 2.6–4.7)
PLATELET # BLD AUTO: 178 K/UL (ref 150–400)
PLATELET # BLD AUTO: 206 K/UL (ref 150–400)
PMV BLD AUTO: 9.4 FL (ref 8.9–12.9)
PMV BLD AUTO: 9.8 FL (ref 8.9–12.9)
POTASSIUM SERPL-SCNC: 4.3 MMOL/L (ref 3.5–5.1)
POTASSIUM SERPL-SCNC: 4.9 MMOL/L (ref 3.5–5.1)
PROT SERPL-MCNC: 5.9 G/DL (ref 6.4–8.2)
PROT UR STRIP-MCNC: 300 MG/DL
PTH-INTACT SERPL-MCNC: 445 PG/ML (ref 18.4–88)
Q-T INTERVAL, ECG07: 454 MS
QRS DURATION, ECG06: 74 MS
QTC CALCULATION (BEZET), ECG08: 490 MS
RBC # BLD AUTO: 2.71 M/UL (ref 3.8–5.2)
RBC # BLD AUTO: 3.07 M/UL (ref 3.8–5.2)
RBC #/AREA URNS HPF: ABNORMAL /HPF (ref 0–5)
RSV RNA SPEC QL NAA+PROBE: NOT DETECTED
RV+EV RNA SPEC QL NAA+PROBE: NOT DETECTED
SERVICE CMNT-IMP: ABNORMAL
SODIUM SERPL-SCNC: 125 MMOL/L (ref 136–145)
SODIUM SERPL-SCNC: 128 MMOL/L (ref 136–145)
SP GR UR REFRACTOMETRY: 1.01 (ref 1–1.03)
TIBC SERPL-MCNC: 177 UG/DL (ref 250–450)
UR CULT HOLD, URHOLD: NORMAL
UROBILINOGEN UR QL STRIP.AUTO: 0.2 EU/DL (ref 0.2–1)
VENTRICULAR RATE, ECG03: 70 BPM
VIT B12 SERPL-MCNC: >2000 PG/ML (ref 211–911)
WBC # BLD AUTO: 5.7 K/UL (ref 3.6–11)
WBC # BLD AUTO: 6.5 K/UL (ref 3.6–11)
WBC URNS QL MICRO: ABNORMAL /HPF (ref 0–4)

## 2018-03-13 PROCEDURE — 74011636637 HC RX REV CODE- 636/637: Performed by: INTERNAL MEDICINE

## 2018-03-13 PROCEDURE — 86850 RBC ANTIBODY SCREEN: CPT | Performed by: INTERNAL MEDICINE

## 2018-03-13 PROCEDURE — 74011000250 HC RX REV CODE- 250: Performed by: RADIOLOGY

## 2018-03-13 PROCEDURE — 74011000258 HC RX REV CODE- 258: Performed by: INTERNAL MEDICINE

## 2018-03-13 PROCEDURE — 74011250636 HC RX REV CODE- 250/636: Performed by: INTERNAL MEDICINE

## 2018-03-13 PROCEDURE — 74011000250 HC RX REV CODE- 250: Performed by: INTERNAL MEDICINE

## 2018-03-13 PROCEDURE — 80074 ACUTE HEPATITIS PANEL: CPT | Performed by: INTERNAL MEDICINE

## 2018-03-13 PROCEDURE — 90935 HEMODIALYSIS ONE EVALUATION: CPT

## 2018-03-13 PROCEDURE — 82607 VITAMIN B-12: CPT | Performed by: INTERNAL MEDICINE

## 2018-03-13 PROCEDURE — 83690 ASSAY OF LIPASE: CPT | Performed by: INTERNAL MEDICINE

## 2018-03-13 PROCEDURE — 83605 ASSAY OF LACTIC ACID: CPT | Performed by: INTERNAL MEDICINE

## 2018-03-13 PROCEDURE — 82746 ASSAY OF FOLIC ACID SERUM: CPT | Performed by: INTERNAL MEDICINE

## 2018-03-13 PROCEDURE — 5A1D70Z PERFORMANCE OF URINARY FILTRATION, INTERMITTENT, LESS THAN 6 HOURS PER DAY: ICD-10-PCS | Performed by: INTERNAL MEDICINE

## 2018-03-13 PROCEDURE — 87633 RESP VIRUS 12-25 TARGETS: CPT | Performed by: INTERNAL MEDICINE

## 2018-03-13 PROCEDURE — P9016 RBC LEUKOCYTES REDUCED: HCPCS | Performed by: INTERNAL MEDICINE

## 2018-03-13 PROCEDURE — 83540 ASSAY OF IRON: CPT | Performed by: INTERNAL MEDICINE

## 2018-03-13 PROCEDURE — 30233N1 TRANSFUSION OF NONAUTOLOGOUS RED BLOOD CELLS INTO PERIPHERAL VEIN, PERCUTANEOUS APPROACH: ICD-10-PCS | Performed by: INTERNAL MEDICINE

## 2018-03-13 PROCEDURE — 36415 COLL VENOUS BLD VENIPUNCTURE: CPT | Performed by: INTERNAL MEDICINE

## 2018-03-13 PROCEDURE — 77030018719 HC DRSG PTCH ANTIMIC J&J -A

## 2018-03-13 PROCEDURE — 86923 COMPATIBILITY TEST ELECTRIC: CPT | Performed by: INTERNAL MEDICINE

## 2018-03-13 PROCEDURE — 74011000250 HC RX REV CODE- 250: Performed by: EMERGENCY MEDICINE

## 2018-03-13 PROCEDURE — 85027 COMPLETE CBC AUTOMATED: CPT | Performed by: INTERNAL MEDICINE

## 2018-03-13 PROCEDURE — 83970 ASSAY OF PARATHORMONE: CPT | Performed by: INTERNAL MEDICINE

## 2018-03-13 PROCEDURE — 74011250636 HC RX REV CODE- 250/636: Performed by: EMERGENCY MEDICINE

## 2018-03-13 PROCEDURE — 80048 BASIC METABOLIC PNL TOTAL CA: CPT | Performed by: INTERNAL MEDICINE

## 2018-03-13 PROCEDURE — 76937 US GUIDE VASCULAR ACCESS: CPT

## 2018-03-13 PROCEDURE — 65660000000 HC RM CCU STEPDOWN

## 2018-03-13 PROCEDURE — 74011250636 HC RX REV CODE- 250/636: Performed by: RADIOLOGY

## 2018-03-13 PROCEDURE — C1752 CATH,HEMODIALYSIS,SHORT-TERM: HCPCS

## 2018-03-13 PROCEDURE — 74011250637 HC RX REV CODE- 250/637: Performed by: INTERNAL MEDICINE

## 2018-03-13 PROCEDURE — 81001 URINALYSIS AUTO W/SCOPE: CPT | Performed by: EMERGENCY MEDICINE

## 2018-03-13 PROCEDURE — 71045 X-RAY EXAM CHEST 1 VIEW: CPT

## 2018-03-13 PROCEDURE — 86706 HEP B SURFACE ANTIBODY: CPT | Performed by: INTERNAL MEDICINE

## 2018-03-13 PROCEDURE — 93005 ELECTROCARDIOGRAM TRACING: CPT

## 2018-03-13 PROCEDURE — 02HV33Z INSERTION OF INFUSION DEVICE INTO SUPERIOR VENA CAVA, PERCUTANEOUS APPROACH: ICD-10-PCS | Performed by: RADIOLOGY

## 2018-03-13 PROCEDURE — 80076 HEPATIC FUNCTION PANEL: CPT | Performed by: INTERNAL MEDICINE

## 2018-03-13 PROCEDURE — 76770 US EXAM ABDO BACK WALL COMP: CPT

## 2018-03-13 PROCEDURE — 87070 CULTURE OTHR SPECIMN AEROBIC: CPT | Performed by: INTERNAL MEDICINE

## 2018-03-13 PROCEDURE — 82962 GLUCOSE BLOOD TEST: CPT

## 2018-03-13 PROCEDURE — 83735 ASSAY OF MAGNESIUM: CPT | Performed by: INTERNAL MEDICINE

## 2018-03-13 PROCEDURE — 36430 TRANSFUSION BLD/BLD COMPNT: CPT

## 2018-03-13 PROCEDURE — 82728 ASSAY OF FERRITIN: CPT | Performed by: INTERNAL MEDICINE

## 2018-03-13 PROCEDURE — 84100 ASSAY OF PHOSPHORUS: CPT | Performed by: INTERNAL MEDICINE

## 2018-03-13 RX ORDER — DEXTROSE 50 % IN WATER (D50W) INTRAVENOUS SYRINGE
12.5-25 AS NEEDED
Status: DISCONTINUED | OUTPATIENT
Start: 2018-03-13 | End: 2018-03-30 | Stop reason: HOSPADM

## 2018-03-13 RX ORDER — NALOXONE HYDROCHLORIDE 0.4 MG/ML
0.4 INJECTION, SOLUTION INTRAMUSCULAR; INTRAVENOUS; SUBCUTANEOUS AS NEEDED
Status: DISCONTINUED | OUTPATIENT
Start: 2018-03-13 | End: 2018-03-30 | Stop reason: HOSPADM

## 2018-03-13 RX ORDER — AMLODIPINE BESYLATE 5 MG/1
10 TABLET ORAL DAILY
Status: DISCONTINUED | OUTPATIENT
Start: 2018-03-13 | End: 2018-03-30 | Stop reason: HOSPADM

## 2018-03-13 RX ORDER — MAGNESIUM SULFATE 100 %
4 CRYSTALS MISCELLANEOUS AS NEEDED
Status: DISCONTINUED | OUTPATIENT
Start: 2018-03-13 | End: 2018-03-30 | Stop reason: HOSPADM

## 2018-03-13 RX ORDER — ACETAMINOPHEN 325 MG/1
650 TABLET ORAL
Status: DISCONTINUED | OUTPATIENT
Start: 2018-03-13 | End: 2018-03-30 | Stop reason: HOSPADM

## 2018-03-13 RX ORDER — SODIUM CHLORIDE 9 MG/ML
100 INJECTION, SOLUTION INTRAVENOUS CONTINUOUS
Status: DISCONTINUED | OUTPATIENT
Start: 2018-03-13 | End: 2018-03-16

## 2018-03-13 RX ORDER — DOCUSATE SODIUM 100 MG/1
100 CAPSULE, LIQUID FILLED ORAL 2 TIMES DAILY
Status: DISCONTINUED | OUTPATIENT
Start: 2018-03-13 | End: 2018-03-18

## 2018-03-13 RX ORDER — SODIUM CHLORIDE 9 MG/ML
250 INJECTION, SOLUTION INTRAVENOUS AS NEEDED
Status: DISCONTINUED | OUTPATIENT
Start: 2018-03-13 | End: 2018-03-30 | Stop reason: HOSPADM

## 2018-03-13 RX ORDER — LANOLIN ALCOHOL/MO/W.PET/CERES
325 CREAM (GRAM) TOPICAL DAILY
Status: DISCONTINUED | OUTPATIENT
Start: 2018-03-13 | End: 2018-03-30 | Stop reason: HOSPADM

## 2018-03-13 RX ORDER — HYDROMORPHONE HYDROCHLORIDE 2 MG/ML
0.2 INJECTION, SOLUTION INTRAMUSCULAR; INTRAVENOUS; SUBCUTANEOUS
Status: DISCONTINUED | OUTPATIENT
Start: 2018-03-13 | End: 2018-03-30 | Stop reason: HOSPADM

## 2018-03-13 RX ORDER — HEPARIN SODIUM 1000 [USP'U]/ML
2800 INJECTION, SOLUTION INTRAVENOUS; SUBCUTANEOUS
Status: DISCONTINUED | OUTPATIENT
Start: 2018-03-13 | End: 2018-03-17

## 2018-03-13 RX ORDER — PROCHLORPERAZINE EDISYLATE 5 MG/ML
5 INJECTION INTRAMUSCULAR; INTRAVENOUS
Status: DISCONTINUED | OUTPATIENT
Start: 2018-03-13 | End: 2018-03-30 | Stop reason: HOSPADM

## 2018-03-13 RX ORDER — METOPROLOL TARTRATE 50 MG/1
100 TABLET ORAL
Status: DISCONTINUED | OUTPATIENT
Start: 2018-03-13 | End: 2018-03-30 | Stop reason: HOSPADM

## 2018-03-13 RX ORDER — HYDROCODONE BITARTRATE AND ACETAMINOPHEN 5; 325 MG/1; MG/1
1 TABLET ORAL
Status: DISCONTINUED | OUTPATIENT
Start: 2018-03-13 | End: 2018-03-30 | Stop reason: HOSPADM

## 2018-03-13 RX ORDER — ONDANSETRON 2 MG/ML
4 INJECTION INTRAMUSCULAR; INTRAVENOUS
Status: DISCONTINUED | OUTPATIENT
Start: 2018-03-13 | End: 2018-03-30 | Stop reason: HOSPADM

## 2018-03-13 RX ORDER — LIDOCAINE HYDROCHLORIDE 10 MG/ML
10-30 INJECTION INFILTRATION; PERINEURAL
Status: DISCONTINUED | OUTPATIENT
Start: 2018-03-13 | End: 2018-03-14 | Stop reason: HOSPADM

## 2018-03-13 RX ORDER — DIPHENHYDRAMINE HYDROCHLORIDE 50 MG/ML
12.5 INJECTION, SOLUTION INTRAMUSCULAR; INTRAVENOUS
Status: DISCONTINUED | OUTPATIENT
Start: 2018-03-13 | End: 2018-03-30 | Stop reason: HOSPADM

## 2018-03-13 RX ORDER — LEVOTHYROXINE SODIUM 100 UG/1
100 TABLET ORAL
Status: DISCONTINUED | OUTPATIENT
Start: 2018-03-13 | End: 2018-03-30 | Stop reason: HOSPADM

## 2018-03-13 RX ORDER — SODIUM CHLORIDE 0.9 % (FLUSH) 0.9 %
5-10 SYRINGE (ML) INJECTION EVERY 8 HOURS
Status: DISCONTINUED | OUTPATIENT
Start: 2018-03-13 | End: 2018-03-30 | Stop reason: HOSPADM

## 2018-03-13 RX ORDER — SODIUM CHLORIDE 0.9 % (FLUSH) 0.9 %
5-10 SYRINGE (ML) INJECTION AS NEEDED
Status: DISCONTINUED | OUTPATIENT
Start: 2018-03-13 | End: 2018-03-27

## 2018-03-13 RX ORDER — INSULIN LISPRO 100 [IU]/ML
INJECTION, SOLUTION INTRAVENOUS; SUBCUTANEOUS
Status: DISCONTINUED | OUTPATIENT
Start: 2018-03-13 | End: 2018-03-30 | Stop reason: HOSPADM

## 2018-03-13 RX ADMIN — HEPARIN SODIUM 2800 UNITS: 1000 INJECTION, SOLUTION INTRAVENOUS; SUBCUTANEOUS at 11:19

## 2018-03-13 RX ADMIN — PIPERACILLIN SODIUM AND TAZOBACTAM SODIUM 3.38 G: 3; .375 INJECTION, POWDER, LYOPHILIZED, FOR SOLUTION INTRAVENOUS at 21:56

## 2018-03-13 RX ADMIN — PIPERACILLIN SODIUM AND TAZOBACTAM SODIUM 3.38 G: 3; .375 INJECTION, POWDER, LYOPHILIZED, FOR SOLUTION INTRAVENOUS at 11:34

## 2018-03-13 RX ADMIN — METRONIDAZOLE 500 MG: 500 INJECTION, SOLUTION INTRAVENOUS at 01:51

## 2018-03-13 RX ADMIN — HYDROMORPHONE HYDROCHLORIDE 0.2 MG: 2 INJECTION, SOLUTION INTRAMUSCULAR; INTRAVENOUS; SUBCUTANEOUS at 18:56

## 2018-03-13 RX ADMIN — ONDANSETRON 4 MG: 2 INJECTION INTRAMUSCULAR; INTRAVENOUS at 07:32

## 2018-03-13 RX ADMIN — HYDROMORPHONE HYDROCHLORIDE 0.2 MG: 2 INJECTION, SOLUTION INTRAMUSCULAR; INTRAVENOUS; SUBCUTANEOUS at 04:18

## 2018-03-13 RX ADMIN — PROCHLORPERAZINE EDISYLATE 5 MG: 5 INJECTION INTRAMUSCULAR; INTRAVENOUS at 09:52

## 2018-03-13 RX ADMIN — SODIUM BICARBONATE: 84 INJECTION, SOLUTION INTRAVENOUS at 00:11

## 2018-03-13 RX ADMIN — SODIUM CHLORIDE 100 ML/HR: 900 INJECTION, SOLUTION INTRAVENOUS at 20:47

## 2018-03-13 RX ADMIN — AZITHROMYCIN MONOHYDRATE 500 MG: 500 INJECTION, POWDER, LYOPHILIZED, FOR SOLUTION INTRAVENOUS at 00:15

## 2018-03-13 RX ADMIN — LIDOCAINE HYDROCHLORIDE 30 ML: 10 INJECTION, SOLUTION INFILTRATION; PERINEURAL at 11:19

## 2018-03-13 RX ADMIN — SODIUM CHLORIDE 100 ML/HR: 900 INJECTION, SOLUTION INTRAVENOUS at 10:19

## 2018-03-13 RX ADMIN — INSULIN LISPRO 2 UNITS: 100 INJECTION, SOLUTION INTRAVENOUS; SUBCUTANEOUS at 07:33

## 2018-03-13 RX ADMIN — Medication 10 ML: at 00:37

## 2018-03-13 RX ADMIN — METOPROLOL TARTRATE 100 MG: 50 TABLET ORAL at 21:15

## 2018-03-13 RX ADMIN — PROCHLORPERAZINE EDISYLATE 5 MG: 5 INJECTION INTRAMUSCULAR; INTRAVENOUS at 18:56

## 2018-03-13 RX ADMIN — ONDANSETRON 4 MG: 2 INJECTION INTRAMUSCULAR; INTRAVENOUS at 00:35

## 2018-03-13 RX ADMIN — CALCIUM GLUCONATE 1 G: 94 INJECTION, SOLUTION INTRAVENOUS at 08:27

## 2018-03-13 RX ADMIN — DOCUSATE SODIUM 100 MG: 100 CAPSULE, LIQUID FILLED ORAL at 18:56

## 2018-03-13 NOTE — ED NOTES
Patient sleeping, complaining of feeling short of breath and still having pain when awakened, Dr Lorenzo Jim notified.

## 2018-03-13 NOTE — ED TRIAGE NOTES
Pt states she had the flu last Thursday, pt states that she has been vomiting 3 x a day and diarrhea 4 x a day since last Wednesday. Son-in-law is interpreting for patient.

## 2018-03-13 NOTE — PROGRESS NOTES
Physical Therapy Note:   PT consult orders acknowledged. Low hgb of 6.7 noted. PRBC to be delivered per chart review. Will defer eval for now and follow tomorrow.

## 2018-03-13 NOTE — CONSULTS
Called re: MELVA and acidosis  Appears to be volume depletion  Agree with IVF with bicarb  Repeat labs in AM  Renal U/S in AM  Will f/u in AM  Call with any issues overnight        Jodee Ly MD  [address]  Phone - [phone]   Fax - [phone]  www. [email or web address]

## 2018-03-13 NOTE — DIALYSIS
[facility]  [phone]    Vitals   Pre   Post   Assessment   Pre   Post     Temp  Temp: 98.4 °F (36.9 °C) (03/13/18 1805)  98.5     LOC  A&Ox3 A&Ox3   HR   Pulse (Heart Rate): 77 (03/13/18 1805) 78 Lungs   Clear, room air  clear, room air   B/P   BP: 168/66 (03/13/18 1805) 156/75 Cardiac   Regular, sinus 80's denies chest pain  regular, sinus 80's, denies chest pain   Resp   Resp Rate: 16 (03/13/18 1805) 16   Skin   Warm/dry  warm/dry   Pain level  Pain Intensity 1: 2 (03/13/18 1615) 0   Edema  None noted     None noted   Orders:    Duration:   Start:   Procedure Start Time: 3521 End:   3368 Total:   2 hours   Dialyzer:   Dialyzer/Set Up Inspection: Carolina Hard (03/13/18 1805)   K Bath:   Dialysate K (mEq/L): 3 (03/13/18 1805)   Ca Bath:   Dialysate CA (mEq/L): 2.5 (03/13/18 1805)   Na/Bicarb:   Dialysate NA (mEq/L): 140 (03/13/18 1805)   Target Fluid Removal:   Goal/Amount of Fluid to Remove (mL): 0 mL (03/13/18 1805)   Access     Type & Location:   RIJ nontunneled catheter, biopatch in place dated 3/13/18, clean, dry, intact. Placement confirmed by CXR. Both ports aspirate and flush without difficulty. Labs     Obtained/Reviewed   Critical Results Called   Date when labs were drawn-  Hgb-    HGB   Date Value Ref Range Status   03/13/2018 6.7 (L) 11.5 - 16.0 g/dL Final     K-    Potassium   Date Value Ref Range Status   03/13/2018 4.3 3.5 - 5.1 mmol/L Final     Ca-   Calcium   Date Value Ref Range Status   03/13/2018 6.4 (LL) 8.5 - 10.1 MG/DL Final     Comment:     RESULTS VERIFIED, PHONED TO AND READ BACK BY  [email or web address]     03/13/2018 6.4 (LL) 8.5 - 10.1 MG/DL Final     Comment:     RESULTS VERIFIED, PHONED TO AND READ BACK BY  [email or web address]       Bun-   BUN   Date Value Ref Range Status   03/13/2018 97 (H) 6 - 20 MG/DL Final     Creat-   Creatinine   Date Value Ref Range Status   03/13/2018 9.14 (H) 0.55 - 1.02 MG/DL Final        Medications/ Blood Products Given     Name   Dose   Route and Time                     Blood Volume Processed (BVP):    23.2 liters Net Fluid   Removed:  None   Comments   Time Out Done: 1800: orders, consent, patient, code status confirmed  Primary Nurse Rpt Pre: Ludmila Mason RN  Primary Nurse Rpt Post: Cristal Khan RN  Pt Education: fluid management, access care, procedural  Care Plan: See above, no discharge plan at this time  Tx Summary: Tolerated 2 hours HD without difficulty. No UF per order. All possible blood returned with NS. A/V ports packed with heparin. Report given to RN at bedside. Admiting Diagnosis:  Pt's previous clinic-acute  Consent signed - Informed Consent Verified: Yes (03/13/18 1755)  Amaita Consent - obtained  Hepatitis Status- drawn, unknown  Machine #- Machine Number: [id number] (03/13/18 1805)  Telemetry status-present  Pre-dialysis wt. -

## 2018-03-13 NOTE — PROGRESS NOTES
[address] [address] Pharmacy Dosing Services: Antimicrobial Stewardship Progress Note    Consult for antibiotic dosing of Ceftriaxone, Azithromycin, & Metronidazole by Dr. Estefania Wyman  Pharmacist reviewed antibiotic appropriateness for 59year old , female  for indication of PNA (community-acquired)  Day of Therapy: 1  Duration of Therapy: 5 days recommended per protocol for CAP    Plan:    Non-Kinetic Antimicrobial Dosing:   Ceftriaxone  Recommendation: 1 gm IV Q24H x 4 more days to complete 5 day total duration (already received 2 gm in ED on 3/12)    Azithromycin  Recommendation: 500 mg IV Q24H x 5 days    Metronidazole  Recommendation: 500 mg IV Q12H x 5 days    Other Antimicrobial  (not dosed by pharmacist)   none   Cultures     3/12 Blood x 2 - (pending)   Serum Creatinine     Lab Results   Component Value Date/Time    Creatinine 9.84 (H) 03/12/2018 09:04 PM    Creatinine (POC) 0.8 12/02/2010 05:52 PM       Creatinine Clearance Estimated Creatinine Clearance: 5.3 mL/min (based on Cr of 9.84). Temp   98 °F (36.7 °C)    WBC   Lab Results   Component Value Date/Time    WBC 6.4 03/12/2018 09:04 PM       H/H   Lab Results   Component Value Date/Time    HGB 7.4 (L) 03/12/2018 09:04 PM        Platelets   Lab Results   Component Value Date/Time    PLATELET 049 49/42/5080 09:04 PM        Thank you for the consult,  Rudolph Campbell

## 2018-03-13 NOTE — ED NOTES
Bedside and Verbal shift change report given to MT (oncoming nurse) by [facility] (offgoing nurse). Report included the following information SBAR, ED Summary, Intake/Output, MAR, Recent Results and Med Rec Status.

## 2018-03-13 NOTE — PROGRESS NOTES
[facility]  [address] [facility], [address] [place] [address]  [phone]      Medical Progress Note      NAME: Sheree Becker   :  1955  MRM:  [id number]    Date/Time: 3/13/2018          Subjective:     Chief Complaint:  F/u abdominal pain    Chart/notes/labs/studies reviewed, patient examined at bedside. Still having some pain in abdomen. Generalized, with nausea and vomiting. No fever            Objective:       Vitals:          Last 24hrs VS reviewed since prior progress note. Most recent are:    Visit Vitals    /62    Pulse 82    Temp 98.5 °F (36.9 °C)    Resp 16    Ht 5' 3\" (1.6 m)    Wt 64 kg (141 lb 1.5 oz)    SpO2 95%    BMI 24.99 kg/m2     SpO2 Readings from Last 6 Encounters:   18 95%   17 100%   17 97%   14 98%   14 99%   11 94%          Intake/Output Summary (Last 24 hours) at 18 1400  Last data filed at 18 0345   Gross per 24 hour   Intake           795.83 ml   Output                0 ml   Net           795.83 ml          Exam:     Physical Exam:    Gen: chronically ill-appearing, mild distress  HEENT:  Pink conjunctivae,  hearing intact to voice, moist mucous membranes  Neck:  Supple, without masses  Resp:  No accessory muscle use, few basilar rales  Card:  No murmurs, normal S1, S2 without thrills, 1+ edema  Abd:  Soft, mild generalized tenderness. No rebound. Non-distended, normoactive bowel sounds are present  Musc:  No cyanosis or clubbing  Skin:  No rashes  Neuro:  Cranial nerves 3-12 are grossly intact, follows commands appropriately  Psych:  Alert with fair insight.   Oriented to person, place, and time        Medications Reviewed: (see below)    Lab Data Reviewed: (see below)    ______________________________________________________________________    Medications:     Current Facility-Administered Medications   Medication Dose Route Frequency    amLODIPine (NORVASC) tablet 10 mg  10 mg Oral DAILY    ferrous sulfate tablet 325 mg  325 mg Oral DAILY    levothyroxine (SYNTHROID) tablet 100 mcg  100 mcg Oral ACB    metoprolol tartrate (LOPRESSOR) tablet 100 mg  100 mg Oral QHS    sodium chloride (NS) flush 5-10 mL  5-10 mL IntraVENous Q8H    sodium chloride (NS) flush 5-10 mL  5-10 mL IntraVENous PRN    acetaminophen (TYLENOL) tablet 650 mg  650 mg Oral Q4H PRN    HYDROcodone-acetaminophen (NORCO) 5-325 mg per tablet 1 Tab  1 Tab Oral Q4H PRN    HYDROmorphone (PF) (DILAUDID) injection 0.2 mg  0.2 mg IntraVENous Q4H PRN    naloxone (NARCAN) injection 0.4 mg  0.4 mg IntraVENous PRN    diphenhydrAMINE (BENADRYL) injection 12.5 mg  12.5 mg IntraVENous Q4H PRN    ondansetron (ZOFRAN) injection 4 mg  4 mg IntraVENous Q6H PRN    docusate sodium (COLACE) capsule 100 mg  100 mg Oral BID    insulin lispro (HUMALOG) injection   SubCUTAneous AC&HS    glucose chewable tablet 16 g  4 Tab Oral PRN    dextrose (D50W) injection syrg 12.5-25 g  12.5-25 g IntraVENous PRN    glucagon (GLUCAGEN) injection 1 mg  1 mg IntraMUSCular PRN    0.9% sodium chloride infusion 250 mL  250 mL IntraVENous PRN    prochlorperazine (COMPAZINE) injection 5 mg  5 mg IntraVENous Q6H PRN    0.9% sodium chloride infusion  100 mL/hr IntraVENous CONTINUOUS    lidocaine (XYLOCAINE) 10 mg/mL (1 %) injection 10-30 mL  10-30 mL SubCUTAneous Multiple    heparin (porcine) 1,000 unit/mL injection 2,800 Units  2,800 Units IntraVENous Multiple    piperacillin-tazobactam (ZOSYN) 3.375 g in 0.9% sodium chloride (MBP/ADV) 100 mL  3.375 g IntraVENous Q12H    albuterol-ipratropium (DUO-NEB) 2.5 MG-0.5 MG/3 ML  3 mL Nebulization Q4H PRN    azithromycin (ZITHROMAX) 500 mg in 0.9% sodium chloride 250 mL IVPB  500 mg IntraVENous Q24H     Current Outpatient Prescriptions   Medication Sig    amLODIPine (NORVASC) 10 mg tablet Take 10 mg by mouth daily.     insulin NPH/insulin regular (NOVOLIN 70/30 U-100 INSULIN) 100 unit/mL (70-30) injection 10 Units by SubCUTAneous route Before breakfast and dinner.  metoprolol tartrate (LOPRESSOR) 100 mg IR tablet Take 1 Tab by mouth nightly.  furosemide (LASIX) 40 mg tablet Take 1 Tab by mouth two (2) times a day.  levothyroxine (SYNTHROID) 100 mcg tablet Take 1 Tab by mouth Daily (before breakfast).  ferrous sulfate 325 mg (65 mg iron) tablet Take 325 mg by mouth daily. Lab Review:     Recent Labs      03/13/18   0410  03/12/18   2104   WBC  5.7  6.4   HGB  6.7*  7.4*   HCT  20.9*  23.6*   PLT  178  222     Recent Labs      03/13/18   0955  03/13/18 0410  03/12/18   2104   NA  128*  125*  124*   K  4.3  4.9  5.0   CL  93*  93*  89*   CO2  17*  16*  15*   GLU  183*  207*  183*   BUN  97*  94*  100*   CREA  9.14*  9.15*  9.84*   CA  6.4*  6.4*  5.8*  6.2*   MG   --   1.7   --    PHOS   --   6.0*   --    ALB   --   2.3*  2.8*   SGOT   --   27  36   ALT   --   60  76     No components found for: GLPOC  No results for input(s): PH, PCO2, PO2, HCO3, FIO2 in the last 72 hours. No results for input(s): INR in the last 72 hours. No lab exists for component: INREXT  Lab Results   Component Value Date/Time    Specimen Description: URINE 09/14/2010 02:20 PM     Lab Results   Component Value Date/Time    Culture result: PENDING 03/13/2018 04:33 AM    Culture result: NO GROWTH AFTER 8 HOURS 03/12/2018 10:37 PM    Culture result: NO GROWTH AFTER 8 HOURS 03/12/2018 10:37 PM            Assessment / Plan:     58 yo  F w/ hx of HTN, DM, CKD 5, presenting w/ N/V, diarrhea, found to have metabolic acidosis, hyponatremia, and worsening of renal disease       Nausea/vomiting/diarrhea: likely multifactorial, uremia, pneumonia, possible appendicitis. Discussed with general surgery for fluid filled appendix on CT, no plans to take to OR right now but will keep NPO. Will send stool studies including C. Diff. Changed IV CTX/Flagyl to Zosyn.  Continue azithromycin for atypical coverage     Bilateral Pneumonia: seen on CT. No sepsis. Empiric abx as above. Follow micro      CKD 5: progressing to ESRD. Nephrology to start HD today       Metabolic acidosis: due to ESRD. Off bicarb gtt per nephrology, HD to start. HD line placed      Hyponatremia: due to hypovolemia, renal failure. Improving. Follow Na closely       Anemia: due to ESRD. Worse this morning. FOBT negative. Serology c/w ACD. Transfuse one unit of pRBC. Heparin SQ currently on hold      Hypocalcemia: low even after correction. Repleted. Follow Ca closely     HTN: cont metoprolol and amlodipine      DM type 2 w/ renal complications: on 68/03 which is currently on hold due to NPO status. A1c not helpful in setting of severe anemia. Continue SSI       Total time spent with patient: 35 minutes, d/w Dr. Qamar Johnson and nephrology Dr. Elena Caraballo discussed with: Patient, Nursing Staff and >50% of time spent in counseling and coordination of care    Discussed:  Care Plan and D/C Planning    Prophylaxis:  Hep SQ currently on hold due to severe anemia.  If Hg stable tomorrow then would start DVT ppx    Disposition:  Home w/Family           ___________________________________________________    Attending Physician: Vania Balderrama MD

## 2018-03-13 NOTE — PROGRESS NOTES
[facility] Dosing Services  Pharmacist Renal Dosing Progress Note for piperacillin/tazobactam   Physician: Moreno Ambriz  Indication: Intra-abdominal infection    Piperacillin/tazobactam was automatically dose-adjusted per [facility] P&T Committee Protocol with respect to renal function. Order changed to piperacillin/tazobactam 3.375 g IV every 12 hours, infused over 4 hours, for HD. Pt Weight:   Wt Readings from Last 1 Encounters:   03/12/18 64 kg (141 lb 1.5 oz)     Previous Regimen Piperacillin/tazobactam 3.375 g IV every 8 hours   Serum Creatinine Lab Results   Component Value Date/Time    Creatinine 9.14 (H) 03/13/2018 09:55 AM    Creatinine (POC) 0.8 12/02/2010 05:52 PM       Creatinine Clearance Estimated Creatinine Clearance: 5.7 mL/min (based on Cr of 9.14). BUN Lab Results   Component Value Date/Time    BUN 97 (H) 03/13/2018 09:55 AM    BUN (POC) 18 12/02/2010 05:52 PM         Pharmacy to continue to monitor patient daily. Will make dosage adjustments based upon changing renal function.     Thank you,  Rodney Nolan, PharmD, [facility]

## 2018-03-13 NOTE — PROGRESS NOTES
Problem: Falls - Risk of  Goal: *Absence of Falls  Document Zuly Fall Risk and appropriate interventions in the flowsheet.    Outcome: Progressing Towards Goal  Fall Risk Interventions:            Medication Interventions: Patient to call before getting OOB         History of Falls Interventions: Bed/chair exit alarm

## 2018-03-13 NOTE — PROGRESS NOTES
Bedside shift change report given to Ilsa Blanton (oncoming nurse) by Jam Whiting RN (offgoing nurse). Report included the following information ED Summary. Pt in rm 14, IVF started and IV abx continued. 0020: EKG performed. Family left for night, will return in AM. Son-in-law's number is [phone].    0030: One episode emesis, given PRN Zofran. Resting quietly in bed at this time.

## 2018-03-13 NOTE — PROGRESS NOTES
Full note to follow. Case d/w Dr. Faith Pederson. Chart reviewed. Patient examined. Change CTX/Flagyl to Zosyn for broader coverage of possible intra-abdominal source. Keep azithromycin for atypical PNA. coverage. R/B/A blood transfusion discussed, and pt agrees to proceed with pRBC transfusion. Will give 1 unit to start for Hg 6.7.

## 2018-03-13 NOTE — ROUTINE PROCESS
1520: Bedside and Verbal shift change report given to ANSHU Bahena (oncoming nurse) by German Logan RN (offgoing nurse). Report included the following information SBAR, Kardex, Procedure Summary, Intake/Output, MAR, Accordion, Recent Results and Med Rec Status. 1900: Bedside and Verbal shift change report given to Funmilayo Contreras RN (oncoming nurse) by ANSHU Bahena (offgoing nurse). Report included the following information SBAR, Kardex, Procedure Summary, Intake/Output, MAR, Accordion, Recent Results and Med Rec Status. ~~~ Pt currently on dialysis; will remain in ED until completed. Report to floor nurse Portia Arreguin RN and ED nurse, Funmilayo Contreras RN given. Bedside and Verbal shift change report given to Killian Laughlin  (oncoming nurse) by ANSHU Bahena (offgoing nurse). Report included the following information SBAR, Kardex, Procedure Summary, Intake/Output, MAR, Accordion, Recent Results and Med Rec Status.

## 2018-03-13 NOTE — PROGRESS NOTES
Case Management Note:  EMR reviewed in ED: CM met with Pt. at bedside to obtain hx for this needs assessment. Initial assessment: Pt is Faroese speaking used  line, Pt resides in a two level home with 7 steps. Pt is ambulatory. Pt is not working. Lives with family. Primary contact Keri Feliz [phone]    HH: none  DME:none  RX:  Pt has no prescriptions coverage, uses [facility],  PCP:Pt uses [address] CM to follow for further discharge needs.      [address] Management Interventions  PCP Verified by CM: No  Mode of Transport at Discharge: Self  Transition of Care Consult (CM Consult): Discharge Planning  Physical Therapy Consult: Yes  Occupational Therapy Consult: Yes  Current Support Network: Relative's Home  Confirm Follow Up Transport: Family  Plan discussed with Pt/Family/Caregiver: Yes  Discharge Location  Discharge Placement: Unable to determine at this time

## 2018-03-13 NOTE — PROGRESS NOTES
Occupational therapy note:  Orders received, chart reviewed. Patient admitted to hospital for nausea/vomiting, and abdominal pain. Patient with anemia due to ESRD and current Hgb of 6.7. Will follow up with patient at later time for OT evaluation. Leyla Georges MS OTR/L

## 2018-03-13 NOTE — CONSULTS
Assessment:     Dilated appendix on CT without abdominal pain. Plan:     No acute surgical intervention  NPO  Agree with antibiotic therapy to cover both her pneumonia and possible intra-abdominal infection  While there is some concern for possible acute appendicitis I would not recommend surgical intervention at this time. If she develops abdominal pain or her clinical condition does not improve with current management would offer appendectomy. Blood transfusion per primary team    Signed By: Lori Banda MD  [facility]  Office:  [phone]    March 13, 2018          General Surgery Consult    Subjective:      I was asked to see Gerard Haddad by Silas Freeman MD for management of a dilated appendix seen on CT abdomen/pelvis. The patient is a 59 y.o. female with ESRD, DM who presents for evaluation of a several day history of nausea, emesis and diarrhea. She has had a decrease in her PO intake as well. She denies abdominal pain or fevers. Work-up in the ED showed signifcant anemia on CBC without leukocytosis, BMP significant for hyponatremia. CT abdomen/pelvis showed bilateral lower lobe pneumonia, a dilated fluid filled 12 mm appendix, diverticulosis of the right and transverse colon.      Past Medical History:   Diagnosis Date    Arrhythmia     fast heart rate and palpitations dec 2010    Arthritis     generalized    Asthma     Chronic pain     headaches and stomach pain x several months    Diabetes ([address]     Diabetic foot ulcer associated with type 2 diabetes mellitus ([address]     ESRD (end stage renal disease) ([address]     GERD (gastroesophageal reflux disease)     Hypertension     Psychiatric disorder     depression         Past Surgical History:   Procedure Laterality Date    HX CATARACT REMOVAL      cataract removal and lens placement in right eye    HX GYN      LBTL    HX ORTHOPAEDIC      partial amputation of left foot      Family History   Problem Relation Age of Onset    Heart Disease Mother     Diabetes Father     Heart Disease Father     Diabetes Brother     Cancer Paternal Aunt 39     uterine cancer    Diabetes Brother      Social History     Social History    Marital status: LEGALLY      Spouse name: N/A    Number of children: N/A    Years of education: N/A     Social History Main Topics    Smoking status: Never Smoker    Smokeless tobacco: Never Used    Alcohol use No    Drug use: No    Sexual activity: Not Asked     Other Topics Concern    None     Social History Narrative      Current Facility-Administered Medications   Medication Dose Route Frequency    amLODIPine (NORVASC) tablet 10 mg  10 mg Oral DAILY    ferrous sulfate tablet 325 mg  325 mg Oral DAILY    levothyroxine (SYNTHROID) tablet 100 mcg  100 mcg Oral ACB    metoprolol tartrate (LOPRESSOR) tablet 100 mg  100 mg Oral QHS    sodium chloride (NS) flush 5-10 mL  5-10 mL IntraVENous Q8H    sodium chloride (NS) flush 5-10 mL  5-10 mL IntraVENous PRN    acetaminophen (TYLENOL) tablet 650 mg  650 mg Oral Q4H PRN    HYDROcodone-acetaminophen (NORCO) 5-325 mg per tablet 1 Tab  1 Tab Oral Q4H PRN    HYDROmorphone (PF) (DILAUDID) injection 0.2 mg  0.2 mg IntraVENous Q4H PRN    naloxone (NARCAN) injection 0.4 mg  0.4 mg IntraVENous PRN    diphenhydrAMINE (BENADRYL) injection 12.5 mg  12.5 mg IntraVENous Q4H PRN    ondansetron (ZOFRAN) injection 4 mg  4 mg IntraVENous Q6H PRN    docusate sodium (COLACE) capsule 100 mg  100 mg Oral BID    insulin lispro (HUMALOG) injection   SubCUTAneous AC&HS    glucose chewable tablet 16 g  4 Tab Oral PRN    dextrose (D50W) injection syrg 12.5-25 g  12.5-25 g IntraVENous PRN    glucagon (GLUCAGEN) injection 1 mg  1 mg IntraMUSCular PRN    calcium gluconate 1 g in 0.9% sodium chloride 100 mL IVPB  1 g IntraVENous ONCE    0.9% sodium chloride infusion 250 mL  250 mL IntraVENous PRN    prochlorperazine (COMPAZINE) injection 5 mg  5 mg IntraVENous Q6H PRN    sodium bicarbonate (8.4%) 150 mEq in dextrose 5% 1,000 mL infusion   IntraVENous CONTINUOUS    albuterol-ipratropium (DUO-NEB) 2.5 MG-0.5 MG/3 ML  3 mL Nebulization Q4H PRN    cefTRIAXone (ROCEPHIN) 1 g in 0.9% sodium chloride 50 mL IVPB  1 g IntraVENous Q24H    azithromycin (ZITHROMAX) 500 mg in 0.9% sodium chloride 250 mL IVPB  500 mg IntraVENous Q24H    metroNIDAZOLE (FLAGYL) IVPB premix 500 mg  500 mg IntraVENous Q12H     Current Outpatient Prescriptions   Medication Sig    amLODIPine (NORVASC) 10 mg tablet Take 10 mg by mouth daily.  insulin NPH/insulin regular (NOVOLIN 70/30 U-100 INSULIN) 100 unit/mL (70-30) injection 10 Units by SubCUTAneous route Before breakfast and dinner.  metoprolol tartrate (LOPRESSOR) 100 mg IR tablet Take 1 Tab by mouth nightly.  furosemide (LASIX) 40 mg tablet Take 1 Tab by mouth two (2) times a day.  levothyroxine (SYNTHROID) 100 mcg tablet Take 1 Tab by mouth Daily (before breakfast).  ferrous sulfate 325 mg (65 mg iron) tablet Take 325 mg by mouth daily.       No Known Allergies    Review of Systems:     []     Unable to obtain  ROS due to  []    mental status change  []    sedated   []    intubated   [x]    Total of 12 system negative, unless specified below or in HPI:  Constitutional: negative fever, negative chills, negative weight loss  Eyes:   negative visual changes  ENT:   negative sore throat, tongue or lip swelling  Respiratory:  negative cough, negative dyspnea  Cards:  negative for chest pain, palpitations, lower extremity edema  GI:   negative for nausea, vomiting, diarrhea, and abdominal pain  :  negative for frequency, dysuria  Integument:  negative for rash and pruritus  Heme:  negative for easy bruising and gum/nose bleeding  Musculoskel: negative for myalgias,  back pain and muscle weakness  Neuro:  negative for headaches, dizziness, vertigo  Psych:  negative for feelings of anxiety, depression     Objective: Patient Vitals for the past 8 hrs:   BP Temp Pulse Resp SpO2   18 0345 132/61 98 °F (36.7 °C) 69 13 96 %   18 0145 109/53 97.7 °F (36.5 °C) 68 11 97 %       Temp (24hrs), Av.9 °F (36.6 °C), Min:97.7 °F (36.5 °C), Max:98 °F (36.7 °C)      Physical Exam:  General:  Alert, cooperative, no distress, appears stated age. Eyes:  Conjunctivae/corneas clear. PERRL, EOMs intact. Nose: Nares normal. Septum midline. Mucosa normal. No drainage or sinus tenderness. Mouth/Throat: Lips, mucosa, and tongue normal. Teeth and gums normal.   Neck: Supple, symmetrical, trachea midline, no adenopathy, thyroid: no enlargment/tenderness/nodules, no carotid bruit and no JVD. Back:   Symmetric, no curvature. ROM normal. No CVA tenderness. Lungs:   Clear to auscultation bilaterally. Heart:  Regular rate and rhythm, S1, S2 normal, no murmur, click, rub or gallop. Abdomen:   Soft, non-tender, non-distended. Bowel sounds normal. No masses,  No organomegaly. Extremities: Extremities normal, atraumatic, no cyanosis or edema. Pulses: 2+ and symmetric all extremities. Skin: Skin color, texture, turgor normal. No rashes or lesions   Lymph nodes: Cervical, supraclavicular, and axillary nodes normal.     Labs: Recent Labs      18   0410   WBC  5.7   HGB  6.7*   HCT  20.9*   PLT  178     Recent Labs      18   0410   NA  125*   K  4.9   CL  93*   CO2  16*   GLU  207*   BUN  94*   CREA  9.15*   CA  5.8*   MG  1.7   PHOS  6.0*   ALB  2.3*   TBILI  0.3   SGOT  27   ALT  60     No results for input(s): INR in the last 72 hours.     No lab exists for component: INREXT

## 2018-03-13 NOTE — H&P
[facility] [address]  [address] [facility], [address] [place] [address]  [phone]    Admission History and Physical      NAME:  Jonathan Tucker   :   1955   MRN:  [id number]     PCP:  Kimberli Green MD     Date/Time:  3/12/2018         Subjective:     CHIEF COMPLAINT: nausea, vomiting, diarrhea     HISTORY OF PRESENT ILLNESS:     The patient is a 58 yo hx of HTN, DM, CKD 5, presented w/ N/V, diarrhea, metabolic acidosis, hyponatremia, ESRD. The patient is Vietnamese speaking only. Her son was translating and stated that the patient has had nausea, vomiting, and diarrhea for several days, associated with poor PO intake. Her grandson has had the Flu at home. The patient also c/o some dyspnea, but denied chest pain, abd pain, fevers, chills. The diarrhea is described as watery stool; denied GI bleeding. In the ED, WBC was 6.4, Hgb 7.4, Na 124, Cr 9.84. Chest CT showed bilateral pneumonia and a fluid filled appendix of unknown etiology. No Known Allergies    Prior to Admission medications    Medication Sig Start Date End Date Taking? Authorizing Provider   amLODIPine (NORVASC) 10 mg tablet Take 10 mg by mouth daily. Yes Historical Provider   insulin NPH/insulin regular (NOVOLIN 70/30 U-100 INSULIN) 100 unit/mL (70-30) injection 10 Units by SubCUTAneous route Before breakfast and dinner. Yes Historical Provider   metoprolol tartrate (LOPRESSOR) 100 mg IR tablet Take 1 Tab by mouth nightly. 17  Yes Kenn Smith NP   furosemide (LASIX) 40 mg tablet Take 1 Tab by mouth two (2) times a day. 17  Yes Kenn Smith NP   levothyroxine (SYNTHROID) 100 mcg tablet Take 1 Tab by mouth Daily (before breakfast). 17  Yes Kenn Smith NP   ferrous sulfate 325 mg (65 mg iron) tablet Take 325 mg by mouth daily.    Yes Historical Provider       Past Medical History:   Diagnosis Date    Arrhythmia     fast heart rate and palpitations dec 2010    Arthritis     generalized  Asthma     Chronic pain     headaches and stomach pain x several months    [facility]     Diabetic foot ulcer associated with type 2 diabetes mellitus ([facility]     ESRD (end stage renal disease) ([facility]     GERD (gastroesophageal reflux disease)     Hypertension     Psychiatric disorder     depression        Past Surgical History:   Procedure Laterality Date    HX CATARACT REMOVAL      cataract removal and lens placement in right eye    HX GYN      LBTL    HX ORTHOPAEDIC      partial amputation of left foot       Social History   Substance Use Topics    Smoking status: Never Smoker    Smokeless tobacco: Never Used    Alcohol use No        Family History   Problem Relation Age of Onset    Heart Disease Mother     Diabetes Father     Heart Disease Father     Diabetes Brother     Cancer Paternal Aunt 39     uterine cancer    Diabetes Brother         Review of Systems:  (bold if positive, if negative)    Gen:  Eyes:  ENT:  CVS:  Pulm:   dyspneaGI:   nausea, emesis, diarrhea  :    MS:  Skin:  Psych:  Endo:    Hem:  Renal:    Neuro:          Objective:      VITALS:    Vital signs reviewed; most recent are:    Visit Vitals    /72    Pulse 70    Temp 98 °F (36.7 °C)    Resp 12    Ht 5' 3\" (1.6 m)    Wt 64 kg (141 lb 1.5 oz)    SpO2 94%    BMI 24.99 kg/m2     SpO2 Readings from Last 6 Encounters:   03/12/18 94%   12/08/17 100%   11/30/17 97%   09/02/14 98%   08/07/14 99%   01/25/11 94%        No intake or output data in the 24 hours ending 03/12/18 0939     Exam:     Physical Exam:    Gen:  Frail, ill-appearing, mild distress  HEENT:  Pink conjunctivae, PERRL, hearing intact to voice, moist mucous membranes  Neck:  Supple, without masses, thyroid non-tender  Resp:  No accessory muscle use, bilateral rales  Card:  No murmurs, normal S1, S2 without thrills, 1+ edema  Abd:  Soft, non-tender, non-distended, normoactive bowel sounds are present  Lymph:  No cervical adenopathy  Musc:  No cyanosis or clubbing  Skin:  No rashes  Neuro:  Cranial nerves 3-12 are grossly intact, follows commands appropriately  Psych:  Alert with fair insight. Oriented to person, place, and time. Cambodian speaking only    Labs:    Recent Labs      03/12/18 2104   WBC  6.4   HGB  7.4*   HCT  23.6*   PLT  222     Recent Labs      03/12/18 2104   NA  124*   K  5.0   CL  89*   CO2  15*   GLU  183*   BUN  100*   CREA  9.84*   CA  6.2*   ALB  2.8*   TBILI  0.4   SGOT  36   ALT  76     Lab Results   Component Value Date/Time    Glucose (POC) 198 (H) 11/30/2017 11:35 AM    Glucose (POC) 100 11/30/2017 08:00 AM    Glucose POC  02/08/2018 01:28 PM     No results for input(s): PH, PCO2, PO2, HCO3, FIO2 in the last 72 hours. No results for input(s): INR in the last 72 hours. No lab exists for component: INREXT    Radiology and EKG reviewed:   CT reviewed    **Old Records reviewed in [facility]**       Assessment/Plan:       Principal Problem:     58 yo hx of HTN, DM, CKD 5, presented w/ N/V, diarrhea, metabolic acidosis, hyponatremia, ESRD    1) Nausea/vomiting/diarrhea: unclear etiology, likely viral illness and progression of CKD to ESRD. Will send stool studies. Cont IV Abx for pneumonia, IVF. Monitor closely    2) Bilateral Pneumonia: seen on CT. Not septic. Will send blood Cx, sputum Cx, viral panel. Start empiric IV CTX/azithro/flagyl (to cover intraabdominal source), incentive spirometry, O2, nebs prn    3) CKD 5: progressed to ESRD. Will consult Nephrology for dialysis    4) Metabolic acidosis: due to ESRD. Cont bicarb gtt, will need dialysis    5) Hyponatremia: due to hypovolemia, renal failure. Cont bicarb gtt, monitor Na closely    6) Anemia: due to ESRD. Stool blood neg. Will check iron studies, b12, folate, monitor Hgb    7) Fluid filled appendix: seen on CT. Unclear significant. No abdominal pain to suggest appendicitis. Will cont empiric CTX/Flagyl.   Consult Gen surg to eval    8) HTN: cont toprol, norvasc    9) DM type 2 w/ renal complications: check K1Q, hold insulin 70/30.   Start SSI    Code: Full    Risk of deterioration: high      Total time spent with patient: [address] discussed with: Patient, family, nursing    Discussed:  Care Plan    Prophylaxis:  SCDs    Probable Disposition:  Home w/Family           ___________________________________________________    Attending Physician: Addison Joseph MD

## 2018-03-13 NOTE — ED PROVIDER NOTES
HPI Comments: 59 y.o. Bolivian-Speaking female with past medical history significant for HTN, GERD, asthma, DM, diabetic foot ulcer, arthritis, chronic headaches, depression, who presents ambulatory to the ED accompanied by daughter and son-in-law, complaining of persistent cough, abdominal pain, vomiting, and diarrhea since Thursday 3/8/2018. Pt notes that her abdominal pain is spread diffusely throughout the abdomen, and states that it is currently an 8/10 in severity and \"sharp\" in quality. The pain is constant in nature, but the severity of pain will wax and wane. She also believes that her cough is contributing to her abdominal pain, because her abdominal pain is exacerbated when she coughs. Pt additionally c/o chest congestion, in addition to persistent nausea, vomiting, and diarreha. She states that she has been unable to tolerate any PO intake secondary to the nausea and vomiting. Pt estimates that she has had 6 episodes of non-bloody emesis each day since the nausea started, as well as 5-6 episodes of diarrhea. She notes that her diarrhea has had a scant amount of bright red blood present. Pt's grandson has recently been sick with fevers and headaches, but has not had any GI symptoms. Pt denies any history of similar symptoms, and she specifically denies any fevers. There are no other acute medical concerns at this time. Social hx: Negative for Tobacco use; Negative for EtOH use; Negative for Illicit Drug Abuse    PCP: Julieta Aguayo MD     Note written by Paco Land, as dictated by Nikki Peng MD 8:48 PM     The history is provided by the patient and a relative (daughter, son-in-law). A  was used (son-in-law).         Past Medical History:   Diagnosis Date    Arrhythmia     fast heart rate and palpitations dec 2010    Arthritis     generalized    Asthma     Chronic pain     headaches and stomach pain x several months    Diabetes ([address]     Diabetic foot ulcer associated with type 2 diabetes mellitus ([facility] [address] [facility]     GERD (gastroesophageal reflux disease)     Hypertension     Psychiatric disorder     depression       Past Surgical History:   Procedure Laterality Date    HX CATARACT REMOVAL      cataract removal and lens placement in right eye    HX GYN      LBTL    HX ORTHOPAEDIC      partial amputation of left foot         Family History:   Problem Relation Age of Onset    Heart Disease Mother     Diabetes Father     Heart Disease Father     Diabetes Brother     Cancer Paternal Aunt 40     uterine cancer    Diabetes Brother        Social History     Social History    Marital status: LEGALLY      Spouse name: N/A    Number of children: N/A    Years of education: N/A     Occupational History    Not on file. Social History Main Topics    Smoking status: Never Smoker    Smokeless tobacco: Never Used    Alcohol use No    Drug use: No    Sexual activity: Not on file     Other Topics Concern    Not on file     Social History Narrative         ALLERGIES: Review of patient's allergies indicates no known allergies. Review of Systems   Constitutional: Negative for fever. + unable to tolerate PO intake   HENT: Positive for congestion. Respiratory: Positive for cough. Gastrointestinal: Positive for abdominal pain, blood in stool, diarrhea, nausea and vomiting. Neurological: Negative for headaches. Vitals:    03/12/18 2029   BP: 153/68   Pulse: 74   Resp: 16   Temp: 98 °F (36.7 °C)   SpO2: 95%   Weight: 64 kg (141 lb 1.5 oz)   Height: 5' 3\" (1.6 m)            Physical Exam   Constitutional: She is oriented to person, place, and time. She appears well-developed and well-nourished. Patient appears uncomfortable   HENT:   Head: Normocephalic and atraumatic. Eyes: Conjunctivae are normal. No scleral icterus. Neck: Neck supple. No tracheal deviation present.    Cardiovascular: Normal rate, regular rhythm, normal heart sounds and intact distal pulses. Exam reveals no gallop and no friction rub. No murmur heard. Pulmonary/Chest: Effort normal and breath sounds normal. She has no wheezes. She has no rales. Abdominal: Soft. She exhibits no distension. There is tenderness (diffuse). There is no rebound and no guarding. Musculoskeletal: She exhibits no edema. Neurological: She is alert and oriented to person, place, and time. Skin: Skin is warm and dry. No rash noted. Psychiatric: She has a normal mood and affect. Nursing note and vitals reviewed. Note written by Abel Lima. Martinez Fregoso, as dictated by Miryam Riley MD 8:48 PM      [facility]      ED Course       Procedures    Consult note: Dr. Otero Both - will admit. Miryam Riley MD  10:53 PM    A/P: renal failure; presents with 4 day hx N, V, diarrhea with some blood mixed in, abd pain, cough - w/up remarkable for creat 9.8, bicarb 15, Hgb 7.4; CT abd shows bilateral pneumonia; getting fluids, Rocephin, Zithromax; CT shows dilated appendix but exam not c/w appendicitis. Admit for further management. Miryam Riley MD  10:55 PM    Total critical care time spent exclusive of procedures:  35 min. Miryam Riley MD  10:55 PM    Consult note: spoke with nephrology on call - they will see in the AM; recommends D5 NS with 150 mEq bicarb at 125 cc/hr overnight.   Miryam Riley MD  11:03 PM

## 2018-03-13 NOTE — CONSULTS
[address] [place]  YOB: 1955     Assessment & Plan:   1. CKD 5/MELVA ON CKD  · Appears ESRD  · DW her about ESRD,Line,procedure via , she voiced understanding, she agrees for HD line and HD  · IR to put temp line  · [address] notified for HD  · 2 hrs toda,3 hrs in am,4 hrs Thursday  · QM to look for out pt HD  · Hep serologies  2. Hyponatremia  · Appears to be Due to holding on to free water, ? ivvd  · IVF for now  · As staring NA > 124 and also has high BUN, protects against CPM, Risk is low. · Will do lower Na bath at 130  3. Hypocalcemia  · Most likely due to CKD  · Check Vit D  · DC Bicarb drip for now, it will lower calcium even further  · Repletre  4. Hyperphosphatemia  · Due to CKD  · Start binders when able to take PO  5. Anemia  · IV Iron, iron def  6. DM  7. PNA          Subjective:   CHIEF COMPLAIN:CKD 5  HPI:  The patient is a 58 yo hx of HTN, DM, CKD 5, presented w/ N/V, diarrhea, metabolic acidosis, hyponatremia. She does not see Nephrologist out pt. Her cr was 5.26 mg in 1 2018. She has uremia: nausea, vomiting, fatigue. She has acidosis, hyperphosphatemia and hypocalcemia. She has anemia and Iron def. CT did not show any hydronephrosis. dw her via [address]  . The patient is Nauruan speaking only. Her grandson has had the Flu at home. The patient also c/o some dyspnea, but denied chest pain, abd pain, fevers, chills. The diarrhea is described as watery stool; denied GI bleeding. In the ED, WBC was 6.4, Hgb 7.4, Na 124, Cr 9.84. Chest CT showed bilateral pneumonia and a fluid filled appendix of unknown etiology  Her sister is on dialysis. She wants to do PD eventually. Review of Systems  A comprehensive review of systems was negative except for that written in the HPI.     Past Medical History:   Diagnosis Date    Arrhythmia     fast heart rate and palpitations dec 2010    Arthritis generalized    Asthma     Chronic pain     headaches and stomach pain x several months    [facility] ([facility]     Diabetic foot ulcer associated with type 2 diabetes mellitus ([facility]     ESRD (end stage renal disease) ([facility]     GERD (gastroesophageal reflux disease)     Hypertension     Psychiatric disorder     depression      Past Surgical History:   Procedure Laterality Date    HX CATARACT REMOVAL      cataract removal and lens placement in right eye    HX GYN      LBTL    HX ORTHOPAEDIC      partial amputation of left foot       Social History     Social History    Marital status: LEGALLY      Spouse name: N/A    Number of children: N/A    Years of education: N/A     Occupational History    Not on file. Social History Main Topics    Smoking status: Never Smoker    Smokeless tobacco: Never Used    Alcohol use No    Drug use: No    Sexual activity: Not on file     Other Topics Concern    Not on file     Social History Narrative      Family History   Problem Relation Age of Onset    Heart Disease Mother     Diabetes Father     Heart Disease Father     Diabetes Brother     Cancer Paternal Aunt 40     uterine cancer    Diabetes Brother       Prior to Admission medications    Medication Sig Start Date End Date Taking? Authorizing Provider   amLODIPine (NORVASC) 10 mg tablet Take 10 mg by mouth daily. Yes Historical Provider   insulin NPH/insulin regular (NOVOLIN 70/30 U-100 INSULIN) 100 unit/mL (70-30) injection 10 Units by SubCUTAneous route Before breakfast and dinner. Yes Historical Provider   metoprolol tartrate (LOPRESSOR) 100 mg IR tablet Take 1 Tab by mouth nightly. 12/8/17  Yes Suhail Quezada NP   furosemide (LASIX) 40 mg tablet Take 1 Tab by mouth two (2) times a day. 12/8/17  Yes Suhail Quezada NP   levothyroxine (SYNTHROID) 100 mcg tablet Take 1 Tab by mouth Daily (before breakfast).  12/8/17  Yes Suhail Quezada NP   ferrous sulfate 325 mg (65 mg iron) tablet Take 325 mg by mouth daily. Yes Historical Provider     No Known Allergies    Objective:     Vitals:  Blood pressure 132/61, pulse 69, temperature 98 °F (36.7 °C), resp. rate 13, height 5' 3\" (1.6 m), weight 64 kg (141 lb 1.5 oz), SpO2 96 %. Temp (24hrs), Av.9 °F (36.6 °C), Min:97.7 °F (36.5 °C), Max:98 °F (36.7 °C)      Intake and Output:      1901 -  0700  In: 795.8 [I.V.:795.8]  Out: -     Physical Exam:                Patient is intubated:  no    Physical Examination:   GENERAL ASSESSMENT: cachetic and anxious  SKIN: dry skin  HEAD: normocephalic  EYES: normal eyes  NECK: normal  CHEST: normal air exchange, no rales, no rhonchi, no wheezes  HEART: regular rate and rhythm, normal S1/S2  ABDOMEN: soft, non-distended  :Valentin: no  EXTREMITY: no joint swelling  NEURO: gross motor exam normal by observation      ECG/rhythm[de-identified] Rev:yes  Xray/CT/US/MRI REV:yes  Data Review   Recent Results (from the past 72 hour(s))   CBC WITH AUTOMATED DIFF    Collection Time: 18  9:04 PM   Result Value Ref Range    WBC 6.4 3.6 - 11.0 K/uL    RBC 3.05 (L) 3.80 - 5.20 M/uL    HGB 7.4 (L) 11.5 - 16.0 g/dL    HCT 23.6 (L) 35.0 - 47.0 %    MCV 77.4 (L) 80.0 - 99.0 FL    MCH 24.3 (L) 26.0 - 34.0 PG    MCHC 31.4 30.0 - 36.5 g/dL    RDW 17.6 (H) 11.5 - 14.5 %    PLATELET 785 235 - 368 K/uL    MPV 9.8 8.9 - 12.9 FL    NRBC 0.0 0  WBC    ABSOLUTE NRBC 0.00 0.00 - 0.01 K/uL    NEUTROPHILS 81 (H) 32 - 75 %    LYMPHOCYTES 13 12 - 49 %    MONOCYTES 3 (L) 5 - 13 %    EOSINOPHILS 0 0 - 7 %    BASOPHILS 0 0 - 1 %    IMMATURE GRANULOCYTES 2 (H) 0.0 - 0.5 %    ABS. NEUTROPHILS 5.2 1.8 - 8.0 K/UL    ABS. LYMPHOCYTES 0.9 0.8 - 3.5 K/UL    ABS. MONOCYTES 0.2 0.0 - 1.0 K/UL    ABS. EOSINOPHILS 0.0 0.0 - 0.4 K/UL    ABS. BASOPHILS 0.0 0.0 - 0.1 K/UL    ABS. IMM.  GRANS. 0.1 (H) 0.00 - 0.04 K/UL    DF AUTOMATED     METABOLIC PANEL, COMPREHENSIVE    Collection Time: 18  9:04 PM   Result Value Ref Range    Sodium 124 (L) 136 - 145 mmol/L    Potassium 5.0 3.5 - 5.1 mmol/L    Chloride 89 (L) 97 - 108 mmol/L    CO2 15 (LL) 21 - 32 mmol/L    Anion gap 20 (H) 5 - 15 mmol/L    Glucose 183 (H) 65 - 100 mg/dL     (H) 6 - 20 MG/DL    Creatinine 9.84 (H) 0.55 - 1.02 MG/DL    BUN/Creatinine ratio 10 (L) 12 - 20      GFR est AA 5 (L) >60 ml/min/1.73m2    GFR est non-AA 4 (L) >60 ml/min/1.73m2    Calcium 6.2 (LL) 8.5 - 10.1 MG/DL    Bilirubin, total 0.4 0.2 - 1.0 MG/DL    ALT (SGPT) 76 12 - 78 U/L    AST (SGOT) 36 15 - 37 U/L    Alk.  phosphatase 222 (H) 45 - 117 U/L    Protein, total 6.7 6.4 - 8.2 g/dL    Albumin 2.8 (L) 3.5 - 5.0 g/dL    Globulin 3.9 2.0 - 4.0 g/dL    A-G Ratio 0.7 (L) 1.1 - 2.2     LIPASE    Collection Time: 03/12/18  9:04 PM   Result Value Ref Range    Lipase 406 (H) 73 - 393 U/L   OCCULT BLOOD, STOOL    Collection Time: 03/12/18 10:12 PM   Result Value Ref Range    Occult blood, stool NEGATIVE  NEG     CULTURE, BLOOD, PERIPHERAL    Collection Time: 03/12/18 10:37 PM   Result Value Ref Range    Special Requests: NO SPECIAL REQUESTS      Culture result: NO GROWTH AFTER 8 HOURS     LACTIC ACID    Collection Time: 03/12/18 10:37 PM   Result Value Ref Range    Lactic acid 0.6 0.4 - 2.0 MMOL/L   CULTURE, BLOOD, PERIPHERAL    Collection Time: 03/12/18 10:37 PM   Result Value Ref Range    Special Requests: NO SPECIAL REQUESTS      Culture result: NO GROWTH AFTER 8 HOURS     EKG, 12 LEAD, INITIAL    Collection Time: 03/13/18 12:19 AM   Result Value Ref Range    Ventricular Rate 70 BPM    Atrial Rate 70 BPM    P-R Interval 122 ms    QRS Duration 74 ms    Q-T Interval 454 ms    QTC Calculation (Bezet) 490 ms    Calculated P Axis -15 degrees    Calculated R Axis 51 degrees    Calculated T Axis 49 degrees    Diagnosis       Normal sinus rhythm  Low voltage QRS  Cannot rule out Anterior infarct , age undetermined  Abnormal ECG  When compared with ECG of 22-NOV-2017 14:15,  QT has lengthened     IRON PROFILE    Collection Time: 03/13/18 4:10 AM   Result Value Ref Range    Iron 17 (L) 35 - 150 ug/dL    TIBC 177 (L) 250 - 450 ug/dL    Iron % saturation 10 (L) 20 - 50 %   FERRITIN    Collection Time: 03/13/18  4:10 AM   Result Value Ref Range    Ferritin 448 (H) 8 - 252 NG/ML   FOLATE    Collection Time: 03/13/18  4:10 AM   Result Value Ref Range    Folate 12.4 5.0 - 94.3 ng/mL   METABOLIC PANEL, BASIC    Collection Time: 03/13/18  4:10 AM   Result Value Ref Range    Sodium 125 (L) 136 - 145 mmol/L    Potassium 4.9 3.5 - 5.1 mmol/L    Chloride 93 (L) 97 - 108 mmol/L    CO2 16 (L) 21 - 32 mmol/L    Anion gap 16 (H) 5 - 15 mmol/L    Glucose 207 (H) 65 - 100 mg/dL    BUN 94 (H) 6 - 20 MG/DL    Creatinine 9.15 (H) 0.55 - 1.02 MG/DL    BUN/Creatinine ratio 10 (L) 12 - 20      GFR est AA 5 (L) >60 ml/min/1.73m2    GFR est non-AA 4 (L) >60 ml/min/1.73m2    Calcium 5.8 (LL) 8.5 - 10.1 MG/DL   CBC W/O DIFF    Collection Time: 03/13/18  4:10 AM   Result Value Ref Range    WBC 5.7 3.6 - 11.0 K/uL    RBC 2.71 (L) 3.80 - 5.20 M/uL    HGB 6.7 (L) 11.5 - 16.0 g/dL    HCT 20.9 (L) 35.0 - 47.0 %    MCV 77.1 (L) 80.0 - 99.0 FL    MCH 24.7 (L) 26.0 - 34.0 PG    MCHC 32.1 30.0 - 36.5 g/dL    RDW 17.6 (H) 11.5 - 14.5 %    PLATELET 715 040 - 292 K/uL    MPV 9.8 8.9 - 12.9 FL    NRBC 0.0 0  WBC    ABSOLUTE NRBC 0.00 0.00 - 0.01 K/uL   MAGNESIUM    Collection Time: 03/13/18  4:10 AM   Result Value Ref Range    Magnesium 1.7 1.6 - 2.4 mg/dL   PHOSPHORUS    Collection Time: 03/13/18  4:10 AM   Result Value Ref Range    Phosphorus 6.0 (H) 2.6 - 4.7 MG/DL   HEPATIC FUNCTION PANEL    Collection Time: 03/13/18  4:10 AM   Result Value Ref Range    Protein, total 5.9 (L) 6.4 - 8.2 g/dL    Albumin 2.3 (L) 3.5 - 5.0 g/dL    Globulin 3.6 2.0 - 4.0 g/dL    A-G Ratio 0.6 (L) 1.1 - 2.2      Bilirubin, total 0.3 0.2 - 1.0 MG/DL    Bilirubin, direct 0.1 0.0 - 0.2 MG/DL    Alk.  phosphatase 190 (H) 45 - 117 U/L    AST (SGOT) 27 15 - 37 U/L    ALT (SGPT) 60 12 - 78 U/L   LIPASE Collection Time: 03/13/18  4:10 AM   Result Value Ref Range    Lipase 437 (H) 73 - 393 U/L   LACTIC ACID    Collection Time: 03/13/18  4:10 AM   Result Value Ref Range    Lactic acid 0.4 0.4 - 2.0 MMOL/L   GLUCOSE, POC    Collection Time: 03/13/18  6:39 AM   Result Value Ref Range    Glucose (POC) 186 (H) 65 - 100 mg/dL    Performed by Alma Perez (PCT)        Discussed with:    Patient, Nurse and Attending/Consulting  Thank you so much to allow us to participate in this patient's care. We will follow.  : Lenore Kim MD  3/13/2018      [facility]:  [email or web address]  Justus Mittal office:  [address], [address] [place] [address],[address]  [place], [address] [place] [address]  Phone: [phone]  Fax :     [phone]    [facility] office:  [address] [facility], [address]  Phone - [phone]  Fax - [phone]

## 2018-03-13 NOTE — PROGRESS NOTES
Nutrition Assessment:    RECOMMENDATIONS/INTERVENTION(S):   Start Renal diet when able. Phos high 6.0  Monitor PO intakes, wt, GI status  Check need for Nepro once diet starts    ASSESSMENT:   3/13: 59 yr old female admitted for n/v/d. Pt states she hasn't eaten much of anything for the last 3 days. Any time she even drinks water she has to go to the bathroom and it's diarrhea. Pt complains of sweet taste in mouth, without having eaten anything. Ketoacidosis? Pt had central line placed for HD. Hgb low 6.7. Pt with PNA and possible appendicitis, no surgical intervention planned at this time. Phos 6.0, Calcium 6.4 low. , 207 mg/dL. Pt states she's lost about 5 lbs over the last few days. Chart indicates minor weight gain, likely fluids. No edema noted. SUBJECTIVE/OBJECTIVE:   Diet Order:  NPO  % Eaten:  No data found. Pertinent Medications: [x] Reviewed    Past Medical History:   Diagnosis Date    Arrhythmia     fast heart rate and palpitations dec 2010    Arthritis     generalized    Asthma     Chronic pain     headaches and stomach pain x several months    Diabetes ([facility]     Diabetic foot ulcer associated with type 2 diabetes mellitus ([facility]     ESRD (end stage renal disease) (HCC)     GERD (gastroesophageal reflux disease)     Hypertension     Psychiatric disorder     depression        Chemistries:  Lab Results   Component Value Date/Time    Sodium 128 (L) 03/13/2018 09:55 AM    Potassium 4.3 03/13/2018 09:55 AM    Chloride 93 (L) 03/13/2018 09:55 AM    CO2 17 (L) 03/13/2018 09:55 AM    Anion gap 18 (H) 03/13/2018 09:55 AM    Glucose 183 (H) 03/13/2018 09:55 AM    BUN 97 (H) 03/13/2018 09:55 AM    Creatinine 9.14 (H) 03/13/2018 09:55 AM    BUN/Creatinine ratio 11 (L) 03/13/2018 09:55 AM    GFR est AA 5 (L) 03/13/2018 09:55 AM    GFR est non-AA 4 (L) 03/13/2018 09:55 AM    Calcium 6.4 (LL) 03/13/2018 09:55 AM    Calcium 6.4 (LL) 03/13/2018 09:55 AM    AST (SGOT) 27 03/13/2018 04:10 AM    Alk. phosphatase 190 (H) 03/13/2018 04:10 AM    Protein, total 5.9 (L) 03/13/2018 04:10 AM    Albumin 2.3 (L) 03/13/2018 04:10 AM    Globulin 3.6 03/13/2018 04:10 AM    A-G Ratio 0.6 (L) 03/13/2018 04:10 AM    ALT (SGPT) 60 03/13/2018 04:10 AM      Anthropometrics: Height: 5' 3\" (160 cm) Weight: 64 kg (141 lb 1.5 oz)    IBW (%IBW):   ( ) UBW (%UBW):   (  %)    BMI: Body mass index is 24.99 kg/(m^2). This BMI is indicative of:     [] Underweight    [x] Normal    [] Overweight    []  Obesity    []  Extreme Obesity (BMI>40)    Estimated Nutrition Needs (Based on): 1520 Kcals/day (BMR(1169x1.3)) , 51 g (-64g/day(0.8-1.0g/kg)) Protein  Carbohydrate: At Least 130 g/day  Fluids: 1500 mL/day    Last BM: 3/13   []Active     []Hyperactive  []Hypoactive       [] Absent   BS  Skin:    [x] Intact   [] Incision  [] Breakdown   [] DTI   [] Tears/Excoriation/Abrasion  []Edema [] Other:    Wt Readings from Last 30 Encounters:   03/12/18 64 kg (141 lb 1.5 oz)   02/08/18 60.3 kg (133 lb)   12/08/17 66.2 kg (146 lb)   11/28/17 63.5 kg (139 lb 15.9 oz)   09/11/14 64.4 kg (142 lb)   08/21/14 64.9 kg (143 lb)   08/07/14 64.9 kg (143 lb)   08/07/14 64.9 kg (143 lb)   07/10/14 67.1 kg (148 lb)   06/12/14 62.6 kg (138 lb)   06/02/14 63 kg (139 lb)   05/29/14 63.5 kg (140 lb)   05/08/14 61.2 kg (135 lb)   01/25/11 59 kg (130 lb)      NUTRITION DIAGNOSES:   Problem:  Inadequate energy intake     Etiology: related to decreased ability to consume sufficient energy     Signs/Symptoms: as evidenced by poor PO intakes, n/v/d      NUTRITION INTERVENTIONS:  Meals/Snacks: General/healthful diet   Supplements: Commercial supplement              GOAL:   Pt will consume >50% of meals without n/vd within 1-3 days    Cultural, Methodist, or Ethnic Dietary Needs: None     LEARNING NEEDS (Diet, Food/Nutrient-Drug Interaction):    [x] None Identified   [] Identified and Education Provided/Documented   [] Identified and Pt declined/was not appropriate      [x] Interdisciplinary Care Plan Reviewed/Documented    [x] Discharge Needs:    TBD   [] No Nutrition Related Discharge Needs    NUTRITION RISK:   Pt Is At Nutrition Risk  [x]     No Nutrition Risk Identified  []       PT SEEN FOR:    [x]  MD Consult: []Calorie Count      []Diabetic Diet Education        []Diet Education     []Electrolyte Management     [x]General Nutrition Management and Supplements     []Management of Tube Feeding     []TPN Recommendations    []  RN Referral:  []MST score >=2     []Enteral/Parenteral Nutrition PTA     []Pregnant: Gestational DM or Multigestation                 [] Pressure Ulcer      []  Low BMI      []  Length of Stay       [] Dysphagia Diet     [] Ventilator      [] Follow-Up        Previous Recommendations:   [] Implemented          [] Not Implemented          [x] Not Applicable    Previous Goal:   [] Met              [] Progressing Towards Goal              [] Not Progressing Towards Goal   [x] Not Applicable              Dewayne Ferrell, [address] [place] [address]  Pager: [phone]  Office: [phone]

## 2018-03-13 NOTE — PROGRESS NOTES
Assumed care of pt this am. Pt is alert and oriented with some communication issues due to the fact she speaks very little english. Pt is now resting in bed with no known needs. MD has obtained consent for blood transfusion. PO medications refused due to nausea.

## 2018-03-13 NOTE — PROGRESS NOTES
[facility]: MED RECONCILIATION    Medications added:     · Amlodipine 10 mg PO daily    Medications removed:    · Ciprofloxacin eye gtts - course complete    Medications adjusted:    · none    Information obtained from: patient (information and interpretation provided by daughter and son-in-law), medication bottles provided for review    Significant PMH/Disease States:   Past Medical History:   Diagnosis Date    Arrhythmia     fast heart rate and palpitations dec 2010    Arthritis     generalized    Asthma     Chronic pain     headaches and stomach pain x several months    Diabetes ([facility]     Diabetic foot ulcer associated with type 2 diabetes mellitus ([facility]     GERD (gastroesophageal reflux disease)     Hypertension     Psychiatric disorder     depression     Chief Complaint for this Admission:   Chief Complaint   Patient presents with    Vomiting     Allergies: Review of patient's allergies indicates no known allergies. Prior to Admission Medications:   Prior to Admission Medications   Prescriptions Last Dose Informant Patient Reported? Taking? amLODIPine (NORVASC) 10 mg tablet 3/12/2018 at am Self Yes Yes   Sig: Take 10 mg by mouth daily. ferrous sulfate 325 mg (65 mg iron) tablet 3/12/2018 at am Self Yes Yes   Sig: Take 325 mg by mouth daily. furosemide (LASIX) 40 mg tablet 3/12/2018 at 1000 Self No Yes   Sig: Take 1 Tab by mouth two (2) times a day. insulin NPH/insulin regular (NOVOLIN 70/30 U-100 INSULIN) 100 unit/mL (70-30) injection 3/11/2018 at pm Self Yes Yes   Sig: 10 Units by SubCUTAneous route Before breakfast and dinner. levothyroxine (SYNTHROID) 100 mcg tablet 3/12/2018 at am Self No Yes   Sig: Take 1 Tab by mouth Daily (before breakfast). metoprolol tartrate (LOPRESSOR) 100 mg IR tablet 3/11/2018 at pm Self No Yes   Sig: Take 1 Tab by mouth nightly. Facility-Administered Medications: None     Thank you for the consult,  Sudhir OKEEFE [facility]

## 2018-03-14 ENCOUNTER — ANESTHESIA (OUTPATIENT)
Dept: SURGERY | Age: 63
DRG: 341 | End: 2018-03-14
Payer: SUBSIDIZED

## 2018-03-14 ENCOUNTER — ANESTHESIA EVENT (OUTPATIENT)
Dept: SURGERY | Age: 63
DRG: 341 | End: 2018-03-14
Payer: SUBSIDIZED

## 2018-03-14 LAB
ABO + RH BLD: NORMAL
ALBUMIN SERPL-MCNC: 2.4 G/DL (ref 3.5–5)
ALBUMIN/GLOB SERPL: 0.6 {RATIO} (ref 1.1–2.2)
ALP SERPL-CCNC: 180 U/L (ref 45–117)
ALT SERPL-CCNC: 52 U/L (ref 12–78)
ANION GAP SERPL CALC-SCNC: 15 MMOL/L (ref 5–15)
AST SERPL-CCNC: 34 U/L (ref 15–37)
BASOPHILS # BLD: 0 K/UL (ref 0–0.1)
BASOPHILS NFR BLD: 0 % (ref 0–1)
BILIRUB SERPL-MCNC: 0.3 MG/DL (ref 0.2–1)
BLD PROD TYP BPU: NORMAL
BLOOD GROUP ANTIBODIES SERPL: NORMAL
BPU ID: NORMAL
BUN SERPL-MCNC: 53 MG/DL (ref 6–20)
BUN/CREAT SERPL: 9 (ref 12–20)
CALCIUM SERPL-MCNC: 6.9 MG/DL (ref 8.5–10.1)
CHLORIDE SERPL-SCNC: 98 MMOL/L (ref 97–108)
CO2 SERPL-SCNC: 22 MMOL/L (ref 21–32)
CREAT SERPL-MCNC: 5.96 MG/DL (ref 0.55–1.02)
CROSSMATCH RESULT,%XM: NORMAL
DIFFERENTIAL METHOD BLD: ABNORMAL
EOSINOPHIL # BLD: 0 K/UL (ref 0–0.4)
EOSINOPHIL NFR BLD: 0 % (ref 0–7)
ERYTHROCYTE [DISTWIDTH] IN BLOOD BY AUTOMATED COUNT: 17.2 % (ref 11.5–14.5)
GLOBULIN SER CALC-MCNC: 3.8 G/DL (ref 2–4)
GLUCOSE BLD STRIP.AUTO-MCNC: 102 MG/DL (ref 65–100)
GLUCOSE BLD STRIP.AUTO-MCNC: 113 MG/DL (ref 65–100)
GLUCOSE BLD STRIP.AUTO-MCNC: 115 MG/DL (ref 65–100)
GLUCOSE BLD STRIP.AUTO-MCNC: 128 MG/DL (ref 65–100)
GLUCOSE BLD STRIP.AUTO-MCNC: 128 MG/DL (ref 65–100)
GLUCOSE BLD STRIP.AUTO-MCNC: 96 MG/DL (ref 65–100)
GLUCOSE SERPL-MCNC: 119 MG/DL (ref 65–100)
HAV IGM SER QL: NONREACTIVE
HBV CORE IGM SER QL: NONREACTIVE
HBV SURFACE AB SER QL: NONREACTIVE
HBV SURFACE AB SER-ACNC: <3.1 MIU/ML
HBV SURFACE AG SER QL: <0.1 INDEX
HBV SURFACE AG SER QL: NEGATIVE
HCT VFR BLD AUTO: 23.9 % (ref 35–47)
HCV AB SERPL QL IA: NONREACTIVE
HCV COMMENT,HCGAC: NORMAL
HGB BLD-MCNC: 8 G/DL (ref 11.5–16)
IMM GRANULOCYTES # BLD: 0 K/UL
IMM GRANULOCYTES NFR BLD AUTO: 0 %
LYMPHOCYTES # BLD: 0.4 K/UL (ref 0.8–3.5)
LYMPHOCYTES NFR BLD: 8 % (ref 12–49)
MAGNESIUM SERPL-MCNC: 1.7 MG/DL (ref 1.6–2.4)
MCH RBC QN AUTO: 25.7 PG (ref 26–34)
MCHC RBC AUTO-ENTMCNC: 33.5 G/DL (ref 30–36.5)
MCV RBC AUTO: 76.8 FL (ref 80–99)
METAMYELOCYTES NFR BLD MANUAL: 1 %
MONOCYTES # BLD: 0.2 K/UL (ref 0–1)
MONOCYTES NFR BLD: 3 % (ref 5–13)
NEUTS SEG # BLD: 4.8 K/UL (ref 1.8–8)
NEUTS SEG NFR BLD: 88 % (ref 32–75)
NRBC # BLD: 0.02 K/UL (ref 0–0.01)
NRBC BLD-RTO: 0.4 PER 100 WBC
PHOSPHATE SERPL-MCNC: 4.2 MG/DL (ref 2.6–4.7)
PLATELET # BLD AUTO: 200 K/UL (ref 150–400)
PMV BLD AUTO: 9.2 FL (ref 8.9–12.9)
POTASSIUM SERPL-SCNC: 4 MMOL/L (ref 3.5–5.1)
PROT SERPL-MCNC: 6.2 G/DL (ref 6.4–8.2)
RBC # BLD AUTO: 3.11 M/UL (ref 3.8–5.2)
RBC MORPH BLD: ABNORMAL
RBC MORPH BLD: ABNORMAL
SERVICE CMNT-IMP: ABNORMAL
SERVICE CMNT-IMP: NORMAL
SODIUM SERPL-SCNC: 135 MMOL/L (ref 136–145)
SP1: NORMAL
SP2: NORMAL
SP3: NORMAL
SPECIMEN EXP DATE BLD: NORMAL
STATUS OF UNIT,%ST: NORMAL
UNIT DIVISION, %UDIV: 0
WBC # BLD AUTO: 5.5 K/UL (ref 3.6–11)

## 2018-03-14 PROCEDURE — 77030018836 HC SOL IRR NACL ICUM -A: Performed by: SURGERY

## 2018-03-14 PROCEDURE — 36415 COLL VENOUS BLD VENIPUNCTURE: CPT | Performed by: INTERNAL MEDICINE

## 2018-03-14 PROCEDURE — 77030002966 HC SUT PDS J&J -A: Performed by: SURGERY

## 2018-03-14 PROCEDURE — 0DTJ4ZZ RESECTION OF APPENDIX, PERCUTANEOUS ENDOSCOPIC APPROACH: ICD-10-PCS | Performed by: SURGERY

## 2018-03-14 PROCEDURE — 76010000138 HC OR TIME 0.5 TO 1 HR: Performed by: SURGERY

## 2018-03-14 PROCEDURE — 65660000000 HC RM CCU STEPDOWN

## 2018-03-14 PROCEDURE — 74011250636 HC RX REV CODE- 250/636: Performed by: ANESTHESIOLOGY

## 2018-03-14 PROCEDURE — 77030022473 HC HNDL ENDO GIA UNIV USDA -C: Performed by: SURGERY

## 2018-03-14 PROCEDURE — 77030022952 HC TRCR ENDOSC COVD -C: Performed by: SURGERY

## 2018-03-14 PROCEDURE — 88304 TISSUE EXAM BY PATHOLOGIST: CPT | Performed by: SURGERY

## 2018-03-14 PROCEDURE — 77030002933 HC SUT MCRYL J&J -A: Performed by: SURGERY

## 2018-03-14 PROCEDURE — 77030008756 HC TU IRR SUC STRY -B: Performed by: SURGERY

## 2018-03-14 PROCEDURE — 77030037032 HC INSRT SCIS CLICKLLINE DISP STOR -B: Performed by: SURGERY

## 2018-03-14 PROCEDURE — 77030032490 HC SLV COMPR SCD KNE COVD -B: Performed by: SURGERY

## 2018-03-14 PROCEDURE — 77030019908 HC STETH ESOPH SIMS -A: Performed by: NURSE ANESTHETIST, CERTIFIED REGISTERED

## 2018-03-14 PROCEDURE — 77030035048 HC TRCR ENDOSC OPTCL COVD -B: Performed by: SURGERY

## 2018-03-14 PROCEDURE — 76210000016 HC OR PH I REC 1 TO 1.5 HR: Performed by: SURGERY

## 2018-03-14 PROCEDURE — 74011000258 HC RX REV CODE- 258: Performed by: INTERNAL MEDICINE

## 2018-03-14 PROCEDURE — 83735 ASSAY OF MAGNESIUM: CPT | Performed by: INTERNAL MEDICINE

## 2018-03-14 PROCEDURE — 77030009965 HC RELD STPLR ENDOS COVD -D: Performed by: SURGERY

## 2018-03-14 PROCEDURE — 77030008684 HC TU ET CUF COVD -B: Performed by: NURSE ANESTHETIST, CERTIFIED REGISTERED

## 2018-03-14 PROCEDURE — 80053 COMPREHEN METABOLIC PANEL: CPT | Performed by: INTERNAL MEDICINE

## 2018-03-14 PROCEDURE — 77030026438 HC STYL ET INTUB CARD -A: Performed by: NURSE ANESTHETIST, CERTIFIED REGISTERED

## 2018-03-14 PROCEDURE — 74011250637 HC RX REV CODE- 250/637: Performed by: INTERNAL MEDICINE

## 2018-03-14 PROCEDURE — 84100 ASSAY OF PHOSPHORUS: CPT | Performed by: INTERNAL MEDICINE

## 2018-03-14 PROCEDURE — 76060000032 HC ANESTHESIA 0.5 TO 1 HR: Performed by: SURGERY

## 2018-03-14 PROCEDURE — 77030008771 HC TU NG SALEM SUMP -A: Performed by: NURSE ANESTHETIST, CERTIFIED REGISTERED

## 2018-03-14 PROCEDURE — 82962 GLUCOSE BLOOD TEST: CPT

## 2018-03-14 PROCEDURE — 74011250636 HC RX REV CODE- 250/636: Performed by: INTERNAL MEDICINE

## 2018-03-14 PROCEDURE — 74011250636 HC RX REV CODE- 250/636

## 2018-03-14 PROCEDURE — 74011000250 HC RX REV CODE- 250: Performed by: SURGERY

## 2018-03-14 PROCEDURE — 77030010507 HC ADH SKN DERMBND J&J -B: Performed by: SURGERY

## 2018-03-14 PROCEDURE — 77030035051: Performed by: SURGERY

## 2018-03-14 PROCEDURE — 77030009403 HC ELECTRD ENDO MEGA -B: Performed by: SURGERY

## 2018-03-14 PROCEDURE — 77030011640 HC PAD GRND REM COVD -A: Performed by: SURGERY

## 2018-03-14 PROCEDURE — 77030020263 HC SOL INJ SOD CL0.9% LFCR 1000ML: Performed by: SURGERY

## 2018-03-14 PROCEDURE — 85025 COMPLETE CBC W/AUTO DIFF WBC: CPT | Performed by: INTERNAL MEDICINE

## 2018-03-14 PROCEDURE — 77030009848 HC PASSR SUT SET COOP -C: Performed by: SURGERY

## 2018-03-14 PROCEDURE — 77030020747 HC TU INSUF ENDOSC TELE -A: Performed by: SURGERY

## 2018-03-14 PROCEDURE — 77030007955 HC PCH ENDOSC SPEC J&J -B: Performed by: SURGERY

## 2018-03-14 PROCEDURE — 77030022474 HC RELD STPLR ENDO GIA COVD -C: Performed by: SURGERY

## 2018-03-14 PROCEDURE — 74011000250 HC RX REV CODE- 250

## 2018-03-14 RX ORDER — ROCURONIUM BROMIDE 10 MG/ML
INJECTION, SOLUTION INTRAVENOUS AS NEEDED
Status: DISCONTINUED | OUTPATIENT
Start: 2018-03-14 | End: 2018-03-14 | Stop reason: HOSPADM

## 2018-03-14 RX ORDER — FENTANYL CITRATE 50 UG/ML
INJECTION, SOLUTION INTRAMUSCULAR; INTRAVENOUS AS NEEDED
Status: DISCONTINUED | OUTPATIENT
Start: 2018-03-14 | End: 2018-03-14 | Stop reason: HOSPADM

## 2018-03-14 RX ORDER — PROPOFOL 10 MG/ML
INJECTION, EMULSION INTRAVENOUS AS NEEDED
Status: DISCONTINUED | OUTPATIENT
Start: 2018-03-14 | End: 2018-03-14 | Stop reason: HOSPADM

## 2018-03-14 RX ORDER — SODIUM CHLORIDE, SODIUM LACTATE, POTASSIUM CHLORIDE, CALCIUM CHLORIDE 600; 310; 30; 20 MG/100ML; MG/100ML; MG/100ML; MG/100ML
125 INJECTION, SOLUTION INTRAVENOUS CONTINUOUS
Status: DISCONTINUED | OUTPATIENT
Start: 2018-03-14 | End: 2018-03-14 | Stop reason: HOSPADM

## 2018-03-14 RX ORDER — HEPARIN SODIUM 1000 [USP'U]/ML
2500 INJECTION, SOLUTION INTRAVENOUS; SUBCUTANEOUS
Status: DISCONTINUED | OUTPATIENT
Start: 2018-03-14 | End: 2018-03-27

## 2018-03-14 RX ORDER — FLUMAZENIL 0.1 MG/ML
0.2 INJECTION INTRAVENOUS
Status: DISCONTINUED | OUTPATIENT
Start: 2018-03-14 | End: 2018-03-17

## 2018-03-14 RX ORDER — OXYCODONE AND ACETAMINOPHEN 5; 325 MG/1; MG/1
1 TABLET ORAL
Status: DISCONTINUED | OUTPATIENT
Start: 2018-03-14 | End: 2018-03-30 | Stop reason: HOSPADM

## 2018-03-14 RX ORDER — DIPHENHYDRAMINE HYDROCHLORIDE 50 MG/ML
12.5 INJECTION, SOLUTION INTRAMUSCULAR; INTRAVENOUS AS NEEDED
Status: DISCONTINUED | OUTPATIENT
Start: 2018-03-14 | End: 2018-03-14 | Stop reason: HOSPADM

## 2018-03-14 RX ORDER — OXYCODONE AND ACETAMINOPHEN 10; 325 MG/1; MG/1
1 TABLET ORAL
Status: DISCONTINUED | OUTPATIENT
Start: 2018-03-14 | End: 2018-03-30 | Stop reason: HOSPADM

## 2018-03-14 RX ORDER — HEPARIN SODIUM 5000 [USP'U]/ML
5000 INJECTION, SOLUTION INTRAVENOUS; SUBCUTANEOUS EVERY 12 HOURS
Status: DISCONTINUED | OUTPATIENT
Start: 2018-03-14 | End: 2018-03-30 | Stop reason: HOSPADM

## 2018-03-14 RX ORDER — HYDROMORPHONE HYDROCHLORIDE 1 MG/ML
.25-1 INJECTION, SOLUTION INTRAMUSCULAR; INTRAVENOUS; SUBCUTANEOUS
Status: DISCONTINUED | OUTPATIENT
Start: 2018-03-14 | End: 2018-03-14 | Stop reason: HOSPADM

## 2018-03-14 RX ORDER — ONDANSETRON 2 MG/ML
INJECTION INTRAMUSCULAR; INTRAVENOUS AS NEEDED
Status: DISCONTINUED | OUTPATIENT
Start: 2018-03-14 | End: 2018-03-14 | Stop reason: HOSPADM

## 2018-03-14 RX ORDER — NALOXONE HYDROCHLORIDE 0.4 MG/ML
0.2 INJECTION, SOLUTION INTRAMUSCULAR; INTRAVENOUS; SUBCUTANEOUS
Status: DISCONTINUED | OUTPATIENT
Start: 2018-03-14 | End: 2018-03-17

## 2018-03-14 RX ORDER — LIDOCAINE HYDROCHLORIDE 10 MG/ML
0.1 INJECTION, SOLUTION EPIDURAL; INFILTRATION; INTRACAUDAL; PERINEURAL AS NEEDED
Status: DISCONTINUED | OUTPATIENT
Start: 2018-03-14 | End: 2018-03-14 | Stop reason: HOSPADM

## 2018-03-14 RX ORDER — SUCCINYLCHOLINE CHLORIDE 20 MG/ML
INJECTION INTRAMUSCULAR; INTRAVENOUS AS NEEDED
Status: DISCONTINUED | OUTPATIENT
Start: 2018-03-14 | End: 2018-03-14 | Stop reason: HOSPADM

## 2018-03-14 RX ORDER — MIDAZOLAM HYDROCHLORIDE 1 MG/ML
2 INJECTION, SOLUTION INTRAMUSCULAR; INTRAVENOUS
Status: DISCONTINUED | OUTPATIENT
Start: 2018-03-14 | End: 2018-03-14 | Stop reason: HOSPADM

## 2018-03-14 RX ORDER — SODIUM CHLORIDE 9 MG/ML
INJECTION, SOLUTION INTRAVENOUS
Status: DISCONTINUED | OUTPATIENT
Start: 2018-03-14 | End: 2018-03-14 | Stop reason: HOSPADM

## 2018-03-14 RX ORDER — LIDOCAINE HYDROCHLORIDE 20 MG/ML
INJECTION, SOLUTION EPIDURAL; INFILTRATION; INTRACAUDAL; PERINEURAL AS NEEDED
Status: DISCONTINUED | OUTPATIENT
Start: 2018-03-14 | End: 2018-03-14 | Stop reason: HOSPADM

## 2018-03-14 RX ADMIN — Medication 10 ML: at 18:05

## 2018-03-14 RX ADMIN — HYDROMORPHONE HYDROCHLORIDE 0.2 MG: 2 INJECTION, SOLUTION INTRAMUSCULAR; INTRAVENOUS; SUBCUTANEOUS at 20:41

## 2018-03-14 RX ADMIN — ONDANSETRON 4 MG: 2 INJECTION INTRAMUSCULAR; INTRAVENOUS at 20:41

## 2018-03-14 RX ADMIN — HYDROCODONE BITARTRATE AND ACETAMINOPHEN 1 TABLET: 5; 325 TABLET ORAL at 00:30

## 2018-03-14 RX ADMIN — PROPOFOL 120 MG: 10 INJECTION, EMULSION INTRAVENOUS at 15:45

## 2018-03-14 RX ADMIN — DOCUSATE SODIUM 100 MG: 100 CAPSULE, LIQUID FILLED ORAL at 18:00

## 2018-03-14 RX ADMIN — AZITHROMYCIN MONOHYDRATE 500 MG: 500 INJECTION, POWDER, LYOPHILIZED, FOR SOLUTION INTRAVENOUS at 00:25

## 2018-03-14 RX ADMIN — LIDOCAINE HYDROCHLORIDE 50 MG: 20 INJECTION, SOLUTION EPIDURAL; INFILTRATION; INTRACAUDAL; PERINEURAL at 15:45

## 2018-03-14 RX ADMIN — HEPARIN SODIUM 5000 UNITS: 5000 INJECTION, SOLUTION INTRAVENOUS; SUBCUTANEOUS at 21:03

## 2018-03-14 RX ADMIN — FERROUS SULFATE TAB 325 MG (65 MG ELEMENTAL FE) 325 MG: 325 (65 FE) TAB at 08:09

## 2018-03-14 RX ADMIN — ONDANSETRON 4 MG: 2 INJECTION INTRAMUSCULAR; INTRAVENOUS at 16:24

## 2018-03-14 RX ADMIN — METOPROLOL TARTRATE 100 MG: 50 TABLET ORAL at 21:04

## 2018-03-14 RX ADMIN — ONDANSETRON 4 MG: 2 INJECTION INTRAMUSCULAR; INTRAVENOUS at 05:23

## 2018-03-14 RX ADMIN — DOCUSATE SODIUM 100 MG: 100 CAPSULE, LIQUID FILLED ORAL at 08:09

## 2018-03-14 RX ADMIN — SUCCINYLCHOLINE CHLORIDE 80 MG: 20 INJECTION INTRAMUSCULAR; INTRAVENOUS at 15:45

## 2018-03-14 RX ADMIN — SODIUM CHLORIDE 3.38 G: 900 INJECTION, SOLUTION INTRAVENOUS at 21:04

## 2018-03-14 RX ADMIN — ROCURONIUM BROMIDE 5 MG: 10 INJECTION, SOLUTION INTRAVENOUS at 15:45

## 2018-03-14 RX ADMIN — HYDROMORPHONE HYDROCHLORIDE 0.2 MG: 2 INJECTION, SOLUTION INTRAMUSCULAR; INTRAVENOUS; SUBCUTANEOUS at 11:46

## 2018-03-14 RX ADMIN — SODIUM CHLORIDE: 9 INJECTION, SOLUTION INTRAVENOUS at 15:28

## 2018-03-14 RX ADMIN — SODIUM CHLORIDE 3.38 G: 900 INJECTION, SOLUTION INTRAVENOUS at 09:02

## 2018-03-14 RX ADMIN — HYDROMORPHONE HYDROCHLORIDE 0.2 MG: 2 INJECTION, SOLUTION INTRAMUSCULAR; INTRAVENOUS; SUBCUTANEOUS at 05:22

## 2018-03-14 RX ADMIN — AMLODIPINE BESYLATE 10 MG: 5 TABLET ORAL at 08:09

## 2018-03-14 RX ADMIN — Medication 10 ML: at 21:05

## 2018-03-14 RX ADMIN — LEVOTHYROXINE SODIUM 100 MCG: 100 TABLET ORAL at 08:09

## 2018-03-14 RX ADMIN — FENTANYL CITRATE 150 MCG: 50 INJECTION, SOLUTION INTRAMUSCULAR; INTRAVENOUS at 15:45

## 2018-03-14 RX ADMIN — Medication 10 ML: at 05:29

## 2018-03-14 RX ADMIN — HYDROMORPHONE HYDROCHLORIDE 0.25 MG: 1 INJECTION, SOLUTION INTRAMUSCULAR; INTRAVENOUS; SUBCUTANEOUS at 17:11

## 2018-03-14 RX ADMIN — HYDROMORPHONE HYDROCHLORIDE 0.25 MG: 1 INJECTION, SOLUTION INTRAMUSCULAR; INTRAVENOUS; SUBCUTANEOUS at 17:23

## 2018-03-14 NOTE — BRIEF OP NOTE
BRIEF OPERATIVE NOTE    Date of Procedure: 3/14/2018   Preoperative Diagnosis: Acute Appendicitis  Postoperative Diagnosis: Acute Appendicitis    Procedure(s):  LAPAROSCOPIC APPENDECTOMY   Surgeon(s) and Role:     * Katelyn Moise MD - Primary         Assistant Staff: None      Surgical Staff:  Circ-1: Chirag Johns RN  Scrub Tech-1: Phill Valdivia  Surg Asst-1: Jr Sanches  Event Time In   Incision Start 1557   Incision Close      Anesthesia: General   Estimated Blood Loss: 5 cc  Specimens:   ID Type Source Tests Collected by Time Destination   1 : Appendix Preservative Appendix  Katelyn Moise MD 3/14/2018 1605 Pathology      Findings: Dilated non-perforated appendix   Complications: None  Implants: * No implants in log *

## 2018-03-14 NOTE — PROGRESS NOTES
Leo [facility]  [address] [facility], [address] [place] [address]  [phone]      Medical Progress Note      NAME: Arturo Domínguez   :  1955  MRM:  [id number]    Date/Time: 3/14/2018          Subjective:     Chief Complaint:  F/u abdominal pain    No acute events. Reporting cnotinued n/v. Having RLQ pain. Objective:       Vitals:          Last 24hrs VS reviewed since prior progress note. Most recent are:    Visit Vitals    /68    Pulse 85    Temp 98.1 °F (36.7 °C) (Oral)    Resp 16    Ht 5' 3\" (1.6 m)    Wt 64.5 kg (142 lb 3.2 oz)    SpO2 94%    BMI 25.19 kg/m2     SpO2 Readings from Last 6 Encounters:   18 94%   17 100%   17 97%   14 98%   14 99%   11 94%            Intake/Output Summary (Last 24 hours) at 18 1254  Last data filed at 18 1210   Gross per 24 hour   Intake           1761.3 ml   Output             1000 ml   Net            761.3 ml          Exam:     Physical Exam:    Gen: chronically ill-appearing, mild distress  HEENT:  Pink conjunctivae,  hearing intact to voice, moist mucous membranes  Neck:  Supple, without masses  Resp:  No accessory muscle use, few basilar rales  Card:  No murmurs, normal S1, S2 without thrills, 1+ edema  Abd:  Soft, mild TTP RLQ. No rebound. Non-distended, normoactive bowel sounds are present  Musc:  No cyanosis or clubbing  Skin:  No rashes  Neuro:  Cranial nerves 3-12 are grossly intact, follows commands appropriately  Psych:  Alert with fair insight.   Oriented to person, place, and time        Medications Reviewed: (see below)    Lab Data Reviewed: (see below)    ______________________________________________________________________    Medications:     Current Facility-Administered Medications   Medication Dose Route Frequency    piperacillin-tazobactam (ZOSYN) 3.375 g in 0.9% sodium chloride (MBP/ADV) 100 mL ADV  3.375 g IntraVENous Q12H    heparin (porcine) 1,000 unit/mL injection 2,500 Units  2,500 Units Hemodialysis DIALYSIS PRN    amLODIPine (NORVASC) tablet 10 mg  10 mg Oral DAILY    ferrous sulfate tablet 325 mg  325 mg Oral DAILY    levothyroxine (SYNTHROID) tablet 100 mcg  100 mcg Oral ACB    metoprolol tartrate (LOPRESSOR) tablet 100 mg  100 mg Oral QHS    sodium chloride (NS) flush 5-10 mL  5-10 mL IntraVENous Q8H    sodium chloride (NS) flush 5-10 mL  5-10 mL IntraVENous PRN    acetaminophen (TYLENOL) tablet 650 mg  650 mg Oral Q4H PRN    HYDROcodone-acetaminophen (NORCO) 5-325 mg per tablet 1 Tab  1 Tab Oral Q4H PRN    HYDROmorphone (PF) (DILAUDID) injection 0.2 mg  0.2 mg IntraVENous Q4H PRN    naloxone (NARCAN) injection 0.4 mg  0.4 mg IntraVENous PRN    diphenhydrAMINE (BENADRYL) injection 12.5 mg  12.5 mg IntraVENous Q4H PRN    ondansetron (ZOFRAN) injection 4 mg  4 mg IntraVENous Q6H PRN    docusate sodium (COLACE) capsule 100 mg  100 mg Oral BID    insulin lispro (HUMALOG) injection   SubCUTAneous AC&HS    glucose chewable tablet 16 g  4 Tab Oral PRN    dextrose (D50W) injection syrg 12.5-25 g  12.5-25 g IntraVENous PRN    glucagon (GLUCAGEN) injection 1 mg  1 mg IntraMUSCular PRN    0.9% sodium chloride infusion 250 mL  250 mL IntraVENous PRN    prochlorperazine (COMPAZINE) injection 5 mg  5 mg IntraVENous Q6H PRN    0.9% sodium chloride infusion  100 mL/hr IntraVENous CONTINUOUS    lidocaine (XYLOCAINE) 10 mg/mL (1 %) injection 10-30 mL  10-30 mL SubCUTAneous Multiple    heparin (porcine) 1,000 unit/mL injection 2,800 Units  2,800 Units IntraVENous Multiple    albuterol-ipratropium (DUO-NEB) 2.5 MG-0.5 MG/3 ML  3 mL Nebulization Q4H PRN    azithromycin (ZITHROMAX) 500 mg in 0.9% sodium chloride 250 mL IVPB  500 mg IntraVENous Q24H            Lab Review:     Recent Labs      03/14/18   0103  03/13/18 2011 03/13/18   0410   WBC  5.5  6.5  5.7   HGB  8.0*  8.0*  6.7*   HCT  23.9*  23.4*  20.9*   PLT  200  206  178     Recent Labs 03/14/18   0103  03/13/18   0955  03/13/18   0410  03/12/18   2104   NA  135*  128*  125*  124*   K  4.0  4.3  4.9  5.0   CL  98  93*  93*  89*   CO2  22  17*  16*  15*   GLU  119*  183*  207*  183*   BUN  53*  97*  94*  100*   CREA  5.96*  9.14*  9.15*  9.84*   CA  6.9*  6.4*  6.4*  5.8*  6.2*   MG  1.7   --   1.7   --    PHOS  4.2   --   6.0*   --    ALB  2.4*   --   2.3*  2.8*   SGOT  34   --   27  36   ALT  52   --   60  76     No components found for: GLPOC  No results for input(s): PH, PCO2, PO2, HCO3, FIO2 in the last 72 hours. No results for input(s): INR in the last 72 hours. No lab exists for component: Bryna Canavan  Lab Results   Component Value Date/Time    Specimen Description: URINE 09/14/2010 02:20 PM     Lab Results   Component Value Date/Time    Culture result: PENDING 03/13/2018 04:33 AM    Culture result: NO GROWTH 2 DAYS 03/12/2018 10:37 PM    Culture result: NO GROWTH 2 DAYS 03/12/2018 10:37 PM            Assessment / Plan:      Nausea/vomiting/diarrhea: likely multifactorial, uremia, pneumonia, possible appendicitis. Discussed with general surgery, pt now having pain will plan for OR this afternoon. cont Zosyn. Continue azithromycin for atypical coverage     Bilateral Pneumonia: seen on CT. Empiric abx as above. Follow micro      CKD 5: progressing to ESRD. Nephrology to start HD today       Metabolic acidosis: due to ESRD. Off bicarb gtt per nephrology, HD to start. HD line placed      Hyponatremia: due to hypovolemia, renal failure. Improving. Follow Na closely       Anemia: due to ESRD.        Hypocalcemia: low even after correction. Repleted. Follow Ca closely     HTN: cont metoprolol and amlodipine      DM type 2 w/ renal complications: on 58/18 which is currently on hold due to NPO status. A1c not helpful in setting of severe anemia.  Continue SSI       Total time spent with patient: 35 minutes                  Care Plan discussed with: Patient, Nursing Staff and >50% of time spent in counseling and coordination of care    Discussed:  Care Plan and D/C Planning    Prophylaxis:  Hep SQ      Disposition:  Home w/Family           ___________________________________________________    Attending Physician: Kade Saini MD

## 2018-03-14 NOTE — ANESTHESIA POSTPROCEDURE EVALUATION
Post-Anesthesia Evaluation and Assessment    Patient: Grupo Meza MRN: [id number]  SSN: [id number]    YOB: 1955  Age: 59 y.o. Sex: female       Cardiovascular Function/Vital Signs  Visit Vitals    /63    Pulse 86    Temp 36.8 °C (98.2 °F)    Resp 10    Ht 5' 3\" (1.6 m)    Wt 64.5 kg (142 lb 3.2 oz)    SpO2 95%    BMI 25.19 kg/m2       Patient is status post general anesthesia for Procedure(s):  APPENDECTOMY LAPAROSCOPIC. Nausea/Vomiting: None    Postoperative hydration reviewed and adequate. Pain:  Pain Scale 1: Numeric (0 - 10) (03/14/18 1724)  Pain Intensity 1: 5 (03/14/18 1724)   Managed    Neurological Status:   Neuro (WDL): Within Defined Limits (03/14/18 1712)  Neuro  LUE Motor Response: Purposeful (03/14/18 1712)  LLE Motor Response: Purposeful (03/14/18 1712)  RUE Motor Response: Purposeful (03/14/18 1712)  RLE Motor Response: Purposeful (03/14/18 1712)   At baseline    Mental Status and Level of Consciousness: Arousable    Pulmonary Status:   O2 Device: Nasal cannula (03/14/18 1712)   Adequate oxygenation and airway patent    Complications related to anesthesia: None    Post-anesthesia assessment completed.  No concerns    Signed By: Devante Zapien MD     March 14, 2018

## 2018-03-14 NOTE — OP NOTES
Date: 03/14/18     Pre-operative Diagnosis: Acute appendicitis      Post-operative Diagnosis: Acute appendicitis      Procedure: Laparoscopic Appendectomy      Surgeon: Ovidio Zepeda MD     Surgical Assistant: Nedra Ruff. Cristy     Anesthesia: General     Estimated Blood Loss: 5 cc     Findings:Dilated non-perforated appendix. Specimen: Appendix     Indication: 59year-old female with abdominal pain, nausea, emesis, ESRD work-up demonstrated bilateral pneumonia and an enlarged appendix on CT abdomen/pelvis concerning for acute appendicitis. She was admitted for observation and antibiotic therapy, abdominal pain did not improve so appendectomy was recommended.      Procedure: After consent was obtained, the patient was taken back to the operating room and placed in a supine position. After induction of general anesthesia and endotracheal intubation, the abdomen was prepped and draped in normal sterile fashion and left arm tucked. The patient is being treated for appendicitis, and appropriate antibiotic therapy was verified in the EMR as given with the team during pre-incision time out. Pre-incision subcutaneous local analgesia was instilled in the pre-planned port site tissues and the first incision was made a minute later. Access to the abdomen was obtained via an direct cutdown onto the fascia through an infraumbilical incision. A 5-12 mm blunt tip balloon port was placed and pneumoperitoneum was established a 15mmHg. Inspection of the underlying bowel showed no injury. Next a suprapubic 5mm port and left lower quadrant 5 mm port was placed under direct vision.        The non-perforated gangrenous appendix was identified in the right lower quadrant in plain view at the confluence of the taenia. The junction of the terminal ileum to the cecum and the ascending colon was also identified for further confirmation. There was no peritoneal fluid in the right lower quadrant.  The meso-appendix was grasped and lifted upwards to clearly identify the base and the junction with the cecum. The meso appendix was divided using one 45mm tan load stapler. The base of the appendix was then transected using a 60 mm purple load stapler. The appendix was then retrieved through the infra-umbilical port using an endo catch retrieval bag. Reexamination of the right lower quadrant revealed no bleeding. Exploration of all four abdominal quadrants revealed unremarkable anatomy. Hemostasis was satisfactory. Instrument, sponge and needle counts were correct. The ports were removed under direct vision confirming no rectus bleeding, peritoneal bleeding or injury to intraabdominal structures. The pneumoperitoneum was maximally evacuated. The 37BC infra-umbilical port site fascial defect was closed with an 0-PDS figure of eight stitch. All skin was closed with subcuticular 4-0 Monocryl, Dermabond was applied.     The patient tolerated procedure well and returned to the post-anesthesia recovery room in stable condition.  I discussed the findings with the patient's daughter over the phone.  Christy Frederick MD

## 2018-03-14 NOTE — PROGRESS NOTES
Bedside shift change report given to Jaylyn Mondragon (oncoming nurse) by Trav Quiñonez (offgoing nurse). Report included the following information SBAR, Kardex and Recent Results.

## 2018-03-14 NOTE — PROGRESS NOTES
Served as  for [employer] during explanation on plan of care for the day to daughter and  at bedside. Patient was undergoing dialysis and appeared to be sleeping. Both daughter and  had the opportunity to ask questions and expressed understanding.

## 2018-03-14 NOTE — PROGRESS NOTES
Rounded on [facility] patients and provided Anointing of the Sick at request of family.     Jhonathan Boston

## 2018-03-14 NOTE — ROUTINE PROCESS
2044  Primary Nurse Lupe Hampton, ANSHU and Abhijit Victor RN performed a dual skin assessment on this patient Impairment noted- see wound doc flow sheet  Javier score is 23. Patient is missing all her toes on left foot. Has scar to left thigh that looks like from a skin graph.    0700  Bedside and Verbal shift change report given to Ashley Freeman (oncoming nurse) by Antoinette Tejeda (offgoing nurse). Report included the following information SBAR, Kardex, ED Summary, Procedure Summary, Intake/Output, MAR, Recent Results and Cardiac Rhythm NSR.

## 2018-03-14 NOTE — PROGRESS NOTES
3/14/2018  10:20 AM  Case Management Note  Met with daughter and . Attempted to get address of patient: [place] [address]  Patient has flight back to [place] on April 29. Her  is in room and will fly back with her. Spoke with Dr. Estrella Gather he said if we got it worked out they could go sooner. He will know more for sure about continued dialysis on Monday.   Will continue to follow  Jessie Maddox, [address]

## 2018-03-14 NOTE — DISCHARGE INSTRUCTIONS
PATIENT INSTRUCTIONS  APPENDIX SURGERY  (Appendectomy)     FOLLOW-UP: Please make an appointment with your physician in 2 week(s). Call your physician immediately if you have any fevers greater than 102.5, drainage from your wound that is not clear or looks infected, persistent bleeding, increasing abdominal pain, problems urinating, or persistent nausea/vomiting. You should be aware that you may have shoulder pain after surgery and that this will progressively go away. This is called 'referred pain' and is caused by gas that may be trapped under the diaphragm from the surgery, especially if it was performed laparoscopically through mini-incisions. This gas will progressively get reabsorbed by your body.      WOUND CARE INSTRUCTIONS:  Dermabond liquid skin bandage applied. You may shower. Do not scrape off. Once the wound has quit draining you may leave it open to air. If clothing rubs against the wound or causes irritation and the wound is not draining you may cover it with a dry dressing during the daytime. Try to keep the wound dry and avoid ointments on the wound unless directed to do so. If the wound becomes bright red and painful or starts to drain infected material that is not clear, please contact your physician immediately. You should also call if you begin to drain fluid that is thin and greenish-brown from the wound and appears to look like bile. If the wound though is mildly pink and has a thick firm ridge underneath it, this is normal, and is referred to as a healing ridge. This will resolve over the next 4-6 weeks.     DIET: You may eat any foods that you can tolerate. It is a good idea to eat a high fiber diet and take in plenty of fluids to prevent constipation. If you do become constipated you may want to take a mild laxative or take ducolax tablets on a daily basis until your bowel habits are regular.  Constipation can be very uncomfortable, along with straining, after recent abdominal surgery.     ACTIVITY: You are encouraged to cough and deep breath or use your incentive spirometer if you were given one, every 15-30 minutes when awake. This will help prevent respiratory complications and low grade fevers post-operatively. You may want to hug a pillow when coughing and sneezing to add additional support to the surgical area(s) which will decrease pain during these times. You are encouraged to walk and engage in light activity for the next two weeks. You should not lift more than 20 pounds during this time frame as it could put you at increased risk for a post-operative hernia. Twenty pounds is roughly equivalent to a plastic bag of groceries.      MEDICATIONS: Try to take narcotic medications and anti-inflammatory medications, such as tylenol, ibuprofen, naprosyn, etc., with food. This will minimize stomach upset from the medication. Should you develop nausea and vomiting from the pain medication, or develop a rash, please discontinue the medication and contact your physician. You should not drive, make important decisions, or operate machinery when taking narcotic pain medication.     QUESTIONS: Please feel free to call your physician or the hospital  if you have any questions, and they will be glad to assist you. Retention Education Activation    Thank you for enrolling in [facility]. Please follow the instructions below to securely access your online medical record. [employer] allows you to send messages to your doctor, view your test results, renew your prescriptions, schedule appointments, and more. How Do I Sign Up? 1. In your internet browser, go to [email or web address]. 2. Click on the First Time User? Click Here link in the Sign In box. You will see the New Member Sign Up page. 3. Enter your [employer] Access Code exactly as it appears below. You will not need to use this code after youve completed the sign-up process.  If you do not sign up before the expiration date, you must request a new code. Flypaper Access Code: [email or web address]  Expires: 6/12/2018  7:46 AM     4. Enter the last four digits of your Social Security Number (xxxx) and Date of Birth (mm/dd/yyyy) as indicated and click Submit. You will be taken to the next sign-up page. 5. Create a Flypaper ID. This will be your Flypaper login ID and cannot be changed, so think of one that is secure and easy to remember. 6. Create a Flypaper password. You can change your password at any time. 7. Enter your Password Reset Question and Answer. This can be used at a later time if you forget your password. 8. Enter your e-mail address. You will receive e-mail notification when new information is available in [address] 9. Click Sign Up. You can now view your medical record. Additional Information    Remember, Flypaper is NOT to be used for urgent needs. For medical emergencies, dial 911. Now available from your iPhone and Android!

## 2018-03-14 NOTE — PROGRESS NOTES
Occupational Therapy Note:  Orders acknowledged and chart reviewed. Patient is currently off the floor. Will continue to follow.   Melita Mccarthy OTR/L

## 2018-03-14 NOTE — PROGRESS NOTES
Orders acknowledged and chart reviewed. Patient is currently off the floor. Will continue to follow.   Physical Therapy  Sameer Ortega DPT

## 2018-03-14 NOTE — PROGRESS NOTES
[facility]  YOB: 1955          Assessment & Plan:   1. 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 s were answered: pt would like to go back to [place] and her sister is on ? PD  · IR to put temp line  · QM to look for out pt HD  · Hep serologies  2. Hyponatremia  · Better, she had HD against low Na bath  3. Hypocalcemia  · Most likely due to CKD  · Check Vit D  · better  4. Hyperphosphatemia  · Due to CKD  · Start binders    5. Anemia  · IV Iron, iron def  6. DM  7. PNA  8.  HTN  · NOT THE BEST  · Amlodipine, Metoprolol  · Eval post HD  · Add Clonidine  · Avoid RAAS inhibitors till Monday: start if this is chronic ESRD       Subjective:   CC:ESRD  HPI: Patient seen   HD went well  Slightly better  Family at beside  HTN remains up  Hypocalcemia better  Acidosis better  ROS:neg for 12 sys  Current Facility-Administered Medications   Medication Dose Route Frequency    piperacillin-tazobactam (ZOSYN) 3.375 g in 0.9% sodium chloride (MBP/ADV) 100 mL ADV  3.375 g IntraVENous Q12H    amLODIPine (NORVASC) tablet 10 mg  10 mg Oral DAILY    ferrous sulfate tablet 325 mg  325 mg Oral DAILY    levothyroxine (SYNTHROID) tablet 100 mcg  100 mcg Oral ACB    metoprolol tartrate (LOPRESSOR) tablet 100 mg  100 mg Oral QHS    sodium chloride (NS) flush 5-10 mL  5-10 mL IntraVENous Q8H    sodium chloride (NS) flush 5-10 mL  5-10 mL IntraVENous PRN    acetaminophen (TYLENOL) tablet 650 mg  650 mg Oral Q4H PRN    HYDROcodone-acetaminophen (NORCO) 5-325 mg per tablet 1 Tab  1 Tab Oral Q4H PRN    HYDROmorphone (PF) (DILAUDID) injection 0.2 mg  0.2 mg IntraVENous Q4H PRN    naloxone (NARCAN) injection 0.4 mg  0.4 mg IntraVENous PRN    diphenhydrAMINE (BENADRYL) injection 12.5 mg  12.5 mg IntraVENous Q4H PRN    ondansetron (ZOFRAN) injection 4 mg  4 mg IntraVENous Q6H PRN    docusate sodium (COLACE) capsule 100 mg  100 mg Oral BID    insulin lispro (HUMALOG) injection   SubCUTAneous AC&HS    glucose chewable tablet 16 g  4 Tab Oral PRN    dextrose (D50W) injection syrg 12.5-25 g  12.5-25 g IntraVENous PRN    glucagon (GLUCAGEN) injection 1 mg  1 mg IntraMUSCular PRN    0.9% sodium chloride infusion 250 mL  250 mL IntraVENous PRN    prochlorperazine (COMPAZINE) injection 5 mg  5 mg IntraVENous Q6H PRN    0.9% sodium chloride infusion  100 mL/hr IntraVENous CONTINUOUS    lidocaine (XYLOCAINE) 10 mg/mL (1 %) injection 10-30 mL  10-30 mL SubCUTAneous Multiple    heparin (porcine) 1,000 unit/mL injection 2,800 Units  2,800 Units IntraVENous Multiple    albuterol-ipratropium (DUO-NEB) 2.5 MG-0.5 MG/3 ML  3 mL Nebulization Q4H PRN    azithromycin (ZITHROMAX) 500 mg in 0.9% sodium chloride 250 mL IVPB  500 mg IntraVENous Q24H          Objective:     Vitals:  Blood pressure 178/71, pulse 70, temperature 98.1 °F (36.7 °C), resp. rate 11, height 5' 3\" (1.6 m), weight 64.5 kg (142 lb 3.2 oz), SpO2 93 %. Temp (24hrs), Av.5 °F (36.9 °C), Min:98.1 °F (36.7 °C), Max:99.1 °F (37.3 °C)      Intake and Output:      1901 -  0700  In: 2557.1 [P.O.:100; I.V.:2170.8]  Out: 1000 [Urine:1000]    Physical Exam:                Patient is intubated:  no    Physical Examination:   GENERAL ASSESSMENT: NAD  HEENT:Nontraumatic   CHEST: CTA  HEART: S1S2  ABDOMEN: Soft,NT,  :Valentin: n  EXTREMITY: EDEMA n  NEURO:Grossly non focal          ECG/rhythm:    Data Review      No results for input(s): TNIPOC in the last 72 hours. No lab exists for component: ITNL   No results for input(s): CPK, CKMB, TROIQ in the last 72 hours.   Recent Labs      18   0103  18   0955  18   0410  18   2104   NA  135*   --   128*  125*  124*   K  4.0 --   4.3  4.9  5.0   CL  98   --   93*  93*  89*   CO2  22   --   17*  16*  15*   BUN  53*   --   97*  94*  100*   CREA  5.96*   --   9.14*  9.15*  9.84*   GLU  119*   --   183*  207*  183*   PHOS  4.2   --    --   6.0*   --    MG  1.7   --    --   1.7   --    CA  6.9*   --   6.4*  6.4*  5.8*  6.2*   ALB  2.4*   --    --   2.3*  2.8*   WBC  5.5  6.5   --   5.7  6.4   HGB  8.0*  8.0*   --   6.7*  7.4*   HCT  23.9*  23.4*   --   20.9*  23.6*   PLT  200  206   --   178  222      No results for input(s): INR, PTP, APTT in the last 72 hours. No lab exists for component: INREXT  Needs: urine analysis, urine sodium, protein and creatinine  Lab Results   Component Value Date/Time    Sodium urine, random 38 11/22/2017 01:54 PM    Creatinine, urine 61.11 11/22/2017 08:08 PM         Discussed with:  Family    : Ankita Morales MD  3/14/2018        [facility]:  [email or web address]  [facility] office:  [address] [place] [address],[address]  [address]  Phone: [phone]  Fax :     [phone]    [place] office:  [address]  Phone - [phone]  Fax - [phone]

## 2018-03-14 NOTE — PERIOP NOTES
TRANSFER - OUT REPORT:    Verbal report given to Xenia on Gerard Haddad  being transferred to [facility] for routine post - op       Report consisted of patients Situation, Background, Assessment and   Recommendations(SBAR). Information from the following report(s) SBAR was reviewed with the receiving nurse. Opportunity for questions and clarification was provided.       Patient transported with:   O2 @ 2 liters

## 2018-03-14 NOTE — PERIOP NOTES
Preop information and consents obtained using  Gabriela Marshall. Dr Esteban Jackson obtained consent using [employer] interpretor #[id number].

## 2018-03-14 NOTE — ANESTHESIA PREPROCEDURE EVALUATION
Anesthetic History   No history of anesthetic complications            Review of Systems / Medical History  Patient summary reviewed and pertinent labs reviewed    Pulmonary        Sleep apnea: No treatment    Asthma     Comments: Bilateral PNA's   Neuro/Psych   Within defined limits           Cardiovascular    Hypertension          Hyperlipidemia    Exercise tolerance: >4 METS     GI/Hepatic/Renal     GERD    Renal disease (started on dialysis 3/13): ESRD and dialysis      Comments: +N/V Endo/Other    Diabetes: well controlled, type 2, using insulin  Hypothyroidism: well controlled  Arthritis and anemia (s/p transfusion)     Other Findings            Physical Exam    Airway  Mallampati: III  TM Distance: 4 - 6 cm  Neck ROM: normal range of motion   Mouth opening: Normal     Cardiovascular    Rhythm: regular  Rate: normal         Dental    Dentition: Upper dentition intact and Lower dentition intact     Pulmonary  Breath sounds clear to auscultation               Abdominal         Other Findings            Anesthetic Plan    ASA: 4  Anesthesia type: general          Induction: RSI and Intravenous  Anesthetic plan and risks discussed with: Patient       used for consent and PMH.

## 2018-03-14 NOTE — DIALYSIS
[facility]  [phone]    Vitals   Pre   Post   Assessment   Pre   Post     Temp  Temp: 98.2 °F (36.8 °C) (03/14/18 0940)  98.3   LOC  A&Ox3 A&Ox3   HR   Pulse (Heart Rate): 73 (03/14/18 0940) 81 Lungs   Clear, diminished, room air  Clear, diminished, room air   B/P   BP: 179/74 (03/14/18 0940) 161/68 Cardiac   Regular, 70's  Regular, 70's   Resp   Resp Rate: 16 (03/14/18 0940) 16 Skin   Warm/dry  war/dry   Pain level  Pain Intensity 1: 0 (03/14/18 0700) 0   Edema  generalized     generalized   Orders:    Duration:   Start:   Procedure Start Time: 0940 End:   Procedure End Time: 1115 Total:   2.5 hours   Dialyzer:   Dialyzer/Set Up Inspection: Paco Valverde (03/14/18 0940)   K Bath:   Dialysate K (mEq/L): 3 (03/14/18 0940)   Ca Bath:   Dialysate CA (mEq/L): 2.5 (03/14/18 0940)   Na/Bicarb:   Dialysate NA (mEq/L): 140 (03/14/18 0940)   Target Fluid Removal:   Goal/Amount of Fluid to Remove (mL): 0 mL (03/14/18 0940)   Access     Type & Location:   RIJ nontunneled catheter, biopatch in place dated 3/13/18, clean, dry, intact. Both ports aspirate and flush without difficulty.    Labs     Obtained/Reviewed   Critical Results Called   Date when labs were drawn-  Hgb-    HGB   Date Value Ref Range Status   03/14/2018 8.0 (L) 11.5 - 16.0 g/dL Final     K-    Potassium   Date Value Ref Range Status   03/14/2018 4.0 3.5 - 5.1 mmol/L Final     Ca-   Calcium   Date Value Ref Range Status   03/14/2018 6.9 (L) 8.5 - 10.1 MG/DL Final     Bun-   BUN   Date Value Ref Range Status   03/14/2018 53 (H) 6 - 20 MG/DL Final     Comment:     INVESTIGATED PER DELTA CHECK PROTOCOL     Creat-   Creatinine   Date Value Ref Range Status   03/14/2018 5.96 (H) 0.55 - 1.02 MG/DL Final     Comment:     INVESTIGATED PER DELTA CHECK PROTOCOL        Medications/ Blood Products Given     Name   Dose   Route and Time                     Blood Volume Processed (BVP):    35.4 liters Net Fluid   Removed:  None   Comments Time Out Done: 4909: Orders, consent, patient, and code status reviewed  Primary Nurse Rpt Pre: Wilmer Cerrato RN  Primary Nurse Rpt Post: Wilmer Cerrato RN  Pt Education: Fluid management, treatment schedule, procedural  Care Plan: No discharge plan at this time. Tx Summary: Tolerated 2.5 hours of HD (2nd treatment) without difficulty. All possible blood returned with NS. Both ports packed with heparin after HD and capped. Report given to RN at bedside. Admiting Diagnosis:  Pt's previous clinic-Acute  Consent signed - Informed Consent Verified: Yes (03/14/18 0940)  Amaita Consent - Verified  Hepatitis Status- unknown, pending  Machine #- Machine Number: [id number]/ [id number] (03/14/18 0940)  Telemetry status-Present  Pre-dialysis wt. -

## 2018-03-15 LAB
ANION GAP SERPL CALC-SCNC: 14 MMOL/L (ref 5–15)
BACTERIA SPEC CULT: NORMAL
BUN SERPL-MCNC: 27 MG/DL (ref 6–20)
BUN/CREAT SERPL: 7 (ref 12–20)
CALCIUM SERPL-MCNC: 7.5 MG/DL (ref 8.5–10.1)
CHLORIDE SERPL-SCNC: 101 MMOL/L (ref 97–108)
CO2 SERPL-SCNC: 25 MMOL/L (ref 21–32)
CREAT SERPL-MCNC: 4.04 MG/DL (ref 0.55–1.02)
ERYTHROCYTE [DISTWIDTH] IN BLOOD BY AUTOMATED COUNT: 17.6 % (ref 11.5–14.5)
GLUCOSE BLD STRIP.AUTO-MCNC: 102 MG/DL (ref 65–100)
GLUCOSE BLD STRIP.AUTO-MCNC: 112 MG/DL (ref 65–100)
GLUCOSE BLD STRIP.AUTO-MCNC: 124 MG/DL (ref 65–100)
GLUCOSE BLD STRIP.AUTO-MCNC: 143 MG/DL (ref 65–100)
GLUCOSE SERPL-MCNC: 108 MG/DL (ref 65–100)
GRAM STN SPEC: NORMAL
HCT VFR BLD AUTO: 26.2 % (ref 35–47)
HGB BLD-MCNC: 8.4 G/DL (ref 11.5–16)
MCH RBC QN AUTO: 25.5 PG (ref 26–34)
MCHC RBC AUTO-ENTMCNC: 32.1 G/DL (ref 30–36.5)
MCV RBC AUTO: 79.4 FL (ref 80–99)
NRBC # BLD: 0.02 K/UL (ref 0–0.01)
NRBC BLD-RTO: 0.3 PER 100 WBC
PLATELET # BLD AUTO: 214 K/UL (ref 150–400)
PMV BLD AUTO: 8.7 FL (ref 8.9–12.9)
POTASSIUM SERPL-SCNC: 4.2 MMOL/L (ref 3.5–5.1)
RBC # BLD AUTO: 3.3 M/UL (ref 3.8–5.2)
SERVICE CMNT-IMP: ABNORMAL
SERVICE CMNT-IMP: NORMAL
SODIUM SERPL-SCNC: 140 MMOL/L (ref 136–145)
WBC # BLD AUTO: 5.8 K/UL (ref 3.6–11)

## 2018-03-15 PROCEDURE — 65660000000 HC RM CCU STEPDOWN

## 2018-03-15 PROCEDURE — 77030027138 HC INCENT SPIROMETER -A

## 2018-03-15 PROCEDURE — 36415 COLL VENOUS BLD VENIPUNCTURE: CPT | Performed by: SURGERY

## 2018-03-15 PROCEDURE — 77010033678 HC OXYGEN DAILY

## 2018-03-15 PROCEDURE — 74011000258 HC RX REV CODE- 258: Performed by: INTERNAL MEDICINE

## 2018-03-15 PROCEDURE — 74011250637 HC RX REV CODE- 250/637: Performed by: INTERNAL MEDICINE

## 2018-03-15 PROCEDURE — 97535 SELF CARE MNGMENT TRAINING: CPT

## 2018-03-15 PROCEDURE — 97165 OT EVAL LOW COMPLEX 30 MIN: CPT

## 2018-03-15 PROCEDURE — 74011250636 HC RX REV CODE- 250/636: Performed by: INTERNAL MEDICINE

## 2018-03-15 PROCEDURE — 85027 COMPLETE CBC AUTOMATED: CPT | Performed by: SURGERY

## 2018-03-15 PROCEDURE — 97161 PT EVAL LOW COMPLEX 20 MIN: CPT

## 2018-03-15 PROCEDURE — 97116 GAIT TRAINING THERAPY: CPT

## 2018-03-15 PROCEDURE — 74011636637 HC RX REV CODE- 636/637: Performed by: INTERNAL MEDICINE

## 2018-03-15 PROCEDURE — 82962 GLUCOSE BLOOD TEST: CPT

## 2018-03-15 PROCEDURE — 80048 BASIC METABOLIC PNL TOTAL CA: CPT | Performed by: SURGERY

## 2018-03-15 RX ORDER — CALCITRIOL 0.25 UG/1
0.25 CAPSULE ORAL DAILY
Status: DISCONTINUED | OUTPATIENT
Start: 2018-03-15 | End: 2018-03-30 | Stop reason: HOSPADM

## 2018-03-15 RX ORDER — CALCIUM ACETATE 667 MG/1
1 CAPSULE ORAL
Status: DISCONTINUED | OUTPATIENT
Start: 2018-03-15 | End: 2018-03-30 | Stop reason: HOSPADM

## 2018-03-15 RX ADMIN — LEVOTHYROXINE SODIUM 100 MCG: 100 TABLET ORAL at 09:04

## 2018-03-15 RX ADMIN — HYDROMORPHONE HYDROCHLORIDE 0.2 MG: 2 INJECTION, SOLUTION INTRAMUSCULAR; INTRAVENOUS; SUBCUTANEOUS at 15:39

## 2018-03-15 RX ADMIN — ONDANSETRON 4 MG: 2 INJECTION INTRAMUSCULAR; INTRAVENOUS at 12:20

## 2018-03-15 RX ADMIN — CALCITRIOL 0.25 MCG: 0.25 CAPSULE, LIQUID FILLED ORAL at 12:05

## 2018-03-15 RX ADMIN — CALCIUM ACETATE 667 MG: 667 CAPSULE ORAL at 12:05

## 2018-03-15 RX ADMIN — HYDROMORPHONE HYDROCHLORIDE 0.2 MG: 2 INJECTION, SOLUTION INTRAMUSCULAR; INTRAVENOUS; SUBCUTANEOUS at 09:26

## 2018-03-15 RX ADMIN — HYDROMORPHONE HYDROCHLORIDE 0.2 MG: 2 INJECTION, SOLUTION INTRAMUSCULAR; INTRAVENOUS; SUBCUTANEOUS at 03:00

## 2018-03-15 RX ADMIN — HEPARIN SODIUM 5000 UNITS: 5000 INJECTION, SOLUTION INTRAVENOUS; SUBCUTANEOUS at 21:10

## 2018-03-15 RX ADMIN — ONDANSETRON 4 MG: 2 INJECTION INTRAMUSCULAR; INTRAVENOUS at 02:59

## 2018-03-15 RX ADMIN — SODIUM CHLORIDE 100 ML/HR: 900 INJECTION, SOLUTION INTRAVENOUS at 21:09

## 2018-03-15 RX ADMIN — Medication 10 ML: at 06:29

## 2018-03-15 RX ADMIN — Medication 10 ML: at 14:00

## 2018-03-15 RX ADMIN — SODIUM CHLORIDE 100 ML/HR: 900 INJECTION, SOLUTION INTRAVENOUS at 03:03

## 2018-03-15 RX ADMIN — METOPROLOL TARTRATE 100 MG: 50 TABLET ORAL at 21:09

## 2018-03-15 RX ADMIN — FERROUS SULFATE TAB 325 MG (65 MG ELEMENTAL FE) 325 MG: 325 (65 FE) TAB at 09:04

## 2018-03-15 RX ADMIN — AZITHROMYCIN MONOHYDRATE 500 MG: 500 INJECTION, POWDER, LYOPHILIZED, FOR SOLUTION INTRAVENOUS at 00:47

## 2018-03-15 RX ADMIN — SODIUM CHLORIDE 3.38 G: 900 INJECTION, SOLUTION INTRAVENOUS at 09:04

## 2018-03-15 RX ADMIN — Medication 10 ML: at 21:18

## 2018-03-15 RX ADMIN — HYDROCODONE BITARTRATE AND ACETAMINOPHEN 1 TABLET: 5; 325 TABLET ORAL at 18:35

## 2018-03-15 RX ADMIN — DOCUSATE SODIUM 100 MG: 100 CAPSULE, LIQUID FILLED ORAL at 09:04

## 2018-03-15 RX ADMIN — SODIUM CHLORIDE 3.38 G: 900 INJECTION, SOLUTION INTRAVENOUS at 21:11

## 2018-03-15 RX ADMIN — HEPARIN SODIUM 5000 UNITS: 5000 INJECTION, SOLUTION INTRAVENOUS; SUBCUTANEOUS at 09:04

## 2018-03-15 RX ADMIN — CALCIUM ACETATE 667 MG: 667 CAPSULE ORAL at 16:35

## 2018-03-15 RX ADMIN — INSULIN LISPRO 2 UNITS: 100 INJECTION, SOLUTION INTRAVENOUS; SUBCUTANEOUS at 12:05

## 2018-03-15 RX ADMIN — AMLODIPINE BESYLATE 10 MG: 5 TABLET ORAL at 09:04

## 2018-03-15 NOTE — PROGRESS NOTES
Problem: Self Care Deficits Care Plan (Adult)  Goal: *Acute Goals and Plan of Care (Insert Text)  Occupational Therapy Goals  Initiated 3/15/2018  1. Patient will perform grooming with modified independence standing at the sink for > 5 minutes within 7 day(s). 2.  Patient will perform lower body dressing and bathing with supervision/set-up within 7 day(s). 3.  Patient will perform toilet transfers with supervision/set-up within 7 day(s). 4.  Patient will perform all aspects of toileting with supervision/set-up within 7 day(s). 5.  Patient will participate in upper extremity therapeutic exercise/activities with modified independence for 10 minutes within 7 day(s). 6.  Patient will utilize energy conservation techniques during functional activities with verbal cues within 7 day(s). Occupational Therapy EVALUATION  Patient: Gerber Baron (89 y.o. female)  Date: 3/15/2018  Primary Diagnosis: Pneumonia  Appendicitis  Procedure(s) (LRB):  APPENDECTOMY LAPAROSCOPIC (N/A) 1 Day Post-Op   Precautions: fall       ASSESSMENT :  Based on the objective data described below, the patient presents with decreased activity tolerance following admission on 3/12 for appendicitis with c/o nausea and vomiting. Patient underwent lap appendectomy on 3/14. Patient is Uzbek speaking only, no family present, therefore language line used for translation. Patient lives with her daughter and states she was independent with all care PTA. 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 Patient required supervision for bed mobility and CGA to min A for OOB transfers using rolling walker. Patient is independent with UB ADLs and requires CGA to min A for LB ADLs.   Patient educated on adaptive ADL techniques, safe transfer techniques, and simple exercises to complete as often as tolerated. Patient SpO2 drops to 87% on room air in bed at rest, 2L O2 applied. With OOB activity, O2 weaned to room air trial, SpO2 remained >90%; O2 left off patient while up in the chair and RN made aware. Patient would benefit from continued skilled OT to progress towards goals and improve overall independence. Patient will benefit from skilled intervention to address the above impairments. Patients rehabilitation potential is considered to be Good  Factors which may influence rehabilitation potential include:   [x]             None noted  []             Mental ability/status  []             Medical condition  []             Home/family situation and support systems  []             Safety awareness  []             Pain tolerance/management  []             Other:      PLAN :  Recommendations and Planned Interventions:  [x]               Self Care Training                  [x]        Therapeutic Activities  [x]               Functional Mobility Training    []        Cognitive Retraining  [x]               Therapeutic Exercises           [x]        Endurance Activities  []               Balance Training                   []        Neuromuscular Re-Education  []               Visual/Perceptual Training     [x]   Home Safety Training  [x]               Patient Education                 [x]        Family Training/Education  []               Other (comment):    Frequency/Duration: Patient will be followed by occupational therapy 3 times a week to address goals. Discharge Recommendations: Home Health  Further Equipment Recommendations for Discharge: none at this time     SUBJECTIVE:   Patient stated Thank you.     OBJECTIVE DATA SUMMARY:   HISTORY:   Past Medical History:   Diagnosis Date    Arrhythmia     fast heart rate and palpitations dec 2010    Arthritis     generalized    Asthma     Chronic pain     headaches and stomach pain x several months    Diabetes ([address]     Diabetic foot ulcer associated with type 2 [facility] mellitus [facility] [address]     ESRD (end stage renal disease) [facility] [address]     GERD (gastroesophageal reflux disease)     Hypertension     Psychiatric disorder     depression     Past Surgical History:   Procedure Laterality Date    HX CATARACT REMOVAL      cataract removal and lens placement in right eye    HX GYN      LBTL    HX ORTHOPAEDIC      partial amputation of left foot       Prior Level of Function/Environment/Context: Patient lives with her daughter. She reports independence with ADLs and functional mobility PTA. She owns and RW and cane but does not use PTA. Home Situation  Home Environment: Private residence  One/Two Story Residence: One story  Living Alone: No  Support Systems: Child(tadeo) (lives with daughter)  Patient Expects to be Discharged to[de-identified] Private residence  Current DME Used/Available at Home[facility] [id number]  Tub or Shower Type: Shower  [x]  Right hand dominant   []  Left hand dominant    EXAMINATION OF PERFORMANCE DEFICITS:  Cognitive/Behavioral Status:  Neurologic State: Lethargic (improved with activity)  Orientation Level: Oriented X4  Cognition: Decreased attention/concentration (d/t lethargy)  Perception: Appears intact  Perseveration: No perseveration noted  Safety/Judgement: Awareness of environment    Skin: Intact in the uppers    Edema: None noted in the uppers    Hearing: Auditory  Auditory Impairment: None    Vision/Perceptual:    Tracking: Able to track stimulus in all quadrants w/o difficulty    Diplopia: No    Acuity: Within Defined Limits       Range of Motion:  WDL in the uppers    Strength:  Decreased but functional in the uppers     Coordination:  Fine Motor Skills-Upper: Left Intact; Right Intact    Gross Motor Skills-Upper: Left Intact; Right Intact    Tone & Sensation:  Tone: normal  Sensation: intact    Balance:  Sitting: Intact  Standing: Impaired  Standing - Static: Good  Standing - Dynamic : Fair    Functional Mobility and Transfers for ADLs:  Bed Mobility:  Rolling: Supervision; Additional time  Supine to Sit: Supervision; Additional time  Sit to Supine:  (pt remained up at end of tx)  Scooting: Supervision    Transfers:  Sit to Stand: Minimum assistance;Assist x1  Stand to Sit: Contact guard assistance;Assist x1  Bed to Chair: Contact guard assistance;Assist x1  Toilet Transfer : Minimum assistance;Assist x1    ADL Assessment:  Feeding: Independent    Oral Facial Hygiene/Grooming: Independent    Bathing: Minimum assistance    Upper Body Dressing: Independent    Lower Body Dressing: Minimum assistance    Toileting: Contact guard assistance    Cognitive Retraining  Safety/Judgement: Awareness of environment    Functional Measure:  Barthel Index:    Bathin  Bladder: 10  Bowels: 10  Groomin  Dressin  Feeding: 10  Mobility: 0  Stairs: 0  Toilet Use: 5  Transfer (Bed to Chair and Back): 10  Total: 55       Barthel and G-code impairment scale:  Percentage of impairment CH  0% CI  1-19% CJ  20-39% CK  40-59% CL  60-79% CM  80-99% CN  100%   Barthel Score 0-100 100 99-80 79-60 59-40 20-39 1-19   0   Barthel Score 0-20 20 17-19 13-16 9-12 5-8 1-4 0      The Barthel ADL Index: Guidelines  1. The index should be used as a record of what a patient does, not as a record of what a patient could do. 2. The main aim is to establish degree of independence from any help, physical or verbal, however minor and for whatever reason. 3. The need for supervision renders the patient not independent. 4. A patient's performance should be established using the best available evidence. Asking the patient, friends/relatives and nurses are the usual sources, but direct observation and common sense are also important. However direct testing is not needed. 5. Usually the patient's performance over the preceding 24-48 hours is important, but occasionally longer periods will be relevant. 6. Middle categories imply that the patient supplies over 50 per cent of the effort.   7. Use of aids to be independent is allowed. Gricelda Archer., Barthel, D.W. ([id number]). Functional evaluation: the Barthel Index. [address] (14)2. SONIA Sauceda Ester Muzzy.Felicia., Emily, [address] (1999). Measuring the change indisability after inpatient rehabilitation; comparison of the responsiveness of the Barthel Index and Functional [place] Measure. Journal of Neurology, Neurosurgery, and Psychiatry, 66(4), 013-346. Killian Morejon NSARAH, ASHLEE Thompson, & Emmie Ramirez MDAJA. (2004.) Assessment of post-stroke quality of life in cost-effectiveness studies: The usefulness of the Barthel Index and the EuroQoL-5D. Quality of Life Research, 13, 449-91       G codes: In compliance with CMSs Claims Based Outcome Reporting, the following G-code set was chosen for this patient based on their primary functional limitation being treated: The outcome measure chosen to determine the severity of the functional limitation was the Barthel Index with a score of 55/100 which was correlated with the impairment scale. ?  Self Care:     - CURRENT STATUS: CK - 40%-59% impaired, limited or restricted    - GOAL STATUS: CJ - 20%-39% impaired, limited or restricted    - D/C STATUS:  ---------------To be determined---------------     Occupational Therapy Evaluation Charge Determination   History Examination Decision-Making   LOW Complexity : Brief history review  LOW Complexity : 1-3 performance deficits relating to physical, cognitive , or psychosocial skils that result in activity limitations and / or participation restrictions  LOW Complexity : No comorbidities that affect functional and no verbal or physical assistance needed to complete eval tasks       Based on the above components, the patient evaluation is determined to be of the following complexity level: LOW   Pain:  Pain Scale 1: Numeric (0 - 10)  Pain Intensity 1: 0              Activity Tolerance:   Patient tolerated eval well.    Please refer to the flowsheet for vital signs taken during this treatment. After treatment:   [x] Patient left in no apparent distress sitting up in chair  [] Patient left in no apparent distress in bed  [x] Call bell left within reach  [x] Nursing notified  [] Caregiver present  [] Bed alarm activated    COMMUNICATION/EDUCATION:   The patients plan of care was discussed with: Physical Therapist, Registered Nurse and patient. .  [x] Home safety education was provided and the patient/caregiver indicated understanding. [x] Patient/family have participated as able in goal setting and plan of care. [x] Patient/family agree to work toward stated goals and plan of care. [] Patient understands intent and goals of therapy, but is neutral about his/her participation. [] Patient is unable to participate in goal setting and plan of care. This patients plan of care is appropriate for delegation to [facility].     Thank you for this referral.  Shelley Molina OTR/L  Time Calculation: 24 mins

## 2018-03-15 NOTE — PROGRESS NOTES
3/15/2018  4:05 PM  Will continue to get information on dialysis in [facility]. Md indicated he will have final decision if patient will need continued dialysis. Will continue to follow.   Ember Bocanegra, Surendra N Willy Bethea

## 2018-03-15 NOTE — PROGRESS NOTES
Problem: Mobility Impaired (Adult and Pediatric)  Goal: *Acute Goals and Plan of Care (Insert Text)  Physical Therapy Goals  Initiated 3/15/2018  1. Patient will move from supine to sit and sit to supine , scoot up and down and roll side to side in bed with independence within 7 day(s). 2.  Patient will transfer from bed to chair and chair to bed with independence using the least restrictive device within 7 day(s). 3.  Patient will perform sit to stand with independence within 7 day(s). 4.  Patient will ambulate with modified independence for 200 feet with the least restrictive device within 7 day(s). physical Therapy EVALUATION  Patient: Amy Kunz (39 y.o. female)  Date: 3/15/2018  Primary Diagnosis: Pneumonia  Appendicitis  Procedure(s) (LRB):  APPENDECTOMY LAPAROSCOPIC (N/A) 1 Day Post-Op   Precautions: fall       ASSESSMENT :  Based on the objective data described below, the patient presents with difficulty with ambualtion. Patient speak Belarusian use blue phone for translation. Sit patient up on the edge of bed supervision, sit to stand CGA, ambulate with rolling walker to and from the bathroom min assist, OOB to chair as tolerated performed some active range of motion exercise on both LE all planes while up on the chair. Activate chair alarm and notified nurse elli schwartz to monitor and assist back to bed when ask. Patient will benefit from skilled intervention to address the above impairments.   Patients rehabilitation potential is considered to be Excellent  Factors which may influence rehabilitation potential include:   []         None noted  []         Mental ability/status  [x]         Medical condition  []         Home/family situation and support systems  [x]         Safety awareness  []         Pain tolerance/management  []         Other:      PLAN :  Recommendations and Planned Interventions:  [x]           Bed Mobility Training             []    Neuromuscular Re-Education  [x] Transfer Training                   []    Orthotic/Prosthetic Training  [x]           Gait Training                         []    Modalities  [x]           Therapeutic Exercises           []    Edema Management/Control  [x]           Therapeutic Activities            []    Patient and Family Training/Education  []           Other (comment):    Frequency/Duration: Patient will be followed by physical therapy  5 times a week to address goals.   Discharge Recommendations: Home Health  Further Equipment Recommendations for Discharge: TBD     SUBJECTIVE:   Use blue phone for translation to Tajik    OBJECTIVE DATA SUMMARY:   HISTORY:    Past Medical History:   Diagnosis Date    Arrhythmia     fast heart rate and palpitations dec 2010    Arthritis     generalized    Asthma     Chronic pain     headaches and stomach pain x several months    [facility] [address] [facility]     Diabetic foot ulcer associated with type 2 diabetes mellitus ([facility] [address] [facility]     ESRD (end stage renal disease) ([facility] [address] [facility]     GERD (gastroesophageal reflux disease)     Hypertension     Psychiatric disorder     depression     Past Surgical History:   Procedure Laterality Date    HX CATARACT REMOVAL      cataract removal and lens placement in right eye    HX GYN      LBTL    HX ORTHOPAEDIC      partial amputation of left foot     Prior Level of Function/Home Situation: modified independent with rolling walker  Personal factors and/or comorbidities impacting plan of care:     Home Situation  Home Environment: Private residence  One/Two Story Residence: One story  Living Alone: No  Support Systems: Child(tadeo) (lives with daughter)  Patient Expects to be Discharged to[de-identified] Private residence  Current DME Used/Available at Home: Walker, rolling  Tub or Shower Type: Shower    EXAMINATION/PRESENTATION/DECISION MAKING:   Critical Behavior:  Neurologic State: Lethargic (improved with activity)  Orientation Level: Oriented X4  Cognition: Decreased attention/concentration (d/t lethargy)  Safety/Judgement: Awareness of environment  Hearing: Auditory  Auditory Impairment: None    Range Of Motion:  AROM: Grossly decreased, non-functional           PROM: Within functional limits           Strength:    Strength: Generally decreased, functional                    Tone & Sensation:                                  Coordination:  Coordination: Within functional limits  Vision:   Tracking: Able to track stimulus in all quadrants w/o difficulty  Diplopia: No  Acuity: Within Defined Limits  Functional Mobility:  Bed Mobility:  Rolling: Supervision; Additional time  Supine to Sit: Supervision; Additional time  Sit to Supine:  (pt remained up at end of tx)  Scooting: Supervision  Transfers:  Sit to Stand: Minimum assistance;Assist x1  Stand to Sit: Contact guard assistance;Assist x1  Stand Pivot Transfers: Contact guard assistance     Bed to Chair: Contact guard assistance;Assist x1              Balance:   Sitting: Intact  Standing: Impaired;Pull to stand; With support  Standing - Static: Good  Standing - Dynamic : Fair  Ambulation/Gait Training:  Distance (ft): 30 Feet (ft)  Assistive Device: Walker, rolling;Gait belt  Ambulation - Level of Assistance: Contact guard assistance     Gait Description (WDL): Exceptions to WDL  Gait Abnormalities: Path deviations; Step to gait        Base of Support: Widened     Speed/Helen: Fluctuations                Therapeutic Exercises:    Instructed patient to continue active range of motion exercise on both legs while up on chair or on bed.        Functional Measure:  Barthel Index:    Bathin  Bladder: 10  Bowels: 10  Groomin  Dressin  Feeding: 10  Mobility: 0  Stairs: 0  Toilet Use: 5  Transfer (Bed to Chair and Back): 10  Total: 55       Barthel and G-code impairment scale:  Percentage of impairment CH  0% CI  1-19% CJ  20-39% CK  40-59% CL  60-79% CM  80-99% CN  100%   Barthel Score 0-100 100 99-80 79-60 59-40 20-39 1-19   0   Barthel Score 0-20 20 17-19 13-16 9-12 5-8 1-4 0      The Barthel ADL Index: Guidelines  1. The index should be used as a record of what a patient does, not as a record of what a patient could do. 2. The main aim is to establish degree of independence from any help, physical or verbal, however minor and for whatever reason. 3. The need for supervision renders the patient not independent. 4. A patient's performance should be established using the best available evidence. Asking the patient, friends/relatives and nurses are the usual sources, but direct observation and common sense are also important. However direct testing is not needed. 5. Usually the patient's performance over the preceding 24-48 hours is important, but occasionally longer periods will be relevant. 6. Middle categories imply that the patient supplies over 50 per cent of the effort. 7. Use of aids to be independent is allowed. Emilie Cortez., Barthel, D.W. ([id number]). Functional evaluation: the Barthel Index. [address] [facility] (14)2. [facility] Press reagan SONIA Briceno, Kevin Fallon., [facility] [place], [address] [place] [address] (1999). Measuring the change indisability after inpatient rehabilitation; comparison of the responsiveness of the Barthel Index and Functional [place] Measure. Journal of Neurology, Neurosurgery, and Psychiatry, 66(4), 532-650. Kiran Delong, [place].ILENE, ASHLEE Thompson, & Vangie Campbell, M.A. (2004.) Assessment of post-stroke quality of life in cost-effectiveness studies: The usefulness of the Barthel Index and the EuroQoL-5D. Quality of Life Research, 13, 394-50       G codes: In compliance with CMSs Claims Based Outcome Reporting, the following G-code set was chosen for this patient based on their primary functional limitation being treated: The outcome measure chosen to determine the severity of the functional limitation was the barthel with a score of 55/100 which was correlated with the impairment scale.     ? Mobility - Walking and Moving Around:     - CURRENT STATUS: CK - 40%-59% impaired, limited or restricted    - GOAL STATUS: CJ - 20%-39% impaired, limited or restricted    - D/C STATUS:  ---------------To be determined---------------      Physical Therapy Evaluation Charge Determination   History Examination Presentation Decision-Making   LOW Complexity : Zero comorbidities / personal factors that will impact the outcome / POC LOW Complexity : 1-2 Standardized tests and measures addressing body structure, function, activity limitation and / or participation in recreation  LOW Complexity : Stable, uncomplicated  Other outcome measures barthel  MEDIUM      Based on the above components, the patient evaluation is determined to be of the following complexity level: LOW     Pain:  Pain Scale 1: Numeric (0 - 10)  Pain Intensity 1: 0              Activity Tolerance:   Good. Please refer to the flowsheet for vital signs taken during this treatment. After treatment:   [x]         Patient left in no apparent distress sitting up in chair  []         Patient left in no apparent distress in bed  [x]         Call bell left within reach  [x]         Nursing notified  []         Caregiver present  [x]         Chair alarm activated    COMMUNICATION/EDUCATION:   The patients plan of care was discussed with: Occupational Therapist, Registered Nurse and patient. [x]         Fall prevention education was provided and the patient/caregiver indicated understanding. [x]         Patient/family have participated as able in goal setting and plan of care. [x]         Patient/family agree to work toward stated goals and plan of care. []         Patient understands intent and goals of therapy, but is neutral about his/her participation. []         Patient is unable to participate in goal setting and plan of care. Thank you for this referral.  Malorie Fragoso, PT ,[address] [facility],[address].    Time Calculation: 25 mins

## 2018-03-15 NOTE — PROGRESS NOTES
Nutrition Assessment:    RECOMMENDATIONS/INTERVENTION(S):   Start Renal diet when able. Phos high 6.0  Monitor PO intakes, wt, GI status  Pt declined ONS- \"too sweet\"    ASSESSMENT:   3/15: Follow up. Pt had HD 3/13, 3/14, will have it again 3/16. Pt does not want ONS, states they are too sweet which makes her nauseated. Pt states she had some diarrhea earlier today. States she ate ~50% of breakfast, not a lot of meat for lunch. Encouraged protein intakes for HD. Calcium 7.5. BUN 27. Cr 4.04. Phos and K WNL. Lipase slightly high 437 on admission. No new labs. GFR 11 slowly trending up. 3/13: 59 yr old female admitted for n/v/d. Pt states she hasn't eaten much of anything for the last 3 days. Any time she even drinks water she has to go to the bathroom and it's diarrhea. Pt complains of sweet taste in mouth, without having eaten anything. Ketoacidosis? Pt had central line placed for HD. Hgb low 6.7. Pt with PNA and possible appendicitis, no surgical intervention planned at this time. Phos 6.0, Calcium 6.4 low. , 207 mg/dL. Pt states she's lost about 5 lbs over the last few days. Chart indicates minor weight gain, likely fluids. No edema noted. SUBJECTIVE/OBJECTIVE:   Diet Order:  Renal  % Eaten:  No data found.     Pertinent Medications: [x] Reviewed    Past Medical History:   Diagnosis Date    Arrhythmia     fast heart rate and palpitations dec 2010    Arthritis     generalized    Asthma     Chronic pain     headaches and stomach pain x several months    Diabetes [facility]     Diabetic foot ulcer associated with type 2 diabetes mellitus ([facility]     ESRD (end stage renal disease) ([facility]     GERD (gastroesophageal reflux disease)     Hypertension     Psychiatric disorder     depression        Chemistries:  Lab Results   Component Value Date/Time    Sodium 140 03/15/2018 01:02 AM    Potassium 4.2 03/15/2018 01:02 AM    Chloride 101 03/15/2018 01:02 AM    CO2 25 03/15/2018 01:02 AM    Anion gap 14 03/15/2018 01:02 AM    Glucose 108 (H) 03/15/2018 01:02 AM    BUN 27 (H) 03/15/2018 01:02 AM    Creatinine 4.04 (H) 03/15/2018 01:02 AM    BUN/Creatinine ratio 7 (L) 03/15/2018 01:02 AM    GFR est AA 14 (L) 03/15/2018 01:02 AM    GFR est non-AA 11 (L) 03/15/2018 01:02 AM    Calcium 7.5 (L) 03/15/2018 01:02 AM    AST (SGOT) 34 03/14/2018 01:03 AM    Alk. phosphatase 180 (H) 03/14/2018 01:03 AM    Protein, total 6.2 (L) 03/14/2018 01:03 AM    Albumin 2.4 (L) 03/14/2018 01:03 AM    Globulin 3.8 03/14/2018 01:03 AM    A-G Ratio 0.6 (L) 03/14/2018 01:03 AM    ALT (SGPT) 52 03/14/2018 01:03 AM      Anthropometrics: Height: 5' 3\" (160 cm) Weight: 66.1 kg (145 lb 12.8 oz)    IBW (%IBW):   ( ) UBW (%UBW):   (  %)    BMI: Body mass index is 25.83 kg/(m^2). This BMI is indicative of:     [] Underweight    [] Normal    [x] Overweight    []  Obesity    []  Extreme Obesity (BMI>40)    Estimated Nutrition Needs (Based on): 1520 Kcals/day (BMR(1169x1.3)) , 51 g (-64g/day(0.8-1.0g/kg)) Protein  Carbohydrate: At Least 130 g/day  Fluids: 1500 mL/day    Last BM: 3/15- pt stated dirrhea  [x]Active     []Hyperactive  []Hypoactive       [] Absent   BS  Skin:    [x] Intact   [] Incision  [] Breakdown   [] DTI   [] Tears/Excoriation/Abrasion  []Edema [] Other:    Wt Readings from Last 30 Encounters:   03/15/18 66.1 kg (145 lb 12.8 oz)   02/08/18 60.3 kg (133 lb)   12/08/17 66.2 kg (146 lb)   11/28/17 63.5 kg (139 lb 15.9 oz)   09/11/14 64.4 kg (142 lb)   08/21/14 64.9 kg (143 lb)   08/07/14 64.9 kg (143 lb)   08/07/14 64.9 kg (143 lb)   07/10/14 67.1 kg (148 lb)   06/12/14 62.6 kg (138 lb)   06/02/14 63 kg (139 lb)   05/29/14 63.5 kg (140 lb)   05/08/14 61.2 kg (135 lb)   01/25/11 59 kg (130 lb)      NUTRITION DIAGNOSES:   Problem:  Inadequate energy intake     Etiology: related to decreased ability to consume sufficient energy     Signs/Symptoms: as evidenced by poor PO intakes, n/v/d      NUTRITION INTERVENTIONS:  Meals/Snacks: General/healthful diet   Supplements: Commercial supplement              GOAL:   Pt will consume >50% of meals without n/vd within 1-3 days    Cultural, Zoroastrian, or Ethnic Dietary Needs: None     LEARNING NEEDS (Diet, Food/Nutrient-Drug Interaction):    [x] None Identified   [] Identified and Education Provided/Documented   [] Identified and Pt declined/was not appropriate      [x] Interdisciplinary Care Plan Reviewed/Documented    [x] Discharge Needs:    TBD   [] No Nutrition Related Discharge Needs    NUTRITION RISK:   Pt Is At Nutrition Risk  [x]     No Nutrition Risk Identified  []       PT SEEN FOR:    []  MD Consult: []Calorie Count      []Diabetic Diet Education        []Diet Education     []Electrolyte Management     []General Nutrition Management and Supplements     []Management of Tube Feeding     []TPN Recommendations    []  RN Referral:  []MST score >=2     []Enteral/Parenteral Nutrition PTA     []Pregnant: Gestational DM or Multigestation                 [] Pressure Ulcer      []  Low BMI      []  Length of Stay       [] Dysphagia Diet     [] Ventilator      [x] Follow-Up        Previous Recommendations:   [x] Implemented          [] Not Implemented          [] Not Applicable    Previous Goal:   [] Met              [x] Progressing Towards Goal              [] Not Progressing Towards Goal   [] Not Applicable              Yahaira Cantrell, [address] [place] [address]  Pager: [phone]  Office: [phone]

## 2018-03-15 NOTE — PROGRESS NOTES
[address] [facility]  YOB: 1955          Assessment & Plan:   1. 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 s were answered via : pt would like to go back to [place] and her sister is on ? PD  · IR to change temp line next week prior to dc  · QM to look for out pt HD  · Hep serologies neg  2. Hyponatremia  · Better, she had HD against low Na bath  3. Hypocalcemia  · Most likely due to CKD  · Check Vit D  · Better : on calctriol for 2 PTH and Phoslo as binders  4. Hyperphosphatemia  · Due to CKD  · Start binders:phoslo with meals    5. Anemia  · IV Iron, iron def  6. DM  7. PNA  8.  HTN  · NOT THE BEST  · Amlodipine, Metoprolol  · Eval post HD  · Add Clonidine  · Avoid RAAS inhibitors till Monday: start if this is chronic ESRD       Subjective:   CC:ESRD  HPI: Patient seen   HD went well yesterday  Slightly better  Family at beside  HTN remains up but better  Hypocalcemia better  Acidosis better  ROS:neg for 12 sys  Current Facility-Administered Medications   Medication Dose Route Frequency    calcitRIOL (ROCALTROL) capsule 0.25 mcg  0.25 mcg Oral DAILY    calcium acetate (PHOSLO) capsule 667 mg  1 Cap Oral TID WITH MEALS    [START ON 3/16/2018] epoetin gonzalo (EPOGEN;PROCRIT) injection 10,000 Units  10,000 Units IntraVENous DIALYSIS MON, WED & FRI    piperacillin-tazobactam (ZOSYN) 3.375 g in 0.9% sodium chloride (MBP/ADV) 100 mL ADV  3.375 g IntraVENous Q12H    heparin (porcine) 1,000 unit/mL injection 2,500 Units  2,500 Units Hemodialysis DIALYSIS PRN    naloxone (NARCAN) injection 0.2 mg  0.2 mg IntraVENous EVERY 2 MINUTES AS NEEDED    flumazenil (ROMAZICON) 0.1 mg/mL injection 0.2 mg  0.2 mg IntraVENous Multiple    heparin (porcine) injection 5,000 Units  5,000 Units SubCUTAneous Q12H    oxyCODONE-acetaminophen (PERCOCET) 5-325 mg per tablet 1 Tab  1 Tab Oral Q4H PRN    oxyCODONE-acetaminophen (PERCOCET 10)  mg per tablet 1 Tab  1 Tab Oral Q4H PRN    amLODIPine (NORVASC) tablet 10 mg  10 mg Oral DAILY    ferrous sulfate tablet 325 mg  325 mg Oral DAILY    levothyroxine (SYNTHROID) tablet 100 mcg  100 mcg Oral ACB    metoprolol tartrate (LOPRESSOR) tablet 100 mg  100 mg Oral QHS    sodium chloride (NS) flush 5-10 mL  5-10 mL IntraVENous Q8H    sodium chloride (NS) flush 5-10 mL  5-10 mL IntraVENous PRN    acetaminophen (TYLENOL) tablet 650 mg  650 mg Oral Q4H PRN    HYDROcodone-acetaminophen (NORCO) 5-325 mg per tablet 1 Tab  1 Tab Oral Q4H PRN    HYDROmorphone (PF) (DILAUDID) injection 0.2 mg  0.2 mg IntraVENous Q4H PRN    naloxone (NARCAN) injection 0.4 mg  0.4 mg IntraVENous PRN    diphenhydrAMINE (BENADRYL) injection 12.5 mg  12.5 mg IntraVENous Q4H PRN    ondansetron (ZOFRAN) injection 4 mg  4 mg IntraVENous Q6H PRN    docusate sodium (COLACE) capsule 100 mg  100 mg Oral BID    insulin lispro (HUMALOG) injection   SubCUTAneous AC&HS    glucose chewable tablet 16 g  4 Tab Oral PRN    dextrose (D50W) injection syrg 12.5-25 g  12.5-25 g IntraVENous PRN    glucagon (GLUCAGEN) injection 1 mg  1 mg IntraMUSCular PRN    0.9% sodium chloride infusion 250 mL  250 mL IntraVENous PRN    prochlorperazine (COMPAZINE) injection 5 mg  5 mg IntraVENous Q6H PRN    0.9% sodium chloride infusion  100 mL/hr IntraVENous CONTINUOUS    heparin (porcine) 1,000 unit/mL injection 2,800 Units  2,800 Units IntraVENous Multiple    albuterol-ipratropium (DUO-NEB) 2.5 MG-0.5 MG/3 ML  3 mL Nebulization Q4H PRN    azithromycin (ZITHROMAX) 500 mg in 0.9% sodium chloride 250 mL IVPB  500 mg IntraVENous Q24H          Objective:     Vitals:  Blood pressure 139/84, pulse 78, temperature 97.5 °F (36.4 °C), resp.  rate 18, height 5' 3\" (1.6 m), weight 66.1 kg (145 lb 12.8 oz), SpO2 90 %. Temp (24hrs), Av.3 °F (36.8 °C), Min:97.5 °F (36.4 °C), Max:99.1 °F (37.3 °C)      Intake and Output:      1901 - 03/15 0700  In: 2296 [P.O.:200; I.V.:4085]  Out: 1010 [Urine:1000]    Physical Exam:                Patient is intubated:  no    Physical Examination:   GENERAL ASSESSMENT: NAD  HEENT:Nontraumatic   CHEST: CTA  HEART: S1S2  ABDOMEN: Soft,NT,  :Valentin: n  EXTREMITY: EDEMA n  NEURO:Grossly non focal          ECG/rhythm:    Data Review      No results for input(s): TNIPOC in the last 72 hours. No lab exists for component: ITNL   No results for input(s): CPK, CKMB, TROIQ in the last 72 hours. Recent Labs      03/15/18   0102  18   0103  18   0955  18   0410  18   2104   NA  140  135*   --   128*  125*  124*   K  4.2  4.0   --   4.3  4.9  5.0   CL  101  98   --   93*  93*  89*   CO2  25  22   --   17*  16*  15*   BUN  27*  53*   --   97*  94*  100*   CREA  4.04*  5.96*   --   9.14*  9.15*  9.84*   GLU  108*  119*   --   183*  207*  183*   PHOS   --   4.2   --    --   6.0*   --    MG   --   1.7   --    --   1.7   --    CA  7.5*  6.9*   --   6.4*  6.4*  5.8*  6.2*   ALB   --   2.4*   --    --   2.3*  2.8*   WBC  5.8  5.5  6.5   --   5.7  6.4   HGB  8.4*  8.0*  8.0*   --   6.7*  7.4*   HCT  26.2*  23.9*  23.4*   --   20.9*  23.6*   PLT  214  200  206   --   178  222      No results for input(s): INR, PTP, APTT in the last 72 hours. No lab exists for component: INREXT, INREXT  Needs: urine analysis, urine sodium, protein and creatinine  Lab Results   Component Value Date/Time    Sodium urine, random 38 2017 01:54 PM    Creatinine, urine 61.11 2017 08:08 PM         Discussed with:  Family    : Syeda Jimenez MD  3/15/2018        [facility]:  [email or web address]  Jeannette Niño office:  [address] [place] [address], [address]  [place], [address] [place] [address]  Phone: [phone]  Fax :     [phone]    [place] office:  [address] [place] [address]  Richar Saunders  Phone - [phone]  Fax - [phone]

## 2018-03-15 NOTE — ROUTINE PROCESS
Bedside and Verbal shift change report given to [facility] (oncoming nurse) by Manfred Borrero (offgoing nurse). Report included the following information SBAR, Kardex, ED Summary, Procedure Summary, Intake/Output, MAR, Recent Results and Cardiac Rhythm NSR.

## 2018-03-15 NOTE — PROGRESS NOTES
General Surgery Daily Progress Note    Patient: Monty Burdick MRN: [id number]  SSN: [id number]    YOB: 1955  Age: 59 y.o. Sex: female      Admit Date: 3/12/2018    Subjective:   Pain controlled, continues to have some nausea. Tolerating PO.      Current Facility-Administered Medications   Medication Dose Route Frequency    calcitRIOL (ROCALTROL) capsule 0.25 mcg  0.25 mcg Oral DAILY    calcium acetate (PHOSLO) capsule 667 mg  1 Cap Oral TID WITH MEALS    [START ON 3/16/2018] epoetin gonzalo (EPOGEN;PROCRIT) injection 10,000 Units  10,000 Units IntraVENous DIALYSIS MON, WED & FRI    piperacillin-tazobactam (ZOSYN) 3.375 g in 0.9% sodium chloride (MBP/ADV) 100 mL ADV  3.375 g IntraVENous Q12H    heparin (porcine) 1,000 unit/mL injection 2,500 Units  2,500 Units Hemodialysis DIALYSIS PRN    naloxone (NARCAN) injection 0.2 mg  0.2 mg IntraVENous EVERY 2 MINUTES AS NEEDED    flumazenil (ROMAZICON) 0.1 mg/mL injection 0.2 mg  0.2 mg IntraVENous Multiple    heparin (porcine) injection 5,000 Units  5,000 Units SubCUTAneous Q12H    oxyCODONE-acetaminophen (PERCOCET) 5-325 mg per tablet 1 Tab  1 Tab Oral Q4H PRN    oxyCODONE-acetaminophen (PERCOCET 10)  mg per tablet 1 Tab  1 Tab Oral Q4H PRN    amLODIPine (NORVASC) tablet 10 mg  10 mg Oral DAILY    ferrous sulfate tablet 325 mg  325 mg Oral DAILY    levothyroxine (SYNTHROID) tablet 100 mcg  100 mcg Oral ACB    metoprolol tartrate (LOPRESSOR) tablet 100 mg  100 mg Oral QHS    sodium chloride (NS) flush 5-10 mL  5-10 mL IntraVENous Q8H    sodium chloride (NS) flush 5-10 mL  5-10 mL IntraVENous PRN    acetaminophen (TYLENOL) tablet 650 mg  650 mg Oral Q4H PRN    HYDROcodone-acetaminophen (NORCO) 5-325 mg per tablet 1 Tab  1 Tab Oral Q4H PRN    HYDROmorphone (PF) (DILAUDID) injection 0.2 mg  0.2 mg IntraVENous Q4H PRN    naloxone (NARCAN) injection 0.4 mg  0.4 mg IntraVENous PRN    diphenhydrAMINE (BENADRYL) injection 12.5 mg  12.5 mg IntraVENous Q4H PRN    ondansetron (ZOFRAN) injection 4 mg  4 mg IntraVENous Q6H PRN    docusate sodium (COLACE) capsule 100 mg  100 mg Oral BID    insulin lispro (HUMALOG) injection   SubCUTAneous AC&HS    glucose chewable tablet 16 g  4 Tab Oral PRN    dextrose (D50W) injection syrg 12.5-25 g  12.5-25 g IntraVENous PRN    glucagon (GLUCAGEN) injection 1 mg  1 mg IntraMUSCular PRN    0.9% sodium chloride infusion 250 mL  250 mL IntraVENous PRN    prochlorperazine (COMPAZINE) injection 5 mg  5 mg IntraVENous Q6H PRN    0.9% sodium chloride infusion  100 mL/hr IntraVENous CONTINUOUS    heparin (porcine) 1,000 unit/mL injection 2,800 Units  2,800 Units IntraVENous Multiple    albuterol-ipratropium (DUO-NEB) 2.5 MG-0.5 MG/3 ML  3 mL Nebulization Q4H PRN    azithromycin (ZITHROMAX) 500 mg in 0.9% sodium chloride 250 mL IVPB  500 mg IntraVENous Q24H        Objective:      03/13 1901 - 03/15 0700  In: 2822 [P.O.:200;  I.V.:4085]  Out: 1010 [Urine:1000]  Patient Vitals for the past 8 hrs:   BP Temp Pulse Resp SpO2 Weight   03/15/18 1109 154/67 98.4 °F (36.9 °C) 74 18 95 % -   03/15/18 0900 139/84 97.5 °F (36.4 °C) 78 18 90 % -   03/15/18 0656 - - 73 - - -   03/15/18 0427 153/71 98.4 °F (36.9 °C) 71 16 99 % -       Physical Exam:  General: Alert, cooperative, NAD  Lungs: Unlabored  Heart:  Regular rate and  rhythm  Abdomen: Soft, ATTP, mild distention, incisions c/d/i  Extremities: Warm, moves all, no edema  Skin:  Warm and dry, no rash    Labs:   Recent Labs      03/15/18   0102   WBC  5.8   HGB  8.4*   HCT  26.2*   PLT  214     Recent Labs      03/15/18   0102  03/14/18   0103   NA  140  135*   K  4.2  4.0   CL  101  98   CO2  25  22   GLU  108*  119*   BUN  27*  53*   CREA  4.04*  5.96*   CA  7.5*  6.9*   MG   --   1.7   PHOS   --   4.2   ALB   --   2.4*   TBILI   --   0.3   SGOT   --   34   ALT   --   52       Assessment / Plan:   · POD#1 lap appendectomy  · Appropriate exam  · Diet as tolerated  · We will see again as needed. F/u in office in 2 weeks.

## 2018-03-15 NOTE — PROGRESS NOTES
Bedside and Verbal shift change report given to [address] [facility] [address] (oncoming nurse) by GISSELLE Diaz RN (offgoing nurse). Report given with SBAR, Kardex, Intake/Output, MAR and Recent Results.

## 2018-03-15 NOTE — PROGRESS NOTES
[facility]o [facility] [address]  [address]  [phone]      Medical Progress Note      NAME: Simona Whiting   :  1955  MRM:  [id number]    Date/Time: 3/15/2018          Subjective:     Chief Complaint:  F/u abdominal pain    No acute events. Nausea and vomiting improved overall. Abdominal pain improved overall, some minimal tenderness at incision sites          Objective:       Vitals:          Last 24hrs VS reviewed since prior progress note. Most recent are:    Visit Vitals    /67 (BP 1 Location: Left arm, BP Patient Position: At rest)    Pulse 74    Temp 98.4 °F (36.9 °C)    Resp 18    Ht 5' 3\" (1.6 m)    Wt 66.1 kg (145 lb 12.8 oz)    SpO2 95%    BMI 25.83 kg/m2     SpO2 Readings from Last 6 Encounters:   03/15/18 95%   17 100%   17 97%   14 98%   14 99%   11 94%    O2 Flow Rate (L/min): 2 l/min       Intake/Output Summary (Last 24 hours) at 03/15/18 1314  Last data filed at 03/15/18 1142   Gross per 24 hour   Intake          2810.01 ml   Output               10 ml   Net          2800.01 ml          Exam:     Physical Exam:    Gen: chronically ill-appearing, mild distress  HEENT:  Pink conjunctivae,  hearing intact to voice, moist mucous membranes  Neck:  Supple, without masses  Resp:  No accessory muscle use, few basilar rales  Card:  No murmurs, normal S1, S2 without thrills, 1+ edema  Abd:  Soft, mild TTP RLQ. No rebound. Non-distended, normoactive bowel sounds are present  Skin:  No rashes  Neuro:  Cranial nerves 3-12 are grossly intact, follows commands appropriately  Psych:  Alert with fair insight.   Oriented to person, place, and time        Medications Reviewed: (see below)    Lab Data Reviewed: (see below)    ______________________________________________________________________    Medications:     Current Facility-Administered Medications   Medication Dose Route Frequency    calcitRIOL (ROCALTROL) capsule 0.25 mcg  0.25 mcg Oral DAILY    calcium acetate (PHOSLO) capsule 667 mg  1 Cap Oral TID WITH MEALS    [START ON 3/16/2018] epoetin gonzalo (EPOGEN;PROCRIT) injection 10,000 Units  10,000 Units IntraVENous DIALYSIS MON, WED & FRI    piperacillin-tazobactam (ZOSYN) 3.375 g in 0.9% sodium chloride (MBP/ADV) 100 mL ADV  3.375 g IntraVENous Q12H    heparin (porcine) 1,000 unit/mL injection 2,500 Units  2,500 Units Hemodialysis DIALYSIS PRN    naloxone (NARCAN) injection 0.2 mg  0.2 mg IntraVENous EVERY 2 MINUTES AS NEEDED    flumazenil (ROMAZICON) 0.1 mg/mL injection 0.2 mg  0.2 mg IntraVENous Multiple    heparin (porcine) injection 5,000 Units  5,000 Units SubCUTAneous Q12H    oxyCODONE-acetaminophen (PERCOCET) 5-325 mg per tablet 1 Tab  1 Tab Oral Q4H PRN    oxyCODONE-acetaminophen (PERCOCET 10)  mg per tablet 1 Tab  1 Tab Oral Q4H PRN    amLODIPine (NORVASC) tablet 10 mg  10 mg Oral DAILY    ferrous sulfate tablet 325 mg  325 mg Oral DAILY    levothyroxine (SYNTHROID) tablet 100 mcg  100 mcg Oral ACB    metoprolol tartrate (LOPRESSOR) tablet 100 mg  100 mg Oral QHS    sodium chloride (NS) flush 5-10 mL  5-10 mL IntraVENous Q8H    sodium chloride (NS) flush 5-10 mL  5-10 mL IntraVENous PRN    acetaminophen (TYLENOL) tablet 650 mg  650 mg Oral Q4H PRN    HYDROcodone-acetaminophen (NORCO) 5-325 mg per tablet 1 Tab  1 Tab Oral Q4H PRN    HYDROmorphone (PF) (DILAUDID) injection 0.2 mg  0.2 mg IntraVENous Q4H PRN    naloxone (NARCAN) injection 0.4 mg  0.4 mg IntraVENous PRN    diphenhydrAMINE (BENADRYL) injection 12.5 mg  12.5 mg IntraVENous Q4H PRN    ondansetron (ZOFRAN) injection 4 mg  4 mg IntraVENous Q6H PRN    docusate sodium (COLACE) capsule 100 mg  100 mg Oral BID    insulin lispro (HUMALOG) injection   SubCUTAneous AC&HS    glucose chewable tablet 16 g  4 Tab Oral PRN    dextrose (D50W) injection syrg 12.5-25 g  12.5-25 g IntraVENous PRN    glucagon (GLUCAGEN) injection 1 mg  1 mg IntraMUSCular PRN    0.9% sodium chloride infusion 250 mL  250 mL IntraVENous PRN    prochlorperazine (COMPAZINE) injection 5 mg  5 mg IntraVENous Q6H PRN    0.9% sodium chloride infusion  100 mL/hr IntraVENous CONTINUOUS    heparin (porcine) 1,000 unit/mL injection 2,800 Units  2,800 Units IntraVENous Multiple    albuterol-ipratropium (DUO-NEB) 2.5 MG-0.5 MG/3 ML  3 mL Nebulization Q4H PRN    azithromycin (ZITHROMAX) 500 mg in 0.9% sodium chloride 250 mL IVPB  500 mg IntraVENous Q24H            Lab Review:     Recent Labs      03/15/18   0102  03/14/18   0103  03/13/18 2011   WBC  5.8  5.5  6.5   HGB  8.4*  8.0*  8.0*   HCT  26.2*  23.9*  23.4*   PLT  214  200  206     Recent Labs      03/15/18   0102  03/14/18   0103  03/13/18   0955  03/13/18   0410  03/12/18   2104   NA  140  135*  128*  125*  124*   K  4.2  4.0  4.3  4.9  5.0   CL  101  98  93*  93*  89*   CO2  25  22  17*  16*  15*   GLU  108*  119*  183*  207*  183*   BUN  27*  53*  97*  94*  100*   CREA  4.04*  5.96*  9.14*  9.15*  9.84*   CA  7.5*  6.9*  6.4*  6.4*  5.8*  6.2*   MG   --   1.7   --   1.7   --    PHOS   --   4.2   --   6.0*   --    ALB   --   2.4*   --   2.3*  2.8*   SGOT   --   34   --   27  36   ALT   --   52   --   60  76     No components found for: GLPOC  No results for input(s): PH, PCO2, PO2, HCO3, FIO2 in the last 72 hours. No results for input(s): INR in the last 72 hours. No lab exists for component: Gordon Bell  Lab Results   Component Value Date/Time    Specimen Description: URINE 09/14/2010 02:20 PM     Lab Results   Component Value Date/Time    Culture result: MODERATE NORMAL RESPIRATORY CORINNE 03/13/2018 04:33 AM    Culture result: NO GROWTH 3 DAYS 03/12/2018 10:37 PM    Culture result: NO GROWTH 3 DAYS 03/12/2018 10:37 PM            Assessment / Plan:      Nausea/vomiting/diarrhea: likely multifactorial, uremia, pneumonia, appendicitis. Now sp appendectomy. cont Zosyn.  Continue azithromycin for atypical coverage     Bilateral Pneumonia: seen on CT. Empiric abx as above. Follow micro      CKD 5: progressing to ESRD. HD started, long term HD needs to be determined Monday       Metabolic acidosis: due to ESRD. Off bicarb gtt per nephrology, HD to start. HD line placed      Hyponatremia: due to hypovolemia, renal failure. Improving. Follow Na closely       Anemia: due to ESRD.        Hypocalcemia: low even after correction. Repleted. Follow Ca closely     HTN: cont metoprolol and amlodipine      DM type 2 w/ renal complications: on 87/11 which is currently on hold due to NPO status. A1c not helpful in setting of severe anemia.  Continue SSI       Total time spent with patient: 30 minutes                  Care Plan discussed with: Patient, Nursing Staff and >50% of time spent in counseling and coordination of care    Discussed:  Care Plan and D/C Planning    Prophylaxis:  Hep SQ      Disposition:  Home w/Family           ___________________________________________________    Attending Physician: Danay Ritter MD

## 2018-03-16 LAB
ANION GAP SERPL CALC-SCNC: 14 MMOL/L (ref 5–15)
BUN SERPL-MCNC: 31 MG/DL (ref 6–20)
BUN/CREAT SERPL: 6 (ref 12–20)
CALCIUM SERPL-MCNC: 7.1 MG/DL (ref 8.5–10.1)
CHLORIDE SERPL-SCNC: 99 MMOL/L (ref 97–108)
CO2 SERPL-SCNC: 21 MMOL/L (ref 21–32)
CREAT SERPL-MCNC: 5.39 MG/DL (ref 0.55–1.02)
ERYTHROCYTE [DISTWIDTH] IN BLOOD BY AUTOMATED COUNT: 17.3 % (ref 11.5–14.5)
GLUCOSE BLD STRIP.AUTO-MCNC: 102 MG/DL (ref 65–100)
GLUCOSE BLD STRIP.AUTO-MCNC: 107 MG/DL (ref 65–100)
GLUCOSE BLD STRIP.AUTO-MCNC: 119 MG/DL (ref 65–100)
GLUCOSE BLD STRIP.AUTO-MCNC: 142 MG/DL (ref 65–100)
GLUCOSE SERPL-MCNC: 108 MG/DL (ref 65–100)
HCT VFR BLD AUTO: 24.4 % (ref 35–47)
HGB BLD-MCNC: 7.8 G/DL (ref 11.5–16)
MCH RBC QN AUTO: 25.7 PG (ref 26–34)
MCHC RBC AUTO-ENTMCNC: 32 G/DL (ref 30–36.5)
MCV RBC AUTO: 80.5 FL (ref 80–99)
NRBC # BLD: 0 K/UL (ref 0–0.01)
NRBC BLD-RTO: 0 PER 100 WBC
PLATELET # BLD AUTO: 191 K/UL (ref 150–400)
PMV BLD AUTO: 8.2 FL (ref 8.9–12.9)
POTASSIUM SERPL-SCNC: 3.6 MMOL/L (ref 3.5–5.1)
RBC # BLD AUTO: 3.03 M/UL (ref 3.8–5.2)
SERVICE CMNT-IMP: ABNORMAL
SODIUM SERPL-SCNC: 134 MMOL/L (ref 136–145)
WBC # BLD AUTO: 5.6 K/UL (ref 3.6–11)

## 2018-03-16 PROCEDURE — 36415 COLL VENOUS BLD VENIPUNCTURE: CPT | Performed by: INTERNAL MEDICINE

## 2018-03-16 PROCEDURE — 77030027138 HC INCENT SPIROMETER -A

## 2018-03-16 PROCEDURE — 74011250636 HC RX REV CODE- 250/636: Performed by: INTERNAL MEDICINE

## 2018-03-16 PROCEDURE — 97530 THERAPEUTIC ACTIVITIES: CPT

## 2018-03-16 PROCEDURE — 85027 COMPLETE CBC AUTOMATED: CPT | Performed by: INTERNAL MEDICINE

## 2018-03-16 PROCEDURE — 77010033678 HC OXYGEN DAILY

## 2018-03-16 PROCEDURE — 82962 GLUCOSE BLOOD TEST: CPT

## 2018-03-16 PROCEDURE — 74011250637 HC RX REV CODE- 250/637: Performed by: INTERNAL MEDICINE

## 2018-03-16 PROCEDURE — 74011000258 HC RX REV CODE- 258: Performed by: INTERNAL MEDICINE

## 2018-03-16 PROCEDURE — 80048 BASIC METABOLIC PNL TOTAL CA: CPT | Performed by: INTERNAL MEDICINE

## 2018-03-16 PROCEDURE — 90935 HEMODIALYSIS ONE EVALUATION: CPT

## 2018-03-16 PROCEDURE — 97116 GAIT TRAINING THERAPY: CPT

## 2018-03-16 PROCEDURE — 65660000000 HC RM CCU STEPDOWN

## 2018-03-16 RX ADMIN — CALCIUM ACETATE 667 MG: 667 CAPSULE ORAL at 18:21

## 2018-03-16 RX ADMIN — HYDROCODONE BITARTRATE AND ACETAMINOPHEN 1 TABLET: 5; 325 TABLET ORAL at 18:24

## 2018-03-16 RX ADMIN — SODIUM CHLORIDE 3.38 G: 900 INJECTION, SOLUTION INTRAVENOUS at 08:57

## 2018-03-16 RX ADMIN — HEPARIN SODIUM 5000 UNITS: 5000 INJECTION, SOLUTION INTRAVENOUS; SUBCUTANEOUS at 08:57

## 2018-03-16 RX ADMIN — FERROUS SULFATE TAB 325 MG (65 MG ELEMENTAL FE) 325 MG: 325 (65 FE) TAB at 08:57

## 2018-03-16 RX ADMIN — Medication 10 ML: at 17:58

## 2018-03-16 RX ADMIN — HEPARIN SODIUM 2500 UNITS: 1000 INJECTION, SOLUTION INTRAVENOUS; SUBCUTANEOUS at 17:55

## 2018-03-16 RX ADMIN — CALCIUM ACETATE 667 MG: 667 CAPSULE ORAL at 08:27

## 2018-03-16 RX ADMIN — CALCIUM ACETATE 667 MG: 667 CAPSULE ORAL at 13:16

## 2018-03-16 RX ADMIN — HYDROCODONE BITARTRATE AND ACETAMINOPHEN 1 TABLET: 5; 325 TABLET ORAL at 13:19

## 2018-03-16 RX ADMIN — METOPROLOL TARTRATE 100 MG: 50 TABLET ORAL at 18:22

## 2018-03-16 RX ADMIN — Medication 10 ML: at 07:12

## 2018-03-16 RX ADMIN — AMLODIPINE BESYLATE 10 MG: 5 TABLET ORAL at 08:57

## 2018-03-16 RX ADMIN — CALCITRIOL 0.25 MCG: 0.25 CAPSULE, LIQUID FILLED ORAL at 08:57

## 2018-03-16 RX ADMIN — LEVOTHYROXINE SODIUM 100 MCG: 100 TABLET ORAL at 07:11

## 2018-03-16 RX ADMIN — ERYTHROPOIETIN 10000 UNITS: 10000 INJECTION, SOLUTION INTRAVENOUS; SUBCUTANEOUS at 16:31

## 2018-03-16 RX ADMIN — AZITHROMYCIN MONOHYDRATE 500 MG: 500 INJECTION, POWDER, LYOPHILIZED, FOR SOLUTION INTRAVENOUS at 01:03

## 2018-03-16 RX ADMIN — DOCUSATE SODIUM 100 MG: 100 CAPSULE, LIQUID FILLED ORAL at 18:21

## 2018-03-16 RX ADMIN — AZITHROMYCIN MONOHYDRATE 500 MG: 500 INJECTION, POWDER, LYOPHILIZED, FOR SOLUTION INTRAVENOUS at 23:01

## 2018-03-16 RX ADMIN — Medication 10 ML: at 21:39

## 2018-03-16 RX ADMIN — HYDROMORPHONE HYDROCHLORIDE 0.2 MG: 2 INJECTION, SOLUTION INTRAMUSCULAR; INTRAVENOUS; SUBCUTANEOUS at 08:27

## 2018-03-16 NOTE — PROGRESS NOTES
3/16/2018  3:23 PM  I spoke baljeet/ Claire Morgan CM who has worked w/ Pt and family to arrange for HD. Currently Pt is requiring HD and will require for d/c. Pt is a citizen of [place] and is planning to return to [place] 4/29/18, [facility] has international locations and centers in [place], [address] will set up services locally for d/c when stable. I notified [facility] of screening needed and emergency Medicaid application, referral sent, imani supporting clinicals in [facility] for [address] HD chair at [facility]. I spoke baljeet/ Christopher Garcia of case management re: d/c plan, she is aware of need for emergency Medicaid application and HD placement for d/c.   CM will follow and assist.  Carlos Eduardo Bowser

## 2018-03-16 NOTE — PROGRESS NOTES
Served as  for [employer] during an update on plan of care for the day. Questions were answered and both  and daughter at bedside expressed understanding.

## 2018-03-16 NOTE — PROGRESS NOTES
1815: Patient blood pressure 180s/80s. Notified Dr. Mariano Redd. Orders to give metoprolol now. Will continue to monitor. Bedside shift change report given to Dayne Medina (oncoming nurse) by Tj Cortes RN (offgoing nurse). Report included the following information SBAR, Kardex, Intake/Output, MAR and Recent Results.

## 2018-03-16 NOTE — PROGRESS NOTES
Bedside and Verbal shift change report given to Brenna Maxwell RN (oncoming nurse) by Martha Baird RN (offgoing nurse). Report included the following information SBAR, Kardex, Intake/Output, Accordion, Recent Results and Cardiac Rhythm NSR. Pt resting in bed with family at bedside. Offgoing nurse demonstrated use of blue phone and [employer] . Pt has no c/o pain at shift change. 0100:  Pt O2 desat to 60s while sleeping with RA. NC at 2L put on pt. Sats guillermo immediately to 93%. 0125:  Pt requesting cloths to clean self. Min assist provided to pt to clean lower legs/feet. Bedside and Verbal shift change report given to Vinay Mart RN (oncoming nurse) by Brenna Maxwell RN (offgoing nurse). Report included the following information SBAR, Kardex, Accordion, Recent Results and Cardiac Rhythm NSR.

## 2018-03-16 NOTE — PROGRESS NOTES
3/16/2018  2:12 PM  Called [facility] to find information regarding setting up dialysis in [place] 4 . After conversation with person, they stated they may be able to help with patient staying in [place]. They established reference number [id number]. If they elected to stay in [place] contact Inge Hernandez the corrdinator at [facility] which is closest. Her ext. Is [phone] after calling [facility] main number [phone]. There fax is [phone]. If want to continue to find help in [place] call [facility]. Dr. Ariana Kiran was going to make final decision regarding HD on Monday.    Alexandra Valencia, [address]

## 2018-03-16 NOTE — PROGRESS NOTES
0725- Bedside and Verbal shift change report given to Catherine Bhatia RN (oncoming nurse) by Debra Denny RN (offgoing nurse). Report included the following information SBAR, Kardex, Intake/Output, MAR and Cardiac Rhythm NSR.       1222- TRANSFER - OUT REPORT:    Verbal report given to [facility] AT MAURY ALBERTO RN(name) on Elnora Phoenix  being transferred to 4th floor(unit) for routine progression of care       Report consisted of patients Situation, Background, Assessment and   Recommendations(SBAR). Information from the following report(s) SBAR, Kardex, Intake/Output, MAR, Recent Results, Med Rec Status and Cardiac Rhythm NSR was reviewed with the receiving nurse. Lines:   Peripheral IV 03/12/18 Left Antecubital (Active)   Site Assessment Clean, dry, & intact 3/16/2018  8:13 AM   Phlebitis Assessment 0 3/16/2018  8:13 AM   Infiltration Assessment 0 3/16/2018  8:13 AM   Dressing Status Clean, dry, & intact 3/16/2018  8:13 AM   Dressing Type Tape;Transparent 3/16/2018  8:13 AM   Hub Color/Line Status Pink;Flushed 3/16/2018  8:13 AM   Action Taken Open ports on tubing capped 3/14/2018  7:30 PM   Alcohol Cap Used Yes 3/16/2018  8:13 AM       Peripheral IV 03/13/18 Right Wrist (Active)   Site Assessment Clean, dry, & intact 3/16/2018  8:13 AM   Phlebitis Assessment 0 3/16/2018  8:13 AM   Infiltration Assessment 0 3/16/2018  8:13 AM   Dressing Status Clean, dry, & intact 3/16/2018  8:13 AM   Dressing Type Tape;Other (comments) 3/16/2018  8:13 AM   Hub Color/Line Status Blue; Infusing 3/16/2018  8:13 AM   Action Taken Open ports on tubing capped 3/15/2018  9:05 PM   Alcohol Cap Used Yes 3/16/2018  8:13 AM        Opportunity for questions and clarification was provided.       Patient transported with:   Registered Nurse

## 2018-03-16 NOTE — PROGRESS NOTES
Problem: Falls - Risk of  Goal: *Absence of Falls  Document Zuly Fall Risk and appropriate interventions in the flowsheet.    Outcome: Progressing Towards Goal  Fall Risk Interventions:  Mobility Interventions: Communicate number of staff needed for ambulation/transfer, OT consult for ADLs, Patient to call before getting OOB, PT Consult for mobility concerns, PT Consult for assist device competence, Utilize walker, cane, or other assitive device         Medication Interventions: Patient to call before getting OOB, Teach patient to arise slowly    Elimination Interventions: Call light in reach, Patient to call for help with toileting needs, Toileting schedule/hourly rounds    History of Falls Interventions: Room close to nurse's station

## 2018-03-16 NOTE — PROGRESS NOTES
Leo Hickman [facility]  [address] [facility], [address] [place] [address]  [phone]      Medical Progress Note      NAME: August Pain   :  1955  MRM:  [id number]    Date/Time: 3/16/2018          Subjective:     Chief Complaint:  F/u abdominal pain    No acute events. History taken via  phone. Mild abdominal pain at surgical site. No cp, sob. Objective:       Vitals:          Last 24hrs VS reviewed since prior progress note. Most recent are:    Visit Vitals    /64 (BP 1 Location: Right arm, BP Patient Position: At rest)    Pulse 73    Temp 98 °F (36.7 °C)    Resp 13    Ht 5' 3\" (1.6 m)    Wt 68.1 kg (150 lb 1.6 oz)    SpO2 92%    BMI 26.59 kg/m2     SpO2 Readings from Last 6 Encounters:   18 92%   17 100%   17 97%   14 98%   14 99%   11 94%    O2 Flow Rate (L/min): 2 l/min       Intake/Output Summary (Last 24 hours) at 18 1343  Last data filed at 18 0857   Gross per 24 hour   Intake          2718.33 ml   Output              150 ml   Net          2568. 33 ml          Exam:     Physical Exam:    Gen: chronically ill-appearing, mild distress  HEENT:  Pink conjunctivae,  hearing intact to voice, moist mucous membranes  Neck:  Supple, without masses  Resp:  No accessory muscle use, few basilar rales  Card:  No murmurs, normal S1, S2 without thrills, 1+ edema  Abd:  Soft, mild TTP RLQ. Non-distended, normoactive bowel sounds are present  Skin:  No rashes  Neuro:  Cranial nerves 3-12 are grossly intact, follows commands appropriately  Psych:  Alert with fair insight.   Oriented to person, place, and time        Medications Reviewed: (see below)    Lab Data Reviewed: (see below)    ______________________________________________________________________    Medications:     Current Facility-Administered Medications   Medication Dose Route Frequency    calcitRIOL (ROCALTROL) capsule 0.25 mcg  0.25 mcg Oral DAILY    calcium acetate (PHOSLO) capsule 667 mg  1 Cap Oral TID WITH MEALS    epoetin gonzalo (EPOGEN;PROCRIT) injection 10,000 Units  10,000 Units IntraVENous DIALYSIS MON, WED & FRI    piperacillin-tazobactam (ZOSYN) 3.375 g in 0.9% sodium chloride (MBP/ADV) 100 mL ADV  3.375 g IntraVENous Q12H    heparin (porcine) 1,000 unit/mL injection 2,500 Units  2,500 Units Hemodialysis DIALYSIS PRN    naloxone (NARCAN) injection 0.2 mg  0.2 mg IntraVENous EVERY 2 MINUTES AS NEEDED    flumazenil (ROMAZICON) 0.1 mg/mL injection 0.2 mg  0.2 mg IntraVENous Multiple    heparin (porcine) injection 5,000 Units  5,000 Units SubCUTAneous Q12H    oxyCODONE-acetaminophen (PERCOCET) 5-325 mg per tablet 1 Tab  1 Tab Oral Q4H PRN    oxyCODONE-acetaminophen (PERCOCET 10)  mg per tablet 1 Tab  1 Tab Oral Q4H PRN    amLODIPine (NORVASC) tablet 10 mg  10 mg Oral DAILY    ferrous sulfate tablet 325 mg  325 mg Oral DAILY    levothyroxine (SYNTHROID) tablet 100 mcg  100 mcg Oral ACB    metoprolol tartrate (LOPRESSOR) tablet 100 mg  100 mg Oral QHS    sodium chloride (NS) flush 5-10 mL  5-10 mL IntraVENous Q8H    sodium chloride (NS) flush 5-10 mL  5-10 mL IntraVENous PRN    acetaminophen (TYLENOL) tablet 650 mg  650 mg Oral Q4H PRN    HYDROcodone-acetaminophen (NORCO) 5-325 mg per tablet 1 Tab  1 Tab Oral Q4H PRN    HYDROmorphone (PF) (DILAUDID) injection 0.2 mg  0.2 mg IntraVENous Q4H PRN    naloxone (NARCAN) injection 0.4 mg  0.4 mg IntraVENous PRN    diphenhydrAMINE (BENADRYL) injection 12.5 mg  12.5 mg IntraVENous Q4H PRN    ondansetron (ZOFRAN) injection 4 mg  4 mg IntraVENous Q6H PRN    docusate sodium (COLACE) capsule 100 mg  100 mg Oral BID    insulin lispro (HUMALOG) injection   SubCUTAneous AC&HS    glucose chewable tablet 16 g  4 Tab Oral PRN    dextrose (D50W) injection syrg 12.5-25 g  12.5-25 g IntraVENous PRN    glucagon (GLUCAGEN) injection 1 mg  1 mg IntraMUSCular PRN    0.9% sodium chloride infusion 250 mL 250 mL IntraVENous PRN    prochlorperazine (COMPAZINE) injection 5 mg  5 mg IntraVENous Q6H PRN    heparin (porcine) 1,000 unit/mL injection 2,800 Units  2,800 Units IntraVENous Multiple    albuterol-ipratropium (DUO-NEB) 2.5 MG-0.5 MG/3 ML  3 mL Nebulization Q4H PRN    azithromycin (ZITHROMAX) 500 mg in 0.9% sodium chloride 250 mL IVPB  500 mg IntraVENous Q24H            Lab Review:     Recent Labs      03/16/18   0246  03/15/18   0102  03/14/18   0103   WBC  5.6  5.8  5.5   HGB  7.8*  8.4*  8.0*   HCT  24.4*  26.2*  23.9*   PLT  191  214  200     Recent Labs      03/16/18   0246  03/15/18   0102  03/14/18   0103   NA  134*  140  135*   K  3.6  4.2  4.0   CL  99  101  98   CO2  21  25  22   GLU  108*  108*  119*   BUN  31*  27*  53*   CREA  5.39*  4.04*  5.96*   CA  7.1*  7.5*  6.9*   MG   --    --   1.7   PHOS   --    --   4.2   ALB   --    --   2.4*   SGOT   --    --   34   ALT   --    --   52     No components found for: GLPOC  No results for input(s): PH, PCO2, PO2, HCO3, FIO2 in the last 72 hours. No results for input(s): INR in the last 72 hours. No lab exists for component: [place] Nolasco  Lab Results   Component Value Date/Time    Specimen Description: URINE 09/14/2010 02:20 PM     Lab Results   Component Value Date/Time    Culture result: MODERATE NORMAL RESPIRATORY CORINNE 03/13/2018 04:33 AM    Culture result: NO GROWTH 4 DAYS 03/12/2018 10:37 PM    Culture result: NO GROWTH 4 DAYS 03/12/2018 10:37 PM            Assessment / Plan:      Nausea/vomiting/diarrhea: likely multifactorial, uremia, pneumonia, appendicitis. Now sp appendectomy. Stop Zosyn. Continue azithromycin for atypical coverage     Bilateral Pneumonia: seen on CT. Empiric abx as above. Follow micro      CKD 5: progressing to ESRD. HD started, long term HD needs to be determined Monday       Metabolic acidosis: due to ESRD. Off bicarb gtt per nephrology, HD to start. HD line placed      Hyponatremia: due to hypovolemia, renal failure. Improving. Follow Na closely       Anemia: due to ESRD.        Hypocalcemia: low even after correction. Repleted. Follow Ca closely     HTN: cont metoprolol and amlodipine      DM type 2 w/ renal complications: on 08/24 which is currently on hold due to NPO status -- BG ok. A1c not helpful in setting of severe anemia.  Continue SSI       Total time spent with patient: 30 minutes                  Care Plan discussed with: Patient, Nursing Staff and >50% of time spent in counseling and coordination of care    Discussed:  Care Plan and D/C Planning    Prophylaxis:  Hep SQ      Disposition:  Home w/Family           ___________________________________________________    Attending Physician: Jose Durant MD

## 2018-03-16 NOTE — PROGRESS NOTES
[facility]  YOB: 1955          Assessment & Plan:   1. CKD 5/MELVA ON CKD  · Appears ESRD  · HD started 3 13 18, 2nd HD 3 14 18  · 3rd Today and then next week MWF  · I doubt she can come off HD  · We will know slightly better by Monday  · DW her and her family via  today, all ? s were answered via : pt would like to go back to [place] and her sister is on ? PD  · IR to change temp line next week prior to dc  · QM to look for out pt HD  · Hep serologies neg  2. Hyponatremia  · Better, she had HD against low Na bath  3. Hypocalcemia  · Most likely due to CKD  · Check Vit D  · Better : on calctriol for 2 PTH and Phoslo as binders  4. Hyperphosphatemia  · Due to CKD  · Start binders:phoslo with meals    5. Anemia  · IV Iron, iron def  6. DM  7. PNA  8.  HTN  · NOT THE BEST  · Amlodipine, Metoprolol  · Avoid RAAS inhibitors till Monday: start if this is chronic ESRD       Subjective:   CC:ESRD  HPI: Patient seen   HD due today,Dio aware  Feels slightly better,abd pain +  No Family at beside  HTN remains up    Hypocalcemia better  Acidosis better  ROS:neg for 12 sys  Current Facility-Administered Medications   Medication Dose Route Frequency    calcitRIOL (ROCALTROL) capsule 0.25 mcg  0.25 mcg Oral DAILY    calcium acetate (PHOSLO) capsule 667 mg  1 Cap Oral TID WITH MEALS    epoetin gonzalo (EPOGEN;PROCRIT) injection 10,000 Units  10,000 Units IntraVENous DIALYSIS MON, WED & FRI    piperacillin-tazobactam (ZOSYN) 3.375 g in 0.9% sodium chloride (MBP/ADV) 100 mL ADV  3.375 g IntraVENous Q12H    heparin (porcine) 1,000 unit/mL injection 2,500 Units  2,500 Units Hemodialysis DIALYSIS PRN    naloxone (NARCAN) injection 0.2 mg  0.2 mg IntraVENous EVERY 2 MINUTES AS NEEDED    flumazenil (ROMAZICON) 0.1 mg/mL injection 0.2 mg  0.2 mg IntraVENous Multiple    heparin (porcine) injection 5,000 Units  5,000 Units SubCUTAneous Q12H    oxyCODONE-acetaminophen (PERCOCET) 5-325 mg per tablet 1 Tab  1 Tab Oral Q4H PRN    oxyCODONE-acetaminophen (PERCOCET 10)  mg per tablet 1 Tab  1 Tab Oral Q4H PRN    amLODIPine (NORVASC) tablet 10 mg  10 mg Oral DAILY    ferrous sulfate tablet 325 mg  325 mg Oral DAILY    levothyroxine (SYNTHROID) tablet 100 mcg  100 mcg Oral ACB    metoprolol tartrate (LOPRESSOR) tablet 100 mg  100 mg Oral QHS    sodium chloride (NS) flush 5-10 mL  5-10 mL IntraVENous Q8H    sodium chloride (NS) flush 5-10 mL  5-10 mL IntraVENous PRN    acetaminophen (TYLENOL) tablet 650 mg  650 mg Oral Q4H PRN    HYDROcodone-acetaminophen (NORCO) 5-325 mg per tablet 1 Tab  1 Tab Oral Q4H PRN    HYDROmorphone (PF) (DILAUDID) injection 0.2 mg  0.2 mg IntraVENous Q4H PRN    naloxone (NARCAN) injection 0.4 mg  0.4 mg IntraVENous PRN    diphenhydrAMINE (BENADRYL) injection 12.5 mg  12.5 mg IntraVENous Q4H PRN    ondansetron (ZOFRAN) injection 4 mg  4 mg IntraVENous Q6H PRN    docusate sodium (COLACE) capsule 100 mg  100 mg Oral BID    insulin lispro (HUMALOG) injection   SubCUTAneous AC&HS    glucose chewable tablet 16 g  4 Tab Oral PRN    dextrose (D50W) injection syrg 12.5-25 g  12.5-25 g IntraVENous PRN    glucagon (GLUCAGEN) injection 1 mg  1 mg IntraMUSCular PRN    0.9% sodium chloride infusion 250 mL  250 mL IntraVENous PRN    prochlorperazine (COMPAZINE) injection 5 mg  5 mg IntraVENous Q6H PRN    0.9% sodium chloride infusion  100 mL/hr IntraVENous CONTINUOUS    heparin (porcine) 1,000 unit/mL injection 2,800 Units  2,800 Units IntraVENous Multiple    albuterol-ipratropium (DUO-NEB) 2.5 MG-0.5 MG/3 ML  3 mL Nebulization Q4H PRN    azithromycin (ZITHROMAX) 500 mg in 0.9% sodium chloride 250 mL IVPB  500 mg IntraVENous Q24H          Objective:     Vitals:  Blood pressure 173/79, pulse 76, temperature 98.1 °F (36.7 °C), resp.  rate 20, height 5' 3\" (1.6 m), weight 68.1 kg (150 lb 1.6 oz), SpO2 91 %.  Temp (24hrs), Av.4 °F (36.9 °C), Min:98.1 °F (36.7 °C), Max:99 °F (37.2 °C)      Intake and Output:  701 - 1900  In: 224 [I.V.:850]  Out: -   1901 -  0700  In: 2788.3 [P.O.:750; I.V.:2038.3]  Out: 150 [Urine:150]    Physical Exam:                Patient is intubated:  no    Physical Examination:   GENERAL ASSESSMENT: NAD  HEENT:Nontraumatic   CHEST: CTA  HEART: S1S2  ABDOMEN: Soft,NT,  :Valentin: n  EXTREMITY: EDEMA n  NEURO:Grossly non focal          ECG/rhythm:    Data Review      No results for input(s): TNIPOC in the last 72 hours. No lab exists for component: ITNL   No results for input(s): CPK, CKMB, TROIQ in the last 72 hours. Recent Labs      18   0246  03/15/18   0102  18   0103   NA  134*  140  135*   K  3.6  4.2  4.0   CL  99  101  98   CO2  21  25  22   BUN  31*  27*  53*   CREA  5.39*  4.04*  5.96*   GLU  108*  108*  119*   PHOS   --    --   4.2   MG   --    --   1.7   CA  7.1*  7.5*  6.9*   ALB   --    --   2.4*   WBC  5.6  5.8  5.5   HGB  7.8*  8.4*  8.0*   HCT  24.4*  26.2*  23.9*   PLT  191  214  200      No results for input(s): INR, PTP, APTT in the last 72 hours. No lab exists for component: INREXT, INREXT  Needs: urine analysis, urine sodium, protein and creatinine  Lab Results   Component Value Date/Time    Sodium urine, random 38 2017 01:54 PM    Creatinine, urine 61.11 2017 08:08 PM         Discussed with:  Family    : Ai Parks MD  3/16/2018        [facility]:  [email or web address]  [place] office:  [address] [place] [address], [address] [place] [address],[address]  [place], [address] [facility]  Phone: [phone]  Fax :     [phone]    [place] office:  [address] [facility]  [place], [facility] [address]   Phone - [phone]  Fax - [phone]

## 2018-03-16 NOTE — PROGRESS NOTES
Received patient as transfer from [facility] to room 404. PAtient is alert and talkative. English is limited. Khmer ids main language. Family at bedside.  Primary Nurse Anthony Wu, RN and Laurie Foley RN, RN performed a dual skin assessment on this patient Impairment noted- see wound doc flow sheet  Javier score is 19

## 2018-03-16 NOTE — PROGRESS NOTES
Problem: Falls - Risk of  Goal: *Absence of Falls  Document Zuly Fall Risk and appropriate interventions in the flowsheet.    Outcome: Progressing Towards Goal  Fall Risk Interventions:  Mobility Interventions: Utilize walker, cane, or other assitive device         Medication Interventions: Teach patient to arise slowly    Elimination Interventions: Call light in reach    History of Falls Interventions: Bed/chair exit alarm

## 2018-03-16 NOTE — PROGRESS NOTES
Problem: Mobility Impaired (Adult and Pediatric)  Goal: *Acute Goals and Plan of Care (Insert Text)  Physical Therapy Goals  Initiated 3/15/2018  1. Patient will move from supine to sit and sit to supine , scoot up and down and roll side to side in bed with independence within 7 day(s). 2.  Patient will transfer from bed to chair and chair to bed with independence using the least restrictive device within 7 day(s). 3.  Patient will perform sit to stand with independence within 7 day(s). 4.  Patient will ambulate with modified independence for 200 feet with the least restrictive device within 7 day(s). physical Therapy TREATMENT  Patient: Vanessa Baeza (50 y.o. female)  Date: 3/16/2018  Diagnosis: Pneumonia  Appendicitis Pneumonia  Procedure(s) (LRB):  APPENDECTOMY LAPAROSCOPIC (N/A) 2 Days Post-Op  Precautions:    Chart, physical therapy assessment, plan of care and goals were reviewed. ASSESSMENT:  Based on the objective data described below, patient participated well with treatment today. Sit on the edge of bed independently, sit to stand supervision, ambulate without assistive device in the room towards the wheelchair patient getting ready to transfer to 4th floor. Daughter in the room during the entire therapy sessions and translating for the patient. Daughter and spouse stated patient had her left toe amputated more than 2 years ago and has been ambulating without assistive device prior to admission. Nurse int he room ready to transport patient to 4th floor. Progression toward goals:  [x]    Improving appropriately and progressing toward goals  []    Improving slowly and progressing toward goals  []    Not making progress toward goals and plan of care will be adjusted     PLAN:  Patient continues to benefit from skilled intervention to address the above impairments. Continue treatment per established plan of care.   Discharge Recommendations:  None  Further Equipment Recommendations for Discharge:  none     SUBJECTIVE:   Daughter translating for the patient    OBJECTIVE DATA SUMMARY:   Critical Behavior:  Neurologic State: Drowsy  Orientation Level: Oriented X4  Cognition: Follows commands  Safety/Judgement: Awareness of environment  Functional Mobility Training:  Bed Mobility:  Rolling: Independent  Supine to Sit: Independent  Sit to Supine: Independent  Scooting: Independent        Transfers:  Sit to Stand: Supervision  Stand to Sit: Supervision  Stand Pivot Transfers: Supervision     Bed to Chair: Supervision                    Balance:  Sitting: Intact  Standing: Intact; Without support  Standing - Static: Good  Standing - Dynamic : Fair  Ambulation/Gait Training:  Distance (ft): 30 Feet (ft)     Ambulation - Level of Assistance: Stand-by assistance     Gait Description (WDL): Within defined limits  Gait Abnormalities: Path deviations        Base of Support: Widened     Speed/Helen: Pace decreased (<100 feet/min)             Therapeutic Exercises:    Instructed patient to continue active range of motion exercise on both legs while up on chair or on bed. Pain:  Pain Scale 1: Numeric (0 - 10)  Pain Intensity 1: 8  Pain Location 1: Abdomen  Pain Orientation 1: Anterior  Pain Description 1: Aching  Pain Intervention(s) 1: Medication (see MAR)  Activity Tolerance:   Good. Please refer to the flowsheet for vital signs taken during this treatment. After treatment:   [x]    Patient left in no apparent distress sitting up in chair  []    Patient left in no apparent distress in bed  [x]    Call bell left within reach  [x]    Nursing notified  [x]    Caregiver present  []    Bed alarm activated    COMMUNICATION/COLLABORATION:   The patients plan of care was discussed with: Registered Nurse and patient    Jessica Ly PT,WCC.    Time Calculation: 24 mins

## 2018-03-16 NOTE — DIALYSIS
[facility]  [phone]    Vitals   Pre   Post   Assessment   Pre   Post     Temp  Temp: 98.3 °F (36.8 °C) (03/16/18 1420)  98.3 oral  LOC  Alert; primarily speaks Divehi   Alert; primarily speaks Divehi    HR   Pulse (Heart Rate): 76 (03/16/18 1420) 87 Lungs   Coarse   Diminished    B/P   BP: 156/65 (03/16/18 1420) 194/87 Cardiac   HRR   HRR    Resp   Resp Rate: 16 (03/16/18 1420) 16  Skin   Warm, Dry   Warm, Dry    Pain level  No distress noted. No distress noted. Edema  Generalized      Generalized    Orders:    Duration:   Start:   0350 End:   1750 Total:   3.5 Hours    Dialyzer:   Dialyzer/Set Up Inspection: Soniya Arzate (03/16/18 1420)   K Bath:   Dialysate K (mEq/L): 3 (03/16/18 1420)   Ca Bath:   Dialysate CA (mEq/L): 2.5 (03/16/18 1420)   Na/Bicarb:   Dialysate NA (mEq/L): 140 (03/16/18 1420)   Target Fluid Removal:   Goal/Amount of Fluid to Remove (mL): 1000 mL (03/16/18 1420)   Access     Type & Location:   Right IJ Catheter - No s/x of infection. No drainage noted. Dressing dry and intact. Ports disinfected per protocol, aspirated (5ml each port, discarded)  and flushed with NS 0.9%. Lines connected and treatment initiated.     Labs     Obtained/Reviewed   Critical Results Called   Date when labs were drawn- 3/16/18   Hgb-    HGB   Date Value Ref Range Status   03/16/2018 7.8 (L) 11.5 - 16.0 g/dL Final     K-    Potassium   Date Value Ref Range Status   03/16/2018 3.6 3.5 - 5.1 mmol/L Final     Ca-   Calcium   Date Value Ref Range Status   03/16/2018 7.1 (L) 8.5 - 10.1 MG/DL Final     Bun-   BUN   Date Value Ref Range Status   03/16/2018 31 (H) 6 - 20 MG/DL Final     Creat-   Creatinine   Date Value Ref Range Status   03/16/2018 5.39 (H) 0.55 - 1.02 MG/DL Final     Comment:     INVESTIGATED PER DELTA CHECK PROTOCOL        Medications/ Blood Products Given     Name   Dose   Route and Time     Epogen   10,000Units/1mL IVP @ 1631    Heparin  1,100 units/1.1 mL arterial  1,400 units/1.4 mL venous    Intra-catheter @ 1753        Blood Volume Processed (BVP):    75.5 L  Net Fluid   Removed:  1000 mL    Comments   Time Out Done: Yes   Primary Nurse Rpt Pre: Gerald Ayoub RN  Primary Nurse Rpt Post: Willy Johansen RN   Pt Education: Procedural   Care Plan: Continue with dialysis as ordered   Tx Summary: Treatment completed per order. Ports disinfected per protocol, all possible blood rinsed back; lines disconnected, ports flushed with NS 0.9%, and capped. VSS. Bed in lowest position, call button, phone and personal belongings are within reach. Report given to primary nurse. Admiting Diagnosis: CKD 5/MELVA ON CKD  Pt's previous clinic- NA   Consent signed - Informed Consent Verified: Yes (03/16/18 1420)  Amaita Consent - Yes   Hepatitis Status- Negative 3/13/18   Machine #- Machine Number: [id number] (03/16/18 1420)  Telemetry status-  Pre-dialysis wt.- 69.4 kg   Post-dialysis wt.  - 68.0 kg

## 2018-03-17 PROBLEM — N18.6 ESRD (END STAGE RENAL DISEASE) (HCC): Chronic | Status: ACTIVE | Noted: 2018-03-12

## 2018-03-17 PROBLEM — E87.1 HYPONATREMIA: Status: RESOLVED | Noted: 2018-03-12 | Resolved: 2018-03-17

## 2018-03-17 LAB
GLUCOSE BLD STRIP.AUTO-MCNC: 139 MG/DL (ref 65–100)
GLUCOSE BLD STRIP.AUTO-MCNC: 150 MG/DL (ref 65–100)
GLUCOSE BLD STRIP.AUTO-MCNC: 160 MG/DL (ref 65–100)
GLUCOSE BLD STRIP.AUTO-MCNC: 93 MG/DL (ref 65–100)
SERVICE CMNT-IMP: ABNORMAL
SERVICE CMNT-IMP: NORMAL

## 2018-03-17 PROCEDURE — 74011250637 HC RX REV CODE- 250/637: Performed by: INTERNAL MEDICINE

## 2018-03-17 PROCEDURE — 74011250636 HC RX REV CODE- 250/636: Performed by: INTERNAL MEDICINE

## 2018-03-17 PROCEDURE — 65660000000 HC RM CCU STEPDOWN

## 2018-03-17 PROCEDURE — 97530 THERAPEUTIC ACTIVITIES: CPT

## 2018-03-17 PROCEDURE — 82962 GLUCOSE BLOOD TEST: CPT

## 2018-03-17 PROCEDURE — 97116 GAIT TRAINING THERAPY: CPT

## 2018-03-17 PROCEDURE — 74011636637 HC RX REV CODE- 636/637: Performed by: INTERNAL MEDICINE

## 2018-03-17 RX ORDER — HYDRALAZINE HYDROCHLORIDE 25 MG/1
50 TABLET, FILM COATED ORAL 3 TIMES DAILY
Status: DISCONTINUED | OUTPATIENT
Start: 2018-03-17 | End: 2018-03-19

## 2018-03-17 RX ORDER — HYDRALAZINE HYDROCHLORIDE 20 MG/ML
20 INJECTION INTRAMUSCULAR; INTRAVENOUS
Status: DISCONTINUED | OUTPATIENT
Start: 2018-03-17 | End: 2018-03-30 | Stop reason: HOSPADM

## 2018-03-17 RX ADMIN — CALCIUM ACETATE 667 MG: 667 CAPSULE ORAL at 09:28

## 2018-03-17 RX ADMIN — HYDRALAZINE HYDROCHLORIDE 50 MG: 25 TABLET ORAL at 21:46

## 2018-03-17 RX ADMIN — Medication 10 ML: at 21:48

## 2018-03-17 RX ADMIN — HYDRALAZINE HYDROCHLORIDE 20 MG: 20 INJECTION INTRAMUSCULAR; INTRAVENOUS at 11:55

## 2018-03-17 RX ADMIN — FERROUS SULFATE TAB 325 MG (65 MG ELEMENTAL FE) 325 MG: 325 (65 FE) TAB at 09:32

## 2018-03-17 RX ADMIN — Medication 10 ML: at 16:29

## 2018-03-17 RX ADMIN — HYDRALAZINE HYDROCHLORIDE 50 MG: 25 TABLET ORAL at 16:28

## 2018-03-17 RX ADMIN — CALCIUM ACETATE 667 MG: 667 CAPSULE ORAL at 16:27

## 2018-03-17 RX ADMIN — HYDROCODONE BITARTRATE AND ACETAMINOPHEN 1 TABLET: 5; 325 TABLET ORAL at 19:47

## 2018-03-17 RX ADMIN — HYDRALAZINE HYDROCHLORIDE 20 MG: 20 INJECTION INTRAMUSCULAR; INTRAVENOUS at 18:45

## 2018-03-17 RX ADMIN — METOPROLOL TARTRATE 100 MG: 50 TABLET ORAL at 21:47

## 2018-03-17 RX ADMIN — CALCIUM ACETATE 667 MG: 667 CAPSULE ORAL at 12:48

## 2018-03-17 RX ADMIN — AMLODIPINE BESYLATE 10 MG: 5 TABLET ORAL at 09:27

## 2018-03-17 RX ADMIN — HEPARIN SODIUM 5000 UNITS: 5000 INJECTION, SOLUTION INTRAVENOUS; SUBCUTANEOUS at 21:46

## 2018-03-17 RX ADMIN — HEPARIN SODIUM 5000 UNITS: 5000 INJECTION, SOLUTION INTRAVENOUS; SUBCUTANEOUS at 09:32

## 2018-03-17 RX ADMIN — HYDRALAZINE HYDROCHLORIDE 50 MG: 25 TABLET ORAL at 09:29

## 2018-03-17 RX ADMIN — LEVOTHYROXINE SODIUM 100 MCG: 100 TABLET ORAL at 06:08

## 2018-03-17 RX ADMIN — Medication 10 ML: at 06:08

## 2018-03-17 RX ADMIN — INSULIN LISPRO 2 UNITS: 100 INJECTION, SOLUTION INTRAVENOUS; SUBCUTANEOUS at 12:48

## 2018-03-17 RX ADMIN — INSULIN LISPRO 2 UNITS: 100 INJECTION, SOLUTION INTRAVENOUS; SUBCUTANEOUS at 17:41

## 2018-03-17 RX ADMIN — CALCITRIOL 0.25 MCG: 0.25 CAPSULE, LIQUID FILLED ORAL at 09:25

## 2018-03-17 NOTE — PROGRESS NOTES
Problem: Patient Education: Go to Patient Education Activity  Goal: Patient/Family Education  physical Therapy TREATMENT  Patient: Ally Stahl (88 y.o. female)  Date: 3/17/2018  Diagnosis: Pneumonia  Appendicitis Pneumonia  Procedure(s) (LRB):  APPENDECTOMY LAPAROSCOPIC (N/A) 3 Days Post-Op  Precautions:    Chart, physical therapy assessment, plan of care and goals were reviewed. ASSESSMENT:  Pt SBA supine to sit to stand. Pt ambulated 130ft with no device SBS. Pts SPO2 dropped to 85% on RA with ambulation. Pt was returned to 3L of O2 to finish ambulation with SPO2 increased to 91%. Pt SBA back to bed. Pt tolerated treatment fairly well. Continue goals. .  Progression toward goals:  [x]    Improving appropriately and progressing toward goals  []    Improving slowly and progressing toward goals  []    Not making progress toward goals and plan of care will be adjusted     PLAN:  Patient continues to benefit from skilled intervention to address the above impairments. Continue treatment per established plan of care. Discharge Recommendations:  None  Further Equipment Recommendations for Discharge:       SUBJECTIVE:       OBJECTIVE DATA SUMMARY:   Critical Behavior:  Neurologic State: Alert  Orientation Level: Oriented X4  Cognition: Follows commands  Safety/Judgement: Awareness of environment  Functional Mobility Training:  Bed Mobility:     Supine to Sit: Stand-by assistance  Sit to Supine: Stand-by assistance           Transfers:  Sit to Stand: Stand-by assistance  Stand to Sit: Stand-by assistance                             Balance:  Sitting: Intact  Standing: Intact  Ambulation/Gait Training:     Assistive Device: Gait belt   130ft  Ambulation - Level of Assistance: Stand-by assistance                 Base of Support: Narrowed     Speed/Helen: Pace decreased (<100 feet/min)                   Pain:                    Activity Tolerance:   Pt tolerated treatment well.   Please refer to the flowsheet for vital signs taken during this treatment.   After treatment:   []    Patient left in no apparent distress sitting up in chair  [x]    Patient left in no apparent distress in bed  []    Call bell left within reach  []    Nursing notified  []    Caregiver present  []    Bed alarm activated    COMMUNICATION/COLLABORATION:   The patients plan of care was discussed with: Physical Therapist    Cece Allen PTA   Time Calculation: 38 mins

## 2018-03-17 NOTE — PROGRESS NOTES
[facility] [place] [address]  [facility] [place] [address] [place] [address]  [phone]      Medical Progress Note      NAME: Vanessa Baeza   :  1955  MRM:  [id number]    Date/Time: 3/17/2018  9:40 AM         Subjective:     Chief Complaint:  Pain: abdomen, mild, intermittent, crampy    ROS:  (bold if positive, if negative)                        Tolerating PT  Tolerating Diet          Objective:       Vitals:          Last 24hrs VS reviewed since prior progress note.  Most recent are:    Visit Vitals    /82 (BP 1 Location: Right arm, BP Patient Position: At rest)    Pulse 89    Temp 98.4 °F (36.9 °C)    Resp 14    Ht 5' 3\" (1.6 m)    Wt 68.1 kg (150 lb 1.6 oz)    SpO2 (!) 89%    BMI 26.59 kg/m2     SpO2 Readings from Last 6 Encounters:   18 (!) 89%   17 100%   17 97%   14 98%   14 99%   11 94%    O2 Flow Rate (L/min): 2 l/min     Intake/Output Summary (Last 24 hours) at 18 0940  Last data filed at 18 1750   Gross per 24 hour   Intake                0 ml   Output             1000 ml   Net            -1000 ml          Exam:     Physical Exam:    Gen:  Well-developed, well-nourished, in no acute distress  HEENT:  Pink conjunctivae, PERRL, hearing intact to voice, moist mucous membranes  Neck:  Supple, without masses, thyroid non-tender  Resp:  No accessory muscle use, clear breath sounds without wheezes rales or rhonchi  Card:  No murmurs, normal S1, S2 without thrills, bruits or peripheral edema  Abd:  Soft, non-tender, non-distended, normoactive bowel sounds are present, no palpable organomegaly and no detectable hernias  Lymph:  No cervical or inguinal adenopathy  Musc:  No cyanosis or clubbing  Skin:  No rashes or ulcers, skin turgor is good  Neuro:  Cranial nerves are grossly intact, no focal motor weakness, follows commands appropriately  Psych:  Good insight, oriented to person, place and time, alert    Medications Reviewed: (see below)    Lab Data Reviewed: (see below)    ______________________________________________________________________    Medications:     Current Facility-Administered Medications   Medication Dose Route Frequency    hydrALAZINE (APRESOLINE) 20 mg/mL injection 20 mg  20 mg IntraVENous Q6H PRN    hydrALAZINE (APRESOLINE) tablet 50 mg  50 mg Oral TID    calcitRIOL (ROCALTROL) capsule 0.25 mcg  0.25 mcg Oral DAILY    calcium acetate (PHOSLO) capsule 667 mg  1 Cap Oral TID WITH MEALS    epoetin gonzalo (EPOGEN;PROCRIT) injection 10,000 Units  10,000 Units IntraVENous DIALYSIS MON, WED & FRI    heparin (porcine) 1,000 unit/mL injection 2,500 Units  2,500 Units Hemodialysis DIALYSIS PRN    naloxone (NARCAN) injection 0.2 mg  0.2 mg IntraVENous EVERY 2 MINUTES AS NEEDED    flumazenil (ROMAZICON) 0.1 mg/mL injection 0.2 mg  0.2 mg IntraVENous Multiple    heparin (porcine) injection 5,000 Units  5,000 Units SubCUTAneous Q12H    oxyCODONE-acetaminophen (PERCOCET) 5-325 mg per tablet 1 Tab  1 Tab Oral Q4H PRN    oxyCODONE-acetaminophen (PERCOCET 10)  mg per tablet 1 Tab  1 Tab Oral Q4H PRN    amLODIPine (NORVASC) tablet 10 mg  10 mg Oral DAILY    ferrous sulfate tablet 325 mg  325 mg Oral DAILY    levothyroxine (SYNTHROID) tablet 100 mcg  100 mcg Oral ACB    metoprolol tartrate (LOPRESSOR) tablet 100 mg  100 mg Oral QHS    sodium chloride (NS) flush 5-10 mL  5-10 mL IntraVENous Q8H    sodium chloride (NS) flush 5-10 mL  5-10 mL IntraVENous PRN    acetaminophen (TYLENOL) tablet 650 mg  650 mg Oral Q4H PRN    HYDROcodone-acetaminophen (NORCO) 5-325 mg per tablet 1 Tab  1 Tab Oral Q4H PRN    HYDROmorphone (PF) (DILAUDID) injection 0.2 mg  0.2 mg IntraVENous Q4H PRN    naloxone (NARCAN) injection 0.4 mg  0.4 mg IntraVENous PRN    diphenhydrAMINE (BENADRYL) injection 12.5 mg  12.5 mg IntraVENous Q4H PRN    ondansetron (ZOFRAN) injection 4 mg  4 mg IntraVENous Q6H PRN    docusate sodium (COLACE) capsule 100 mg  100 mg Oral BID    insulin lispro (HUMALOG) injection   SubCUTAneous AC&HS    glucose chewable tablet 16 g  4 Tab Oral PRN    dextrose (D50W) injection syrg 12.5-25 g  12.5-25 g IntraVENous PRN    glucagon (GLUCAGEN) injection 1 mg  1 mg IntraMUSCular PRN    0.9% sodium chloride infusion 250 mL  250 mL IntraVENous PRN    prochlorperazine (COMPAZINE) injection 5 mg  5 mg IntraVENous Q6H PRN    heparin (porcine) 1,000 unit/mL injection 2,800 Units  2,800 Units IntraVENous Multiple    albuterol-ipratropium (DUO-NEB) 2.5 MG-0.5 MG/3 ML  3 mL Nebulization Q4H PRN            Lab Review:     Recent Labs      03/16/18   0246  03/15/18   0102   WBC  5.6  5.8   HGB  7.8*  8.4*   HCT  24.4*  26.2*   PLT  191  214     Recent Labs      03/16/18   0246  03/15/18   0102   NA  134*  140   K  3.6  4.2   CL  99  101   CO2  21  25   GLU  108*  108*   BUN  31*  27*   CREA  5.39*  4.04*   CA  7.1*  7.5*     Lab Results   Component Value Date/Time    Glucose (POC) 93 03/17/2018 07:36 AM    Glucose (POC) 142 (H) 03/16/2018 09:04 PM    Glucose (POC) 107 (H) 03/16/2018 05:40 PM    Glucose (POC) 119 (H) 03/16/2018 11:12 AM    Glucose (POC) 102 (H) 03/16/2018 07:29 AM     No results for input(s): PH, PCO2, PO2, HCO3, FIO2 in the last 72 hours. No results for input(s): INR in the last 72 hours.     No lab exists for component: INREXT  Lab Results   Component Value Date/Time    Specimen Description: URINE 09/14/2010 02:20 PM     Lab Results   Component Value Date/Time    Culture result: MODERATE NORMAL RESPIRATORY CORINNE 03/13/2018 04:33 AM    Culture result: NO GROWTH 5 DAYS 03/12/2018 10:37 PM    Culture result: NO GROWTH 5 DAYS 03/12/2018 10:37 PM            Assessment:     Principal Problem:    Pneumonia (3/12/2018)    Active Problems:    Type 2 diabetes mellitus with renal complication ([address] (9/55/9659)      HTN (hypertension) (6/12/2014)      Anemia (1/23/8446)      Metabolic acidosis (26/70/1982)      Nausea & vomiting (3/12/2018)      Diarrhea (3/12/2018)      ESRD (end stage renal disease) ([facility] [place] [facility] (3/12/2018)           Plan:     Principal Problem:    Pneumonia (3/12/2018)   - completed 5 days of azithromycin, off all other antibiotics    Active Problems:    Type 2 diabetes mellitus with renal complication ([facility]) (1/25/9154)   - BS okay off 70/30   - follow on SSI      HTN (hypertension) (6/12/2014)   - BP up at times, PRN hydralazine ordered and add scheduled hydralazine PO (Renal wants to avoid RAAS inhibitors)      Anemia (6/13/2014)    - Hgb stable, follow      Metabolic acidosis (34/39/6074)   - resolved      Nausea & vomiting (3/12/2018)/Diarrhea (3/12/2018)   - resolved      ESRD (end stage renal disease) ([facility] [place] [facility] (3/12/2018)   - started on dialysis, CM arranging outpatient dialysis      Total time spent with patient: 25 minutes                  Care Plan discussed with: Patient and Nursing Staff    Discussed:  Code Status, Care Plan and D/C Planning    Prophylaxis:  Hep SQ    Disposition:  HH PT, OT, RN           ___________________________________________________    Attending Physician: Guillermina Jimenez MD

## 2018-03-17 NOTE — PROGRESS NOTES
MD notified Patient with Diarrhea:    MD notified patient with IV antibiotics during this stay and now intractable diarrhea. >4 watery stools including incontinence episode (patient is not normally incontinent.)     All criteria met for C-diff testing except that patient was given colace once yesterday evening. (Colace was held yesterday morning and this morning for loose stools). Discussed with charge nurse and with MD. Per Protocol, orders for C-diff testing deferred until all  3 criteria met for testing.

## 2018-03-17 NOTE — PROGRESS NOTES
Problem: Falls - Risk of  Goal: *Absence of Falls  Document Zuly Fall Risk and appropriate interventions in the flowsheet.    Outcome: Progressing Towards Goal  Fall Risk Interventions:  Mobility Interventions: Bed/chair exit alarm, Patient to call before getting OOB         Medication Interventions: Patient to call before getting OOB, Teach patient to arise slowly    Elimination Interventions: Call light in reach, Patient to call for help with toileting needs    History of Falls Interventions: Bed/chair exit alarm

## 2018-03-18 PROBLEM — R11.2 NAUSEA & VOMITING: Status: RESOLVED | Noted: 2018-03-12 | Resolved: 2018-03-18

## 2018-03-18 PROBLEM — E87.20 METABOLIC ACIDOSIS: Status: RESOLVED | Noted: 2017-11-22 | Resolved: 2018-03-18

## 2018-03-18 LAB
ANION GAP SERPL CALC-SCNC: 10 MMOL/L (ref 5–15)
BACTERIA SPEC CULT: NORMAL
BACTERIA SPEC CULT: NORMAL
BUN SERPL-MCNC: 15 MG/DL (ref 6–20)
BUN/CREAT SERPL: 3 (ref 12–20)
CALCIUM SERPL-MCNC: 7.7 MG/DL (ref 8.5–10.1)
CHLORIDE SERPL-SCNC: 100 MMOL/L (ref 97–108)
CO2 SERPL-SCNC: 27 MMOL/L (ref 21–32)
CREAT SERPL-MCNC: 4.38 MG/DL (ref 0.55–1.02)
ERYTHROCYTE [DISTWIDTH] IN BLOOD BY AUTOMATED COUNT: 17 % (ref 11.5–14.5)
GLUCOSE BLD STRIP.AUTO-MCNC: 126 MG/DL (ref 65–100)
GLUCOSE BLD STRIP.AUTO-MCNC: 162 MG/DL (ref 65–100)
GLUCOSE BLD STRIP.AUTO-MCNC: 174 MG/DL (ref 65–100)
GLUCOSE BLD STRIP.AUTO-MCNC: 178 MG/DL (ref 65–100)
GLUCOSE SERPL-MCNC: 122 MG/DL (ref 65–100)
HCT VFR BLD AUTO: 26.6 % (ref 35–47)
HGB BLD-MCNC: 8.5 G/DL (ref 11.5–16)
MAGNESIUM SERPL-MCNC: 1.8 MG/DL (ref 1.6–2.4)
MCH RBC QN AUTO: 25.6 PG (ref 26–34)
MCHC RBC AUTO-ENTMCNC: 32 G/DL (ref 30–36.5)
MCV RBC AUTO: 80.1 FL (ref 80–99)
NRBC # BLD: 0 K/UL (ref 0–0.01)
NRBC BLD-RTO: 0 PER 100 WBC
PHOSPHATE SERPL-MCNC: 3 MG/DL (ref 2.6–4.7)
PLATELET # BLD AUTO: 288 K/UL (ref 150–400)
PMV BLD AUTO: 8 FL (ref 8.9–12.9)
POTASSIUM SERPL-SCNC: 3.2 MMOL/L (ref 3.5–5.1)
RBC # BLD AUTO: 3.32 M/UL (ref 3.8–5.2)
SERVICE CMNT-IMP: ABNORMAL
SERVICE CMNT-IMP: NORMAL
SERVICE CMNT-IMP: NORMAL
SODIUM SERPL-SCNC: 137 MMOL/L (ref 136–145)
WBC # BLD AUTO: 8.9 K/UL (ref 3.6–11)

## 2018-03-18 PROCEDURE — 65660000000 HC RM CCU STEPDOWN

## 2018-03-18 PROCEDURE — 80048 BASIC METABOLIC PNL TOTAL CA: CPT | Performed by: INTERNAL MEDICINE

## 2018-03-18 PROCEDURE — 85027 COMPLETE CBC AUTOMATED: CPT | Performed by: INTERNAL MEDICINE

## 2018-03-18 PROCEDURE — 74011636637 HC RX REV CODE- 636/637: Performed by: INTERNAL MEDICINE

## 2018-03-18 PROCEDURE — 74011250637 HC RX REV CODE- 250/637: Performed by: INTERNAL MEDICINE

## 2018-03-18 PROCEDURE — 84100 ASSAY OF PHOSPHORUS: CPT | Performed by: INTERNAL MEDICINE

## 2018-03-18 PROCEDURE — 36415 COLL VENOUS BLD VENIPUNCTURE: CPT | Performed by: INTERNAL MEDICINE

## 2018-03-18 PROCEDURE — 82962 GLUCOSE BLOOD TEST: CPT

## 2018-03-18 PROCEDURE — 83735 ASSAY OF MAGNESIUM: CPT | Performed by: INTERNAL MEDICINE

## 2018-03-18 PROCEDURE — 74011250637 HC RX REV CODE- 250/637: Performed by: SURGERY

## 2018-03-18 PROCEDURE — 74011250636 HC RX REV CODE- 250/636: Performed by: INTERNAL MEDICINE

## 2018-03-18 RX ADMIN — HEPARIN SODIUM 5000 UNITS: 5000 INJECTION, SOLUTION INTRAVENOUS; SUBCUTANEOUS at 21:22

## 2018-03-18 RX ADMIN — CALCIUM ACETATE 667 MG: 667 CAPSULE ORAL at 18:12

## 2018-03-18 RX ADMIN — OXYCODONE AND ACETAMINOPHEN 1 TABLET: 10; 325 TABLET ORAL at 18:47

## 2018-03-18 RX ADMIN — OXYCODONE AND ACETAMINOPHEN 1 TABLET: 10; 325 TABLET ORAL at 15:36

## 2018-03-18 RX ADMIN — HYDRALAZINE HYDROCHLORIDE 50 MG: 25 TABLET ORAL at 15:36

## 2018-03-18 RX ADMIN — HEPARIN SODIUM 5000 UNITS: 5000 INJECTION, SOLUTION INTRAVENOUS; SUBCUTANEOUS at 08:00

## 2018-03-18 RX ADMIN — CALCITRIOL 0.25 MCG: 0.25 CAPSULE, LIQUID FILLED ORAL at 08:00

## 2018-03-18 RX ADMIN — HYDRALAZINE HYDROCHLORIDE 50 MG: 25 TABLET ORAL at 08:00

## 2018-03-18 RX ADMIN — HYDRALAZINE HYDROCHLORIDE 50 MG: 25 TABLET ORAL at 21:22

## 2018-03-18 RX ADMIN — Medication 10 ML: at 06:35

## 2018-03-18 RX ADMIN — Medication 10 ML: at 14:00

## 2018-03-18 RX ADMIN — FERROUS SULFATE TAB 325 MG (65 MG ELEMENTAL FE) 325 MG: 325 (65 FE) TAB at 08:00

## 2018-03-18 RX ADMIN — OXYCODONE AND ACETAMINOPHEN 1 TABLET: 10; 325 TABLET ORAL at 07:54

## 2018-03-18 RX ADMIN — OXYCODONE AND ACETAMINOPHEN 1 TABLET: 10; 325 TABLET ORAL at 11:30

## 2018-03-18 RX ADMIN — METOPROLOL TARTRATE 100 MG: 50 TABLET ORAL at 21:22

## 2018-03-18 RX ADMIN — INSULIN LISPRO 2 UNITS: 100 INJECTION, SOLUTION INTRAVENOUS; SUBCUTANEOUS at 17:00

## 2018-03-18 RX ADMIN — LEVOTHYROXINE SODIUM 100 MCG: 100 TABLET ORAL at 06:34

## 2018-03-18 RX ADMIN — CALCIUM ACETATE 667 MG: 667 CAPSULE ORAL at 07:54

## 2018-03-18 RX ADMIN — Medication 10 ML: at 21:23

## 2018-03-18 RX ADMIN — AMLODIPINE BESYLATE 10 MG: 5 TABLET ORAL at 08:00

## 2018-03-18 RX ADMIN — CALCIUM ACETATE 667 MG: 667 CAPSULE ORAL at 11:30

## 2018-03-18 RX ADMIN — INSULIN LISPRO 2 UNITS: 100 INJECTION, SOLUTION INTRAVENOUS; SUBCUTANEOUS at 11:31

## 2018-03-18 NOTE — PROGRESS NOTES
Blood pressure persistently elevated throughout day. Hydralazine given per existing orders blood pressure lowered to 150's but subsequently returned to 190's. MD aware. See vitals and MAR.

## 2018-03-18 NOTE — PROGRESS NOTES
[facility]  [address]  [phone]      Medical Progress Note      NAME: Jimbo Gallegos   :  1955  MRM:  [id number]    Date/Time: 3/18/2018  8:12 AM          Subjective:     Chief Complaint:  Pain: abdomen, mild, intermittent, crampy    ROS:  (bold if positive, if negative)                        Tolerating PT  Tolerating Diet          Objective:       Vitals:          Last 24hrs VS reviewed since prior progress note.  Most recent are:    Visit Vitals    /70 (BP 1 Location: Right arm, BP Patient Position: At rest)    Pulse 87    Temp 99.3 °F (37.4 °C)    Resp 16    Ht 5' 3\" (1.6 m)    Wt 68.1 kg (150 lb 1.6 oz)    SpO2 95%    BMI 26.59 kg/m2     SpO2 Readings from Last 6 Encounters:   18 95%   17 100%   17 97%   14 98%   14 99%   11 94%    O2 Flow Rate (L/min): 2 l/min   No intake or output data in the 24 hours ending 18 1889       Exam:     Physical Exam:    Gen:  Well-developed, well-nourished, in no acute distress  HEENT:  Pink conjunctivae, PERRL, hearing intact to voice, moist mucous membranes  Neck:  Supple, without masses, thyroid non-tender  Resp:  No accessory muscle use, clear breath sounds without wheezes rales or rhonchi  Card:  No murmurs, normal S1, S2 without thrills, bruits or peripheral edema  Abd:  Soft, non-tender, non-distended, normoactive bowel sounds are present, no palpable organomegaly and no detectable hernias  Lymph:  No cervical or inguinal adenopathy  Musc:  No cyanosis or clubbing  Skin:  No rashes or ulcers, skin turgor is good  Neuro:  Cranial nerves are grossly intact, no focal motor weakness, follows commands appropriately  Psych:  Good insight, oriented to person, place and time, alert    Medications Reviewed: (see below)    Lab Data Reviewed: (see below)    ______________________________________________________________________    Medications: Current Facility-Administered Medications   Medication Dose Route Frequency    hydrALAZINE (APRESOLINE) 20 mg/mL injection 20 mg  20 mg IntraVENous Q6H PRN    hydrALAZINE (APRESOLINE) tablet 50 mg  50 mg Oral TID    calcitRIOL (ROCALTROL) capsule 0.25 mcg  0.25 mcg Oral DAILY    calcium acetate (PHOSLO) capsule 667 mg  1 Cap Oral TID WITH MEALS    epoetin gonzalo (EPOGEN;PROCRIT) injection 10,000 Units  10,000 Units IntraVENous DIALYSIS MON, WED & FRI    heparin (porcine) 1,000 unit/mL injection 2,500 Units  2,500 Units Hemodialysis DIALYSIS PRN    heparin (porcine) injection 5,000 Units  5,000 Units SubCUTAneous Q12H    oxyCODONE-acetaminophen (PERCOCET) 5-325 mg per tablet 1 Tab  1 Tab Oral Q4H PRN    oxyCODONE-acetaminophen (PERCOCET 10)  mg per tablet 1 Tab  1 Tab Oral Q4H PRN    amLODIPine (NORVASC) tablet 10 mg  10 mg Oral DAILY    ferrous sulfate tablet 325 mg  325 mg Oral DAILY    levothyroxine (SYNTHROID) tablet 100 mcg  100 mcg Oral ACB    metoprolol tartrate (LOPRESSOR) tablet 100 mg  100 mg Oral QHS    sodium chloride (NS) flush 5-10 mL  5-10 mL IntraVENous Q8H    sodium chloride (NS) flush 5-10 mL  5-10 mL IntraVENous PRN    acetaminophen (TYLENOL) tablet 650 mg  650 mg Oral Q4H PRN    HYDROcodone-acetaminophen (NORCO) 5-325 mg per tablet 1 Tab  1 Tab Oral Q4H PRN    HYDROmorphone (PF) (DILAUDID) injection 0.2 mg  0.2 mg IntraVENous Q4H PRN    naloxone (NARCAN) injection 0.4 mg  0.4 mg IntraVENous PRN    diphenhydrAMINE (BENADRYL) injection 12.5 mg  12.5 mg IntraVENous Q4H PRN    ondansetron (ZOFRAN) injection 4 mg  4 mg IntraVENous Q6H PRN    docusate sodium (COLACE) capsule 100 mg  100 mg Oral BID    insulin lispro (HUMALOG) injection   SubCUTAneous AC&HS    glucose chewable tablet 16 g  4 Tab Oral PRN    dextrose (D50W) injection syrg 12.5-25 g  12.5-25 g IntraVENous PRN    glucagon (GLUCAGEN) injection 1 mg  1 mg IntraMUSCular PRN    0.9% sodium chloride infusion 250 mL  250 mL IntraVENous PRN    prochlorperazine (COMPAZINE) injection 5 mg  5 mg IntraVENous Q6H PRN    albuterol-ipratropium (DUO-NEB) 2.5 MG-0.5 MG/3 ML  3 mL Nebulization Q4H PRN            Lab Review:     Recent Labs      03/18/18   0430  03/16/18   0246   WBC  8.9  5.6   HGB  8.5*  7.8*   HCT  26.6*  24.4*   PLT  288  191     Recent Labs      03/18/18   0430  03/16/18   0246   NA  137  134*   K  3.2*  3.6   CL  100  99   CO2  27  21   GLU  122*  108*   BUN  15  31*   CREA  4.38*  5.39*   CA  7.7*  7.1*   MG  1.8   --    PHOS  3.0   --      Lab Results   Component Value Date/Time    Glucose (POC) 126 (H) 03/18/2018 07:33 AM    Glucose (POC) 139 (H) 03/17/2018 09:36 PM    Glucose (POC) 160 (H) 03/17/2018 04:15 PM    Glucose (POC) 150 (H) 03/17/2018 11:27 AM    Glucose (POC) 93 03/17/2018 07:36 AM     No results for input(s): PH, PCO2, PO2, HCO3, FIO2 in the last 72 hours. No results for input(s): INR in the last 72 hours.     No lab exists for component: Achilles Blizzard  Lab Results   Component Value Date/Time    Specimen Description: URINE 09/14/2010 02:20 PM     Lab Results   Component Value Date/Time    Culture result: MODERATE NORMAL RESPIRATORY CORINNE 03/13/2018 04:33 AM    Culture result: NO GROWTH 6 DAYS 03/12/2018 10:37 PM    Culture result: NO GROWTH 6 DAYS 03/12/2018 10:37 PM            Assessment:     Principal Problem:    Pneumonia (3/12/2018)    Active Problems:    Type 2 diabetes mellitus with renal complication ([facility] (4/90/5724)      HTN (hypertension) (6/12/2014)      Anemia (6/13/2014)      Diarrhea (3/12/2018)      ESRD (end stage renal disease) ([facility] (3/12/2018)           Plan:     Principal Problem:    Pneumonia (3/12/2018)   - completed 5 days of azithromycin, off all other antibiotics, still with some cough    Active Problems:    Type 2 diabetes mellitus with [facility] complication ([facility] (0/17/8694)   - BS okay off 70/30   - follow on SSI      HTN (hypertension) (6/12/2014)   - BP up at times, PRN hydralazine ordered and added scheduled hydralazine PO (Renal wants to avoid RAAS inhibitors) and BP is better       Anemia (6/13/2014)    - Hgb stable, follow      ESRD (end stage renal disease) ([facility] (3/12/2018)   - started on dialysis, CM arranging outpatient dialysis      Diarrhea   - had resolved, patient has been on stool softeners, now stopped      Total time spent with patient: 25 minutes                  Care Plan discussed with: Patient and Nursing Staff    Discussed:  Code Status, Care Plan and D/C Planning    Prophylaxis:  Hep SQ    Disposition:  HH PT, OT, RN           ___________________________________________________    Attending Physician: Avery Diamond MD

## 2018-03-18 NOTE — PROGRESS NOTES
Bedside and Verbal shift change report given to Rosenda ePlayo RN (oncoming nurse) by Alfonzo Montes (offgoing nurse). Report included the following information SBAR, Kardex, ED Summary, Intake/Output, MAR, Accordion and Recent Results.

## 2018-03-18 NOTE — PROGRESS NOTES
Problem: Falls - Risk of  Goal: *Absence of Falls  Document Zuly Fall Risk and appropriate interventions in the flowsheet.    Outcome: Progressing Towards Goal  Fall Risk Interventions:  Mobility Interventions: Bed/chair exit alarm, Patient to call before getting OOB         Medication Interventions: Bed/chair exit alarm, Patient to call before getting OOB, Teach patient to arise slowly    Elimination Interventions: Call light in reach, Bed/chair exit alarm, Patient to call for help with toileting needs    History of Falls Interventions: Bed/chair exit alarm

## 2018-03-19 LAB
ANION GAP SERPL CALC-SCNC: 11 MMOL/L (ref 5–15)
BUN SERPL-MCNC: 18 MG/DL (ref 6–20)
BUN/CREAT SERPL: 3 (ref 12–20)
CALCIUM SERPL-MCNC: 7.9 MG/DL (ref 8.5–10.1)
CHLORIDE SERPL-SCNC: 100 MMOL/L (ref 97–108)
CO2 SERPL-SCNC: 26 MMOL/L (ref 21–32)
CREAT SERPL-MCNC: 5.62 MG/DL (ref 0.55–1.02)
GLUCOSE BLD STRIP.AUTO-MCNC: 118 MG/DL (ref 65–100)
GLUCOSE BLD STRIP.AUTO-MCNC: 137 MG/DL (ref 65–100)
GLUCOSE BLD STRIP.AUTO-MCNC: 172 MG/DL (ref 65–100)
GLUCOSE BLD STRIP.AUTO-MCNC: 96 MG/DL (ref 65–100)
GLUCOSE SERPL-MCNC: 126 MG/DL (ref 65–100)
POTASSIUM SERPL-SCNC: 3.3 MMOL/L (ref 3.5–5.1)
SERVICE CMNT-IMP: ABNORMAL
SERVICE CMNT-IMP: NORMAL
SODIUM SERPL-SCNC: 137 MMOL/L (ref 136–145)

## 2018-03-19 PROCEDURE — 82962 GLUCOSE BLOOD TEST: CPT

## 2018-03-19 PROCEDURE — 74011250637 HC RX REV CODE- 250/637: Performed by: INTERNAL MEDICINE

## 2018-03-19 PROCEDURE — 74011250636 HC RX REV CODE- 250/636: Performed by: INTERNAL MEDICINE

## 2018-03-19 PROCEDURE — 97535 SELF CARE MNGMENT TRAINING: CPT

## 2018-03-19 PROCEDURE — 97110 THERAPEUTIC EXERCISES: CPT

## 2018-03-19 PROCEDURE — 65660000000 HC RM CCU STEPDOWN

## 2018-03-19 PROCEDURE — 90935 HEMODIALYSIS ONE EVALUATION: CPT

## 2018-03-19 PROCEDURE — 74011636637 HC RX REV CODE- 636/637: Performed by: INTERNAL MEDICINE

## 2018-03-19 PROCEDURE — 80048 BASIC METABOLIC PNL TOTAL CA: CPT | Performed by: INTERNAL MEDICINE

## 2018-03-19 PROCEDURE — 36415 COLL VENOUS BLD VENIPUNCTURE: CPT | Performed by: INTERNAL MEDICINE

## 2018-03-19 PROCEDURE — 97116 GAIT TRAINING THERAPY: CPT

## 2018-03-19 RX ORDER — HYDRALAZINE HYDROCHLORIDE 25 MG/1
100 TABLET, FILM COATED ORAL 3 TIMES DAILY
Status: DISCONTINUED | OUTPATIENT
Start: 2018-03-19 | End: 2018-03-30 | Stop reason: HOSPADM

## 2018-03-19 RX ADMIN — HEPARIN SODIUM 2500 UNITS: 1000 INJECTION, SOLUTION INTRAVENOUS; SUBCUTANEOUS at 12:04

## 2018-03-19 RX ADMIN — HEPARIN SODIUM 5000 UNITS: 5000 INJECTION, SOLUTION INTRAVENOUS; SUBCUTANEOUS at 20:50

## 2018-03-19 RX ADMIN — CALCITRIOL 0.25 MCG: 0.25 CAPSULE, LIQUID FILLED ORAL at 12:32

## 2018-03-19 RX ADMIN — HYDRALAZINE HYDROCHLORIDE 100 MG: 25 TABLET, FILM COATED ORAL at 14:02

## 2018-03-19 RX ADMIN — HEPARIN SODIUM 5000 UNITS: 5000 INJECTION, SOLUTION INTRAVENOUS; SUBCUTANEOUS at 12:32

## 2018-03-19 RX ADMIN — CALCIUM ACETATE 667 MG: 667 CAPSULE ORAL at 12:32

## 2018-03-19 RX ADMIN — Medication 10 ML: at 06:42

## 2018-03-19 RX ADMIN — Medication 10 ML: at 22:33

## 2018-03-19 RX ADMIN — HYDRALAZINE HYDROCHLORIDE 100 MG: 25 TABLET, FILM COATED ORAL at 22:29

## 2018-03-19 RX ADMIN — INSULIN LISPRO 2 UNITS: 100 INJECTION, SOLUTION INTRAVENOUS; SUBCUTANEOUS at 22:30

## 2018-03-19 RX ADMIN — LEVOTHYROXINE SODIUM 100 MCG: 100 TABLET ORAL at 06:41

## 2018-03-19 RX ADMIN — AMLODIPINE BESYLATE 10 MG: 5 TABLET ORAL at 12:32

## 2018-03-19 RX ADMIN — METOPROLOL TARTRATE 100 MG: 50 TABLET ORAL at 22:30

## 2018-03-19 RX ADMIN — Medication 10 ML: at 14:02

## 2018-03-19 RX ADMIN — HYDROCODONE BITARTRATE AND ACETAMINOPHEN 1 TABLET: 5; 325 TABLET ORAL at 08:10

## 2018-03-19 RX ADMIN — FERROUS SULFATE TAB 325 MG (65 MG ELEMENTAL FE) 325 MG: 325 (65 FE) TAB at 12:32

## 2018-03-19 RX ADMIN — CALCIUM ACETATE 667 MG: 667 CAPSULE ORAL at 17:44

## 2018-03-19 NOTE — PROGRESS NOTES
Problem: Self Care Deficits Care Plan (Adult)  Goal: *Acute Goals and Plan of Care (Insert Text)  Occupational Therapy Goals  Initiated 3/15/2018  1. Patient will perform grooming with modified independence standing at the sink for > 5 minutes within 7 day(s). 2.  Patient will perform lower body dressing and bathing with supervision/set-up within 7 day(s). 3.  Patient will perform toilet transfers with supervision/set-up within 7 day(s). 4.  Patient will perform all aspects of toileting with supervision/set-up within 7 day(s). 5.  Patient will participate in upper extremity therapeutic exercise/activities with modified independence for 10 minutes within 7 day(s). 6.  Patient will utilize energy conservation techniques during functional activities with verbal cues within 7 day(s). Occupational Therapy TREATMENT  Patient: Karla Heck (59 y.o. female)  Date: 3/19/2018  Diagnosis: Pneumonia  Appendicitis Pneumonia  Procedure(s) (LRB):  APPENDECTOMY LAPAROSCOPIC (N/A) 5 Days Post-Op  Precautions:    Chart, occupational therapy assessment, plan of care, and goals were reviewed. ASSESSMENT:  Pt agreeable to OT. After ambulation to the bathroom pt able to transfer to commode with stand by assist, she stood at sink and washed her hands with supervision. Pt than participated with 2 sets of UE therapeutic exercise 2 sets in sitting and one set in standing. O2 sats 90% or greater on room air with activity. Pt progressing towards OT goals. Progression toward goals:  [x]       Improving appropriately and progressing toward goals  []       Improving slowly and progressing toward goals  []       Not making progress toward goals and plan of care will be adjusted     PLAN:  Patient continues to benefit from skilled intervention to address the above impairments. Continue treatment per established plan of care.   Discharge Recommendations:  None  Further Equipment Recommendations for Discharge:  None SUBJECTIVE:   Patient stated [place].     OBJECTIVE DATA SUMMARY:   Cognitive/Behavioral Status:  Neurologic State: Alert  Orientation Level: Oriented X4  Cognition: Follows commands             Functional Mobility and Transfers for ADLs:  Bed Mobility:  Supine to Sit: Modified independent  Sit to Supine: Modified independent    Transfers:  Sit to Stand: Stand-by assistance  Functional Transfers  Toilet Transfer : Stand-by assistance       Balance:  Sitting: Intact  Standing: Intact    ADL Intervention:       Grooming  Washing Hands: Supervision/set-up  Standing at sink        Therapeutic Exercises:     EXERCISE   Sets   Reps   Active Active Assist   Passive   Comments   Finger flex/ext 3 10 [x]           []           []              Wrist flex/ext 3 10 [x]           []           []              Forearm supination/pronation 3 10 [x]           []           []              Elbow flex/ext 3 10 [x]           []           []              Shoulder flex/ext 3 10 [x]           []           []              Chest presses 3 10 [x]           []           []                 []           []           []                 []           []           []                 []           []           []                 []           []           []                 []           []           []                  Pain:  Pain Scale 1: Numeric (0 - 10)  Pain Intensity 1: 0  Pain Location 1: Abdomen  Pain Orientation 1: Anterior;Mid  Pain Description 1: Aching; Sore  Pain Intervention(s) 1: Medication (see MAR)  Activity Tolerance:   No signs/symptoms of distress of discomfort during OT  Please refer to the flowsheet for vital signs taken during this treatment.   After treatment:   [] Patient left in no apparent distress sitting up in chair  [x] Patient left in no apparent distress in bed  [x] Call bell left within reach  [x] Nursing notified  [] Caregiver present  [] Bed alarm activated    COMMUNICATION/COLLABORATION:   The patients plan of care was discussed with: Occupational Therapist    HILARIO Correa  Time Calculation: 25 mins

## 2018-03-19 NOTE — PROGRESS NOTES
Bedside and Verbal shift change report given to Taryn Bucio RN (oncoming nurse) by Suresh Escalona RN (offgoing nurse). Report included the following information SBAR, Kardex, Procedure Summary, Intake/Output, MAR and Recent Results.

## 2018-03-19 NOTE — DIALYSIS
[facility] Acutes  [phone]    Vitals   Pre   Post   Assessment   Pre   Post     Temp  Temp: 98.7 °F (37.1 °C) (03/19/18 0824)  98.7 oral  LOC  A&Ox4; primarily speaks Yakut  A&Ox4; primarily speaks Yakut    HR   89 92  Lungs   Clear upper fields, wheezing noted in lower fields   Clear, Diminished    B/P   182/88 189/75  Cardiac   HRR   HRR    Resp   Resp Rate: 16 (03/19/18 0830) 16  Skin   Warm, Dry   Warm, Dry    Pain level  Pain Intensity 1: 5 (03/19/18 0809) 0/10  Edema    Generalized, +3 pitting BLE      Generalized, +3 pitting BLE    Orders:    Duration:   Start:   0824 End:   1154 Total:   3.5 Hours    Dialyzer:   Dialyzer/Set Up Inspection: Revaclear (03/19/18 0824)   K Bath:   Dialysate K (mEq/L): 3 (03/19/18 0824)   Ca Bath:   Dialysate CA (mEq/L): 2.5 (03/19/18 0824)   Na/Bicarb:   Dialysate NA (mEq/L): 140 (03/19/18 0824)   Target Fluid Removal:   Goal/Amount of Fluid to Remove (mL): 1000 mL (03/19/18 0824)   Goal increased to 2kg at 0910; Dr. Gricelda Sena rounding and aware. Access     Type & Location:   Right IJ Catheter - No s/x of infection. No drainage noted. Dressing dry and intact. Ports disinfected per protocol, aspirated (5ml each port, discarded)  and flushed with NS 0.9%. Lines connected and treatment initiated. Labs     Obtained/Reviewed   Critical Results Called   Date when labs were drawn- 3/18/18   Hgb-    HGB   Date Value Ref Range Status   03/18/2018 8.5 (L) 11.5 - 16.0 g/dL Final     K-    Potassium   Date Value Ref Range Status   03/19/2018 3.3 (L) 3.5 - 5.1 mmol/L Final     Ca-   Calcium   Date Value Ref Range Status   03/19/2018 7.9 (L) 8.5 - 10.1 MG/DL Final     Bun-   BUN   Date Value Ref Range Status   03/19/2018 18 6 - 20 MG/DL Final     Creat-   Creatinine   Date Value Ref Range Status   03/19/2018 5.62 (H) 0.55 - 1.02 MG/DL Final        Medications/ Blood Products Given     Name   Dose   Route and Time     Heparin   2,500unit/s2. 5mL   1.1 mL arterial  1.4 mL venous  Intra-cathter @1155              Blood Volume Processed (BVP):    74.4 L  Net Fluid   Removed:  2000 mL    Comments   Time Out Done: Yes   Primary Nurse Rpt Pre: Yash Guardado RN   Primary Nurse Rpt Post: Yash Guardado RN   Pt Education: Procedural   Care Plan: Continue with dialysis as ordered   Tx Summary: UF goal increased r/t hypertension. Dr. Dianna Srivastava aware. Patient tolerated treatment well. Epogen held due to hypertension. Ports disinfected per protocol, all possible blood rinsed back; lines disconnected, ports flushed with NS 0.9%, and capped. VSS. Bed in lowest position, call button, phone and personal belongings are within reach. Report given to primary nurse. Admiting Diagnosis: CKD 5/MELVA ON CKD  Pt's previous clinic- NA   Consent signed - Informed Consent Verified: Yes (03/19/18 0824)  Amaita Consent - Yes   Hepatitis Status- Negative 3/13/18   Machine #- Machine Number: [id number] (03/19/18 3902)  Telemetry status- Remotely monitored   Pre-dialysis wt. - Pre-Dialysis Weight: 68.1 kg (150 lb 2.1 oz) (03/19/18 0824)   Post-dialysis wt. - 66.3 kg

## 2018-03-19 NOTE — PROGRESS NOTES
[facility]  [facility], [address] [place] [address]  [phone]      Medical Progress Note      NAME: Amy Kunz   :  1955  MRM:  [id number]    Date/Time: 3/19/2018  8:01 AM           Subjective:     Chief Complaint:  Pain: abdomen, mild, intermittent, crampy; denies diarrhea    ROS:  (bold if positive, if negative)                        Tolerating PT  Tolerating Diet          Objective:       Vitals:          Last 24hrs VS reviewed since prior progress note.  Most recent are:    Visit Vitals    /65 (BP 1 Location: Right arm, BP Patient Position: At rest)    Pulse 88    Temp 98.7 °F (37.1 °C)    Resp 17    Ht 5' 3\" (1.6 m)    Wt 68.1 kg (150 lb 1.6 oz)    SpO2 90%    BMI 26.59 kg/m2     SpO2 Readings from Last 6 Encounters:   18 90%   17 100%   17 97%   14 98%   14 99%   11 94%    O2 Flow Rate (L/min): 2 l/min       Intake/Output Summary (Last 24 hours) at 18 0801  Last data filed at 18 1200   Gross per 24 hour   Intake              600 ml   Output              200 ml   Net              400 ml          Exam:     Physical Exam:    Gen:  Well-developed, well-nourished, in no acute distress  HEENT:  Pink conjunctivae, PERRL, hearing intact to voice, moist mucous membranes  Neck:  Supple, without masses, thyroid non-tender  Resp:  No accessory muscle use, clear breath sounds without wheezes rales or rhonchi  Card:  No murmurs, normal S1, S2 without thrills, bruits or peripheral edema  Abd:  Soft, non-tender, non-distended, normoactive bowel sounds are present, no palpable organomegaly and no detectable hernias  Lymph:  No cervical or inguinal adenopathy  Musc:  No cyanosis or clubbing  Skin:  No rashes or ulcers, skin turgor is good  Neuro:  Cranial nerves are grossly intact, no focal motor weakness, follows commands appropriately  Psych:  Good insight, oriented to person, place and time, alert    Medications Reviewed: (see below)    Lab Data Reviewed: (see below)    ______________________________________________________________________    Medications:     Current Facility-Administered Medications   Medication Dose Route Frequency    hydrALAZINE (APRESOLINE) tablet 100 mg  100 mg Oral TID    hydrALAZINE (APRESOLINE) 20 mg/mL injection 20 mg  20 mg IntraVENous Q6H PRN    calcitRIOL (ROCALTROL) capsule 0.25 mcg  0.25 mcg Oral DAILY    calcium acetate (PHOSLO) capsule 667 mg  1 Cap Oral TID WITH MEALS    epoetin gonzalo (EPOGEN;PROCRIT) injection 10,000 Units  10,000 Units IntraVENous DIALYSIS MON, WED & FRI    heparin (porcine) 1,000 unit/mL injection 2,500 Units  2,500 Units Hemodialysis DIALYSIS PRN    heparin (porcine) injection 5,000 Units  5,000 Units SubCUTAneous Q12H    oxyCODONE-acetaminophen (PERCOCET) 5-325 mg per tablet 1 Tab  1 Tab Oral Q4H PRN    oxyCODONE-acetaminophen (PERCOCET 10)  mg per tablet 1 Tab  1 Tab Oral Q4H PRN    amLODIPine (NORVASC) tablet 10 mg  10 mg Oral DAILY    ferrous sulfate tablet 325 mg  325 mg Oral DAILY    levothyroxine (SYNTHROID) tablet 100 mcg  100 mcg Oral ACB    metoprolol tartrate (LOPRESSOR) tablet 100 mg  100 mg Oral QHS    sodium chloride (NS) flush 5-10 mL  5-10 mL IntraVENous Q8H    sodium chloride (NS) flush 5-10 mL  5-10 mL IntraVENous PRN    acetaminophen (TYLENOL) tablet 650 mg  650 mg Oral Q4H PRN    HYDROcodone-acetaminophen (NORCO) 5-325 mg per tablet 1 Tab  1 Tab Oral Q4H PRN    HYDROmorphone (PF) (DILAUDID) injection 0.2 mg  0.2 mg IntraVENous Q4H PRN    naloxone (NARCAN) injection 0.4 mg  0.4 mg IntraVENous PRN    diphenhydrAMINE (BENADRYL) injection 12.5 mg  12.5 mg IntraVENous Q4H PRN    ondansetron (ZOFRAN) injection 4 mg  4 mg IntraVENous Q6H PRN    insulin lispro (HUMALOG) injection   SubCUTAneous AC&HS    glucose chewable tablet 16 g  4 Tab Oral PRN    dextrose (D50W) injection syrg 12.5-25 g  12.5-25 g IntraVENous PRN    glucagon (GLUCAGEN) injection 1 mg  1 mg IntraMUSCular PRN    0.9% sodium chloride infusion 250 mL  250 mL IntraVENous PRN    prochlorperazine (COMPAZINE) injection 5 mg  5 mg IntraVENous Q6H PRN    albuterol-ipratropium (DUO-NEB) 2.5 MG-0.5 MG/3 ML  3 mL Nebulization Q4H PRN            Lab Review:     Recent Labs      03/18/18   0430   WBC  8.9   HGB  8.5*   HCT  26.6*   PLT  288     Recent Labs      03/19/18   0456  03/18/18   0430   NA  137  137   K  3.3*  3.2*   CL  100  100   CO2  26  27   GLU  126*  122*   BUN  18  15   CREA  5.62*  4.38*   CA  7.9*  7.7*   MG   --   1.8   PHOS   --   3.0     Lab Results   Component Value Date/Time    Glucose (POC) 118 (H) 03/19/2018 07:23 AM    Glucose (POC) 162 (H) 03/18/2018 09:33 PM    Glucose (POC) 174 (H) 03/18/2018 04:39 PM    Glucose (POC) 178 (H) 03/18/2018 11:13 AM    Glucose (POC) 126 (H) 03/18/2018 07:33 AM     No results for input(s): PH, PCO2, PO2, HCO3, FIO2 in the last 72 hours. No results for input(s): INR in the last 72 hours.     No lab exists for component: Kamryn Beltrán  Lab Results   Component Value Date/Time    Specimen Description: URINE 09/14/2010 02:20 PM     Lab Results   Component Value Date/Time    Culture result: MODERATE NORMAL RESPIRATORY CORINNE 03/13/2018 04:33 AM    Culture result: NO GROWTH 6 DAYS 03/12/2018 10:37 PM    Culture result: NO GROWTH 6 DAYS 03/12/2018 10:37 PM            Assessment:     Principal Problem:    Pneumonia (3/12/2018)    Active Problems:    Type 2 diabetes mellitus with renal complication ([facility] (8/41/1189)      HTN (hypertension) (6/12/2014)      Anemia (6/13/2014)      Diarrhea (3/12/2018)      ESRD (end stage renal disease) ([facility] (3/12/2018)           Plan:     Principal Problem:    Pneumonia (3/12/2018)   - completed 5 days of azithromycin, off all other antibiotics, still with some cough but improving overall    Active Problems:    Type 2 diabetes mellitus with renal complication ([facility] (6/12/2014)   - BS okay off 70/30   - follow on SSI      HTN (hypertension) (6/12/2014)   - BP remains up, increase hydralazine      Anemia (6/13/2014)    - Hgb stable, follow      ESRD (end stage renal disease) ([facility] (3/12/2018)   - started on dialysis, CM arranging outpatient dialysis, discharge when dialysis arranged      Diarrhea   - resolved, no further stool softeners      Total time spent with patient: 25 minutes                  Care Plan discussed with: Patient and Nursing Staff    Discussed:  Code Status, Care Plan and D/C Planning    Prophylaxis:  Hep SQ    Disposition:  HH PT, OT, RN           ___________________________________________________    Attending Physician: Barrington Shaffer MD

## 2018-03-19 NOTE — PROGRESS NOTES
3/19/2018 4:52 PM Spoke with pt's daughter, Adelaida Koroma. Patient Insurability Assessment Form completed for [facility] and faxed to [phone]. CM discussed options for Dialysis once pt returns to [place]. CM explored options for dialysis placement once returning home. Pt does not have a pcp in [place]. Pt uses public healthcare, does not have a source of income to pay for private insurance in [place]. Pt does not have any contacts in [place] that are on nor would have information about dialysis. CM discussed with pt's daughter that pt will need transport to dialysis 3 days a week while still here in [place], pt's daughter was agreeable and understanding. CM sent email to [employer] a foundation that works with the [address] [place] [address] that offers dialysis at 2 centers in [place] regarding enrollment into their program.   CM will follow up.     3/19/2018  2:02 PM [address] message from [address] at [facility] requesting return phone call. CM called catie Solano. Spoke with pt through [email or web address] user [id number]. Pt requested CM talk with her daughter, Adriana([phone]). Pt reported her sister in [place] is on dialysis. Pt is unfamiliar with any dialysis clinics in [place]. CM called Adriana, pt's grandson translated conversation. Adriana reported pt is planning to go back to [place] on April 29th but can go back sooner if recommended. Adriana reported she will be to [facility] this afternoon and can discuss further then. CM will follow up.     3/19/2018 11:30 AM Per [facility], pt's Medicaid application cannot be processed until the discharge summary is completed. Once the Emergency Medicaid application is submitted estimated it will take 2 months for application to be reviewed by DMAS. CM will follow up.     3/19/2018  11:13 AM EMR reviewed, pt from [place] and will need HD placement at discharge.    Contacted [facility], pt's Emergency Medicaid application has been completed but not submitted as it will need pt's discharge summary. CM requested application be submitted without discharge summary if possible. Shady Betts at [place] [phone] ext [phone]) and discussed pt's care. [facility] [address] [facility] reported pt's referral was not received on 3/16 and requested clinicals be faxed to them at [phone]. CM faxed referral to provided fax number. CM discussed possibility of contract payment with Mariaa Mims until Medicaid is in place. [facility] [address] [facility] reported she will have to discuss with their admin and will follow up. Message sent to [place] manager, Misty Chisholm regarding pt's care. CM will follow up.  MILLIE Belcher

## 2018-03-19 NOTE — PROGRESS NOTES
Problem: Mobility Impaired (Adult and Pediatric)  Goal: *Acute Goals and Plan of Care (Insert Text)  Physical Therapy Goals  Initiated 3/15/2018  1. Patient will move from supine to sit and sit to supine , scoot up and down and roll side to side in bed with independence within 7 day(s). 2.  Patient will transfer from bed to chair and chair to bed with independence using the least restrictive device within 7 day(s). 3.  Patient will perform sit to stand with independence within 7 day(s). 4.  Patient will ambulate with modified independence for 200 feet with the least restrictive device within 7 day(s). physical Therapy TREATMENT  Patient: Harris Martinez (89 y.o. female)  Date: 3/19/2018  Diagnosis: Pneumonia  Appendicitis Pneumonia  Procedure(s) (LRB):  APPENDECTOMY LAPAROSCOPIC (N/A) 5 Days Post-Op  Precautions:    Chart, physical therapy assessment, plan of care and goals were reviewed. ASSESSMENT:  Pt received in bed, agreeable to participate with physical therapy. Denies pain. Mod I for bed mobility. Sit<>stand with SBA, denies dizziness, O2 sat 90% or greater on room air throughout activity.  at highest. Gait training x 200' without AD with SBA, demonstrates increased trunk sway, no LOB. Pt returned to bed. RN notified. Progression toward goals:  []    Improving appropriately and progressing toward goals  [x]    Improving slowly and progressing toward goals  []    Not making progress toward goals and plan of care will be adjusted     PLAN:  Patient continues to benefit from skilled intervention to address the above impairments. Continue treatment per established plan of care. Discharge Recommendations:  None  Further Equipment Recommendations for Discharge:  none     SUBJECTIVE:   Patient stated I'm ok.     OBJECTIVE DATA SUMMARY:   Critical Behavior:  Neurologic State: Alert  Orientation Level: Oriented X4  Cognition: Appropriate decision making, Appropriate for age attention/concentration, Appropriate safety awareness, Follows commands  Safety/Judgement: Awareness of environment  Functional Mobility Training:  Bed Mobility:     Supine to Sit: Modified independent  Sit to Supine: Modified independent           Transfers:  Sit to Stand: Stand-by assistance  Stand to Sit: Stand-by assistance                             Balance:  Sitting: Intact  Standing: Intact  Ambulation/Gait Training:  Distance (ft): 200 Feet (ft)  Assistive Device: Gait belt  Ambulation - Level of Assistance: Stand-by assistance        Gait Abnormalities: Trunk sway increased;Decreased step clearance        Base of Support: Widened     Speed/Helen: Pace decreased (<100 feet/min)                       Stairs:              Neuro Re-Education:    Therapeutic Exercises:     Pain:  Pain Scale 1: Numeric (0 - 10)  Pain Intensity 1: 0  Pain Location 1: Abdomen  Pain Orientation 1: Anterior;Mid  Pain Description 1: Aching; Sore  Pain Intervention(s) 1: Medication (see MAR)  Activity Tolerance:   Good  Please refer to the flowsheet for vital signs taken during this treatment.   After treatment:   []    Patient left in no apparent distress sitting up in chair  [x]    Patient left in no apparent distress in bed  [x]    Call bell left within reach  [x]    Nursing notified  []    Caregiver present  [x]    Bed alarm activated    COMMUNICATION/COLLABORATION:   The patients plan of care was discussed with: Registered Nurse    Cleta Cranker   Time Calculation: 19 mins

## 2018-03-19 NOTE — PROGRESS NOTES
Nutrition Assessment:    RECOMMENDATIONS/INTERVENTION(S):   Continue Renal diet when able. Phos now 3.0  Monitor PO intakes, wt, GI status  Pt declined ONS- \"too sweet\"    ASSESSMENT:   Follow up. Pt appetite improved. Eating well. No n/v. Pt states she is going to the bathroom normally. Pt states she's just a little sore where she had her appendix taken out. Not painful but sore. Pt is worried about how she is going to make it back to [place] and get HD because in her country people usually have to wait a month to be seen for HD. Cr 5.62, GFR 8. K+ 3.3.     3/15: Follow up. Pt had HD 3/13, 3/14, will have it again 3/16. Pt does not want ONS, states they are too sweet which makes her nauseated. Pt states she had some diarrhea earlier today. States she ate ~50% of breakfast, not a lot of meat for lunch. Encouraged protein intakes for HD. Calcium 7.5. BUN 27. Cr 4.04. Phos and K WNL. Lipase slightly high 437 on admission. No new labs. GFR 11 slowly trending up. 3/13: 59 yr old female admitted for n/v/d. Pt states she hasn't eaten much of anything for the last 3 days. Any time she even drinks water she has to go to the bathroom and it's diarrhea. Pt complains of sweet taste in mouth, without having eaten anything. Ketoacidosis? Pt had central line placed for HD. Hgb low 6.7. Pt with PNA and possible appendicitis, no surgical intervention planned at this time. Phos 6.0, Calcium 6.4 low. , 207 mg/dL. Pt states she's lost about 5 lbs over the last few days. Chart indicates minor weight gain, likely fluids. No edema noted.      SUBJECTIVE/OBJECTIVE:   Diet Order:  Renal  % Eaten:    Patient Vitals for the past 168 hrs:   % Diet Eaten   03/18/18 1200 75 %   03/18/18 0826 80 %   03/16/18 0857 75 %   03/15/18 1627 75 %     Pertinent Medications: [x] Reviewed    Past Medical History:   Diagnosis Date    Arrhythmia     fast heart rate and palpitations dec 2010    Arthritis     generalized    Asthma     Chronic pain     headaches and stomach pain x several months    Diabetes ([facility]     Diabetic foot ulcer associated with type 2 diabetes mellitus ([facility]     ESRD (end stage renal disease) ([facility]     GERD (gastroesophageal reflux disease)     Hypertension     Psychiatric disorder     depression        Chemistries:  Lab Results   Component Value Date/Time    Sodium 137 03/19/2018 04:56 AM    Potassium 3.3 (L) 03/19/2018 04:56 AM    Chloride 100 03/19/2018 04:56 AM    CO2 26 03/19/2018 04:56 AM    Anion gap 11 03/19/2018 04:56 AM    Glucose 126 (H) 03/19/2018 04:56 AM    BUN 18 03/19/2018 04:56 AM    Creatinine 5.62 (H) 03/19/2018 04:56 AM    BUN/Creatinine ratio 3 (L) 03/19/2018 04:56 AM    GFR est AA 9 (L) 03/19/2018 04:56 AM    GFR est non-AA 8 (L) 03/19/2018 04:56 AM    Calcium 7.9 (L) 03/19/2018 04:56 AM    AST (SGOT) 34 03/14/2018 01:03 AM    Alk. phosphatase 180 (H) 03/14/2018 01:03 AM    Protein, total 6.2 (L) 03/14/2018 01:03 AM    Albumin 2.4 (L) 03/14/2018 01:03 AM    Globulin 3.8 03/14/2018 01:03 AM    A-G Ratio 0.6 (L) 03/14/2018 01:03 AM    ALT (SGPT) 52 03/14/2018 01:03 AM      Anthropometrics: Height: 5' 3\" (160 cm) Weight: 68.1 kg (150 lb 2.1 oz)    IBW (%IBW):   ( ) UBW (%UBW):   (  %)    BMI: Body mass index is 26.59 kg/(m^2). This BMI is indicative of:     [] Underweight    [] Normal    [x] Overweight    []  Obesity    []  Extreme Obesity (BMI>40)    Estimated Nutrition Needs (Based on): 1520 Kcals/day (BMR(1169x1.3)) , 51 g (-64g/day(0.8-1.0g/kg)) Protein  Carbohydrate: At Least 130 g/day  Fluids: 1500 mL/day    Last BM: 3/18- pt stated  [x]Active     []Hyperactive  []Hypoactive       [] Absent   BS  Skin:    [x] Intact   [] Incision  [] Breakdown   [] DTI   [] Tears/Excoriation/Abrasion  []Edema [] Other:    Wt Readings from Last 30 Encounters:   03/19/18 68.1 kg (150 lb 2.1 oz)   02/08/18 60.3 kg (133 lb)   12/08/17 66.2 kg (146 lb)   11/28/17 63.5 kg (139 lb 15.9 oz)   09/11/14 64.4 kg (142 lb) 08/21/14 64.9 kg (143 lb)   08/07/14 64.9 kg (143 lb)   08/07/14 64.9 kg (143 lb)   07/10/14 67.1 kg (148 lb)   06/12/14 62.6 kg (138 lb)   06/02/14 63 kg (139 lb)   05/29/14 63.5 kg (140 lb)   05/08/14 61.2 kg (135 lb)   01/25/11 59 kg (130 lb)      NUTRITION DIAGNOSES:   Problem:  Inadequate energy intake     Etiology: related to decreased ability to consume sufficient energy     Signs/Symptoms: as evidenced by poor PO intakes, n/v/d      NUTRITION INTERVENTIONS:  Meals/Snacks: General/healthful diet   Supplements: Commercial supplement              GOAL:   pt will consume >50% of meals within 3-5 days    Cultural, Faith, or Ethnic Dietary Needs: None     LEARNING NEEDS (Diet, Food/Nutrient-Drug Interaction):    [x] None Identified   [] Identified and Education Provided/Documented   [] Identified and Pt declined/was not appropriate      [x] Interdisciplinary Care Plan Reviewed/Documented    [x] Discharge Needs:    TBD   [] No Nutrition Related Discharge Needs    NUTRITION RISK:   Pt Is At Nutrition Risk  [x]     No Nutrition Risk Identified  []       PT SEEN FOR:    []  MD Consult: []Calorie Count      []Diabetic Diet Education        []Diet Education     []Electrolyte Management     []General Nutrition Management and Supplements     []Management of Tube Feeding     []TPN Recommendations    []  RN Referral:  []MST score >=2     []Enteral/Parenteral Nutrition PTA     []Pregnant: Gestational DM or Multigestation                 [] Pressure Ulcer      []  Low BMI      []  Length of Stay       [] Dysphagia Diet     [] Ventilator      [x] Follow-Up        Previous Recommendations:   [x] Implemented          [] Not Implemented          [] Not Applicable    Previous Goal:   [] Met              [x] Progressing Towards Goal              [] Not Progressing Towards Goal   [] Not Applicable              Chas Shepherd, [address]  Pager: [phone]  Office: [phone]

## 2018-03-19 NOTE — PROGRESS NOTES
[facility]  YOB: 1955          Assessment & Plan:   ESRD  · Seen on HD. Tolerating well  · Increase UF goal to 2 kg   · Working on placement. Anticipate this effort will prove challenging. HTN  · OK    Anemia  · EPO    SHPT  · Calcitriol and Ca acetate       Subjective:   CC: f/u ESRD  HPI: Seen on HD. Sherri well.  Increasing UF due to edema   ROS: wants to go home soon  Current Facility-Administered Medications   Medication Dose Route Frequency    hydrALAZINE (APRESOLINE) tablet 100 mg  100 mg Oral TID    hydrALAZINE (APRESOLINE) 20 mg/mL injection 20 mg  20 mg IntraVENous Q6H PRN    calcitRIOL (ROCALTROL) capsule 0.25 mcg  0.25 mcg Oral DAILY    calcium acetate (PHOSLO) capsule 667 mg  1 Cap Oral TID WITH MEALS    epoetin gonzalo (EPOGEN;PROCRIT) injection 10,000 Units  10,000 Units IntraVENous DIALYSIS MON, WED & FRI    heparin (porcine) 1,000 unit/mL injection 2,500 Units  2,500 Units Hemodialysis DIALYSIS PRN    heparin (porcine) injection 5,000 Units  5,000 Units SubCUTAneous Q12H    oxyCODONE-acetaminophen (PERCOCET) 5-325 mg per tablet 1 Tab  1 Tab Oral Q4H PRN    oxyCODONE-acetaminophen (PERCOCET 10)  mg per tablet 1 Tab  1 Tab Oral Q4H PRN    amLODIPine (NORVASC) tablet 10 mg  10 mg Oral DAILY    ferrous sulfate tablet 325 mg  325 mg Oral DAILY    levothyroxine (SYNTHROID) tablet 100 mcg  100 mcg Oral ACB    metoprolol tartrate (LOPRESSOR) tablet 100 mg  100 mg Oral QHS    sodium chloride (NS) flush 5-10 mL  5-10 mL IntraVENous Q8H    sodium chloride (NS) flush 5-10 mL  5-10 mL IntraVENous PRN    acetaminophen (TYLENOL) tablet 650 mg  650 mg Oral Q4H PRN    HYDROcodone-acetaminophen (NORCO) 5-325 mg per tablet 1 Tab  1 Tab Oral Q4H PRN    HYDROmorphone (PF) (DILAUDID) injection 0.2 mg  0.2 mg IntraVENous Q4H PRN    naloxone (NARCAN) injection 0.4 mg  0.4 mg IntraVENous PRN    diphenhydrAMINE (BENADRYL) injection 12.5 mg  12.5 mg IntraVENous Q4H PRN    ondansetron (ZOFRAN) injection 4 mg  4 mg IntraVENous Q6H PRN    insulin lispro (HUMALOG) injection   SubCUTAneous AC&HS    glucose chewable tablet 16 g  4 Tab Oral PRN    dextrose (D50W) injection syrg 12.5-25 g  12.5-25 g IntraVENous PRN    glucagon (GLUCAGEN) injection 1 mg  1 mg IntraMUSCular PRN    0.9% sodium chloride infusion 250 mL  250 mL IntraVENous PRN    prochlorperazine (COMPAZINE) injection 5 mg  5 mg IntraVENous Q6H PRN    albuterol-ipratropium (DUO-NEB) 2.5 MG-0.5 MG/3 ML  3 mL Nebulization Q4H PRN          Objective:     Vitals:  Blood pressure 186/83, pulse 87, temperature 98.7 °F (37.1 °C), temperature source Oral, resp. rate 16, height 5' 3\" (1.6 m), weight 68.1 kg (150 lb 2.1 oz), SpO2 90 %. Temp (24hrs), Av.7 °F (37.1 °C), Min:98.3 °F (36.8 °C), Max:98.9 °F (37.2 °C)      Intake and Output:      1901 -  0700  In: 600 [P.O.:600]  Out: 200 [Urine:200]    Physical Exam:               GENERAL ASSESSMENT: NAD  CHEST: CTA  HEART: S1S2  ABDOMEN: Soft,NT  EXTREMITY: +EDEMA          ECG/rhythm:    Data Review      No results for input(s): TNIPOC in the last 72 hours. No lab exists for component: ITNL   No results for input(s): CPK, CKMB, TROIQ in the last 72 hours. Recent Labs      18   0456  18   0430   NA  137  137   K  3.3*  3.2*   CL  100  100   CO2  26  27   BUN  18  15   CREA  5.62*  4.38*   GLU  126*  122*   PHOS   --   3.0   MG   --   1.8   CA  7.9*  7.7*   WBC   --   8.9   HGB   --   8.5*   HCT   --   26.6*   PLT   --   288      No results for input(s): INR, PTP, APTT in the last 72 hours.     No lab exists for component: INREXT  Needs: urine analysis, urine sodium, protein and creatinine  Lab Results   Component Value Date/Time    Sodium urine, random 38 2017 01:54 PM    Creatinine, urine 61.11 2017 08:08 PM           : Tin Chapman MD  3/19/2018        [facility]:  [email or web address]  [place] office:  [address] [place] [address], [address] [place] [address],[address]  [place], [address] [facility]  Phone: [phone]  Fax :     [phone]    [place] office:  [address] [facility], [address]  Phone - [phone]  Fax - [phone]

## 2018-03-20 PROBLEM — R19.7 DIARRHEA: Status: RESOLVED | Noted: 2018-03-12 | Resolved: 2018-03-20

## 2018-03-20 LAB
GLUCOSE BLD STRIP.AUTO-MCNC: 114 MG/DL (ref 65–100)
GLUCOSE BLD STRIP.AUTO-MCNC: 157 MG/DL (ref 65–100)
GLUCOSE BLD STRIP.AUTO-MCNC: 168 MG/DL (ref 65–100)
GLUCOSE BLD STRIP.AUTO-MCNC: 169 MG/DL (ref 65–100)
SERVICE CMNT-IMP: ABNORMAL

## 2018-03-20 PROCEDURE — 74011250637 HC RX REV CODE- 250/637: Performed by: INTERNAL MEDICINE

## 2018-03-20 PROCEDURE — 97116 GAIT TRAINING THERAPY: CPT

## 2018-03-20 PROCEDURE — 82962 GLUCOSE BLOOD TEST: CPT

## 2018-03-20 PROCEDURE — 97535 SELF CARE MNGMENT TRAINING: CPT

## 2018-03-20 PROCEDURE — 74011636637 HC RX REV CODE- 636/637: Performed by: INTERNAL MEDICINE

## 2018-03-20 PROCEDURE — 65660000000 HC RM CCU STEPDOWN

## 2018-03-20 PROCEDURE — 74011250637 HC RX REV CODE- 250/637: Performed by: SURGERY

## 2018-03-20 PROCEDURE — 74011250636 HC RX REV CODE- 250/636: Performed by: INTERNAL MEDICINE

## 2018-03-20 RX ADMIN — CALCIUM ACETATE 667 MG: 667 CAPSULE ORAL at 08:22

## 2018-03-20 RX ADMIN — CALCIUM ACETATE 667 MG: 667 CAPSULE ORAL at 16:41

## 2018-03-20 RX ADMIN — CALCITRIOL 0.25 MCG: 0.25 CAPSULE, LIQUID FILLED ORAL at 08:21

## 2018-03-20 RX ADMIN — FERROUS SULFATE TAB 325 MG (65 MG ELEMENTAL FE) 325 MG: 325 (65 FE) TAB at 08:21

## 2018-03-20 RX ADMIN — AMLODIPINE BESYLATE 10 MG: 5 TABLET ORAL at 08:22

## 2018-03-20 RX ADMIN — INSULIN LISPRO 2 UNITS: 100 INJECTION, SOLUTION INTRAVENOUS; SUBCUTANEOUS at 16:41

## 2018-03-20 RX ADMIN — Medication 10 ML: at 08:24

## 2018-03-20 RX ADMIN — HYDRALAZINE HYDROCHLORIDE 100 MG: 25 TABLET, FILM COATED ORAL at 08:21

## 2018-03-20 RX ADMIN — OXYCODONE HYDROCHLORIDE AND ACETAMINOPHEN 1 TABLET: 5; 325 TABLET ORAL at 16:21

## 2018-03-20 RX ADMIN — CALCIUM ACETATE 667 MG: 667 CAPSULE ORAL at 11:23

## 2018-03-20 RX ADMIN — HYDRALAZINE HYDROCHLORIDE 100 MG: 25 TABLET, FILM COATED ORAL at 21:06

## 2018-03-20 RX ADMIN — HEPARIN SODIUM 5000 UNITS: 5000 INJECTION, SOLUTION INTRAVENOUS; SUBCUTANEOUS at 08:32

## 2018-03-20 RX ADMIN — HYDRALAZINE HYDROCHLORIDE 100 MG: 25 TABLET, FILM COATED ORAL at 16:41

## 2018-03-20 RX ADMIN — HEPARIN SODIUM 5000 UNITS: 5000 INJECTION, SOLUTION INTRAVENOUS; SUBCUTANEOUS at 21:07

## 2018-03-20 RX ADMIN — METOPROLOL TARTRATE 100 MG: 50 TABLET ORAL at 21:07

## 2018-03-20 RX ADMIN — INSULIN LISPRO 2 UNITS: 100 INJECTION, SOLUTION INTRAVENOUS; SUBCUTANEOUS at 11:23

## 2018-03-20 RX ADMIN — LEVOTHYROXINE SODIUM 100 MCG: 100 TABLET ORAL at 06:31

## 2018-03-20 NOTE — PROGRESS NOTES
Bedside and Verbal shift change report given to Neha Salomon RN (oncoming nurse) by Todd Toussaint RN (offgoing nurse). Report included the following information SBAR, Kardex, Procedure Summary, Intake/Output, MAR and Recent Results.

## 2018-03-20 NOTE — PROGRESS NOTES
Problem: Mobility Impaired (Adult and Pediatric)  Goal: *Acute Goals and Plan of Care (Insert Text)  Physical Therapy Goals  Initiated 3/15/2018  1. Patient will move from supine to sit and sit to supine , scoot up and down and roll side to side in bed with independence within 7 day(s). 2.  Patient will transfer from bed to chair and chair to bed with independence using the least restrictive device within 7 day(s). 3.  Patient will perform sit to stand with independence within 7 day(s). 4.  Patient will ambulate with modified independence for 200 feet with the least restrictive device within 7 day(s). physical Therapy TREATMENT  Patient: Oralia Crain (16 y.o. female)  Date: 3/20/2018  Diagnosis: Pneumonia  Appendicitis Pneumonia  Procedure(s) (LRB):  APPENDECTOMY LAPAROSCOPIC (N/A) 6 Days Post-Op  Precautions:    Chart, physical therapy assessment, plan of care and goals were reviewed. ASSESSMENT:  Pt received in bed, agreeable to participate with physical therapy. Mod I for bed mobility. SBA for sit<> stand transfers. Gait training x 200' with SBA, demonstrates increases trunk sway, no LOB. Ascen/ descend 4 steps using single handrail on L using sideways technique. Pt denies pain with mobility tasks. Pt requested to return to chair. Progression toward goals:  []    Improving appropriately and progressing toward goals  [x]    Improving slowly and progressing toward goals  []    Not making progress toward goals and plan of care will be adjusted     PLAN:  Patient continues to benefit from skilled intervention to address the above impairments. Continue treatment per established plan of care. Discharge Recommendations:  None  Further Equipment Recommendations for Discharge:  none     SUBJECTIVE:   Patient stated I am good.     OBJECTIVE DATA SUMMARY:   Critical Behavior:  Neurologic State: Alert  Orientation Level: Oriented X4  Cognition: Appropriate decision making, Appropriate for age attention/concentration, Appropriate safety awareness, Follows commands  Safety/Judgement: Awareness of environment  Functional Mobility Training:  Bed Mobility:  Rolling: Independent  Supine to Sit: Modified independent  Sit to Supine: Independent  Scooting: Modified independent        Transfers:  Sit to Stand: Stand-by assistance  Stand to Sit: Stand-by assistance                             Balance:  Sitting: Intact  Standing: Intact  Standing - Static: Good  Standing - Dynamic : Good  Ambulation/Gait Training:  Distance (ft): 200 Feet (ft)  Assistive Device: Gait belt  Ambulation - Level of Assistance: Stand-by assistance        Gait Abnormalities: Decreased step clearance;Trunk sway increased        Base of Support: Widened     Speed/Helen: Pace decreased (<100 feet/min)                       Stairs:  Number of Stairs Trained: 4  Stairs - Level of Assistance: Contact guard assistance   Rail Use: Right     Neuro Re-Education:    Therapeutic Exercises:     Pain:  Pain Scale 1: Numeric (0 - 10)  Pain Intensity 1: 0              Activity Tolerance:   Good  Please refer to the flowsheet for vital signs taken during this treatment.   After treatment:   [x]    Patient left in no apparent distress sitting up in chair  []    Patient left in no apparent distress in bed  [x]    Call bell left within reach  [x]    Nursing notified  []    Caregiver present  []    Bed alarm activated    COMMUNICATION/COLLABORATION:   The patients plan of care was discussed with: Registered Nurse    Loki Lee   Time Calculation: 12 mins

## 2018-03-20 NOTE — PROGRESS NOTES
L #20 PIV out unintentionally today. Discussed with Dr. Avi Fu. Pt has no IV med other than PRN IV hydralazine for SBP >180. BP well controlled with current oral meds. Okay to leave PIV out and not replace.

## 2018-03-20 NOTE — PROGRESS NOTES
Bedside shift change report given to Suresh Escalona (oncoming nurse) by Zee Hernandez (offgoing nurse). Report included the following information SBAR, Kardex, MAR and Accordion.

## 2018-03-20 NOTE — PROGRESS NOTES
Problem: Self Care Deficits Care Plan (Adult)  Goal: *Acute Goals and Plan of Care (Insert Text)  Occupational Therapy Goals  Initiated 3/15/2018  1. Patient will perform grooming with modified independence standing at the sink for > 5 minutes within 7 day(s). 2.  Patient will perform lower body dressing and bathing with supervision/set-up within 7 day(s). 3.  Patient will perform toilet transfers with supervision/set-up within 7 day(s). 4.  Patient will perform all aspects of toileting with supervision/set-up within 7 day(s). 5.  Patient will participate in upper extremity therapeutic exercise/activities with modified independence for 10 minutes within 7 day(s). 6.  Patient will utilize energy conservation techniques during functional activities with verbal cues within 7 day(s). Occupational Therapy TREATMENT/DISCHARGE  Patient: Negar Amos (38 y.o. female)  Date: 3/20/2018  Diagnosis: Pneumonia  Appendicitis Pneumonia  Procedure(s) (LRB):  APPENDECTOMY LAPAROSCOPIC (N/A) 6 Days Post-Op  Precautions:    Chart, occupational therapy assessment, plan of care, and goals were reviewed. ASSESSMENT:  Patient presents at independent level with self-care and mobility. No further recommendations at this time. Safe for discharge. Progression toward goals:  [x]            Improving appropriately and progressing toward goals  []            Improving slowly and progressing toward goals  []            Not making progress toward goals and plan of care will be adjusted     PLAN:  Patient will be discharged from occupational therapy at this time. Rationale for discharge:  [x]   Goals Achieved  []   [address]  []   Patient not participating in therapy  []   Other:  Discharge Recommendations: Home  Further Equipment Recommendations for Discharge:  None noted     SUBJECTIVE:   Patient stated R[place]ie [place].     OBJECTIVE DATA SUMMARY:   Cognitive/Behavioral Status:  Neurologic State: Alert  Orientation Level: Oriented X4  Cognition: Appropriate decision making; Appropriate for age attention/concentration; Appropriate safety awareness; Follows commands             Functional Mobility and Transfers for ADLs:  Bed Mobility:  Rolling: Independent  Supine to Sit: Independent  Sit to Supine: Independent    Transfers:     Functional Transfers  Bathroom Mobility: Independent        Balance:  Sitting: Intact  Standing: Intact    ADL Intervention:  Feeding  Feeding Assistance: Independent    Grooming  Grooming Assistance: Independent    Upper Body Bathing  Bathing Assistance: Independent         Upper Body Dressing Assistance  Dressing Assistance: Independent    Lower Body Dressing Assistance  Dressing Assistance: Independent    Toileting  Toileting Assistance: Independent             Functional Measure:  Barthel Index:    Bathin  Bladder: 10  Bowels: 10  Groomin  Dressing: 10  Feeding: 10  Mobility: 15  Stairs: 10  Toilet Use: 10  Transfer (Bed to Chair and Back): 15  Total: 100       Barthel and G-code impairment scale:  Percentage of impairment CH  0% CI  1-19% CJ  20-39% CK  40-59% CL  60-79% CM  80-99% CN  100%   Barthel Score 0-100 100 99-80 79-60 59-40 20-39 1-19   0   Barthel Score 0-20 20 17-19 13-16 9-12 5-8 1-4 0      The Barthel ADL Index: Guidelines  1. The index should be used as a record of what a patient does, not as a record of what a patient could do. 2. The main aim is to establish degree of independence from any help, physical or verbal, however minor and for whatever reason. 3. The need for supervision renders the patient not independent. 4. A patient's performance should be established using the best available evidence. Asking the patient, friends/relatives and nurses are the usual sources, but direct observation and common sense are also important. However direct testing is not needed.   5. Usually the patient's performance over the preceding 24-48 hours is important, but occasionally longer periods will be relevant. 6. Middle categories imply that the patient supplies over 50 per cent of the effort. 7. Use of aids to be independent is allowed. Leah Dodge., Barthel, D.W. (9058). Functional evaluation: the Barthel Index. [address] (14)2. Livier SONIA Adam, Carolina Gutierrez., Tiffanie Froilanlinden., Page Scripture, [address] (1999). Measuring the change indisability after inpatient rehabilitation; comparison of the responsiveness of the Barthel Index and Functional [place] Measure. Journal of Neurology, Neurosurgery, and Psychiatry, 66(4), 266-569. Sofi Wen, N.J.A, ASHLEE Thompson, & Jovany Montelongo M.A. (2004.) Assessment of post-stroke quality of life in cost-effectiveness studies: The usefulness of the Barthel Index and the EuroQoL-5D. Quality of Life Research, 13, 112-47       G codes: In compliance with CMSs Claims Based Outcome Reporting, the following G-code set was chosen for this patient based on their primary functional limitation being treated: The outcome measure chosen to determine the severity of the functional limitation was the Barthel  with a score of 100/100 which was correlated with the impairment scale. ? Self Care:     - CURRENT STATUS: CH - 0% impaired, limited or restricted    - GOAL STATUS: CH - 0% impaired, limited or restricted    - D/C STATUS:  CH - 0% impaired, limited or restricted     Pain:  Pain Scale 1: Numeric (0 - 10)  Pain Intensity 1: 0              Activity Tolerance:   Good  Please refer to the flowsheet for vital signs taken during this treatment.   After treatment:   [] Patient left in no apparent distress sitting up in chair  [x] Patient left in no apparent distress in bed  [x] Call bell left within reach  [] Nursing notified  [x] Caregiver present  [] Bed alarm activated    COMMUNICATION/COLLABORATION:   The patients plan of care was discussed with: Physical Therapist and Occupational Therapist    KRAIG Humphreys/L  Time Calculation: 13 mins

## 2018-03-20 NOTE — PROGRESS NOTES
3/20/2018 4:41 PM Spoke with pt's daughter, Travis Lujan relayed dialysis placement is pending. [facility] on their [facility] treatment centers in relation to pt's home address. Adriana relayed pt is on a tourist visa that expires at the end of April at that time pt will have to return to [place]. CM will continue to explore options for dialysis while pt remains in the [address] [place] [address],[address] and for when she returns to [place]. 3/20/2018 4:23 PM [facility] who requested contact information for who would complete the spa contract. BRENDON provided case management manager's contact information. Chevy reported there was no other information required at this time. BRENDON will follow. 3/20/2018 10:16 AM Called and lvm with [facility] at [phone] ext [phone]. CM will follow up.  MILLIE Dunham

## 2018-03-20 NOTE — PROGRESS NOTES
Leo Hickman [facility] [place] [address]  [facility] [address] [place] [address]  [phone]      Medical Progress Note      NAME: Clarisse Mclean   :  1955  MRM:  [id number]    Date/Time: 3/20/2018  8:16 AM           Subjective:     Chief Complaint:  Pain: abdomen, mild, intermittent, crampy; denies diarrhea    ROS:  (bold if positive, if negative)                        Tolerating PT  Tolerating Diet          Objective:       Vitals:          Last 24hrs VS reviewed since prior progress note.  Most recent are:    Visit Vitals    /68 (BP 1 Location: Right arm, BP Patient Position: At rest)    Pulse 86    Temp 98.4 °F (36.9 °C)    Resp 16    Ht 5' 3\" (1.6 m)    Wt 68.1 kg (150 lb 2.1 oz)    SpO2 91%    BMI 26.59 kg/m2     SpO2 Readings from Last 6 Encounters:   18 91%   17 100%   17 97%   14 98%   14 99%   11 94%    O2 Flow Rate (L/min): 2 l/min       Intake/Output Summary (Last 24 hours) at 18 0816  Last data filed at 18 1154   Gross per 24 hour   Intake                0 ml   Output             2000 ml   Net            -2000 ml          Exam:     Physical Exam:    Gen:  Well-developed, well-nourished, in no acute distress  HEENT:  Pink conjunctivae, PERRL, hearing intact to voice, moist mucous membranes  Neck:  Supple, without masses, thyroid non-tender  Resp:  No accessory muscle use, clear breath sounds without wheezes rales or rhonchi  Card:  No murmurs, normal S1, S2 without thrills, bruits or peripheral edema  Abd:  Soft, non-tender, non-distended, normoactive bowel sounds are present, no palpable organomegaly and no detectable hernias  Lymph:  No cervical or inguinal adenopathy  Musc:  No cyanosis or clubbing  Skin:  No rashes or ulcers, skin turgor is good  Neuro:  Cranial nerves are grossly intact, no focal motor weakness, follows commands appropriately  Psych:  Good insight, oriented to person, place and time, alert    Medications Reviewed: (see below)    Lab Data Reviewed: (see below)    ______________________________________________________________________    Medications:     Current Facility-Administered Medications   Medication Dose Route Frequency    hydrALAZINE (APRESOLINE) tablet 100 mg  100 mg Oral TID    hydrALAZINE (APRESOLINE) 20 mg/mL injection 20 mg  20 mg IntraVENous Q6H PRN    calcitRIOL (ROCALTROL) capsule 0.25 mcg  0.25 mcg Oral DAILY    calcium acetate (PHOSLO) capsule 667 mg  1 Cap Oral TID WITH MEALS    epoetin gonzalo (EPOGEN;PROCRIT) injection 10,000 Units  10,000 Units IntraVENous DIALYSIS MON, WED & FRI    heparin (porcine) 1,000 unit/mL injection 2,500 Units  2,500 Units Hemodialysis DIALYSIS PRN    heparin (porcine) injection 5,000 Units  5,000 Units SubCUTAneous Q12H    oxyCODONE-acetaminophen (PERCOCET) 5-325 mg per tablet 1 Tab  1 Tab Oral Q4H PRN    oxyCODONE-acetaminophen (PERCOCET 10)  mg per tablet 1 Tab  1 Tab Oral Q4H PRN    amLODIPine (NORVASC) tablet 10 mg  10 mg Oral DAILY    ferrous sulfate tablet 325 mg  325 mg Oral DAILY    levothyroxine (SYNTHROID) tablet 100 mcg  100 mcg Oral ACB    metoprolol tartrate (LOPRESSOR) tablet 100 mg  100 mg Oral QHS    sodium chloride (NS) flush 5-10 mL  5-10 mL IntraVENous Q8H    sodium chloride (NS) flush 5-10 mL  5-10 mL IntraVENous PRN    acetaminophen (TYLENOL) tablet 650 mg  650 mg Oral Q4H PRN    HYDROcodone-acetaminophen (NORCO) 5-325 mg per tablet 1 Tab  1 Tab Oral Q4H PRN    HYDROmorphone (PF) (DILAUDID) injection 0.2 mg  0.2 mg IntraVENous Q4H PRN    naloxone (NARCAN) injection 0.4 mg  0.4 mg IntraVENous PRN    diphenhydrAMINE (BENADRYL) injection 12.5 mg  12.5 mg IntraVENous Q4H PRN    ondansetron (ZOFRAN) injection 4 mg  4 mg IntraVENous Q6H PRN    insulin lispro (HUMALOG) injection   SubCUTAneous AC&HS    glucose chewable tablet 16 g  4 Tab Oral PRN    dextrose (D50W) injection syrg 12.5-25 g  12.5-25 g IntraVENous PRN    glucagon (GLUCAGEN) injection 1 mg  1 mg IntraMUSCular PRN    0.9% sodium chloride infusion 250 mL  250 mL IntraVENous PRN    prochlorperazine (COMPAZINE) injection 5 mg  5 mg IntraVENous Q6H PRN    albuterol-ipratropium (DUO-NEB) 2.5 MG-0.5 MG/3 ML  3 mL Nebulization Q4H PRN            Lab Review:     Recent Labs      03/18/18   0430   WBC  8.9   HGB  8.5*   HCT  26.6*   PLT  288     Recent Labs      03/19/18   0456  03/18/18   0430   NA  137  137   K  3.3*  3.2*   CL  100  100   CO2  26  27   GLU  126*  122*   BUN  18  15   CREA  5.62*  4.38*   CA  7.9*  7.7*   MG   --   1.8   PHOS   --   3.0     Lab Results   Component Value Date/Time    Glucose (POC) 114 (H) 03/20/2018 07:26 AM    Glucose (POC) 172 (H) 03/19/2018 08:47 PM    Glucose (POC) 137 (H) 03/19/2018 04:32 PM    Glucose (POC) 96 03/19/2018 11:23 AM    Glucose (POC) 118 (H) 03/19/2018 07:23 AM     No results for input(s): PH, PCO2, PO2, HCO3, FIO2 in the last 72 hours. No results for input(s): INR in the last 72 hours.     No lab exists for component: Clau Fernandez  Lab Results   Component Value Date/Time    Specimen Description: URINE 09/14/2010 02:20 PM     Lab Results   Component Value Date/Time    Culture result: MODERATE NORMAL RESPIRATORY CORINNE 03/13/2018 04:33 AM    Culture result: NO GROWTH 6 DAYS 03/12/2018 10:37 PM    Culture result: NO GROWTH 6 DAYS 03/12/2018 10:37 PM            Assessment:     Principal Problem:    Pneumonia (3/12/2018)    Active Problems:    Type 2 diabetes mellitus with renal complication ([facility] (5/23/0295)      HTN (hypertension) (6/12/2014)      Anemia (6/13/2014)      Diarrhea (3/12/2018)      ESRD (end stage renal disease) ([facility] (3/12/2018)           Plan:     Principal Problem:    Pneumonia (3/12/2018)   - completed 5 days of azithromycin, off all other antibiotics, still with some cough but improving overall    Active Problems:    Type 2 diabetes mellitus with renal complication ([facility] (6/12/2014)   - BS okay off 70/30   - follow on SSI      HTN (hypertension) (6/12/2014)   - BP remains up, increased PO hydralazine yesterday, follow      Anemia (6/13/2014)    - Hgb stable, follow      ESRD (end stage renal disease) ([email or web address] (3/12/2018)   - started on dialysis, CM arranging outpatient dialysis (this apparently will be quite difficult per Dr. Halle Blount)   - discharge when dialysis arranged      Total time spent with patient: 25 minutes                  Care Plan discussed with: Patient and Nursing Staff    Discussed:  Code Status, Care Plan and D/C Planning    Prophylaxis:  Hep SQ    Disposition:   PT, OT, RN           ___________________________________________________    Attending Physician: Sánchez Stanford MD

## 2018-03-20 NOTE — DISCHARGE SUMMARY
Physician Interim Discharge Summary     Patient ID:  Wilber Camp  [id number]  59 y.o.  1955    Admit date: 3/12/2018      Discharge date and time: TBD    Admission Diagnoses: Pneumonia  Appendicitis    Discharge Diagnoses:  Principal Diagnosis Pneumonia                                            Principal Problem:    Pneumonia (3/12/2018)    Active Problems:    Type 2 diabetes mellitus with renal complication (HCC) (2/10/8416)      HTN (hypertension) (6/12/2014)      Anemia (6/13/2014)      Diarrhea (3/12/2018)      ESRD (end stage renal disease) ([facility] (3/12/2018)         Resolved Problems:  Problem List as of 3/20/2018  Date Reviewed: 3/12/2018          Codes Class Noted - Resolved    * (Principal)Pneumonia ICD-10-CM: J18.9  ICD-9-CM: 486  3/12/2018 - Present        Diarrhea ICD-10-CM: R19.7  ICD-9-CM: 787.91  3/12/2018 - Present        ESRD (end stage renal disease) ([facility] (Chronic) ICD-10-CM: N18.6  ICD-9-CM: 585.6  3/12/2018 - Present        CKD (chronic kidney disease) stage 5, GFR less than 15 ml/min ([facility] ICD-10-CM: N18.5  ICD-9-CM: 585.5  11/30/2017 - Present        Diabetic foot infection ([facility] ICD-10-CM: E11.69, L08.9  ICD-9-CM: 250.80, 686.9  11/30/2017 - Present        MELVA (acute kidney injury) ([facility] ICD-10-CM: N17.9  ICD-9-CM: 584.9  11/22/2017 - Present        Foot fracture ICD-10-CM: S92.909A  ICD-9-CM: 825.20  11/22/2017 - Present        Hyperlipidemia ICD-10-CM: E78.5  ICD-9-CM: 272.4  6/13/2014 - Present        Anemia ICD-10-CM: D64.9  ICD-9-CM: 285.9  6/13/2014 - Present        Type 2 diabetes mellitus with renal complication (HCC) (Chronic) ICD-10-CM: E11.29  ICD-9-CM: 250.40  6/12/2014 - Present        HTN (hypertension) (Chronic) ICD-10-CM: I10  ICD-9-CM: 401.9  6/12/2014 - Present        Edema ICD-10-CM: R60.9  ICD-9-CM: 782.3  6/12/2014 - Present        Diabetic foot ulcer ([facility] ICD-10-CM: E11.621, L97.509  ICD-9-CM: 250.80, 707.15  6/12/2014 - Present        RESOLVED: Nausea & vomiting ICD-10-CM: R11.2  ICD-9-CM: 787.01  3/12/2018 - 3/18/2018        RESOLVED: Hyponatremia ICD-10-CM: E87.1  ICD-9-CM: 276.1  3/12/2018 - 3/17/2018        RESOLVED: Metabolic acidosis HEY-62-GB: J76.9  ICD-9-CM: 276.2  11/22/2017 - 3/18/2018                Hospital Course: This is an interim summary and covers the hospitalization from 3/17/2018 to 3/20/2018. For any preceding portion of the patient's hospitalization, please see my partner's notes. Ms. Yasmany Young remained on dialysis throughout this time period. She had previously completed a course of antibiotics for pneumonia and continued to have some cough which gradually improved. She reported some diarrhea, but this only started after she received stool softeners, which were then stopped and her diarrhea resolved. Case Management has been unable to make any progress on outpatient dialysis arrangements. Final discharge summary will be done by the discharging physician.        Signed:  Vanessa Domínguez MD  3/20/2018  11:09 AM

## 2018-03-21 LAB
ANION GAP SERPL CALC-SCNC: 7 MMOL/L (ref 5–15)
BUN SERPL-MCNC: 14 MG/DL (ref 6–20)
BUN/CREAT SERPL: 3 (ref 12–20)
CALCIUM SERPL-MCNC: 8 MG/DL (ref 8.5–10.1)
CHLORIDE SERPL-SCNC: 103 MMOL/L (ref 97–108)
CO2 SERPL-SCNC: 27 MMOL/L (ref 21–32)
CREAT SERPL-MCNC: 4.52 MG/DL (ref 0.55–1.02)
ERYTHROCYTE [DISTWIDTH] IN BLOOD BY AUTOMATED COUNT: 17.3 % (ref 11.5–14.5)
GLUCOSE BLD STRIP.AUTO-MCNC: 149 MG/DL (ref 65–100)
GLUCOSE BLD STRIP.AUTO-MCNC: 151 MG/DL (ref 65–100)
GLUCOSE BLD STRIP.AUTO-MCNC: 153 MG/DL (ref 65–100)
GLUCOSE BLD STRIP.AUTO-MCNC: 215 MG/DL (ref 65–100)
GLUCOSE SERPL-MCNC: 154 MG/DL (ref 65–100)
HCT VFR BLD AUTO: 26.8 % (ref 35–47)
HGB BLD-MCNC: 8.3 G/DL (ref 11.5–16)
MCH RBC QN AUTO: 25.5 PG (ref 26–34)
MCHC RBC AUTO-ENTMCNC: 31 G/DL (ref 30–36.5)
MCV RBC AUTO: 82.2 FL (ref 80–99)
NRBC # BLD: 0 K/UL (ref 0–0.01)
NRBC BLD-RTO: 0 PER 100 WBC
PLATELET # BLD AUTO: 272 K/UL (ref 150–400)
PMV BLD AUTO: 8 FL (ref 8.9–12.9)
POTASSIUM SERPL-SCNC: 3.5 MMOL/L (ref 3.5–5.1)
RBC # BLD AUTO: 3.26 M/UL (ref 3.8–5.2)
SERVICE CMNT-IMP: ABNORMAL
SODIUM SERPL-SCNC: 137 MMOL/L (ref 136–145)
WBC # BLD AUTO: 8.8 K/UL (ref 3.6–11)

## 2018-03-21 PROCEDURE — 74011250637 HC RX REV CODE- 250/637: Performed by: INTERNAL MEDICINE

## 2018-03-21 PROCEDURE — 74011250636 HC RX REV CODE- 250/636: Performed by: INTERNAL MEDICINE

## 2018-03-21 PROCEDURE — 85027 COMPLETE CBC AUTOMATED: CPT | Performed by: INTERNAL MEDICINE

## 2018-03-21 PROCEDURE — 74011250637 HC RX REV CODE- 250/637: Performed by: SURGERY

## 2018-03-21 PROCEDURE — 80048 BASIC METABOLIC PNL TOTAL CA: CPT | Performed by: INTERNAL MEDICINE

## 2018-03-21 PROCEDURE — 65660000000 HC RM CCU STEPDOWN

## 2018-03-21 PROCEDURE — 82962 GLUCOSE BLOOD TEST: CPT

## 2018-03-21 PROCEDURE — 74011636637 HC RX REV CODE- 636/637: Performed by: INTERNAL MEDICINE

## 2018-03-21 PROCEDURE — 36415 COLL VENOUS BLD VENIPUNCTURE: CPT | Performed by: INTERNAL MEDICINE

## 2018-03-21 RX ADMIN — FERROUS SULFATE TAB 325 MG (65 MG ELEMENTAL FE) 325 MG: 325 (65 FE) TAB at 08:15

## 2018-03-21 RX ADMIN — AMLODIPINE BESYLATE 10 MG: 5 TABLET ORAL at 08:14

## 2018-03-21 RX ADMIN — CALCIUM ACETATE 667 MG: 667 CAPSULE ORAL at 08:14

## 2018-03-21 RX ADMIN — CALCIUM ACETATE 667 MG: 667 CAPSULE ORAL at 17:29

## 2018-03-21 RX ADMIN — HYDRALAZINE HYDROCHLORIDE 100 MG: 25 TABLET, FILM COATED ORAL at 08:14

## 2018-03-21 RX ADMIN — INSULIN LISPRO 2 UNITS: 100 INJECTION, SOLUTION INTRAVENOUS; SUBCUTANEOUS at 17:29

## 2018-03-21 RX ADMIN — CALCITRIOL 0.25 MCG: 0.25 CAPSULE, LIQUID FILLED ORAL at 08:14

## 2018-03-21 RX ADMIN — HYDRALAZINE HYDROCHLORIDE 100 MG: 25 TABLET, FILM COATED ORAL at 15:22

## 2018-03-21 RX ADMIN — INSULIN LISPRO 2 UNITS: 100 INJECTION, SOLUTION INTRAVENOUS; SUBCUTANEOUS at 21:23

## 2018-03-21 RX ADMIN — HEPARIN SODIUM 5000 UNITS: 5000 INJECTION, SOLUTION INTRAVENOUS; SUBCUTANEOUS at 08:15

## 2018-03-21 RX ADMIN — Medication 10 ML: at 15:23

## 2018-03-21 RX ADMIN — HEPARIN SODIUM 2500 UNITS: 1000 INJECTION, SOLUTION INTRAVENOUS; SUBCUTANEOUS at 14:15

## 2018-03-21 RX ADMIN — HEPARIN SODIUM 5000 UNITS: 5000 INJECTION, SOLUTION INTRAVENOUS; SUBCUTANEOUS at 21:22

## 2018-03-21 RX ADMIN — HYDRALAZINE HYDROCHLORIDE 100 MG: 25 TABLET, FILM COATED ORAL at 21:22

## 2018-03-21 RX ADMIN — OXYCODONE HYDROCHLORIDE AND ACETAMINOPHEN 1 TABLET: 5; 325 TABLET ORAL at 19:19

## 2018-03-21 RX ADMIN — METOPROLOL TARTRATE 100 MG: 50 TABLET ORAL at 21:23

## 2018-03-21 RX ADMIN — LEVOTHYROXINE SODIUM 100 MCG: 100 TABLET ORAL at 06:30

## 2018-03-21 RX ADMIN — INSULIN LISPRO 2 UNITS: 100 INJECTION, SOLUTION INTRAVENOUS; SUBCUTANEOUS at 08:15

## 2018-03-21 RX ADMIN — ERYTHROPOIETIN 10000 UNITS: 10000 INJECTION, SOLUTION INTRAVENOUS; SUBCUTANEOUS at 14:07

## 2018-03-21 NOTE — PROGRESS NOTES
3/21/2018  5:06 PM Family meeting planned with pt, pt's daughter and  with Derick Kate at 10am on 3/22 to discuss pt's dialysis need and coordination for discharge. 3/21/2018  9:33 AM Discussed with case management manager, awaiting contract for \"spa\" dialysis chair from [facility] to submit to legal. Coordination of dialysis in [place] will have to be pursued by pt and family per case management manager. CM will continue to follow.  MILLIE Tyler

## 2018-03-21 NOTE — PROGRESS NOTES
Leo Hickman [facility] 79  5[address] [facility], [address] [place] [address]  [phone]      Medical Progress Note      NAME: Jonathan Tucker   :  1955  MRM:  [id number]    Date/Time: 3/21/2018  8:16 AM           Subjective:     Chief Complaint:  Patient seen and examined. Chart reviewed. [employer] phone used for communication. She reports feeling \"fine, thank God. \" No acute complaints at this time. ROS:  (bold if positive, if negative)                        Tolerating PT  Tolerating Diet          Objective:       Vitals:          Last 24hrs VS reviewed since prior progress note.  Most recent are:    Visit Vitals    /85    Pulse 92    Temp 98.7 °F (37.1 °C) (Oral)    Resp 16    Ht 5' 3\" (1.6 m)    Wt 64.7 kg (142 lb 11.2 oz)    SpO2 98%    BMI 25.28 kg/m2     SpO2 Readings from Last 6 Encounters:   18 98%   17 100%   17 97%   14 98%   14 99%   11 94%    O2 Flow Rate (L/min): 2 l/min     No intake or output data in the 24 hours ending 18 1237       Exam:     Physical Exam:    Gen:  Well-developed, well-nourished, in no acute distress  HEENT:  Pink conjunctivae, PERRL, hearing intact to voice, moist mucous membranes  Neck:  Supple, without masses, thyroid non-tender  Resp:  No accessory muscle use, clear breath sounds without wheezes rales or rhonchi  Card:  No murmurs, normal S1, S2 without thrills, bruits or peripheral edema  Abd:  Soft, non-tender, non-distended, normoactive bowel sounds are present, no palpable organomegaly and no detectable hernias  Lymph:  No cervical or inguinal adenopathy  Musc:  No cyanosis or clubbing  Skin:  No rashes or ulcers, skin turgor is good  Neuro:  Cranial nerves are grossly intact, no focal motor weakness, follows commands appropriately  Psych:  Good insight, oriented to person, place and time, alert    Medications Reviewed: (see below)    Lab Data Reviewed: (see below)    ______________________________________________________________________    Medications:     Current Facility-Administered Medications   Medication Dose Route Frequency    hydrALAZINE (APRESOLINE) tablet 100 mg  100 mg Oral TID    hydrALAZINE (APRESOLINE) 20 mg/mL injection 20 mg  20 mg IntraVENous Q6H PRN    calcitRIOL (ROCALTROL) capsule 0.25 mcg  0.25 mcg Oral DAILY    calcium acetate (PHOSLO) capsule 667 mg  1 Cap Oral TID WITH MEALS    epoetin gonzalo (EPOGEN;PROCRIT) injection 10,000 Units  10,000 Units IntraVENous DIALYSIS MON, WED & FRI    heparin (porcine) 1,000 unit/mL injection 2,500 Units  2,500 Units Hemodialysis DIALYSIS PRN    heparin (porcine) injection 5,000 Units  5,000 Units SubCUTAneous Q12H    oxyCODONE-acetaminophen (PERCOCET) 5-325 mg per tablet 1 Tab  1 Tab Oral Q4H PRN    oxyCODONE-acetaminophen (PERCOCET 10)  mg per tablet 1 Tab  1 Tab Oral Q4H PRN    amLODIPine (NORVASC) tablet 10 mg  10 mg Oral DAILY    ferrous sulfate tablet 325 mg  325 mg Oral DAILY    levothyroxine (SYNTHROID) tablet 100 mcg  100 mcg Oral ACB    metoprolol tartrate (LOPRESSOR) tablet 100 mg  100 mg Oral QHS    sodium chloride (NS) flush 5-10 mL  5-10 mL IntraVENous Q8H    sodium chloride (NS) flush 5-10 mL  5-10 mL IntraVENous PRN    acetaminophen (TYLENOL) tablet 650 mg  650 mg Oral Q4H PRN    HYDROcodone-acetaminophen (NORCO) 5-325 mg per tablet 1 Tab  1 Tab Oral Q4H PRN    HYDROmorphone (PF) (DILAUDID) injection 0.2 mg  0.2 mg IntraVENous Q4H PRN    naloxone (NARCAN) injection 0.4 mg  0.4 mg IntraVENous PRN    diphenhydrAMINE (BENADRYL) injection 12.5 mg  12.5 mg IntraVENous Q4H PRN    ondansetron (ZOFRAN) injection 4 mg  4 mg IntraVENous Q6H PRN    insulin lispro (HUMALOG) injection   SubCUTAneous AC&HS    glucose chewable tablet 16 g  4 Tab Oral PRN    dextrose (D50W) injection syrg 12.5-25 g  12.5-25 g IntraVENous PRN    glucagon (GLUCAGEN) injection 1 mg  1 mg IntraMUSCular PRN    0.9% sodium chloride infusion 250 mL  250 mL IntraVENous PRN    prochlorperazine (COMPAZINE) injection 5 mg  5 mg IntraVENous Q6H PRN    albuterol-ipratropium (DUO-NEB) 2.5 MG-0.5 MG/3 ML  3 mL Nebulization Q4H PRN            Lab Review:     Recent Labs      03/21/18   0450   WBC  8.8   HGB  8.3*   HCT  26.8*   PLT  272     Recent Labs      03/21/18   0450  03/19/18   0456   NA  137  137   K  3.5  3.3*   CL  103  100   CO2  27  26   GLU  154*  126*   BUN  14  18   CREA  4.52*  5.62*   CA  8.0*  7.9*     Lab Results   Component Value Date/Time    Glucose (POC) 151 (H) 03/21/2018 11:32 AM    Glucose (POC) 149 (H) 03/21/2018 07:47 AM    Glucose (POC) 168 (H) 03/20/2018 09:00 PM    Glucose (POC) 169 (H) 03/20/2018 04:24 PM    Glucose (POC) 157 (H) 03/20/2018 11:05 AM     No results for input(s): PH, PCO2, PO2, HCO3, FIO2 in the last 72 hours. No results for input(s): INR in the last 72 hours.     No lab exists for component: Ramonia Troy  Lab Results   Component Value Date/Time    Specimen Description: URINE 09/14/2010 02:20 PM     Lab Results   Component Value Date/Time    Culture result: MODERATE NORMAL RESPIRATORY CORINNE 03/13/2018 04:33 AM    Culture result: NO GROWTH 6 DAYS 03/12/2018 10:37 PM    Culture result: NO GROWTH 6 DAYS 03/12/2018 10:37 PM            Assessment:     Principal Problem:    Pneumonia (3/12/2018)    Active Problems:    Type 2 diabetes mellitus with renal complication ([facility] (4/04/5357)      HTN (hypertension) (6/12/2014)      Anemia (6/13/2014)      ESRD (end stage renal disease) ([facility] (3/12/2018)           Plan:     Principal Problem:    Pneumonia (3/12/2018)   - completed 5 days of azithromycin, off all other antibiotics, improved, cough is minimal    Active Problems:    Type 2 diabetes mellitus with renal complication ([facility] (7/58/8867)   - BS okay off 70/30   - follow on SSI      HTN (hypertension) (6/12/2014)   - BP remains up, on max dose amlodipine, hydralazine and on 100 mg of metoprolol. Suspect BP will drop with HD today.  Follow and add additional agents as necessary      Anemia (6/13/2014)    - Hgb stable, follow      ESRD (end stage renal disease) ([facility] (3/12/2018)   - started on dialysis, CM arranging outpatient dialysis (this apparently will be quite difficult per Dr. Madeline Munoz)   - discharge when dialysis arranged      Total time spent with patient: 25 minutes                  Care Plan discussed with: Patient and Nursing Staff    Discussed:  Code Status, Care Plan and D/C Planning    Prophylaxis:  Hep SQ    Disposition:  CAITLIN PT, OT, RN           ___________________________________________________    Attending Physician: Jhon Johnson, DO

## 2018-03-21 NOTE — PROGRESS NOTES
[address] [facility]  YOB: 1955          Assessment & Plan:   ESRD  · For HD today  · Working on placement. Anticipate this effort will prove challenging.     HTN  · OK    Anemia  · EPO    SHPT  · Calcitriol and Ca acetate       Subjective:   CC: f/u ESRD  HPI: For HD today   ROS: No n/v/sob  Current Facility-Administered Medications   Medication Dose Route Frequency    hydrALAZINE (APRESOLINE) tablet 100 mg  100 mg Oral TID    hydrALAZINE (APRESOLINE) 20 mg/mL injection 20 mg  20 mg IntraVENous Q6H PRN    calcitRIOL (ROCALTROL) capsule 0.25 mcg  0.25 mcg Oral DAILY    calcium acetate (PHOSLO) capsule 667 mg  1 Cap Oral TID WITH MEALS    epoetin gonzalo (EPOGEN;PROCRIT) injection 10,000 Units  10,000 Units IntraVENous DIALYSIS MON, WED & FRI    heparin (porcine) 1,000 unit/mL injection 2,500 Units  2,500 Units Hemodialysis DIALYSIS PRN    heparin (porcine) injection 5,000 Units  5,000 Units SubCUTAneous Q12H    oxyCODONE-acetaminophen (PERCOCET) 5-325 mg per tablet 1 Tab  1 Tab Oral Q4H PRN    oxyCODONE-acetaminophen (PERCOCET 10)  mg per tablet 1 Tab  1 Tab Oral Q4H PRN    amLODIPine (NORVASC) tablet 10 mg  10 mg Oral DAILY    ferrous sulfate tablet 325 mg  325 mg Oral DAILY    levothyroxine (SYNTHROID) tablet 100 mcg  100 mcg Oral ACB    metoprolol tartrate (LOPRESSOR) tablet 100 mg  100 mg Oral QHS    sodium chloride (NS) flush 5-10 mL  5-10 mL IntraVENous Q8H    sodium chloride (NS) flush 5-10 mL  5-10 mL IntraVENous PRN    acetaminophen (TYLENOL) tablet 650 mg  650 mg Oral Q4H PRN    HYDROcodone-acetaminophen (NORCO) 5-325 mg per tablet 1 Tab  1 Tab Oral Q4H PRN    HYDROmorphone (PF) (DILAUDID) injection 0.2 mg  0.2 mg IntraVENous Q4H PRN    naloxone (NARCAN) injection 0.4 mg  0.4 mg IntraVENous PRN    diphenhydrAMINE (BENADRYL) injection 12.5 mg  12.5 mg IntraVENous Q4H PRN    ondansetron (ZOFRAN) injection 4 mg  4 mg IntraVENous Q6H PRN    insulin lispro (HUMALOG) injection   SubCUTAneous AC&HS    glucose chewable tablet 16 g  4 Tab Oral PRN    dextrose (D50W) injection syrg 12.5-25 g  12.5-25 g IntraVENous PRN    glucagon (GLUCAGEN) injection 1 mg  1 mg IntraMUSCular PRN    0.9% sodium chloride infusion 250 mL  250 mL IntraVENous PRN    prochlorperazine (COMPAZINE) injection 5 mg  5 mg IntraVENous Q6H PRN    albuterol-ipratropium (DUO-NEB) 2.5 MG-0.5 MG/3 ML  3 mL Nebulization Q4H PRN          Objective:     Vitals:  Blood pressure 134/56, pulse 88, temperature 98.7 °F (37.1 °C), resp. rate 19, height 5' 3\" (1.6 m), weight 64.7 kg (142 lb 11.2 oz), SpO2 94 %. Temp (24hrs), Av.6 °F (37 °C), Min:98.3 °F (36.8 °C), Max:98.8 °F (37.1 °C)      Intake and Output:          Physical Exam:               GENERAL ASSESSMENT: NAD  CHEST: few rhonchi  HEART: S1S2  ABDOMEN: Soft,NT  EXTREMITY: less EDEMA          ECG/rhythm:    Data Review      No results for input(s): TNIPOC in the last 72 hours. No lab exists for component: ITNL   No results for input(s): CPK, CKMB, TROIQ in the last 72 hours. Recent Labs      18   0450  18   0456   NA  137  137   K  3.5  3.3*   CL  103  100   CO2  27  26   BUN  14  18   CREA  4.52*  5.62*   GLU  154*  126*   CA  8.0*  7.9*   WBC  8.8   --    HGB  8.3*   --    HCT  26.8*   --    PLT  272   --       No results for input(s): INR, PTP, APTT in the last 72 hours. No lab exists for component: INREXT, INREXT  Needs: urine analysis, urine sodium, protein and creatinine  Lab Results   Component Value Date/Time    Sodium urine, random 38 2017 01:54 PM    Creatinine, urine 61.11 2017 08:08 PM           : Jarred Caicedo MD  3/21/2018        [facility]:  [email or web address]  [facility] office:  [address] [place] [address], [address] [place] [address],[address]  [address]  Phone: [phone]  Fax :     [phone] office:  [address] [facility], [address]  Phone - [phone]  Fax - [phone]

## 2018-03-21 NOTE — DIALYSIS
[facility]  [phone]    Vitals   Pre   Post   Assessment   Pre   Post     Temp  Temp: 98.7 °F (37.1 °C) (03/21/18 1110)  98.5   LOC  A&Ox3 A&Ox3   HR   Pulse (Heart Rate): 91 (03/21/18 1110) 93 Lungs   Scattered rhonchi  scattered rhonchi   B/P   BP: 157/78 (03/21/18 1110) 188/82 Cardiac   Regular, denies chest pain  regular, denies chest pain   Resp   Resp Rate: 16 (03/21/18 1110) 16 Skin   Warm/dry  warm/dry   Pain level  Pain Intensity 1: 4 (03/20/18 1621) 0   Edema  generalized     generalized   Orders:    Duration:   Start:   Procedure Start Time: 1110 End:   1440 Total:   3.5 hours   Dialyzer:   Dialyzer/Set Up Inspection: Revaclear (03/21/18 1110)   K Bath:   Dialysate K (mEq/L): 3 (03/21/18 1110)   Ca Bath:   Dialysate CA (mEq/L): 2.5 (03/21/18 1110)   Na/Bicarb:   Dialysate NA (mEq/L): 140 (03/21/18 1110)   Target Fluid Removal:   Goal/Amount of Fluid to Remove (mL): 1000 mL (03/21/18 1110)   Access     Type & Location:   RIJ tunneled catheter, biopatch in place dated 3/19/18, clean, dry, intact. Both ports aspirate and flush without difficulty.    Labs     Obtained/Reviewed   Critical Results Called   Date when labs were drawn-  Hgb-    HGB   Date Value Ref Range Status   03/21/2018 8.3 (L) 11.5 - 16.0 g/dL Final     K-    Potassium   Date Value Ref Range Status   03/21/2018 3.5 3.5 - 5.1 mmol/L Final     Ca-   Calcium   Date Value Ref Range Status   03/21/2018 8.0 (L) 8.5 - 10.1 MG/DL Final     Bun-   BUN   Date Value Ref Range Status   03/21/2018 14 6 - 20 MG/DL Final     Creat-   Creatinine   Date Value Ref Range Status   03/21/2018 4.52 (H) 0.55 - 1.02 MG/DL Final        Medications/ Blood Products Given     Name   Dose   Route and Time     Procrit 10,000 units IV 1405             Blood Volume Processed (BVP):    78 liters Net Fluid   Removed:  1,000 ml   Comments   Time Out Done: 1110: Orders, patient, code status, consent verified  Primary Nurse Rpt Pre: Daryle Kennel Ritesh Fuentes, RN  Primary Nurse Rpt Post: Betsy Null RN  Pt Education: Access care, procedural, outpatient dialysis schedule  Care Plan: See above, no discharge plan at this time. Tx Summary: Tolerated 3.5 hours HD without difficulty. All possible blood returned with NS. Both ports packed with heparin and capped. Report given to RN at bedside. Admiting Diagnosis:  Pt's previous clinic- Acute  Consent signed - Informed Consent Verified: Yes (03/21/18 1110)  Amaita Consent - Obtained  Hepatitis Status- HBSAG neg 3/13/18 via connect care  Machine #- Machine Number: [id number] (03/21/18 1110)  Telemetry status-N/A  Pre-dialysis wt. - Pre-Dialysis Weight: 68.1 kg (150 lb 2.1 oz) (03/19/18 0824)

## 2018-03-21 NOTE — PROGRESS NOTES
Bedside and Verbal shift change report given to Morena Finnegan RN (oncoming nurse) by KETAN Chapa (offgoing nurse). Report given with SBAR, Kardex, OR Summary, Intake/Output, MAR and Recent Results.

## 2018-03-22 LAB
ANION GAP SERPL CALC-SCNC: 5 MMOL/L (ref 5–15)
BASOPHILS # BLD: 0 K/UL (ref 0–0.1)
BASOPHILS NFR BLD: 1 % (ref 0–1)
BUN SERPL-MCNC: 8 MG/DL (ref 6–20)
BUN/CREAT SERPL: 3 (ref 12–20)
CALCIUM SERPL-MCNC: 8.1 MG/DL (ref 8.5–10.1)
CHLORIDE SERPL-SCNC: 103 MMOL/L (ref 97–108)
CO2 SERPL-SCNC: 30 MMOL/L (ref 21–32)
CREAT SERPL-MCNC: 2.84 MG/DL (ref 0.55–1.02)
DIFFERENTIAL METHOD BLD: ABNORMAL
EOSINOPHIL # BLD: 0.1 K/UL (ref 0–0.4)
EOSINOPHIL NFR BLD: 1 % (ref 0–7)
ERYTHROCYTE [DISTWIDTH] IN BLOOD BY AUTOMATED COUNT: 17.4 % (ref 11.5–14.5)
GLUCOSE BLD STRIP.AUTO-MCNC: 141 MG/DL (ref 65–100)
GLUCOSE BLD STRIP.AUTO-MCNC: 163 MG/DL (ref 65–100)
GLUCOSE BLD STRIP.AUTO-MCNC: 200 MG/DL (ref 65–100)
GLUCOSE BLD STRIP.AUTO-MCNC: 207 MG/DL (ref 65–100)
GLUCOSE SERPL-MCNC: 151 MG/DL (ref 65–100)
HCT VFR BLD AUTO: 25.8 % (ref 35–47)
HGB BLD-MCNC: 8.1 G/DL (ref 11.5–16)
IMM GRANULOCYTES # BLD: 0.1 K/UL (ref 0–0.04)
IMM GRANULOCYTES NFR BLD AUTO: 2 % (ref 0–0.5)
LYMPHOCYTES # BLD: 1.8 K/UL (ref 0.8–3.5)
LYMPHOCYTES NFR BLD: 22 % (ref 12–49)
MAGNESIUM SERPL-MCNC: 1.8 MG/DL (ref 1.6–2.4)
MCH RBC QN AUTO: 25.7 PG (ref 26–34)
MCHC RBC AUTO-ENTMCNC: 31.4 G/DL (ref 30–36.5)
MCV RBC AUTO: 81.9 FL (ref 80–99)
MONOCYTES # BLD: 0.7 K/UL (ref 0–1)
MONOCYTES NFR BLD: 8 % (ref 5–13)
NEUTS SEG # BLD: 5.6 K/UL (ref 1.8–8)
NEUTS SEG NFR BLD: 67 % (ref 32–75)
NRBC # BLD: 0 K/UL (ref 0–0.01)
NRBC BLD-RTO: 0 PER 100 WBC
PHOSPHATE SERPL-MCNC: 1.8 MG/DL (ref 2.6–4.7)
PLATELET # BLD AUTO: 244 K/UL (ref 150–400)
PMV BLD AUTO: 8.4 FL (ref 8.9–12.9)
POTASSIUM SERPL-SCNC: 3.6 MMOL/L (ref 3.5–5.1)
RBC # BLD AUTO: 3.15 M/UL (ref 3.8–5.2)
SERVICE CMNT-IMP: ABNORMAL
SODIUM SERPL-SCNC: 138 MMOL/L (ref 136–145)
WBC # BLD AUTO: 8.3 K/UL (ref 3.6–11)

## 2018-03-22 PROCEDURE — 74011250637 HC RX REV CODE- 250/637: Performed by: INTERNAL MEDICINE

## 2018-03-22 PROCEDURE — 74011636637 HC RX REV CODE- 636/637: Performed by: INTERNAL MEDICINE

## 2018-03-22 PROCEDURE — 80048 BASIC METABOLIC PNL TOTAL CA: CPT | Performed by: INTERNAL MEDICINE

## 2018-03-22 PROCEDURE — 74011250636 HC RX REV CODE- 250/636: Performed by: INTERNAL MEDICINE

## 2018-03-22 PROCEDURE — 83735 ASSAY OF MAGNESIUM: CPT | Performed by: INTERNAL MEDICINE

## 2018-03-22 PROCEDURE — 82962 GLUCOSE BLOOD TEST: CPT

## 2018-03-22 PROCEDURE — 65660000000 HC RM CCU STEPDOWN

## 2018-03-22 PROCEDURE — 85025 COMPLETE CBC W/AUTO DIFF WBC: CPT | Performed by: INTERNAL MEDICINE

## 2018-03-22 PROCEDURE — 36415 COLL VENOUS BLD VENIPUNCTURE: CPT | Performed by: INTERNAL MEDICINE

## 2018-03-22 PROCEDURE — 84100 ASSAY OF PHOSPHORUS: CPT | Performed by: INTERNAL MEDICINE

## 2018-03-22 RX ORDER — CLONIDINE HYDROCHLORIDE 0.1 MG/1
0.1 TABLET ORAL 2 TIMES DAILY
Status: DISCONTINUED | OUTPATIENT
Start: 2018-03-22 | End: 2018-03-30 | Stop reason: HOSPADM

## 2018-03-22 RX ADMIN — CLONIDINE HYDROCHLORIDE 0.1 MG: 0.1 TABLET ORAL at 17:14

## 2018-03-22 RX ADMIN — HEPARIN SODIUM 5000 UNITS: 5000 INJECTION, SOLUTION INTRAVENOUS; SUBCUTANEOUS at 21:39

## 2018-03-22 RX ADMIN — INSULIN LISPRO 2 UNITS: 100 INJECTION, SOLUTION INTRAVENOUS; SUBCUTANEOUS at 08:12

## 2018-03-22 RX ADMIN — HEPARIN SODIUM 5000 UNITS: 5000 INJECTION, SOLUTION INTRAVENOUS; SUBCUTANEOUS at 08:12

## 2018-03-22 RX ADMIN — INSULIN LISPRO 2 UNITS: 100 INJECTION, SOLUTION INTRAVENOUS; SUBCUTANEOUS at 12:11

## 2018-03-22 RX ADMIN — HYDRALAZINE HYDROCHLORIDE 100 MG: 25 TABLET, FILM COATED ORAL at 17:14

## 2018-03-22 RX ADMIN — FERROUS SULFATE TAB 325 MG (65 MG ELEMENTAL FE) 325 MG: 325 (65 FE) TAB at 08:12

## 2018-03-22 RX ADMIN — CALCIUM ACETATE 667 MG: 667 CAPSULE ORAL at 12:11

## 2018-03-22 RX ADMIN — INSULIN LISPRO 2 UNITS: 100 INJECTION, SOLUTION INTRAVENOUS; SUBCUTANEOUS at 21:41

## 2018-03-22 RX ADMIN — HYDRALAZINE HYDROCHLORIDE 100 MG: 25 TABLET, FILM COATED ORAL at 21:38

## 2018-03-22 RX ADMIN — METOPROLOL TARTRATE 100 MG: 50 TABLET ORAL at 21:39

## 2018-03-22 RX ADMIN — INSULIN LISPRO 3 UNITS: 100 INJECTION, SOLUTION INTRAVENOUS; SUBCUTANEOUS at 17:14

## 2018-03-22 RX ADMIN — CALCIUM ACETATE 667 MG: 667 CAPSULE ORAL at 08:12

## 2018-03-22 RX ADMIN — LEVOTHYROXINE SODIUM 100 MCG: 100 TABLET ORAL at 08:12

## 2018-03-22 RX ADMIN — HYDRALAZINE HYDROCHLORIDE 100 MG: 25 TABLET, FILM COATED ORAL at 09:47

## 2018-03-22 RX ADMIN — CLONIDINE HYDROCHLORIDE 0.1 MG: 0.1 TABLET ORAL at 12:11

## 2018-03-22 RX ADMIN — CALCIUM ACETATE 667 MG: 667 CAPSULE ORAL at 17:14

## 2018-03-22 RX ADMIN — CALCITRIOL 0.25 MCG: 0.25 CAPSULE, LIQUID FILLED ORAL at 09:47

## 2018-03-22 RX ADMIN — AMLODIPINE BESYLATE 10 MG: 5 TABLET ORAL at 08:12

## 2018-03-22 NOTE — PROGRESS NOTES
Physical Therapy   Ms Stephane Prader is up ambulating in the halls with her , and indep in her room. She has been trained on stairs. She & family indicate she is at baseline. No further skilled services deemed necessary.   Antonette Osborn PT

## 2018-03-22 NOTE — PROGRESS NOTES
Had an appointment with [place] M[place], patient and family to find out more about their thoughts, findings and wishes about dialysis. Patient's daughter stated that she had located a place in [place] near their residence that will accept the patient free of charge. Together we called the dialysis place and found out more information about the process. The place in [place] is called [facility] When the patient arrives in [place], they will have to go in person to fill out paperwork and application. They have two options available: 1 - hemodialysis once a week; 2. Peritoneal dialysis dayly managed at home and trained to do so by the facility. The organization covers the expenses incurred by dialysis, but not the medications the patient will need. The dialysis will be supervised by nephrologist and if catheters need to be placed it will be done so by nephrologists. The dialysis facility will need the following tests:   Complete urine analysis  Urea and Creatinine work up  Complete hematology analysis  Infectious disease work up to make sure she doesn't have hepB, hepC of HIV  Kidney ultrasound  Any other findings    There's no waiting list.  Patient and family had questions regarding how long patient can be without dialysis, what type of dialysis the doctors recommend. BRENDON Bar will arrange for a family meeting with the doctor. Patient's daughter will call the airlines to see if they can return to [place] earlier than expected without being charged the airline change fee and if she needs some documentation from the hospital for that. Will follow up with patient and family tomorrow.

## 2018-03-22 NOTE — PROGRESS NOTES
Bedside shift change report given to Oneyda (oncoming nurse) by Tammie Garcia (offgoing nurse). Report included the following information SBAR, Kardex, Procedure Summary, MAR and Recent Results.

## 2018-03-22 NOTE — PROGRESS NOTES
Nutrition Assessment:    RECOMMENDATIONS/INTERVENTION(S):   Change to CCD diet- Phos low, K+ WNL. Monitor PO intakes, wt, GI status  Pt declined ONS- \"too sweet\"    ASSESSMENT:   3/22: Follow up. Appetite is good. Pt eating well, no complaints. Change diet to CCD. Pt states she's having BM normally. Pt on Renal diet. Phos is low at 1.8. Cr 2.84. Protein requirements increased with HD.     3/19: Follow up. Pt appetite improved. Eating well. No n/v. Pt states she is going to the bathroom normally. Pt states she's just a little sore where she had her appendix taken out. Not painful but sore. Pt is worried about how she is going to make it back to [place] and get HD because in her country people usually have to wait a month to be seen for HD. Cr 5.62, GFR 8. K+ 3.3.     3/15: Follow up. Pt had HD 3/13, 3/14, will have it again 3/16. Pt does not want ONS, states they are too sweet which makes her nauseated. Pt states she had some diarrhea earlier today. States she ate ~50% of breakfast, not a lot of meat for lunch. Encouraged protein intakes for HD. Calcium 7.5. BUN 27. Cr 4.04. Phos and K WNL. Lipase slightly high 437 on admission. No new labs. GFR 11 slowly trending up. 3/13: 59 yr old female admitted for n/v/d. Pt states she hasn't eaten much of anything for the last 3 days. Any time she even drinks water she has to go to the bathroom and it's diarrhea. Pt complains of sweet taste in mouth, without having eaten anything. Ketoacidosis? Pt had central line placed for HD. Hgb low 6.7. Pt with PNA and possible appendicitis, no surgical intervention planned at this time. Phos 6.0, Calcium 6.4 low. , 207 mg/dL. Pt states she's lost about 5 lbs over the last few days. Chart indicates minor weight gain, likely fluids. No edema noted.      SUBJECTIVE/OBJECTIVE:   Diet Order:  Renal  % Eaten:    Patient Vitals for the past 168 hrs:   % Diet Eaten   03/18/18 1200 75 %   03/18/18 0826 80 %   03/16/18 0857 75 % 03/15/18 1627 75 %     Pertinent Medications: [x] Reviewed    Past Medical History:   Diagnosis Date    Arrhythmia     fast heart rate and palpitations dec 2010    Arthritis     generalized    Asthma     Chronic pain     headaches and stomach pain x several months    Diabetes ([place] [address].)     Diabetic foot ulcer associated with type 2 diabetes mellitus ([facility]     ESRD (end stage renal disease) ([facility]     GERD (gastroesophageal reflux disease)     Hypertension     Psychiatric disorder     depression        Chemistries:  Lab Results   Component Value Date/Time    Sodium 138 03/22/2018 04:02 AM    Potassium 3.6 03/22/2018 04:02 AM    Chloride 103 03/22/2018 04:02 AM    CO2 30 03/22/2018 04:02 AM    Anion gap 5 03/22/2018 04:02 AM    Glucose 151 (H) 03/22/2018 04:02 AM    BUN 8 03/22/2018 04:02 AM    Creatinine 2.84 (H) 03/22/2018 04:02 AM    BUN/Creatinine ratio 3 (L) 03/22/2018 04:02 AM    GFR est AA 20 (L) 03/22/2018 04:02 AM    GFR est non-AA 17 (L) 03/22/2018 04:02 AM    Calcium 8.1 (L) 03/22/2018 04:02 AM    AST (SGOT) 34 03/14/2018 01:03 AM    Alk. phosphatase 180 (H) 03/14/2018 01:03 AM    Protein, total 6.2 (L) 03/14/2018 01:03 AM    Albumin 2.4 (L) 03/14/2018 01:03 AM    Globulin 3.8 03/14/2018 01:03 AM    A-G Ratio 0.6 (L) 03/14/2018 01:03 AM    ALT (SGPT) 52 03/14/2018 01:03 AM      Anthropometrics: Height: 5' 3\" (160 cm) Weight: 64.7 kg (142 lb 11.2 oz)    IBW (%IBW):   ( ) UBW (%UBW):   (  %)    BMI: Body mass index is 25.28 kg/(m^2). This BMI is indicative of:     [] Underweight    [] Normal    [x] Overweight    []  Obesity    []  Extreme Obesity (BMI>40)    Estimated Nutrition Needs (Based on): 1520 Kcals/day (BMR(1169x1.3)) , 82 g (-95g/day(1.2-1.4g/kg)) Protein  Carbohydrate:  At Least 130 g/day  Fluids: 1500 mL/day    Last BM: 3/18- documented [x]Active     []Hyperactive  []Hypoactive       [] Absent   BS  Skin:    [x] Intact   [] Incision  [] Breakdown   [] DTI   [] Tears/Excoriation/Abrasion []Edema [] Other: Wt Readings from Last 30 Encounters:   03/20/18 64.7 kg (142 lb 11.2 oz)   02/08/18 60.3 kg (133 lb)   12/08/17 66.2 kg (146 lb)   11/28/17 63.5 kg (139 lb 15.9 oz)   09/11/14 64.4 kg (142 lb)   08/21/14 64.9 kg (143 lb)   08/07/14 64.9 kg (143 lb)   08/07/14 64.9 kg (143 lb)   07/10/14 67.1 kg (148 lb)   06/12/14 62.6 kg (138 lb)   06/02/14 63 kg (139 lb)   05/29/14 63.5 kg (140 lb)   05/08/14 61.2 kg (135 lb)   01/25/11 59 kg (130 lb)      NUTRITION DIAGNOSES:   Problem:  Inadequate energy intake     Etiology: related to decreased ability to consume sufficient energy     Signs/Symptoms: as evidenced by poor PO intakes, n/v/d    Initial diagnosis resolved. Intakes good. Increased protein needs 2/2 HD.      NUTRITION INTERVENTIONS:  Meals/Snacks: General/healthful diet   Supplements: Commercial supplement              GOAL:   pt will consume >50% of meals within 3-5 days    Cultural, Methodist, or Ethnic Dietary Needs: None     LEARNING NEEDS (Diet, Food/Nutrient-Drug Interaction):    [x] None Identified   [] Identified and Education Provided/Documented   [] Identified and Pt declined/was not appropriate      [x] Interdisciplinary Care Plan Reviewed/Documented    [x] Discharge Needs:    TBD   [] No Nutrition Related Discharge Needs    NUTRITION RISK:   Pt Is At Nutrition Risk  [x]     No Nutrition Risk Identified  []       PT SEEN FOR:    []  MD Consult: []Calorie Count      []Diabetic Diet Education        []Diet Education     []Electrolyte Management     []General Nutrition Management and Supplements     []Management of Tube Feeding     []TPN Recommendations    []  RN Referral:  []MST score >=2     []Enteral/Parenteral Nutrition PTA     []Pregnant: Gestational DM or Multigestation                 [] Pressure Ulcer      []  Low BMI      []  Length of Stay       [] Dysphagia Diet     [] Ventilator      [x] Follow-Up        Previous Recommendations:   [x] Implemented          [] Not Implemented [] Not Applicable    Previous Goal:   [x] Met              [x] Progressing Towards Goal              [] Not Progressing Towards Goal   [] Not Applicable              Kelsie Feliz, RD  Pager: [phone]  Office: [phone]

## 2018-03-22 NOTE — PROGRESS NOTES
3/22/2018 5:00 PM Family meeting was held with Cultural navigator, Sarah Romo pt, pt's , and daughter to discuss pt's need for dialysis while still in [facility] and when returning to [place]. Call was made to [facility] and process for dialysis in [place] was explained to pt and pt's family in [facility] [place] Please see Ayah's note for specific needs. Family meeting arranged with Dr. Mimi Clemons for 3/23 at [place] [address], pt's family is aware. Awaiting spa chair to be established with [facility] while pt remains in [facility]. [facility] has contact information to arrange contract. CM will follow up.  MILLIE Clarke

## 2018-03-22 NOTE — PROGRESS NOTES
Problem: Falls - Risk of  Goal: *Absence of Falls  Document Zuly Fall Risk and appropriate interventions in the flowsheet.    Outcome: Progressing Towards Goal  Fall Risk Interventions:  Mobility Interventions: Bed/chair exit alarm         Medication Interventions: Bed/chair exit alarm    Elimination Interventions: Call light in reach, Toileting schedule/hourly rounds, Patient to call for help with toileting needs    History of Falls Interventions: Bed/chair exit alarm, Door open when patient unattended, Investigate reason for fall, Room close to nurse's station

## 2018-03-22 NOTE — PROGRESS NOTES
[facility]  [address] [facility], [address] [place] [address]  [phone]      Medical Progress Note      NAME: Elnora Phoenix   :  1955  MRM:  [id number]    Date/Time: 3/22/2018  8:16 AM           Subjective:     Chief Complaint:  Patient seen and examined. Chart reviewed. Posterous phone used for communication. She denies any complaints at this time    ROS:  (bold if positive, if negative)                        Tolerating PT  Tolerating Diet          Objective:       Vitals:          Last 24hrs VS reviewed since prior progress note.  Most recent are:    Visit Vitals    /84 (BP 1 Location: Right arm, BP Patient Position: Head of bed elevated (Comment degrees))    Pulse 88    Temp 98.9 °F (37.2 °C)    Resp 12    Ht 5' 3\" (1.6 m)    Wt 64.7 kg (142 lb 11.2 oz)    SpO2 94%    BMI 25.28 kg/m2     SpO2 Readings from Last 6 Encounters:   18 94%   17 100%   17 97%   14 98%   14 99%   11 94%    O2 Flow Rate (L/min): 2 l/min       Intake/Output Summary (Last 24 hours) at 18 0958  Last data filed at 18 1440   Gross per 24 hour   Intake                0 ml   Output             1000 ml   Net            -1000 ml          Exam:     Physical Exam:    Gen:  Well-developed, well-nourished, in no acute distress  HEENT:  Pink conjunctivae, PERRL, hearing intact to voice, moist mucous membranes  Neck:  Supple, without masses, thyroid non-tender  Resp:  No accessory muscle use, clear breath sounds without wheezes rales or rhonchi  Card:  No murmurs, normal S1, S2 without thrills, bruits or peripheral edema  Abd:  Soft, non-tender, non-distended, normoactive bowel sounds are present, no palpable organomegaly and no detectable hernias  Lymph:  No cervical or inguinal adenopathy  Musc:  No cyanosis or clubbing  Skin:  No rashes or ulcers, skin turgor is good  Neuro:  Cranial nerves are grossly intact, no focal motor weakness, follows commands appropriately  Psych:  Good insight, oriented to person, place and time, alert    Medications Reviewed: (see below)    Lab Data Reviewed: (see below)    ______________________________________________________________________    Medications:     Current Facility-Administered Medications   Medication Dose Route Frequency    hydrALAZINE (APRESOLINE) tablet 100 mg  100 mg Oral TID    hydrALAZINE (APRESOLINE) 20 mg/mL injection 20 mg  20 mg IntraVENous Q6H PRN    calcitRIOL (ROCALTROL) capsule 0.25 mcg  0.25 mcg Oral DAILY    calcium acetate (PHOSLO) capsule 667 mg  1 Cap Oral TID WITH MEALS    epoetin gonzalo (EPOGEN;PROCRIT) injection 10,000 Units  10,000 Units IntraVENous DIALYSIS MON, WED & FRI    heparin (porcine) 1,000 unit/mL injection 2,500 Units  2,500 Units Hemodialysis DIALYSIS PRN    heparin (porcine) injection 5,000 Units  5,000 Units SubCUTAneous Q12H    oxyCODONE-acetaminophen (PERCOCET) 5-325 mg per tablet 1 Tab  1 Tab Oral Q4H PRN    oxyCODONE-acetaminophen (PERCOCET 10)  mg per tablet 1 Tab  1 Tab Oral Q4H PRN    amLODIPine (NORVASC) tablet 10 mg  10 mg Oral DAILY    ferrous sulfate tablet 325 mg  325 mg Oral DAILY    levothyroxine (SYNTHROID) tablet 100 mcg  100 mcg Oral ACB    metoprolol tartrate (LOPRESSOR) tablet 100 mg  100 mg Oral QHS    sodium chloride (NS) flush 5-10 mL  5-10 mL IntraVENous Q8H    sodium chloride (NS) flush 5-10 mL  5-10 mL IntraVENous PRN    acetaminophen (TYLENOL) tablet 650 mg  650 mg Oral Q4H PRN    HYDROcodone-acetaminophen (NORCO) 5-325 mg per tablet 1 Tab  1 Tab Oral Q4H PRN    HYDROmorphone (PF) (DILAUDID) injection 0.2 mg  0.2 mg IntraVENous Q4H PRN    naloxone (NARCAN) injection 0.4 mg  0.4 mg IntraVENous PRN    diphenhydrAMINE (BENADRYL) injection 12.5 mg  12.5 mg IntraVENous Q4H PRN    ondansetron (ZOFRAN) injection 4 mg  4 mg IntraVENous Q6H PRN    insulin lispro (HUMALOG) injection   SubCUTAneous AC&HS    glucose chewable tablet 16 g  4 Tab Oral PRN    dextrose (D50W) injection syrg 12.5-25 g  12.5-25 g IntraVENous PRN    glucagon (GLUCAGEN) injection 1 mg  1 mg IntraMUSCular PRN    0.9% sodium chloride infusion 250 mL  250 mL IntraVENous PRN    prochlorperazine (COMPAZINE) injection 5 mg  5 mg IntraVENous Q6H PRN    albuterol-ipratropium (DUO-NEB) 2.5 MG-0.5 MG/3 ML  3 mL Nebulization Q4H PRN            Lab Review:     Recent Labs      03/22/18   0402  03/21/18   0450   WBC  8.3  8.8   HGB  8.1*  8.3*   HCT  25.8*  26.8*   PLT  244  272     Recent Labs      03/22/18 0402  03/21/18   0450   NA  138  137   K  3.6  3.5   CL  103  103   CO2  30  27   GLU  151*  154*   BUN  8  14   CREA  2.84*  4.52*   CA  8.1*  8.0*   MG  1.8   --    PHOS  1.8*   --      Lab Results   Component Value Date/Time    Glucose (POC) 141 (H) 03/22/2018 07:23 AM    Glucose (POC) 215 (H) 03/21/2018 09:12 PM    Glucose (POC) 153 (H) 03/21/2018 04:57 PM    Glucose (POC) 151 (H) 03/21/2018 11:32 AM    Glucose (POC) 149 (H) 03/21/2018 07:47 AM     No results for input(s): PH, PCO2, PO2, HCO3, FIO2 in the last 72 hours. No results for input(s): INR in the last 72 hours.     No lab exists for component: Thirza Degree  Lab Results   Component Value Date/Time    Specimen Description: URINE 09/14/2010 02:20 PM     Lab Results   Component Value Date/Time    Culture result: MODERATE NORMAL RESPIRATORY CORINNE 03/13/2018 04:33 AM    Culture result: NO GROWTH 6 DAYS 03/12/2018 10:37 PM    Culture result: NO GROWTH 6 DAYS 03/12/2018 10:37 PM            Assessment:     Principal Problem:    Pneumonia (3/12/2018)    Active Problems:    Type 2 diabetes mellitus with renal complication ([facility] (7/32/1400)      HTN (hypertension) (6/12/2014)      Anemia (6/13/2014)      ESRD (end stage renal disease) ([facility] (3/12/2018)           Plan:     Principal Problem:    Pneumonia (3/12/2018)   - completed 5 days of azithromycin, off all other antibiotics, resolved at this point    Active Problems:    Type 2 diabetes mellitus with renal complication ([facility]) (4/74/0058)   - BS okay off 70/30   - follow on SSI      HTN (hypertension) (6/12/2014)   - BP remains up, on max dose amlodipine, hydralazine and on 100 mg of metoprolol.  Will add clonidine and titrate      Anemia (6/13/2014)    - Hgb stable, follow      ESRD (end stage renal disease) ([facility] (3/12/2018)   - started on dialysis, CM arranging outpatient dialysis (this apparently will be quite difficult per Dr. Keke Cagle)   - discharge when dialysis arranged      Total time spent with patient: 25 minutes                  Care Plan discussed with: Patient and Nursing Staff    Discussed:  Code Status, Care Plan and D/C Planning    Prophylaxis:  Hep SQ    Disposition:  CAITLIN PT, OT, RN           ___________________________________________________    Attending Physician: Donis Jackson DO

## 2018-03-23 LAB
ANION GAP SERPL CALC-SCNC: 8 MMOL/L (ref 5–15)
APPEARANCE UR: ABNORMAL
BACTERIA URNS QL MICRO: NEGATIVE /HPF
BASOPHILS # BLD: 0.1 K/UL (ref 0–0.1)
BASOPHILS NFR BLD: 1 % (ref 0–1)
BILIRUB UR QL: NEGATIVE
BUN SERPL-MCNC: 14 MG/DL (ref 6–20)
BUN/CREAT SERPL: 4 (ref 12–20)
CALCIUM SERPL-MCNC: 7.9 MG/DL (ref 8.5–10.1)
CHLORIDE SERPL-SCNC: 101 MMOL/L (ref 97–108)
CHOLEST SERPL-MCNC: 180 MG/DL
CO2 SERPL-SCNC: 29 MMOL/L (ref 21–32)
COLOR UR: ABNORMAL
CREAT SERPL-MCNC: 3.99 MG/DL (ref 0.55–1.02)
DIFFERENTIAL METHOD BLD: ABNORMAL
EOSINOPHIL # BLD: 0.2 K/UL (ref 0–0.4)
EOSINOPHIL NFR BLD: 2 % (ref 0–7)
EPITH CASTS URNS QL MICRO: ABNORMAL /LPF
ERYTHROCYTE [DISTWIDTH] IN BLOOD BY AUTOMATED COUNT: 17.7 % (ref 11.5–14.5)
EST. AVERAGE GLUCOSE BLD GHB EST-MCNC: 117 MG/DL
GLUCOSE BLD STRIP.AUTO-MCNC: 128 MG/DL (ref 65–100)
GLUCOSE BLD STRIP.AUTO-MCNC: 163 MG/DL (ref 65–100)
GLUCOSE BLD STRIP.AUTO-MCNC: 207 MG/DL (ref 65–100)
GLUCOSE BLD STRIP.AUTO-MCNC: 238 MG/DL (ref 65–100)
GLUCOSE SERPL-MCNC: 160 MG/DL (ref 65–100)
GLUCOSE UR STRIP.AUTO-MCNC: 100 MG/DL
HBA1C MFR BLD: 5.7 % (ref 4.2–6.3)
HCT VFR BLD AUTO: 26.6 % (ref 35–47)
HDLC SERPL-MCNC: 71 MG/DL
HDLC SERPL: 2.5 {RATIO} (ref 0–5)
HGB BLD-MCNC: 8.3 G/DL (ref 11.5–16)
HGB UR QL STRIP: NEGATIVE
HYALINE CASTS URNS QL MICRO: ABNORMAL /LPF (ref 0–5)
IMM GRANULOCYTES # BLD: 0.1 K/UL (ref 0–0.04)
IMM GRANULOCYTES NFR BLD AUTO: 1 % (ref 0–0.5)
KETONES UR QL STRIP.AUTO: NEGATIVE MG/DL
LDLC SERPL CALC-MCNC: 72.4 MG/DL (ref 0–100)
LEUKOCYTE ESTERASE UR QL STRIP.AUTO: NEGATIVE
LIPID PROFILE,FLP: ABNORMAL
LYMPHOCYTES # BLD: 1.3 K/UL (ref 0.8–3.5)
LYMPHOCYTES NFR BLD: 17 % (ref 12–49)
MAGNESIUM SERPL-MCNC: 1.8 MG/DL (ref 1.6–2.4)
MCH RBC QN AUTO: 25.6 PG (ref 26–34)
MCHC RBC AUTO-ENTMCNC: 31.2 G/DL (ref 30–36.5)
MCV RBC AUTO: 82.1 FL (ref 80–99)
MONOCYTES # BLD: 0.6 K/UL (ref 0–1)
MONOCYTES NFR BLD: 8 % (ref 5–13)
NEUTS SEG # BLD: 5.6 K/UL (ref 1.8–8)
NEUTS SEG NFR BLD: 72 % (ref 32–75)
NITRITE UR QL STRIP.AUTO: NEGATIVE
NRBC # BLD: 0 K/UL (ref 0–0.01)
NRBC BLD-RTO: 0 PER 100 WBC
PH UR STRIP: 7.5 [PH] (ref 5–8)
PHOSPHATE SERPL-MCNC: 2.3 MG/DL (ref 2.6–4.7)
PLATELET # BLD AUTO: 238 K/UL (ref 150–400)
PMV BLD AUTO: 8.4 FL (ref 8.9–12.9)
POTASSIUM SERPL-SCNC: 3.5 MMOL/L (ref 3.5–5.1)
PROT UR STRIP-MCNC: 300 MG/DL
RBC # BLD AUTO: 3.24 M/UL (ref 3.8–5.2)
RBC #/AREA URNS HPF: ABNORMAL /HPF (ref 0–5)
SERVICE CMNT-IMP: ABNORMAL
SODIUM SERPL-SCNC: 138 MMOL/L (ref 136–145)
SP GR UR REFRACTOMETRY: 1.01 (ref 1–1.03)
TRIGL SERPL-MCNC: 183 MG/DL (ref ?–150)
UR CULT HOLD, URHOLD: NORMAL
UROBILINOGEN UR QL STRIP.AUTO: 0.2 EU/DL (ref 0.2–1)
VLDLC SERPL CALC-MCNC: 36.6 MG/DL
WBC # BLD AUTO: 7.8 K/UL (ref 3.6–11)
WBC URNS QL MICRO: ABNORMAL /HPF (ref 0–4)

## 2018-03-23 PROCEDURE — 93970 EXTREMITY STUDY: CPT

## 2018-03-23 PROCEDURE — 90935 HEMODIALYSIS ONE EVALUATION: CPT

## 2018-03-23 PROCEDURE — 65660000000 HC RM CCU STEPDOWN

## 2018-03-23 PROCEDURE — 74011250637 HC RX REV CODE- 250/637: Performed by: INTERNAL MEDICINE

## 2018-03-23 PROCEDURE — 74011636637 HC RX REV CODE- 636/637: Performed by: INTERNAL MEDICINE

## 2018-03-23 PROCEDURE — 81001 URINALYSIS AUTO W/SCOPE: CPT | Performed by: INTERNAL MEDICINE

## 2018-03-23 PROCEDURE — 83735 ASSAY OF MAGNESIUM: CPT | Performed by: INTERNAL MEDICINE

## 2018-03-23 PROCEDURE — 85025 COMPLETE CBC W/AUTO DIFF WBC: CPT | Performed by: INTERNAL MEDICINE

## 2018-03-23 PROCEDURE — 74011250637 HC RX REV CODE- 250/637: Performed by: SURGERY

## 2018-03-23 PROCEDURE — 83036 HEMOGLOBIN GLYCOSYLATED A1C: CPT | Performed by: INTERNAL MEDICINE

## 2018-03-23 PROCEDURE — 82962 GLUCOSE BLOOD TEST: CPT

## 2018-03-23 PROCEDURE — 74011250636 HC RX REV CODE- 250/636: Performed by: INTERNAL MEDICINE

## 2018-03-23 PROCEDURE — 84100 ASSAY OF PHOSPHORUS: CPT | Performed by: INTERNAL MEDICINE

## 2018-03-23 PROCEDURE — 80061 LIPID PANEL: CPT | Performed by: INTERNAL MEDICINE

## 2018-03-23 PROCEDURE — 87389 HIV-1 AG W/HIV-1&-2 AB AG IA: CPT | Performed by: INTERNAL MEDICINE

## 2018-03-23 PROCEDURE — 36415 COLL VENOUS BLD VENIPUNCTURE: CPT | Performed by: INTERNAL MEDICINE

## 2018-03-23 PROCEDURE — 80048 BASIC METABOLIC PNL TOTAL CA: CPT | Performed by: INTERNAL MEDICINE

## 2018-03-23 RX ADMIN — INSULIN LISPRO 2 UNITS: 100 INJECTION, SOLUTION INTRAVENOUS; SUBCUTANEOUS at 11:29

## 2018-03-23 RX ADMIN — CALCIUM ACETATE 667 MG: 667 CAPSULE ORAL at 16:51

## 2018-03-23 RX ADMIN — ERYTHROPOIETIN 10000 UNITS: 10000 INJECTION, SOLUTION INTRAVENOUS; SUBCUTANEOUS at 13:47

## 2018-03-23 RX ADMIN — HYDRALAZINE HYDROCHLORIDE 100 MG: 25 TABLET, FILM COATED ORAL at 16:51

## 2018-03-23 RX ADMIN — HEPARIN SODIUM 5000 UNITS: 5000 INJECTION, SOLUTION INTRAVENOUS; SUBCUTANEOUS at 22:12

## 2018-03-23 RX ADMIN — FERROUS SULFATE TAB 325 MG (65 MG ELEMENTAL FE) 325 MG: 325 (65 FE) TAB at 09:07

## 2018-03-23 RX ADMIN — LEVOTHYROXINE SODIUM 100 MCG: 100 TABLET ORAL at 09:07

## 2018-03-23 RX ADMIN — HYDRALAZINE HYDROCHLORIDE 100 MG: 25 TABLET, FILM COATED ORAL at 22:12

## 2018-03-23 RX ADMIN — CLONIDINE HYDROCHLORIDE 0.1 MG: 0.1 TABLET ORAL at 09:07

## 2018-03-23 RX ADMIN — CALCIUM ACETATE 667 MG: 667 CAPSULE ORAL at 09:08

## 2018-03-23 RX ADMIN — CALCITRIOL 0.25 MCG: 0.25 CAPSULE, LIQUID FILLED ORAL at 09:07

## 2018-03-23 RX ADMIN — HYDRALAZINE HYDROCHLORIDE 100 MG: 25 TABLET, FILM COATED ORAL at 09:06

## 2018-03-23 RX ADMIN — INSULIN LISPRO 3 UNITS: 100 INJECTION, SOLUTION INTRAVENOUS; SUBCUTANEOUS at 09:09

## 2018-03-23 RX ADMIN — OXYCODONE HYDROCHLORIDE AND ACETAMINOPHEN 1 TABLET: 5; 325 TABLET ORAL at 22:12

## 2018-03-23 RX ADMIN — CLONIDINE HYDROCHLORIDE 0.1 MG: 0.1 TABLET ORAL at 18:35

## 2018-03-23 RX ADMIN — HEPARIN SODIUM 5000 UNITS: 5000 INJECTION, SOLUTION INTRAVENOUS; SUBCUTANEOUS at 09:07

## 2018-03-23 RX ADMIN — AMLODIPINE BESYLATE 10 MG: 5 TABLET ORAL at 09:06

## 2018-03-23 RX ADMIN — INSULIN LISPRO 2 UNITS: 100 INJECTION, SOLUTION INTRAVENOUS; SUBCUTANEOUS at 22:00

## 2018-03-23 RX ADMIN — METOPROLOL TARTRATE 100 MG: 50 TABLET ORAL at 22:12

## 2018-03-23 RX ADMIN — HEPARIN SODIUM 2500 UNITS: 1000 INJECTION, SOLUTION INTRAVENOUS; SUBCUTANEOUS at 14:22

## 2018-03-23 NOTE — PROGRESS NOTES
[address] [facility]  YOB: 1955          Assessment & Plan:   ESRD  · For HD today  · Long d/w pt and family with . She has [facility] [place] in [place]. She has the option of HD 1x per week (being increased as clinically indicated, apparently) and PD daily. · I explained that I feel the daily PD is more likely to be adequate and suggested we could transition here to PD prior to going back to [place] but she is not interested in PD and wishes to do hemo only. She is willing to get an AVF if vascular will be so kind as to arrange surgery and we will try to get her catheter changed to a PC. · She currently has a ticket to return 4/29 but is going to try to get an earlier ticket  · We are looking for outpt HD if possible prior to her return to [place].     HTN  · OK    Anemia  · EPO    SHPT  · Calcitriol and Ca acetate       Subjective:   CC: f/u ESRD  HPI: See at initiation of HD   ROS: No sob/n/v  Current Facility-Administered Medications   Medication Dose Route Frequency    cloNIDine HCl (CATAPRES) tablet 0.1 mg  0.1 mg Oral BID    hydrALAZINE (APRESOLINE) tablet 100 mg  100 mg Oral TID    hydrALAZINE (APRESOLINE) 20 mg/mL injection 20 mg  20 mg IntraVENous Q6H PRN    calcitRIOL (ROCALTROL) capsule 0.25 mcg  0.25 mcg Oral DAILY    calcium acetate (PHOSLO) capsule 667 mg  1 Cap Oral TID WITH MEALS    epoetin gonzalo (EPOGEN;PROCRIT) injection 10,000 Units  10,000 Units IntraVENous DIALYSIS MON, WED & FRI    heparin (porcine) 1,000 unit/mL injection 2,500 Units  2,500 Units Hemodialysis DIALYSIS PRN    heparin (porcine) injection 5,000 Units  5,000 Units SubCUTAneous Q12H    oxyCODONE-acetaminophen (PERCOCET) 5-325 mg per tablet 1 Tab  1 Tab Oral Q4H PRN    oxyCODONE-acetaminophen (PERCOCET 10)  mg per tablet 1 Tab  1 Tab Oral Q4H PRN    amLODIPine (NORVASC) tablet 10 mg  10 mg Oral DAILY  ferrous sulfate tablet 325 mg  325 mg Oral DAILY    levothyroxine (SYNTHROID) tablet 100 mcg  100 mcg Oral ACB    metoprolol tartrate (LOPRESSOR) tablet 100 mg  100 mg Oral QHS    sodium chloride (NS) flush 5-10 mL  5-10 mL IntraVENous Q8H    sodium chloride (NS) flush 5-10 mL  5-10 mL IntraVENous PRN    acetaminophen (TYLENOL) tablet 650 mg  650 mg Oral Q4H PRN    HYDROcodone-acetaminophen (NORCO) 5-325 mg per tablet 1 Tab  1 Tab Oral Q4H PRN    HYDROmorphone (PF) (DILAUDID) injection 0.2 mg  0.2 mg IntraVENous Q4H PRN    naloxone (NARCAN) injection 0.4 mg  0.4 mg IntraVENous PRN    diphenhydrAMINE (BENADRYL) injection 12.5 mg  12.5 mg IntraVENous Q4H PRN    ondansetron (ZOFRAN) injection 4 mg  4 mg IntraVENous Q6H PRN    insulin lispro (HUMALOG) injection   SubCUTAneous AC&HS    glucose chewable tablet 16 g  4 Tab Oral PRN    dextrose (D50W) injection syrg 12.5-25 g  12.5-25 g IntraVENous PRN    glucagon (GLUCAGEN) injection 1 mg  1 mg IntraMUSCular PRN    0.9% sodium chloride infusion 250 mL  250 mL IntraVENous PRN    prochlorperazine (COMPAZINE) injection 5 mg  5 mg IntraVENous Q6H PRN    albuterol-ipratropium (DUO-NEB) 2.5 MG-0.5 MG/3 ML  3 mL Nebulization Q4H PRN          Objective:     Vitals:  Blood pressure 121/64, pulse 84, temperature 98.8 °F (37.1 °C), resp. rate 16, height 5' 3\" (1.6 m), weight 64.7 kg (142 lb 11.2 oz), SpO2 95 %. Temp (24hrs), Av.6 °F (37 °C), Min:98.2 °F (36.8 °C), Max:99.2 °F (37.3 °C)      Intake and Output:   0701 -  1900  In: 350 [P.O.:350]  Out: -        Physical Exam:               GENERAL ASSESSMENT: NAD  CHEST: no distress  HEART: reg  ABDOMEN: Nondistended  EXTREMITY: less EDEMA          ECG/rhythm:    Data Review      No results for input(s): TNIPOC in the last 72 hours. No lab exists for component: ITNL   No results for input(s): CPK, CKMB, TROIQ in the last 72 hours.   Recent Labs      18   0327  18   0402  18   0452 NA  138  138  137   K  3.5  3.6  3.5   CL  101  103  103   CO2  29  30  27   BUN  14  8  14   CREA  3.99*  2.84*  4.52*   GLU  160*  151*  154*   PHOS  2.3*  1.8*   --    MG  1.8  1.8   --    CA  7.9*  8.1*  8.0*   WBC  7.8  8.3  8.8   HGB  8.3*  8.1*  8.3*   HCT  26.6*  25.8*  26.8*   PLT  238  244  272      No results for input(s): INR, PTP, APTT in the last 72 hours. No lab exists for component: INREXT, INREXT  Needs: urine analysis, urine sodium, protein and creatinine  Lab Results   Component Value Date/Time    Sodium urine, random 38 11/22/2017 01:54 PM    Creatinine, urine 61.11 11/22/2017 08:08 PM           : Dion Juarez MD  3/23/2018        [facility]:  [email or web address]. [email or web address]  Nicole Colindres office:  [address] [facility], [address] [place] [address] [address]  [place], [address] [place] [address]  Phone: [phone]  Fax :     [phone]    [facility] office:  [address] [facility], [facility]  Phone - [phone]  Fax - [phone]

## 2018-03-23 NOTE — PROGRESS NOTES
Leo Hickman [facility] 79  [facility], [address] [place] [address]  [phone]      Medical Progress Note      NAME: Winston Calixto   :  1955  MRM:  [id number]    Date/Time: 3/23/2018  8:16 AM           Subjective:     Chief Complaint:  Patient seen and examined. Chart reviewed. Joined Dr. Tiff Sheehan in lengthy discussion re: dialysis options going forward    ROS:  (bold if positive, if negative)                        Tolerating PT  Tolerating Diet          Objective:       Vitals:          Last 24hrs VS reviewed since prior progress note.  Most recent are:    Visit Vitals    /64 (BP 1 Location: Left arm, BP Patient Position: At rest)    Pulse 84    Temp 98.8 °F (37.1 °C)    Resp 16    Ht 5' 3\" (1.6 m)    Wt 64.7 kg (142 lb 11.2 oz)    SpO2 95%    BMI 25.28 kg/m2     SpO2 Readings from Last 6 Encounters:   18 95%   17 100%   17 97%   14 98%   14 99%   11 94%    O2 Flow Rate (L/min): 2 l/min       Intake/Output Summary (Last 24 hours) at 18 1053  Last data filed at 18 0853   Gross per 24 hour   Intake              350 ml   Output                0 ml   Net              350 ml          Exam:     Physical Exam:    Gen:  Well-developed, well-nourished, in no acute distress  HEENT:  Pink conjunctivae, PERRL, hearing intact to voice, moist mucous membranes  Neck:  Supple, without masses, thyroid non-tender  Resp:  No accessory muscle use, clear breath sounds without wheezes rales or rhonchi  Card:  No murmurs, normal S1, S2 without thrills, bruits or peripheral edema  Abd:  Soft, non-tender, non-distended, normoactive bowel sounds are present, no palpable organomegaly and no detectable hernias  Lymph:  No cervical or inguinal adenopathy  Musc:  No cyanosis or clubbing  Skin:  No rashes or ulcers, skin turgor is good  Neuro:  Cranial nerves are grossly intact, no focal motor weakness, follows commands appropriately  Psych:  Good insight, oriented to person, place and time, alert    Medications Reviewed: (see below)    Lab Data Reviewed: (see below)    ______________________________________________________________________    Medications:     Current Facility-Administered Medications   Medication Dose Route Frequency    cloNIDine HCl (CATAPRES) tablet 0.1 mg  0.1 mg Oral BID    hydrALAZINE (APRESOLINE) tablet 100 mg  100 mg Oral TID    hydrALAZINE (APRESOLINE) 20 mg/mL injection 20 mg  20 mg IntraVENous Q6H PRN    calcitRIOL (ROCALTROL) capsule 0.25 mcg  0.25 mcg Oral DAILY    calcium acetate (PHOSLO) capsule 667 mg  1 Cap Oral TID WITH MEALS    epoetin gonzalo (EPOGEN;PROCRIT) injection 10,000 Units  10,000 Units IntraVENous DIALYSIS MON, WED & FRI    heparin (porcine) 1,000 unit/mL injection 2,500 Units  2,500 Units Hemodialysis DIALYSIS PRN    heparin (porcine) injection 5,000 Units  5,000 Units SubCUTAneous Q12H    oxyCODONE-acetaminophen (PERCOCET) 5-325 mg per tablet 1 Tab  1 Tab Oral Q4H PRN    oxyCODONE-acetaminophen (PERCOCET 10)  mg per tablet 1 Tab  1 Tab Oral Q4H PRN    amLODIPine (NORVASC) tablet 10 mg  10 mg Oral DAILY    ferrous sulfate tablet 325 mg  325 mg Oral DAILY    levothyroxine (SYNTHROID) tablet 100 mcg  100 mcg Oral ACB    metoprolol tartrate (LOPRESSOR) tablet 100 mg  100 mg Oral QHS    sodium chloride (NS) flush 5-10 mL  5-10 mL IntraVENous Q8H    sodium chloride (NS) flush 5-10 mL  5-10 mL IntraVENous PRN    acetaminophen (TYLENOL) tablet 650 mg  650 mg Oral Q4H PRN    HYDROcodone-acetaminophen (NORCO) 5-325 mg per tablet 1 Tab  1 Tab Oral Q4H PRN    HYDROmorphone (PF) (DILAUDID) injection 0.2 mg  0.2 mg IntraVENous Q4H PRN    naloxone (NARCAN) injection 0.4 mg  0.4 mg IntraVENous PRN    diphenhydrAMINE (BENADRYL) injection 12.5 mg  12.5 mg IntraVENous Q4H PRN    ondansetron (ZOFRAN) injection 4 mg  4 mg IntraVENous Q6H PRN    insulin lispro (HUMALOG) injection   SubCUTAneous AC&HS    glucose chewable tablet 16 g  4 Tab Oral PRN    dextrose (D50W) injection syrg 12.5-25 g  12.5-25 g IntraVENous PRN    glucagon (GLUCAGEN) injection 1 mg  1 mg IntraMUSCular PRN    0.9% sodium chloride infusion 250 mL  250 mL IntraVENous PRN    prochlorperazine (COMPAZINE) injection 5 mg  5 mg IntraVENous Q6H PRN    albuterol-ipratropium (DUO-NEB) 2.5 MG-0.5 MG/3 ML  3 mL Nebulization Q4H PRN            Lab Review:     Recent Labs      03/23/18 0327 03/22/18   0402  03/21/18   0450   WBC  7.8  8.3  8.8   HGB  8.3*  8.1*  8.3*   HCT  26.6*  25.8*  26.8*   PLT  238  244  272     Recent Labs      03/23/18 0327 03/22/18 0402  03/21/18   0450   NA  138  138  137   K  3.5  3.6  3.5   CL  101  103  103   CO2  29  30  27   GLU  160*  151*  154*   BUN  14  8  14   CREA  3.99*  2.84*  4.52*   CA  7.9*  8.1*  8.0*   MG  1.8  1.8   --    PHOS  2.3*  1.8*   --      Lab Results   Component Value Date/Time    Glucose (POC) 207 (H) 03/23/2018 08:23 AM    Glucose (POC) 200 (H) 03/22/2018 09:17 PM    Glucose (POC) 207 (H) 03/22/2018 04:52 PM    Glucose (POC) 163 (H) 03/22/2018 11:55 AM    Glucose (POC) 141 (H) 03/22/2018 07:23 AM     No results for input(s): PH, PCO2, PO2, HCO3, FIO2 in the last 72 hours. No results for input(s): INR in the last 72 hours.     No lab exists for component: Thirza Degree  Lab Results   Component Value Date/Time    Specimen Description: URINE 09/14/2010 02:20 PM     Lab Results   Component Value Date/Time    Culture result: MODERATE NORMAL RESPIRATORY CORINNE 03/13/2018 04:33 AM    Culture result: NO GROWTH 6 DAYS 03/12/2018 10:37 PM    Culture result: NO GROWTH 6 DAYS 03/12/2018 10:37 PM            Assessment:     Principal Problem:    Pneumonia (3/12/2018)    Active Problems:    Type 2 diabetes mellitus with renal complication ([facility] 75.) (0/65/5983)      HTN (hypertension) (6/12/2014)      Anemia (6/13/2014)      ESRD (end stage renal disease) ([facility] 75.) (3/12/2018)           Plan:     Principal Problem:    Pneumonia (3/12/2018)   - completed 5 days of azithromycin, off all other antibiotics, resolved at this point    Active Problems:    Type 2 diabetes mellitus with renal complication ([facility].) (8/97/0453)   - BS okay off 70/30   - follow on SSI      HTN (hypertension) (6/12/2014)   - BP stabilizing and improving, on max dose amlodipine, hydralazine and on 100 mg of metoprolol. Will continue clonidine and titrate as needed      Anemia (6/13/2014)    - Hgb stable, follow      ESRD (end stage renal disease) ([facility].) (3/12/2018)   - On HD now. After extensive meeting with Dr. Bill Bell, [address] [facility], , patient, and family, it appears they would favor HD in  [place]. Dr. Bill Bell to speak with vascular to arrange AV fistula and swap rudy for perma-cath.    - discharge when dialysis arranged/flight to [place]      Total time spent with patient: 50 minutes                  Care Plan discussed with: Patient, Family, Care Manager, Nursing Staff, Consultant/Specialist and >50% of time spent in counseling and coordination of care    Discussed:  Code Status, Care Plan and D/C Planning    Prophylaxis:  Hep SQ    Disposition:  HH PT, OT, RN           ___________________________________________________    Attending Physician: Dante Jiménez DO

## 2018-03-23 NOTE — PROGRESS NOTES
Served as  during a patient conference for Dr. Kandace Fraga, Dr. Deonna Beaver and Gregor Sahni with patient, daughter and  at bedside. Peritoneal vs hemodialysis was explained at length. Family had the opportunity to ask questions and expressed understanding. Patient and family feel that hemodialysis will be a better option and that they think they might be able to get 3 days/week approved in [place]. Daughter also stated that she will talk to the airline to see if patient can fly back the first week in April.

## 2018-03-23 NOTE — DIABETES MGMT
Progress Note     Chart reviewed for elevated blood glucose ( > 180 mg/dL x 2 in the past 24 hours) . Recommendations/ Comments: If appropriate, please consider adding Lantus 10 units daily. This is ~80% of pt's home dose of basal insulin. Morning glucose: 207 mg/dL (per am POCT Glucose). Required 12 units of correction in the last 24 hours. Inpatient medications for glucose management:  1. Correction Scale: Lispro (Humalog) Normal Sensitivity scale to cover for glucose > 139 mg/dL before meals and for glucose >199 at bedtime      POC Glucose last 24hrs:   Lab Results   Component Value Date/Time     (H) 03/23/2018 03:27 AM    GLUCPOC 163 (H) 03/23/2018 10:56 AM    GLUCPOC 207 (H) 03/23/2018 08:23 AM    GLUCPOC 200 (H) 03/22/2018 09:17 PM        Estimated Creatinine Clearance: 13.2 mL/min (based on Cr of 3.99). Diet order:   Active Orders   Diet    DIET RENAL Regular        PO intake: Patient Vitals for the past 72 hrs:   % Diet Eaten   03/23/18 0853 100 %       History of Diabetes:   Arturo Domínguez is a 59 y.o. female with a past medical history significant for DM per  Angella Subramanian MD's H&P dated 3/12/2018. Prior to admission medications for glucose management: per past medical records  -NPH/insulin regular 70/30: 10 units before breakfast and 10 units before dinner     A1C:   Lab Results   Component Value Date/Time    Hemoglobin A1c 5.7 03/23/2018 03:27 AM    Hemoglobin A1c 8.6 (H) 11/23/2017 01:16 AM       Reference range*:  Increased risk for diabetes: 5.7 - 6.4%  Diabetes: >6.4%  Glycemic control for adults with diabetes: <7.0 %    *JOSE CRUZ (2014). Diagnosis and classification of diabetes mellitus. Diabetes care, 37, S81. Thank you. Jemma Juarez.  Irma MPH, RN, BSN, [address]   [phone]    -For most hospitalized persons with hyperglycemia in the intensive care unit (ICU), a glucose range of 140 to 180 mg/dL is recommended, provided this target can be safely achieved. *  - For general medicine and surgery patients in non-ICU settings, a premeal glucose target <140 mg/dL and a random blood glucose <180 mg/dL are recommended. *    HIMANSHU Deluca, Katherine De La Rosa., Hina Tariq, ... & Hanny Ye ([id number]). AMERICAN ASSOCIATION OF CLINICAL ENDOCRINOLOGISTS AND AMERICAN COLLEGE OF ENDOCRINOLOGY-CLINICAL PRACTICE GUIDELINES FOR DEVELOPING A DIABETES MELLITUS COMPREHENSIVE CARE PLAN-2015-EXECUTIVE SUMMARY: Complete guidelines are available at https://www. aace. com/publications/guidelines. Endocrine Practice, 21(4), V4255100.

## 2018-03-23 NOTE — PROGRESS NOTES
3/23/2018 12:02 PM Pt's daughter is going to attempt to change pt's ticket for 3/31 and requesting discharge for 3/30 after dialysis is complete. Message sent to Dr. Antonio Plunkett who reported, we could make it happen. CM will follow. 3/23/2018 10:51 AM Care conference held with Dr. Antonio Plunkett, Dr. Laisha Aguirre, pt, pt's , pt's daughter and CM. Options of PD vs HD explained at length. Pt and pt's family requested HD over PD. Pt's daughter is going to work on moving pt's plane ticket to an earlier date(original plane ticket scheduled for 4/29) to return to [place]. CM will continue to explore options of outpatient dialysis while pt remains in the [address] [facility]. CM called and spoke with Sung Stokes at [facility] who reported pt's case has been escalated to a spa contract and she will send a message to this department to reach out to CM. Case also discussed with CM manager. CM will follow up.  Clair Rodriguez, BSW

## 2018-03-23 NOTE — PROGRESS NOTES
Shift change report given to Danii (oncoming nurse) by [facility] (offgoing nurse). Report included the following information SBAR, Kardex, ED Summary, Intake/Output, MAR and Recent Results.

## 2018-03-23 NOTE — DIALYSIS
[facility]  [phone]    Vitals   Pre   Post   Assessment   Pre   Post     Temp  Temp: 97.9 °F (36.6 °C) (03/23/18 1052)  98.0 (oral)  LOC  A&Ox4; Primarily speaks Yi  A&Ox4; Primarily speaks Yi    HR   Pulse (Heart Rate): 83 (03/23/18 1052) 85 Lungs   Coarse right upper lobe, all other lung fields clear   Clear to auscultation    B/P   BP: 155/73 (03/23/18 1052) 163/73  Cardiac   HRR  HRR    Resp   Resp Rate: 18 (03/23/18 1052) 18  Skin   Warm, Dry   Warm, Dry    Pain level  0/10  0/10  Edema    +3 pitting BLE, pedal      +3 pitting BLE, pedal    Orders:    Duration:   Start:   1052 End:   1422 Total:   3.5 Hours    Dialyzer:   Dialyzer/Set Up Inspection: Revaclear (03/23/18 1052)   K Bath:   Dialysate K (mEq/L): 3 (03/23/18 1052)   Ca Bath:   Dialysate CA (mEq/L): 2.5 (03/23/18 1052)   Na/Bicarb:   Dialysate NA (mEq/L): 140 (03/23/18 1052)   Target Fluid Removal:   Goal/Amount of Fluid to Remove (mL): 2500 mL (03/23/18 1052)   Access     Type & Location:   Right IJ Catheter - No s/x of infection. No drainage noted. Dressing dry and intact; dated 3/19/18. Ports disinfected per protocol, aspirated (5ml each port, discarded)  and flushed with NS 0.9%. Lines connected and treatment initiated; lines reversed to gain more optimal arterial pressure.      Labs     Obtained/Reviewed   Critical Results Called   Date when labs were drawn- 3/23/18   Hgb-    HGB   Date Value Ref Range Status   03/23/2018 8.3 (L) 11.5 - 16.0 g/dL Final     K-    Potassium   Date Value Ref Range Status   03/23/2018 3.5 3.5 - 5.1 mmol/L Final     Ca-   Calcium   Date Value Ref Range Status   03/23/2018 7.9 (L) 8.5 - 10.1 MG/DL Final     Bun-   BUN   Date Value Ref Range Status   03/23/2018 14 6 - 20 MG/DL Final     Creat-   Creatinine   Date Value Ref Range Status   03/23/2018 3.99 (H) 0.55 - 1.02 MG/DL Final     Comment:     INVESTIGATED PER DELTA CHECK PROTOCOL        Medications/ Blood Products Given     Name   Dose   Route and Time     Procrit   10,000units/1mL  IVP @1347   Heparin  2,500unit/2.5mL  Intra-Catheter @1422 1.1mL Arterial/1. 4mL Venous         Blood Volume Processed (BVP):   75.8 L  Net Fluid   Removed:  2500 mL    Comments   Time Out Done: Yes   Primary Nurse Rpt Pre: Preston Hardy RN   Primary Nurse Rpt Post: Preston Hardy RN   Pt Education: Procedural   Care Plan: Continue with dialysis as ordered; Plans to return to [place]. Tx Summary:   Treatment completed per order. Ports disinfected per protocol, all possible blood rinsed back; lines disconnected, ports flushed with NS 0.9%, and capped. VSS. Bed in lowest position, call button, phone and personal belongings are within reach. Report given to primary nurse. Admiting Diagnosis: CKD 5/MELVA ON CKD  Pt's previous clinic- NA   Consent signed - Informed Consent Verified: Yes (03/23/18 1052)  [facility] Consent - Yes   Hepatitis Status- Negative 3/13/18   Machine #- Machine Number: [id number] (03/23/18 1052)  Telemetry status- Remotely monitored   Pre-dialysis wt. - 67.9 kg  Post-dialysis wt.  - 64.7 kg

## 2018-03-23 NOTE — PROGRESS NOTES
Consult received  I ordered a vein mapping  OR time might be an issue, but I will try to get an AVF placed Monday if space

## 2018-03-24 ENCOUNTER — APPOINTMENT (OUTPATIENT)
Dept: GENERAL RADIOLOGY | Age: 63
DRG: 341 | End: 2018-03-24
Attending: INTERNAL MEDICINE
Payer: SUBSIDIZED

## 2018-03-24 LAB
ANION GAP SERPL CALC-SCNC: 6 MMOL/L (ref 5–15)
BASOPHILS # BLD: 0 K/UL (ref 0–0.1)
BASOPHILS NFR BLD: 1 % (ref 0–1)
BUN SERPL-MCNC: 11 MG/DL (ref 6–20)
BUN/CREAT SERPL: 4 (ref 12–20)
CALCIUM SERPL-MCNC: 8.3 MG/DL (ref 8.5–10.1)
CHLORIDE SERPL-SCNC: 100 MMOL/L (ref 97–108)
CO2 SERPL-SCNC: 30 MMOL/L (ref 21–32)
CREAT SERPL-MCNC: 2.89 MG/DL (ref 0.55–1.02)
DIFFERENTIAL METHOD BLD: ABNORMAL
EOSINOPHIL # BLD: 0.1 K/UL (ref 0–0.4)
EOSINOPHIL NFR BLD: 2 % (ref 0–7)
ERYTHROCYTE [DISTWIDTH] IN BLOOD BY AUTOMATED COUNT: 17.9 % (ref 11.5–14.5)
GLUCOSE BLD STRIP.AUTO-MCNC: 142 MG/DL (ref 65–100)
GLUCOSE BLD STRIP.AUTO-MCNC: 161 MG/DL (ref 65–100)
GLUCOSE BLD STRIP.AUTO-MCNC: 179 MG/DL (ref 65–100)
GLUCOSE BLD STRIP.AUTO-MCNC: 205 MG/DL (ref 65–100)
GLUCOSE SERPL-MCNC: 178 MG/DL (ref 65–100)
HCT VFR BLD AUTO: 26.4 % (ref 35–47)
HGB BLD-MCNC: 8.4 G/DL (ref 11.5–16)
IMM GRANULOCYTES # BLD: 0.1 K/UL (ref 0–0.04)
IMM GRANULOCYTES NFR BLD AUTO: 1 % (ref 0–0.5)
LYMPHOCYTES # BLD: 1.4 K/UL (ref 0.8–3.5)
LYMPHOCYTES NFR BLD: 22 % (ref 12–49)
MAGNESIUM SERPL-MCNC: 1.8 MG/DL (ref 1.6–2.4)
MCH RBC QN AUTO: 26.2 PG (ref 26–34)
MCHC RBC AUTO-ENTMCNC: 31.8 G/DL (ref 30–36.5)
MCV RBC AUTO: 82.2 FL (ref 80–99)
MONOCYTES # BLD: 0.6 K/UL (ref 0–1)
MONOCYTES NFR BLD: 9 % (ref 5–13)
NEUTS SEG # BLD: 4.2 K/UL (ref 1.8–8)
NEUTS SEG NFR BLD: 65 % (ref 32–75)
NRBC # BLD: 0 K/UL (ref 0–0.01)
NRBC BLD-RTO: 0 PER 100 WBC
PHOSPHATE SERPL-MCNC: 2.1 MG/DL (ref 2.6–4.7)
PLATELET # BLD AUTO: 245 K/UL (ref 150–400)
PMV BLD AUTO: 8.8 FL (ref 8.9–12.9)
POTASSIUM SERPL-SCNC: 3.5 MMOL/L (ref 3.5–5.1)
RBC # BLD AUTO: 3.21 M/UL (ref 3.8–5.2)
SERVICE CMNT-IMP: ABNORMAL
SODIUM SERPL-SCNC: 136 MMOL/L (ref 136–145)
WBC # BLD AUTO: 6.4 K/UL (ref 3.6–11)

## 2018-03-24 PROCEDURE — 65660000000 HC RM CCU STEPDOWN

## 2018-03-24 PROCEDURE — 74011250637 HC RX REV CODE- 250/637: Performed by: INTERNAL MEDICINE

## 2018-03-24 PROCEDURE — 80048 BASIC METABOLIC PNL TOTAL CA: CPT | Performed by: INTERNAL MEDICINE

## 2018-03-24 PROCEDURE — 85025 COMPLETE CBC W/AUTO DIFF WBC: CPT | Performed by: INTERNAL MEDICINE

## 2018-03-24 PROCEDURE — 74011636637 HC RX REV CODE- 636/637: Performed by: INTERNAL MEDICINE

## 2018-03-24 PROCEDURE — 82962 GLUCOSE BLOOD TEST: CPT

## 2018-03-24 PROCEDURE — 36415 COLL VENOUS BLD VENIPUNCTURE: CPT | Performed by: INTERNAL MEDICINE

## 2018-03-24 PROCEDURE — 71045 X-RAY EXAM CHEST 1 VIEW: CPT

## 2018-03-24 PROCEDURE — 84100 ASSAY OF PHOSPHORUS: CPT | Performed by: INTERNAL MEDICINE

## 2018-03-24 PROCEDURE — 74011250636 HC RX REV CODE- 250/636: Performed by: INTERNAL MEDICINE

## 2018-03-24 PROCEDURE — 83735 ASSAY OF MAGNESIUM: CPT | Performed by: INTERNAL MEDICINE

## 2018-03-24 RX ORDER — GUAIFENESIN 600 MG/1
600 TABLET, EXTENDED RELEASE ORAL EVERY 12 HOURS
Status: DISCONTINUED | OUTPATIENT
Start: 2018-03-24 | End: 2018-03-30 | Stop reason: HOSPADM

## 2018-03-24 RX ADMIN — INSULIN LISPRO 3 UNITS: 100 INJECTION, SOLUTION INTRAVENOUS; SUBCUTANEOUS at 17:34

## 2018-03-24 RX ADMIN — METOPROLOL TARTRATE 100 MG: 50 TABLET ORAL at 22:10

## 2018-03-24 RX ADMIN — HEPARIN SODIUM 5000 UNITS: 5000 INJECTION, SOLUTION INTRAVENOUS; SUBCUTANEOUS at 08:12

## 2018-03-24 RX ADMIN — CALCIUM ACETATE 667 MG: 667 CAPSULE ORAL at 08:13

## 2018-03-24 RX ADMIN — CALCITRIOL 0.25 MCG: 0.25 CAPSULE, LIQUID FILLED ORAL at 08:13

## 2018-03-24 RX ADMIN — HEPARIN SODIUM 5000 UNITS: 5000 INJECTION, SOLUTION INTRAVENOUS; SUBCUTANEOUS at 22:11

## 2018-03-24 RX ADMIN — GUAIFENESIN 600 MG: 600 TABLET, EXTENDED RELEASE ORAL at 11:37

## 2018-03-24 RX ADMIN — INSULIN LISPRO 2 UNITS: 100 INJECTION, SOLUTION INTRAVENOUS; SUBCUTANEOUS at 11:37

## 2018-03-24 RX ADMIN — HYDRALAZINE HYDROCHLORIDE 100 MG: 25 TABLET, FILM COATED ORAL at 15:00

## 2018-03-24 RX ADMIN — GUAIFENESIN 600 MG: 600 TABLET, EXTENDED RELEASE ORAL at 22:10

## 2018-03-24 RX ADMIN — FERROUS SULFATE TAB 325 MG (65 MG ELEMENTAL FE) 325 MG: 325 (65 FE) TAB at 08:13

## 2018-03-24 RX ADMIN — CLONIDINE HYDROCHLORIDE 0.1 MG: 0.1 TABLET ORAL at 08:13

## 2018-03-24 RX ADMIN — INSULIN LISPRO 2 UNITS: 100 INJECTION, SOLUTION INTRAVENOUS; SUBCUTANEOUS at 08:12

## 2018-03-24 RX ADMIN — LEVOTHYROXINE SODIUM 100 MCG: 100 TABLET ORAL at 06:19

## 2018-03-24 RX ADMIN — CALCIUM ACETATE 667 MG: 667 CAPSULE ORAL at 17:34

## 2018-03-24 RX ADMIN — AMLODIPINE BESYLATE 10 MG: 5 TABLET ORAL at 08:12

## 2018-03-24 RX ADMIN — CLONIDINE HYDROCHLORIDE 0.1 MG: 0.1 TABLET ORAL at 17:34

## 2018-03-24 RX ADMIN — HYDRALAZINE HYDROCHLORIDE 100 MG: 25 TABLET, FILM COATED ORAL at 08:13

## 2018-03-24 RX ADMIN — CALCIUM ACETATE 667 MG: 667 CAPSULE ORAL at 11:37

## 2018-03-24 NOTE — PROGRESS NOTES
Leo Hickman [facility] 79  [address] [facility], [address] [place] [address]  [phone]      Medical Progress Note      NAME: Uma Birmingham   :  1955  MRM:  [id number]    Date/Time: 3/24/2018  8:16 AM           Subjective:     Chief Complaint:  Patient seen and examined. Chart reviewed. Joined Dr. Adrian Montero in lengthy discussion re: dialysis options going forward    ROS:  (bold if positive, if negative)                        Tolerating PT  Tolerating Diet          Objective:       Vitals:          Last 24hrs VS reviewed since prior progress note.  Most recent are:    Visit Vitals    /63 (BP 1 Location: Left arm, BP Patient Position: At rest)    Pulse 77    Temp 98.2 °F (36.8 °C)    Resp 16    Ht 5' 3\" (1.6 m)    Wt 64.8 kg (142 lb 13.7 oz)    SpO2 90%    BMI 25.31 kg/m2     SpO2 Readings from Last 6 Encounters:   18 90%   17 100%   17 97%   14 98%   14 99%   11 94%    O2 Flow Rate (L/min): 2 l/min       Intake/Output Summary (Last 24 hours) at 18 1041  Last data filed at 18 1832   Gross per 24 hour   Intake              250 ml   Output             2500 ml   Net            -2250 ml          Exam:     Physical Exam:    Gen:  Well-developed, well-nourished, in no acute distress  HEENT:  Pink conjunctivae, PERRL, hearing intact to voice, moist mucous membranes  Neck:  Supple, without masses, thyroid non-tender  Resp:  No accessory muscle use, clear breath sounds without wheezes rales or rhonchi  Card:  No murmurs, normal S1, S2 without thrills, bruits or peripheral edema  Abd:  Soft, non-tender, non-distended, normoactive bowel sounds are present, no palpable organomegaly and no detectable hernias  Lymph:  No cervical or inguinal adenopathy  Musc:  No cyanosis or clubbing  Skin:  No rashes or ulcers, skin turgor is good  Neuro:  Cranial nerves are grossly intact, no focal motor weakness, follows commands appropriately  Psych:  Good insight, oriented to person, place and time, alert    Medications Reviewed: (see below)    Lab Data Reviewed: (see below)    ______________________________________________________________________    Medications:     Current Facility-Administered Medications   Medication Dose Route Frequency    cloNIDine HCl (CATAPRES) tablet 0.1 mg  0.1 mg Oral BID    hydrALAZINE (APRESOLINE) tablet 100 mg  100 mg Oral TID    hydrALAZINE (APRESOLINE) 20 mg/mL injection 20 mg  20 mg IntraVENous Q6H PRN    calcitRIOL (ROCALTROL) capsule 0.25 mcg  0.25 mcg Oral DAILY    calcium acetate (PHOSLO) capsule 667 mg  1 Cap Oral TID WITH MEALS    epoetin gonzalo (EPOGEN;PROCRIT) injection 10,000 Units  10,000 Units IntraVENous DIALYSIS MON, WED & FRI    heparin (porcine) 1,000 unit/mL injection 2,500 Units  2,500 Units Hemodialysis DIALYSIS PRN    heparin (porcine) injection 5,000 Units  5,000 Units SubCUTAneous Q12H    oxyCODONE-acetaminophen (PERCOCET) 5-325 mg per tablet 1 Tab  1 Tab Oral Q4H PRN    oxyCODONE-acetaminophen (PERCOCET 10)  mg per tablet 1 Tab  1 Tab Oral Q4H PRN    amLODIPine (NORVASC) tablet 10 mg  10 mg Oral DAILY    ferrous sulfate tablet 325 mg  325 mg Oral DAILY    levothyroxine (SYNTHROID) tablet 100 mcg  100 mcg Oral ACB    metoprolol tartrate (LOPRESSOR) tablet 100 mg  100 mg Oral QHS    sodium chloride (NS) flush 5-10 mL  5-10 mL IntraVENous Q8H    sodium chloride (NS) flush 5-10 mL  5-10 mL IntraVENous PRN    acetaminophen (TYLENOL) tablet 650 mg  650 mg Oral Q4H PRN    HYDROcodone-acetaminophen (NORCO) 5-325 mg per tablet 1 Tab  1 Tab Oral Q4H PRN    HYDROmorphone (PF) (DILAUDID) injection 0.2 mg  0.2 mg IntraVENous Q4H PRN    naloxone (NARCAN) injection 0.4 mg  0.4 mg IntraVENous PRN    diphenhydrAMINE (BENADRYL) injection 12.5 mg  12.5 mg IntraVENous Q4H PRN    ondansetron (ZOFRAN) injection 4 mg  4 mg IntraVENous Q6H PRN    insulin lispro (HUMALOG) injection   SubCUTAneous AC&HS    glucose chewable tablet 16 g  4 Tab Oral PRN    dextrose (D50W) injection syrg 12.5-25 g  12.5-25 g IntraVENous PRN    glucagon (GLUCAGEN) injection 1 mg  1 mg IntraMUSCular PRN    0.9% sodium chloride infusion 250 mL  250 mL IntraVENous PRN    prochlorperazine (COMPAZINE) injection 5 mg  5 mg IntraVENous Q6H PRN    albuterol-ipratropium (DUO-NEB) 2.5 MG-0.5 MG/3 ML  3 mL Nebulization Q4H PRN            Lab Review:     Recent Labs      03/24/18   0521 03/23/18   0327  03/22/18   0402   WBC  6.4  7.8  8.3   HGB  8.4*  8.3*  8.1*   HCT  26.4*  26.6*  25.8*   PLT  245  238  244     Recent Labs      03/24/18   0521 03/23/18 0327  03/22/18   0402   NA  136  138  138   K  3.5  3.5  3.6   CL  100  101  103   CO2  30  29  30   GLU  178*  160*  151*   BUN  11  14  8   CREA  2.89*  3.99*  2.84*   CA  8.3*  7.9*  8.1*   MG  1.8  1.8  1.8   PHOS  2.1*  2.3*  1.8*     Lab Results   Component Value Date/Time    Glucose (POC) 161 (H) 03/24/2018 07:22 AM    Glucose (POC) 238 (H) 03/23/2018 09:19 PM    Glucose (POC) 128 (H) 03/23/2018 04:07 PM    Glucose (POC) 163 (H) 03/23/2018 10:56 AM    Glucose (POC) 207 (H) 03/23/2018 08:23 AM     No results for input(s): PH, PCO2, PO2, HCO3, FIO2 in the last 72 hours. No results for input(s): INR in the last 72 hours.     No lab exists for component: Kang Neri  Lab Results   Component Value Date/Time    Specimen Description: URINE 09/14/2010 02:20 PM     Lab Results   Component Value Date/Time    Culture result: MODERATE NORMAL RESPIRATORY CORINNE 03/13/2018 04:33 AM    Culture result: NO GROWTH 6 DAYS 03/12/2018 10:37 PM    Culture result: NO GROWTH 6 DAYS 03/12/2018 10:37 PM            Assessment:     Principal Problem:    Pneumonia (3/12/2018)    Active Problems:    Type 2 diabetes mellitus with renal complication ([facility].) (4/37/5847)      HTN (hypertension) (6/12/2014)      Anemia (6/13/2014)      ESRD (end stage renal disease) ([facility] (3/12/2018)           Plan:     Principal Problem:    Pneumonia (3/12/2018)   - completed 5 days of azithromycin, off all other antibiotics   - Now with return of cough, will repeat CXR, add mucolytics    Active Problems:    Type 2 diabetes mellitus with renal complication ([facility].) (9/11/7713)   - BS okay off 70/30   - follow on SSI      HTN (hypertension) (6/12/2014)   - BP stabilizing and improving, on max dose amlodipine, hydralazine and on 100 mg of metoprolol. Will continue clonidine at current dosage and titrate as needed      Anemia (6/13/2014)    - Hgb stable, follow      ESRD (end stage renal disease) ([facility].) (3/12/2018)   - On HD now. After extensive meeting with Dr. Nu Cisneros, [address] [place] [address], , patient, and family, it appears they would favor HD in  [place]. Arranging AV fistula and swap rudy for perma-cath.    - discharge when dialysis arranged/flight to [place]      Total time spent with patient: 25 minutes                  Care Plan discussed with: Patient and Nursing Staff    Discussed:  Code Status, Care Plan and D/C Planning    Prophylaxis:  Hep SQ    Disposition:  HH PT, OT, RN           ___________________________________________________    Attending Physician: Christine Nava DO

## 2018-03-24 NOTE — PROCEDURES
[address]  *** FINAL REPORT ***    Name: Dev Slade  MRN: [id number]    Inpatient  : 08 Mar 1955  HIS Order #: [id number]  [address] [facility] Visit #: [id number]  Date: 23 Mar 2018    TYPE OF TEST: Peripheral Venous Testing    REASON FOR TEST  Dialysis access planning    Right Arm:-  Deep venous thrombosis:           Not examined  Superficial venous thrombosis:    No    Left Arm:-  Deep venous thrombosis:           Not examined  Superficial venous thrombosis:    Yes    Vein Mapping:    Diam.   Depth  (mm)    Right Cephalic Vein:    Axilla:             0.0    Upper Arm:       2.0     6.0    Lower Arm:          4.0    Antecubital:     4.0    Upper Forearm:   1.0     1.0    Forearm:    Wrist:    Right Basilic Vein:    Upper Arm:       6.0     9.0    Lower Arm:       5.0     6.0    Antecubital:     4.0     4.0    Upper Forearm:   2.0     2.0    Lower Forearm:   1.0     1.0    Wrist:           1.0     1.0    R. Median Cubital:    Right Brachial Vein:    Proximal:    Distal:    Right Subclavian Artery:  Right Axillary Artery  :    Left Cephalic Vein:    Axilla:    Upper Arm:       5.0    Lower Arm:       4.0    Antecubital:     2.0    Upper Forearm:   2.0    Forearm:         2.0    Wrist:           2.0    Left Basilic Vein:    Upper Arm:       6.0    Lower Arm:       4.0    Antecubital:     2.0    Upper Forearm:   2.0    Lower Forearm:   1.0    Wrist:           1.0    L. Median Cubital:    Left Brachial Vein:    Proximal:    Distal:    Left Subclavian Artery:  Left Axillary Artery  :      INTERPRETATION/FINDINGS  PROCEDURE:  Color duplex ultrasound imaging of upper extremity veins. FINDINGS:         Right:  The basilic, and cephalic veins are patent on color  duplex imaging and without evidence of thrombus. The cephalic vein  ranges in diameter from 1 to 2 millimeters. The basilic vein ranges  in diameter from 1 to 6 millimeters. Left:   Thrombus noted in the distal cephalic vein of the left  upper arm. The basilic vein is patent on color duplex imaging and  without evidence of thrombus. The cephalic vein ranges in diameter  from 2 to 5 millimeters. The basilic vein ranges in diameter from 1  to 6 millimeters. IMPRESSION:  Vein segments that are patent with a diameter of 3  millimeters or greater are the bilateral basilic veins of the upper  arms. No evidence of right upper extremity vein thrombosis. Positive  exam of left upper extremity vein thrombosis. ADDITIONAL COMMENTS    I have personally reviewed the data relevant to the interpretation of  this  study. TECHNOLOGIST: China Guajardo  Signed: 03/23/2018 08:38 PM    PHYSICIAN: Lj Isaacs.  Yanna Doshi MD  Signed: 03/25/2018 11:46 AM

## 2018-03-24 NOTE — PROGRESS NOTES
Bedside and Verbal shift change report given to Alli Romero (oncoming nurse) by José Manuel Toth (offgoing nurse). Report included the following information SBAR, Kardex, Intake/Output, MAR and Recent Results.

## 2018-03-24 NOTE — DISCHARGE SUMMARY
Physician Interim Summary   Patient ID:  Gladys Reid  [id number]  59 y.o.  1955    PCP: Christiane Robertson MD     Consults: Nephrology and Vascular Surgery    Covering dates: 3/20/2018 through 3/24/2018    Admission Diagnoses: Pneumonia  Appendicitis  END STAGE RENAL DISEASE    Hospital Course:   Principal Problem:    Pneumonia (3/12/2018)    Active Problems:    Type 2 diabetes mellitus with renal complication ([facility] (4/67/9553)      HTN (hypertension) (6/12/2014)      Anemia (6/13/2014)      ESRD (end stage renal disease) ([facility] (3/12/2018)      No significant changes since interim by Dr. Rosanne Hughes on 3/20/19 other than it is likely that patient will be set up for hemodialysis with av fistula and permacath and then discharged to her home country of [place]     Additional hospital course and discharge summary will be done by discharging physician.

## 2018-03-24 NOTE — PROGRESS NOTES
Bedside and Verbal shift change report given to [address] Se (oncoming nurse) by Da Brody RN (offgoing nurse). Report included the following information SBAR, Kardex, OR Summary, Procedure Summary, Intake/Output, MAR and Recent Results.

## 2018-03-25 ENCOUNTER — ANESTHESIA EVENT (OUTPATIENT)
Dept: SURGERY | Age: 63
DRG: 341 | End: 2018-03-25
Payer: SUBSIDIZED

## 2018-03-25 LAB
ANION GAP SERPL CALC-SCNC: 8 MMOL/L (ref 5–15)
BASOPHILS # BLD: 0.1 K/UL (ref 0–0.1)
BASOPHILS NFR BLD: 1 % (ref 0–1)
BUN SERPL-MCNC: 22 MG/DL (ref 6–20)
BUN/CREAT SERPL: 6 (ref 12–20)
CALCIUM SERPL-MCNC: 8.5 MG/DL (ref 8.5–10.1)
CHLORIDE SERPL-SCNC: 97 MMOL/L (ref 97–108)
CO2 SERPL-SCNC: 28 MMOL/L (ref 21–32)
CREAT SERPL-MCNC: 3.87 MG/DL (ref 0.55–1.02)
DIFFERENTIAL METHOD BLD: ABNORMAL
EOSINOPHIL # BLD: 0.1 K/UL (ref 0–0.4)
EOSINOPHIL NFR BLD: 2 % (ref 0–7)
ERYTHROCYTE [DISTWIDTH] IN BLOOD BY AUTOMATED COUNT: 18.3 % (ref 11.5–14.5)
GLUCOSE BLD STRIP.AUTO-MCNC: 161 MG/DL (ref 65–100)
GLUCOSE BLD STRIP.AUTO-MCNC: 176 MG/DL (ref 65–100)
GLUCOSE BLD STRIP.AUTO-MCNC: 237 MG/DL (ref 65–100)
GLUCOSE BLD STRIP.AUTO-MCNC: 248 MG/DL (ref 65–100)
GLUCOSE SERPL-MCNC: 172 MG/DL (ref 65–100)
HCT VFR BLD AUTO: 26.4 % (ref 35–47)
HGB BLD-MCNC: 8.3 G/DL (ref 11.5–16)
IMM GRANULOCYTES # BLD: 0.1 K/UL (ref 0–0.04)
IMM GRANULOCYTES NFR BLD AUTO: 1 % (ref 0–0.5)
LYMPHOCYTES # BLD: 1.4 K/UL (ref 0.8–3.5)
LYMPHOCYTES NFR BLD: 18 % (ref 12–49)
MAGNESIUM SERPL-MCNC: 1.8 MG/DL (ref 1.6–2.4)
MCH RBC QN AUTO: 25.8 PG (ref 26–34)
MCHC RBC AUTO-ENTMCNC: 31.4 G/DL (ref 30–36.5)
MCV RBC AUTO: 82 FL (ref 80–99)
MONOCYTES # BLD: 0.6 K/UL (ref 0–1)
MONOCYTES NFR BLD: 8 % (ref 5–13)
NEUTS SEG # BLD: 5.5 K/UL (ref 1.8–8)
NEUTS SEG NFR BLD: 71 % (ref 32–75)
NRBC # BLD: 0 K/UL (ref 0–0.01)
NRBC BLD-RTO: 0 PER 100 WBC
PHOSPHATE SERPL-MCNC: 2.5 MG/DL (ref 2.6–4.7)
PLATELET # BLD AUTO: 248 K/UL (ref 150–400)
PMV BLD AUTO: 8.8 FL (ref 8.9–12.9)
POTASSIUM SERPL-SCNC: 3.7 MMOL/L (ref 3.5–5.1)
RBC # BLD AUTO: 3.22 M/UL (ref 3.8–5.2)
SERVICE CMNT-IMP: ABNORMAL
SODIUM SERPL-SCNC: 133 MMOL/L (ref 136–145)
WBC # BLD AUTO: 7.8 K/UL (ref 3.6–11)

## 2018-03-25 PROCEDURE — 36415 COLL VENOUS BLD VENIPUNCTURE: CPT | Performed by: INTERNAL MEDICINE

## 2018-03-25 PROCEDURE — 65660000000 HC RM CCU STEPDOWN

## 2018-03-25 PROCEDURE — 83735 ASSAY OF MAGNESIUM: CPT | Performed by: INTERNAL MEDICINE

## 2018-03-25 PROCEDURE — 84100 ASSAY OF PHOSPHORUS: CPT | Performed by: INTERNAL MEDICINE

## 2018-03-25 PROCEDURE — 74011250637 HC RX REV CODE- 250/637: Performed by: INTERNAL MEDICINE

## 2018-03-25 PROCEDURE — 85025 COMPLETE CBC W/AUTO DIFF WBC: CPT | Performed by: INTERNAL MEDICINE

## 2018-03-25 PROCEDURE — 82962 GLUCOSE BLOOD TEST: CPT

## 2018-03-25 PROCEDURE — 74011636637 HC RX REV CODE- 636/637: Performed by: INTERNAL MEDICINE

## 2018-03-25 PROCEDURE — 80048 BASIC METABOLIC PNL TOTAL CA: CPT | Performed by: INTERNAL MEDICINE

## 2018-03-25 PROCEDURE — 74011250636 HC RX REV CODE- 250/636: Performed by: INTERNAL MEDICINE

## 2018-03-25 RX ADMIN — GUAIFENESIN 600 MG: 600 TABLET, EXTENDED RELEASE ORAL at 20:56

## 2018-03-25 RX ADMIN — LEVOTHYROXINE SODIUM 100 MCG: 100 TABLET ORAL at 06:52

## 2018-03-25 RX ADMIN — HYDRALAZINE HYDROCHLORIDE 100 MG: 25 TABLET, FILM COATED ORAL at 08:45

## 2018-03-25 RX ADMIN — CLONIDINE HYDROCHLORIDE 0.1 MG: 0.1 TABLET ORAL at 17:22

## 2018-03-25 RX ADMIN — CALCITRIOL 0.25 MCG: 0.25 CAPSULE, LIQUID FILLED ORAL at 08:45

## 2018-03-25 RX ADMIN — INSULIN LISPRO 2 UNITS: 100 INJECTION, SOLUTION INTRAVENOUS; SUBCUTANEOUS at 12:01

## 2018-03-25 RX ADMIN — FERROUS SULFATE TAB 325 MG (65 MG ELEMENTAL FE) 325 MG: 325 (65 FE) TAB at 08:45

## 2018-03-25 RX ADMIN — CLONIDINE HYDROCHLORIDE 0.1 MG: 0.1 TABLET ORAL at 08:45

## 2018-03-25 RX ADMIN — INSULIN LISPRO 3 UNITS: 100 INJECTION, SOLUTION INTRAVENOUS; SUBCUTANEOUS at 16:38

## 2018-03-25 RX ADMIN — CALCIUM ACETATE 667 MG: 667 CAPSULE ORAL at 16:38

## 2018-03-25 RX ADMIN — METOPROLOL TARTRATE 100 MG: 50 TABLET ORAL at 22:16

## 2018-03-25 RX ADMIN — AMLODIPINE BESYLATE 10 MG: 5 TABLET ORAL at 08:45

## 2018-03-25 RX ADMIN — CALCIUM ACETATE 667 MG: 667 CAPSULE ORAL at 07:54

## 2018-03-25 RX ADMIN — GUAIFENESIN 600 MG: 600 TABLET, EXTENDED RELEASE ORAL at 08:45

## 2018-03-25 RX ADMIN — INSULIN LISPRO 2 UNITS: 100 INJECTION, SOLUTION INTRAVENOUS; SUBCUTANEOUS at 07:54

## 2018-03-25 RX ADMIN — HYDRALAZINE HYDROCHLORIDE 100 MG: 25 TABLET, FILM COATED ORAL at 22:15

## 2018-03-25 RX ADMIN — HYDRALAZINE HYDROCHLORIDE 100 MG: 25 TABLET, FILM COATED ORAL at 16:39

## 2018-03-25 RX ADMIN — CALCIUM ACETATE 667 MG: 667 CAPSULE ORAL at 12:01

## 2018-03-25 RX ADMIN — HEPARIN SODIUM 5000 UNITS: 5000 INJECTION, SOLUTION INTRAVENOUS; SUBCUTANEOUS at 20:56

## 2018-03-25 RX ADMIN — INSULIN LISPRO 2 UNITS: 100 INJECTION, SOLUTION INTRAVENOUS; SUBCUTANEOUS at 22:16

## 2018-03-25 RX ADMIN — HEPARIN SODIUM 5000 UNITS: 5000 INJECTION, SOLUTION INTRAVENOUS; SUBCUTANEOUS at 08:45

## 2018-03-25 NOTE — PROGRESS NOTES
[facility]o [facility] [place] [address]  [address] [facility], [address] [place] [address]  [phone]      Medical Progress Note      NAME:         Cesar Green   :        1955  MRM:        [id number]    Date:          3/25/2018      Subjective: Patient has been seen and examined as a follow up for pneumonia. Chart, labs, diagnostics reviewed. She remains stable with an intermittent minimally productive cough. No fever, nausea or vomiting    Objective:    Vital Signs:    Visit Vitals    /76 (BP 1 Location: Left arm, BP Patient Position: Sitting)    Pulse 77    Temp 97.5 °F (36.4 °C)    Resp 18    Ht 5' 3\" (1.6 m)    Wt 64.8 kg (142 lb 13.7 oz)    SpO2 93%    BMI 25.31 kg/m2      No intake or output data in the 24 hours ending 18 1344     Physical Examination:    General:   Well looking and nourished patient in no acute distress  Eyes:   pink conjunctivae, PERRLA with no discharge. ENT:   no ottorrhea or rhinorrhea with moist mucous membranes  Neck: no masses, thyroid non-tender and trachea central.  Pulm:  no accessory muscle use, decreased but clear breath sounds without crackles or wheezes  Card:  no JVD or murmurs, has regular and normal S1, S2 without thrills, bruits or peripheral edema  Abd:  Soft, non-tender, non-distended, normoactive bowel sounds   Musc:  No cyanosis, clubbing, atrophy or deformities. Skin:  No rashes, bruising or ulcers. Neuro: Awake and alert.  Generally a non focal exam. Follows commands appropriately  Psych:  Has a fair insight and is oriented x 3    Current Facility-Administered Medications   Medication Dose Route Frequency    guaiFENesin ER (MUCINEX) tablet 600 mg  600 mg Oral Q12H    cloNIDine HCl (CATAPRES) tablet 0.1 mg  0.1 mg Oral BID    hydrALAZINE (APRESOLINE) tablet 100 mg  100 mg Oral TID    hydrALAZINE (APRESOLINE) 20 mg/mL injection 20 mg  20 mg IntraVENous Q6H PRN    calcitRIOL (ROCALTROL) capsule 0.25 mcg  0.25 mcg Oral DAILY    calcium acetate (PHOSLO) capsule 667 mg  1 Cap Oral TID WITH MEALS    epoetin gonzalo (EPOGEN;PROCRIT) injection 10,000 Units  10,000 Units IntraVENous DIALYSIS MON, WED & FRI    heparin (porcine) 1,000 unit/mL injection 2,500 Units  2,500 Units Hemodialysis DIALYSIS PRN    heparin (porcine) injection 5,000 Units  5,000 Units SubCUTAneous Q12H    oxyCODONE-acetaminophen (PERCOCET) 5-325 mg per tablet 1 Tab  1 Tab Oral Q4H PRN    oxyCODONE-acetaminophen (PERCOCET 10)  mg per tablet 1 Tab  1 Tab Oral Q4H PRN    amLODIPine (NORVASC) tablet 10 mg  10 mg Oral DAILY    ferrous sulfate tablet 325 mg  325 mg Oral DAILY    levothyroxine (SYNTHROID) tablet 100 mcg  100 mcg Oral ACB    metoprolol tartrate (LOPRESSOR) tablet 100 mg  100 mg Oral QHS    sodium chloride (NS) flush 5-10 mL  5-10 mL IntraVENous Q8H    sodium chloride (NS) flush 5-10 mL  5-10 mL IntraVENous PRN    acetaminophen (TYLENOL) tablet 650 mg  650 mg Oral Q4H PRN    HYDROcodone-acetaminophen (NORCO) 5-325 mg per tablet 1 Tab  1 Tab Oral Q4H PRN    HYDROmorphone (PF) (DILAUDID) injection 0.2 mg  0.2 mg IntraVENous Q4H PRN    naloxone (NARCAN) injection 0.4 mg  0.4 mg IntraVENous PRN    diphenhydrAMINE (BENADRYL) injection 12.5 mg  12.5 mg IntraVENous Q4H PRN    ondansetron (ZOFRAN) injection 4 mg  4 mg IntraVENous Q6H PRN    insulin lispro (HUMALOG) injection   SubCUTAneous AC&HS    glucose chewable tablet 16 g  4 Tab Oral PRN    dextrose (D50W) injection syrg 12.5-25 g  12.5-25 g IntraVENous PRN    glucagon (GLUCAGEN) injection 1 mg  1 mg IntraMUSCular PRN    0.9% sodium chloride infusion 250 mL  250 mL IntraVENous PRN    prochlorperazine (COMPAZINE) injection 5 mg  5 mg IntraVENous Q6H PRN    albuterol-ipratropium (DUO-NEB) 2.5 MG-0.5 MG/3 ML  3 mL Nebulization Q4H PRN        Laboratory data and review:    Recent Labs      03/25/18   0405  03/24/18   0521  03/23/18 0327   WBC  7.8  6.4  7.8   HGB  8.3*  8.4*  8.3*   HCT  26.4*  26.4*  26.6*   PLT  248  245  238     Recent Labs      03/25/18   0405  03/24/18   0521  03/23/18   0327   NA  133*  136  138   K  3.7  3.5  3.5   CL  97  100  101   CO2  28  30  29   GLU  172*  178*  160*   BUN  22*  11  14   CREA  3.87*  2.89*  3.99*   CA  8.5  8.3*  7.9*   MG  1.8  1.8  1.8   PHOS  2.5*  2.1*  2.3*     No components found for: [place]    Assessment and Plan:    Pneumonia (3/12/2018): she remains afebrile. Has completed 5 days of azithromycin. No hypoxia. Continue to follow clinically    Type 2 diabetes mellitus with renal complication ([facility] (0/13/7117): A1c 5.7. Continue SSI per protocol    HTN (hypertension) (6/12/2014): Stable. Continue Amlodipine, Hydralazine and Metoprolol. ESRD (end stage renal disease) ([facility] (3/12/2018): on HD. Renal and vascula following. Fistula planned for 3/26. HD as per renal    Anemia (6/13/2014): Hgb stable.  Monitor    Total time spent for the patient's care: [address] [place] discussed with: Patient and Nursing Staff    Discussed:  Care Plan and D/C Planning    Prophylaxis:  Hep SQ    Anticipated Disposition:  Home w/Family           ___________________________________________________    Attending Physician:   Carri Vanegas MD

## 2018-03-25 NOTE — PROGRESS NOTES
MD notified of doppler results (superficial thrombosis of left upper arm). No new orders at this time.

## 2018-03-25 NOTE — PROGRESS NOTES
Bedside and Verbal shift change report given to Neida Fink (oncoming nurse) by Ligia Charles (offgoing nurse). Report included the following information SBAR, Kardex, Procedure Summary, MAR and Recent Results.

## 2018-03-25 NOTE — PROGRESS NOTES
Bedside and Verbal shift change report given to Sybil RN (oncoming nurse) by Alfreda Perdomo RN (offgoing nurse). Report included the following information SBAR, Kardex, OR Summary, Procedure Summary, MAR and Recent Results.

## 2018-03-26 ENCOUNTER — ANESTHESIA (OUTPATIENT)
Dept: SURGERY | Age: 63
DRG: 341 | End: 2018-03-26
Payer: SUBSIDIZED

## 2018-03-26 LAB
GLUCOSE BLD STRIP.AUTO-MCNC: 104 MG/DL (ref 65–100)
GLUCOSE BLD STRIP.AUTO-MCNC: 111 MG/DL (ref 65–100)
GLUCOSE BLD STRIP.AUTO-MCNC: 117 MG/DL (ref 65–100)
GLUCOSE BLD STRIP.AUTO-MCNC: 129 MG/DL (ref 65–100)
GLUCOSE BLD STRIP.AUTO-MCNC: 167 MG/DL (ref 65–100)
GLUCOSE BLD STRIP.AUTO-MCNC: 170 MG/DL (ref 65–100)
HIV 1+2 AB+HIV1 P24 AG SERPL QL IA: NONREACTIVE
HIV12 RESULT COMMENT, HHIVC: NORMAL
SERVICE CMNT-IMP: ABNORMAL

## 2018-03-26 PROCEDURE — 77030020143 HC AIRWY LARYN INTUB CGAS -A: Performed by: NURSE ANESTHETIST, CERTIFIED REGISTERED

## 2018-03-26 PROCEDURE — 76010000149 HC OR TIME 1 TO 1.5 HR

## 2018-03-26 PROCEDURE — 74011250636 HC RX REV CODE- 250/636

## 2018-03-26 PROCEDURE — 77030010507 HC ADH SKN DERMBND J&J -B

## 2018-03-26 PROCEDURE — 76060000033 HC ANESTHESIA 1 TO 1.5 HR

## 2018-03-26 PROCEDURE — 74011250636 HC RX REV CODE- 250/636: Performed by: ANESTHESIOLOGY

## 2018-03-26 PROCEDURE — 03180ZD BYPASS LEFT BRACHIAL ARTERY TO UPPER ARM VEIN, OPEN APPROACH: ICD-10-PCS

## 2018-03-26 PROCEDURE — 74011250637 HC RX REV CODE- 250/637: Performed by: INTERNAL MEDICINE

## 2018-03-26 PROCEDURE — 77030002996 HC SUT SLK J&J -A

## 2018-03-26 PROCEDURE — 77030011640 HC PAD GRND REM COVD -A

## 2018-03-26 PROCEDURE — 74011250636 HC RX REV CODE- 250/636: Performed by: INTERNAL MEDICINE

## 2018-03-26 PROCEDURE — 77030002933 HC SUT MCRYL J&J -A

## 2018-03-26 PROCEDURE — 77030018836 HC SOL IRR NACL ICUM -A

## 2018-03-26 PROCEDURE — 65660000000 HC RM CCU STEPDOWN

## 2018-03-26 PROCEDURE — 77030031139 HC SUT VCRL2 J&J -A

## 2018-03-26 PROCEDURE — 74011250637 HC RX REV CODE- 250/637: Performed by: SURGERY

## 2018-03-26 PROCEDURE — 77030002986 HC SUT PROL J&J -A

## 2018-03-26 PROCEDURE — 76210000006 HC OR PH I REC 0.5 TO 1 HR

## 2018-03-26 PROCEDURE — 77030002916 HC SUT ETHLN J&J -A

## 2018-03-26 PROCEDURE — 74011000250 HC RX REV CODE- 250

## 2018-03-26 PROCEDURE — 77030032490 HC SLV COMPR SCD KNE COVD -B

## 2018-03-26 PROCEDURE — 77030002987 HC SUT PROL J&J -B

## 2018-03-26 PROCEDURE — 74011636637 HC RX REV CODE- 636/637: Performed by: INTERNAL MEDICINE

## 2018-03-26 PROCEDURE — 77030020782 HC GWN BAIR PAWS FLX 3M -B

## 2018-03-26 PROCEDURE — 82962 GLUCOSE BLOOD TEST: CPT

## 2018-03-26 RX ORDER — SODIUM CHLORIDE, SODIUM LACTATE, POTASSIUM CHLORIDE, CALCIUM CHLORIDE 600; 310; 30; 20 MG/100ML; MG/100ML; MG/100ML; MG/100ML
100 INJECTION, SOLUTION INTRAVENOUS CONTINUOUS
Status: DISCONTINUED | OUTPATIENT
Start: 2018-03-26 | End: 2018-03-26 | Stop reason: HOSPADM

## 2018-03-26 RX ORDER — SODIUM CHLORIDE 0.9 % (FLUSH) 0.9 %
5-10 SYRINGE (ML) INJECTION AS NEEDED
Status: DISCONTINUED | OUTPATIENT
Start: 2018-03-26 | End: 2018-03-26 | Stop reason: HOSPADM

## 2018-03-26 RX ORDER — HYDROMORPHONE HYDROCHLORIDE 1 MG/ML
.25-1 INJECTION, SOLUTION INTRAMUSCULAR; INTRAVENOUS; SUBCUTANEOUS
Status: DISCONTINUED | OUTPATIENT
Start: 2018-03-26 | End: 2018-03-26 | Stop reason: HOSPADM

## 2018-03-26 RX ORDER — SODIUM CHLORIDE 0.9 % (FLUSH) 0.9 %
5-10 SYRINGE (ML) INJECTION EVERY 8 HOURS
Status: DISCONTINUED | OUTPATIENT
Start: 2018-03-26 | End: 2018-03-26 | Stop reason: HOSPADM

## 2018-03-26 RX ORDER — PROPOFOL 10 MG/ML
INJECTION, EMULSION INTRAVENOUS AS NEEDED
Status: DISCONTINUED | OUTPATIENT
Start: 2018-03-26 | End: 2018-03-26 | Stop reason: HOSPADM

## 2018-03-26 RX ORDER — ONDANSETRON 2 MG/ML
INJECTION INTRAMUSCULAR; INTRAVENOUS AS NEEDED
Status: DISCONTINUED | OUTPATIENT
Start: 2018-03-26 | End: 2018-03-26 | Stop reason: HOSPADM

## 2018-03-26 RX ORDER — SODIUM CHLORIDE 9 MG/ML
INJECTION, SOLUTION INTRAVENOUS
Status: DISCONTINUED | OUTPATIENT
Start: 2018-03-26 | End: 2018-03-26 | Stop reason: HOSPADM

## 2018-03-26 RX ORDER — FENTANYL CITRATE 50 UG/ML
INJECTION, SOLUTION INTRAMUSCULAR; INTRAVENOUS AS NEEDED
Status: DISCONTINUED | OUTPATIENT
Start: 2018-03-26 | End: 2018-03-26

## 2018-03-26 RX ORDER — LIDOCAINE HYDROCHLORIDE 10 MG/ML
0.1 INJECTION, SOLUTION EPIDURAL; INFILTRATION; INTRACAUDAL; PERINEURAL AS NEEDED
Status: DISCONTINUED | OUTPATIENT
Start: 2018-03-26 | End: 2018-03-26 | Stop reason: HOSPADM

## 2018-03-26 RX ORDER — HEPARIN SODIUM 1000 [USP'U]/ML
INJECTION, SOLUTION INTRAVENOUS; SUBCUTANEOUS AS NEEDED
Status: DISCONTINUED | OUTPATIENT
Start: 2018-03-26 | End: 2018-03-26 | Stop reason: HOSPADM

## 2018-03-26 RX ORDER — LIDOCAINE HYDROCHLORIDE 20 MG/ML
INJECTION, SOLUTION EPIDURAL; INFILTRATION; INTRACAUDAL; PERINEURAL AS NEEDED
Status: DISCONTINUED | OUTPATIENT
Start: 2018-03-26 | End: 2018-03-26 | Stop reason: HOSPADM

## 2018-03-26 RX ORDER — FENTANYL CITRATE 50 UG/ML
INJECTION, SOLUTION INTRAMUSCULAR; INTRAVENOUS AS NEEDED
Status: DISCONTINUED | OUTPATIENT
Start: 2018-03-26 | End: 2018-03-26 | Stop reason: HOSPADM

## 2018-03-26 RX ORDER — MIDAZOLAM HYDROCHLORIDE 1 MG/ML
INJECTION, SOLUTION INTRAMUSCULAR; INTRAVENOUS AS NEEDED
Status: DISCONTINUED | OUTPATIENT
Start: 2018-03-26 | End: 2018-03-26 | Stop reason: HOSPADM

## 2018-03-26 RX ADMIN — CALCIUM ACETATE 667 MG: 667 CAPSULE ORAL at 18:49

## 2018-03-26 RX ADMIN — ONDANSETRON 4 MG: 2 INJECTION INTRAMUSCULAR; INTRAVENOUS at 13:44

## 2018-03-26 RX ADMIN — HEPARIN SODIUM 3000 UNITS: 1000 INJECTION, SOLUTION INTRAVENOUS; SUBCUTANEOUS at 13:24

## 2018-03-26 RX ADMIN — HYDRALAZINE HYDROCHLORIDE 100 MG: 25 TABLET, FILM COATED ORAL at 18:44

## 2018-03-26 RX ADMIN — LIDOCAINE HYDROCHLORIDE 40 MG: 20 INJECTION, SOLUTION EPIDURAL; INFILTRATION; INTRACAUDAL; PERINEURAL at 13:04

## 2018-03-26 RX ADMIN — Medication 10 ML: at 21:54

## 2018-03-26 RX ADMIN — SODIUM CHLORIDE: 9 INJECTION, SOLUTION INTRAVENOUS at 12:50

## 2018-03-26 RX ADMIN — GUAIFENESIN 600 MG: 600 TABLET, EXTENDED RELEASE ORAL at 21:54

## 2018-03-26 RX ADMIN — MIDAZOLAM HYDROCHLORIDE 1 MG: 1 INJECTION, SOLUTION INTRAMUSCULAR; INTRAVENOUS at 12:55

## 2018-03-26 RX ADMIN — FENTANYL CITRATE 50 MCG: 50 INJECTION, SOLUTION INTRAMUSCULAR; INTRAVENOUS at 13:04

## 2018-03-26 RX ADMIN — AMLODIPINE BESYLATE 10 MG: 5 TABLET ORAL at 08:35

## 2018-03-26 RX ADMIN — CLONIDINE HYDROCHLORIDE 0.1 MG: 0.1 TABLET ORAL at 08:35

## 2018-03-26 RX ADMIN — CALCIUM ACETATE 667 MG: 667 CAPSULE ORAL at 09:48

## 2018-03-26 RX ADMIN — OXYCODONE HYDROCHLORIDE AND ACETAMINOPHEN 1 TABLET: 5; 325 TABLET ORAL at 18:49

## 2018-03-26 RX ADMIN — CLONIDINE HYDROCHLORIDE 0.1 MG: 0.1 TABLET ORAL at 18:44

## 2018-03-26 RX ADMIN — GUAIFENESIN 600 MG: 600 TABLET, EXTENDED RELEASE ORAL at 08:35

## 2018-03-26 RX ADMIN — HYDROMORPHONE HYDROCHLORIDE 0.5 MG: 1 INJECTION, SOLUTION INTRAMUSCULAR; INTRAVENOUS; SUBCUTANEOUS at 14:13

## 2018-03-26 RX ADMIN — Medication 10 ML: at 15:32

## 2018-03-26 RX ADMIN — CALCITRIOL 0.25 MCG: 0.25 CAPSULE, LIQUID FILLED ORAL at 08:35

## 2018-03-26 RX ADMIN — LEVOTHYROXINE SODIUM 100 MCG: 100 TABLET ORAL at 07:44

## 2018-03-26 RX ADMIN — PROPOFOL 150 MG: 10 INJECTION, EMULSION INTRAVENOUS at 13:04

## 2018-03-26 RX ADMIN — FERROUS SULFATE TAB 325 MG (65 MG ELEMENTAL FE) 325 MG: 325 (65 FE) TAB at 08:35

## 2018-03-26 RX ADMIN — INSULIN LISPRO 2 UNITS: 100 INJECTION, SOLUTION INTRAVENOUS; SUBCUTANEOUS at 08:37

## 2018-03-26 RX ADMIN — HYDRALAZINE HYDROCHLORIDE 100 MG: 25 TABLET, FILM COATED ORAL at 08:35

## 2018-03-26 RX ADMIN — METOPROLOL TARTRATE 100 MG: 50 TABLET ORAL at 21:53

## 2018-03-26 RX ADMIN — HYDRALAZINE HYDROCHLORIDE 100 MG: 25 TABLET, FILM COATED ORAL at 21:53

## 2018-03-26 RX ADMIN — HEPARIN SODIUM 5000 UNITS: 5000 INJECTION, SOLUTION INTRAVENOUS; SUBCUTANEOUS at 21:53

## 2018-03-26 NOTE — BRIEF OP NOTE
BRIEF OPERATIVE NOTE    Date of Procedure: 3/26/2018   Preoperative Diagnosis: END STAGE RENAL DISEASE  Postoperative Diagnosis: END STAGE RENAL DISEASE    Procedure(s):  LEFT BRACHIAL CEPHALIC FISTULA  Surgeon(s) and Role:     * Brian Ivey MD - Primary         Assistant Staff: None      Surgical Staff:  Circ-1: Maritza Dukes RN  Circ-2: Kaylee Torres RN  Scrub Tech-1: Cherie Allison.  Mel  Surg Asst-1: Sarah Spring LPN  Event Time In   Incision Start 1312   Incision Close      Anesthesia: General   Estimated Blood Loss: 10  Specimens: * No specimens in log *   Findings: 0   Complications: 0  Implants: * No implants in log *

## 2018-03-26 NOTE — PROGRESS NOTES
Bedside and Verbal shift change report given to Laurie (oncoming nurse) by [facility] (offgoing nurse). Report included the following information SBAR, Kardex, Procedure Summary, Intake/Output and Recent Results.

## 2018-03-26 NOTE — PROGRESS NOTES
Leo Hickman [address]  [address] [facility], [address] [place] [address]  [phone]      Medical Progress Note      NAME:         Ivon Boyle   :        1955  MRM:        [id number]    Date:          3/26/2018      Subjective: Patient has been seen and examined as a follow up for pneumonia, renal failure. Chart, labs, diagnostics reviewed. She has no new symptoms. Afebrile. Objective:    Vital Signs:    Visit Vitals    /67 (BP 1 Location: Right arm, BP Patient Position: At rest)    Pulse 70    Temp 97.5 °F (36.4 °C)    Resp 16    Ht 5' 3\" (1.6 m)    Wt 64.8 kg (142 lb 13.7 oz)    SpO2 96%    BMI 25.31 kg/m2          Intake/Output Summary (Last 24 hours) at 18 1548  Last data filed at 18 1438   Gross per 24 hour   Intake              300 ml   Output                0 ml   Net              300 ml        Physical Examination:    General:   Well looking and nourished patient in no acute distress  Eyes:   pink conjunctivae, PERRLA with no discharge. ENT:   no ottorrhea or rhinorrhea with moist mucous membranes  Pulm:  decreased but clear breath sounds without crackles or wheezes  Card:  has regular and normal S1, S2 without thrills, bruits or peripheral edema  Abd:  Soft, non-tender, non-distended, normoactive bowel sounds   Musc:  No cyanosis, clubbing, atrophy or deformities. Skin:  No rashes, bruising or ulcers. Neuro: Awake and alert.  Generally a non focal exam.   Psych:  Has a fair insight and is oriented x 3    Current Facility-Administered Medications   Medication Dose Route Frequency    guaiFENesin ER (MUCINEX) tablet 600 mg  600 mg Oral Q12H    cloNIDine HCl (CATAPRES) tablet 0.1 mg  0.1 mg Oral BID    hydrALAZINE (APRESOLINE) tablet 100 mg  100 mg Oral TID    hydrALAZINE (APRESOLINE) 20 mg/mL injection 20 mg  20 mg IntraVENous Q6H PRN    calcitRIOL (ROCALTROL) capsule 0.25 mcg  0.25 mcg Oral DAILY    calcium acetate (PHOSLO) capsule 667 mg  1 Cap Oral TID WITH MEALS    epoetin gonzalo (EPOGEN;PROCRIT) injection 10,000 Units  10,000 Units IntraVENous DIALYSIS MON, WED & FRI    heparin (porcine) 1,000 unit/mL injection 2,500 Units  2,500 Units Hemodialysis DIALYSIS PRN    heparin (porcine) injection 5,000 Units  5,000 Units SubCUTAneous Q12H    oxyCODONE-acetaminophen (PERCOCET) 5-325 mg per tablet 1 Tab  1 Tab Oral Q4H PRN    oxyCODONE-acetaminophen (PERCOCET 10)  mg per tablet 1 Tab  1 Tab Oral Q4H PRN    amLODIPine (NORVASC) tablet 10 mg  10 mg Oral DAILY    ferrous sulfate tablet 325 mg  325 mg Oral DAILY    levothyroxine (SYNTHROID) tablet 100 mcg  100 mcg Oral ACB    metoprolol tartrate (LOPRESSOR) tablet 100 mg  100 mg Oral QHS    sodium chloride (NS) flush 5-10 mL  5-10 mL IntraVENous Q8H    sodium chloride (NS) flush 5-10 mL  5-10 mL IntraVENous PRN    acetaminophen (TYLENOL) tablet 650 mg  650 mg Oral Q4H PRN    HYDROcodone-acetaminophen (NORCO) 5-325 mg per tablet 1 Tab  1 Tab Oral Q4H PRN    HYDROmorphone (PF) (DILAUDID) injection 0.2 mg  0.2 mg IntraVENous Q4H PRN    naloxone (NARCAN) injection 0.4 mg  0.4 mg IntraVENous PRN    diphenhydrAMINE (BENADRYL) injection 12.5 mg  12.5 mg IntraVENous Q4H PRN    ondansetron (ZOFRAN) injection 4 mg  4 mg IntraVENous Q6H PRN    insulin lispro (HUMALOG) injection   SubCUTAneous AC&HS    glucose chewable tablet 16 g  4 Tab Oral PRN    dextrose (D50W) injection syrg 12.5-25 g  12.5-25 g IntraVENous PRN    glucagon (GLUCAGEN) injection 1 mg  1 mg IntraMUSCular PRN    0.9% sodium chloride infusion 250 mL  250 mL IntraVENous PRN    prochlorperazine (COMPAZINE) injection 5 mg  5 mg IntraVENous Q6H PRN    albuterol-ipratropium (DUO-NEB) 2.5 MG-0.5 MG/3 ML  3 mL Nebulization Q4H PRN        Laboratory data and review:    Recent Labs      03/25/18   0405  03/24/18   0521   WBC  7.8  6.4   HGB  8.3*  8.4*   HCT  26.4*  26.4* PLT  248  245     Recent Labs      03/25/18   0405  03/24/18   0521   NA  133*  136   K  3.7  3.5   CL  97  100   CO2  28  30   GLU  172*  178*   BUN  22*  11   CREA  3.87*  2.89*   CA  8.5  8.3*   MG  1.8  1.8   PHOS  2.5*  2.1*     No components found for: [place]    Assessment and Plan:    ESRD (end stage renal disease) ([facility] (3/12/2018): on HD. Renal and vascular following. She is sp AVF . Renal following. Pain control. Monitor     Type 2 diabetes mellitus with renal complication (H[facility]) (6/75/0135): A1c 5.7. Continue SSI per protocol    HTN (hypertension) (6/12/2014): Stable. Continue Amlodipine, Hydralazine and Metoprolol. Anemia (6/13/2014): Hgb stable. Monitor    Pneumonia (3/12/2018): she remains afebrile. Has completed 5 days of azithromycin. No hypoxia. Stable.      Total time spent for the patient's care: [facility] discussed with: Patient and Nursing Staff    Discussed:  Care Plan and D/C Planning    Prophylaxis:  Hep SQ    Anticipated Disposition:  Home w/Family           ___________________________________________________    Attending Physician:   Sal Metzger MD

## 2018-03-26 NOTE — PERIOP NOTES
TRANSFER - OUT REPORT:    Verbal report given to Barnet Heimlich, RN on [address]  being transferred to [facility](unit) for routine post - op       Report consisted of patients Situation, Background, Assessment and   Recommendations(SBAR). Information from the following report(s) SBAR, Kardex, OR Summary and MAR was reviewed with the receiving nurse. Lines:   Peripheral IV 03/26/18 Right Hand (Active)   Site Assessment Clean, dry, & intact 3/26/2018  2:38 PM   Phlebitis Assessment 0 3/26/2018  2:38 PM   Infiltration Assessment 0 3/26/2018  2:38 PM   Dressing Status Clean, dry, & intact 3/26/2018  2:38 PM   Dressing Type Transparent 3/26/2018  2:38 PM   Hub Color/Line Status Capped;Flushed 3/26/2018  2:38 PM   Action Taken Open ports on tubing capped 3/26/2018 11:32 AM   Alcohol Cap Used Yes 3/26/2018 11:32 AM        Opportunity for questions and clarification was provided.       Patient transported with:   O2 @ 2 liters  Registered Nurse

## 2018-03-26 NOTE — ANESTHESIA POSTPROCEDURE EVALUATION
Post-Anesthesia Evaluation and Assessment    Patient: Sheeba Armstrong MRN: [id number]  SSN: [id number]    YOB: 1955  Age: 59 y.o. Sex: female       Cardiovascular Function/Vital Signs  Visit Vitals    /53    Pulse 68    Temp 36.4 °C (97.5 °F)    Resp 17    Ht 5' 3\" (1.6 m)    Wt 64.8 kg (142 lb 13.7 oz)    SpO2 97%    BMI 25.31 kg/m2       Patient is status post general anesthesia for Procedure(s):  LEFT BRACHIAL CEPHALIC FISTULA. Nausea/Vomiting: None    Postoperative hydration reviewed and adequate. Pain:  Pain Scale 1: Numeric (0 - 10) (03/26/18 1425)  Pain Intensity 1: 3 (03/26/18 1425)   Managed    Neurological Status:   Neuro (WDL): Exceptions to WDL (03/26/18 1425)  Neuro  Neurologic State: Drowsy; Eyes open spontaneously (03/26/18 1425)  Orientation Level: Oriented to person;Oriented to situation (03/26/18 1425)  Cognition: Follows commands (03/26/18 1425)  Speech: Clear (03/26/18 1425)  LUE Motor Response: Purposeful;Spontaneous  (03/26/18 1425)  LLE Motor Response: Purposeful;Spontaneous  (03/26/18 1425)  RUE Motor Response: Purposeful;Spontaneous  (03/26/18 1425)  RLE Motor Response: Purposeful;Spontaneous  (03/26/18 1425)   At baseline    Mental Status and Level of Consciousness: Arousable    Pulmonary Status:   O2 Device: Nasal cannula (03/26/18 1425)   Adequate oxygenation and airway patent    Complications related to anesthesia: None    Post-anesthesia assessment completed.  No concerns    Signed By: Wiley Crooks DO     March 26, 2018

## 2018-03-26 NOTE — PROGRESS NOTES
3/26/2018 1:28 PM Called and spoke with pt's daughter, Janie Peters who confirmed pt has a plane ticket for 3/31 to return to [place]. Planning for pt to discharge on 3/30 after dialysis.  MILLIE Barakat

## 2018-03-26 NOTE — ANESTHESIA PREPROCEDURE EVALUATION
Anesthetic History   No history of anesthetic complications            Review of Systems / Medical History  Patient summary reviewed and pertinent labs reviewed    Pulmonary        Sleep apnea: No treatment    Asthma     Comments: Bilateral PNA's   Neuro/Psych   Within defined limits           Cardiovascular    Hypertension          Hyperlipidemia    Exercise tolerance: >4 METS     GI/Hepatic/Renal     GERD    Renal disease (started on dialysis 3/13): ESRD and dialysis      Comments: +N/V Endo/Other    Diabetes: well controlled, type 2, using insulin  Hypothyroidism: well controlled  Arthritis and anemia (s/p transfusion)     Other Findings              Physical Exam    Airway  Mallampati: III  TM Distance: 4 - 6 cm  Neck ROM: normal range of motion   Mouth opening: Normal     Cardiovascular    Rhythm: regular  Rate: normal         Dental    Dentition: Upper dentition intact and Lower dentition intact     Pulmonary  Breath sounds clear to auscultation               Abdominal         Other Findings            Anesthetic Plan    ASA: 3  Anesthesia type: general          Induction: RSI and Intravenous  Anesthetic plan and risks discussed with: Patient       used for consent and PMH.

## 2018-03-27 ENCOUNTER — APPOINTMENT (OUTPATIENT)
Dept: INTERVENTIONAL RADIOLOGY/VASCULAR | Age: 63
DRG: 341 | End: 2018-03-27
Attending: INTERNAL MEDICINE
Payer: SUBSIDIZED

## 2018-03-27 LAB
GLUCOSE BLD STRIP.AUTO-MCNC: 140 MG/DL (ref 65–100)
GLUCOSE BLD STRIP.AUTO-MCNC: 155 MG/DL (ref 65–100)
GLUCOSE BLD STRIP.AUTO-MCNC: 168 MG/DL (ref 65–100)
GLUCOSE BLD STRIP.AUTO-MCNC: 178 MG/DL (ref 65–100)
SERVICE CMNT-IMP: ABNORMAL

## 2018-03-27 PROCEDURE — 74011250636 HC RX REV CODE- 250/636: Performed by: INTERNAL MEDICINE

## 2018-03-27 PROCEDURE — 77030010507 HC ADH SKN DERMBND J&J -B

## 2018-03-27 PROCEDURE — 77030018719 HC DRSG PTCH ANTIMIC J&J -A

## 2018-03-27 PROCEDURE — 36561 INSERT TUNNELED CV CATH: CPT

## 2018-03-27 PROCEDURE — 74011250637 HC RX REV CODE- 250/637: Performed by: SURGERY

## 2018-03-27 PROCEDURE — 65660000000 HC RM CCU STEPDOWN

## 2018-03-27 PROCEDURE — 74011250636 HC RX REV CODE- 250/636: Performed by: RADIOLOGY

## 2018-03-27 PROCEDURE — C1750 CATH, HEMODIALYSIS,LONG-TERM: HCPCS

## 2018-03-27 PROCEDURE — 77030002986 HC SUT PROL J&J -A

## 2018-03-27 PROCEDURE — 90935 HEMODIALYSIS ONE EVALUATION: CPT

## 2018-03-27 PROCEDURE — 82962 GLUCOSE BLOOD TEST: CPT

## 2018-03-27 PROCEDURE — 77010033678 HC OXYGEN DAILY

## 2018-03-27 PROCEDURE — 74011636637 HC RX REV CODE- 636/637: Performed by: INTERNAL MEDICINE

## 2018-03-27 PROCEDURE — 74011250637 HC RX REV CODE- 250/637: Performed by: INTERNAL MEDICINE

## 2018-03-27 PROCEDURE — C1769 GUIDE WIRE: HCPCS

## 2018-03-27 PROCEDURE — 74011000250 HC RX REV CODE- 250: Performed by: RADIOLOGY

## 2018-03-27 PROCEDURE — 36558 INSERT TUNNELED CV CATH: CPT

## 2018-03-27 PROCEDURE — 02HV33Z INSERTION OF INFUSION DEVICE INTO SUPERIOR VENA CAVA, PERCUTANEOUS APPROACH: ICD-10-PCS | Performed by: RADIOLOGY

## 2018-03-27 RX ORDER — LIDOCAINE HYDROCHLORIDE 10 MG/ML
10-20 INJECTION INFILTRATION; PERINEURAL
Status: DISCONTINUED | OUTPATIENT
Start: 2018-03-27 | End: 2018-03-27 | Stop reason: HOSPADM

## 2018-03-27 RX ORDER — HEPARIN SODIUM 1000 [USP'U]/ML
3800 INJECTION, SOLUTION INTRAVENOUS; SUBCUTANEOUS
Status: DISCONTINUED | OUTPATIENT
Start: 2018-03-27 | End: 2018-03-30 | Stop reason: HOSPADM

## 2018-03-27 RX ORDER — FENTANYL CITRATE 50 UG/ML
25-100 INJECTION, SOLUTION INTRAMUSCULAR; INTRAVENOUS
Status: DISCONTINUED | OUTPATIENT
Start: 2018-03-27 | End: 2018-03-27 | Stop reason: HOSPADM

## 2018-03-27 RX ORDER — HEPARIN SODIUM 1000 [USP'U]/ML
3000 INJECTION, SOLUTION INTRAVENOUS; SUBCUTANEOUS ONCE
Status: COMPLETED | OUTPATIENT
Start: 2018-03-27 | End: 2018-03-27

## 2018-03-27 RX ORDER — CEFAZOLIN SODIUM/WATER 2 G/20 ML
2 SYRINGE (ML) INTRAVENOUS ONCE
Status: COMPLETED | OUTPATIENT
Start: 2018-03-27 | End: 2018-03-27

## 2018-03-27 RX ADMIN — INSULIN LISPRO 2 UNITS: 100 INJECTION, SOLUTION INTRAVENOUS; SUBCUTANEOUS at 08:10

## 2018-03-27 RX ADMIN — AMLODIPINE BESYLATE 10 MG: 5 TABLET ORAL at 08:12

## 2018-03-27 RX ADMIN — METOPROLOL TARTRATE 100 MG: 50 TABLET ORAL at 21:16

## 2018-03-27 RX ADMIN — HYDRALAZINE HYDROCHLORIDE 100 MG: 25 TABLET, FILM COATED ORAL at 21:15

## 2018-03-27 RX ADMIN — HEPARIN SODIUM 3800 UNITS: 1000 INJECTION, SOLUTION INTRAVENOUS; SUBCUTANEOUS at 16:09

## 2018-03-27 RX ADMIN — FENTANYL CITRATE 50 MCG: 50 INJECTION, SOLUTION INTRAMUSCULAR; INTRAVENOUS at 16:03

## 2018-03-27 RX ADMIN — GUAIFENESIN 600 MG: 600 TABLET, EXTENDED RELEASE ORAL at 08:12

## 2018-03-27 RX ADMIN — HEPARIN SODIUM 3800 UNITS: 1000 INJECTION, SOLUTION INTRAVENOUS; SUBCUTANEOUS at 20:20

## 2018-03-27 RX ADMIN — HYDRALAZINE HYDROCHLORIDE 100 MG: 25 TABLET, FILM COATED ORAL at 08:13

## 2018-03-27 RX ADMIN — LEVOTHYROXINE SODIUM 100 MCG: 100 TABLET ORAL at 07:25

## 2018-03-27 RX ADMIN — HEPARIN SODIUM 5000 UNITS: 5000 INJECTION, SOLUTION INTRAVENOUS; SUBCUTANEOUS at 21:16

## 2018-03-27 RX ADMIN — OXYCODONE HYDROCHLORIDE AND ACETAMINOPHEN 1 TABLET: 5; 325 TABLET ORAL at 21:24

## 2018-03-27 RX ADMIN — OXYCODONE HYDROCHLORIDE AND ACETAMINOPHEN 1 TABLET: 5; 325 TABLET ORAL at 16:34

## 2018-03-27 RX ADMIN — ERYTHROPOIETIN 10000 UNITS: 10000 INJECTION, SOLUTION INTRAVENOUS; SUBCUTANEOUS at 18:12

## 2018-03-27 RX ADMIN — CALCIUM ACETATE 667 MG: 667 CAPSULE ORAL at 08:12

## 2018-03-27 RX ADMIN — Medication 10 ML: at 13:34

## 2018-03-27 RX ADMIN — Medication 2 G: at 16:00

## 2018-03-27 RX ADMIN — FENTANYL CITRATE 25 MCG: 50 INJECTION, SOLUTION INTRAMUSCULAR; INTRAVENOUS at 16:04

## 2018-03-27 RX ADMIN — FENTANYL CITRATE 25 MCG: 50 INJECTION, SOLUTION INTRAMUSCULAR; INTRAVENOUS at 16:00

## 2018-03-27 RX ADMIN — FERROUS SULFATE TAB 325 MG (65 MG ELEMENTAL FE) 325 MG: 325 (65 FE) TAB at 08:12

## 2018-03-27 RX ADMIN — HEPARIN SODIUM 5000 UNITS: 5000 INJECTION, SOLUTION INTRAVENOUS; SUBCUTANEOUS at 08:11

## 2018-03-27 RX ADMIN — Medication 10 ML: at 06:00

## 2018-03-27 RX ADMIN — CLONIDINE HYDROCHLORIDE 0.1 MG: 0.1 TABLET ORAL at 21:16

## 2018-03-27 RX ADMIN — LIDOCAINE HYDROCHLORIDE 10 ML: 10 INJECTION, SOLUTION INFILTRATION; PERINEURAL at 16:00

## 2018-03-27 RX ADMIN — GUAIFENESIN 600 MG: 600 TABLET, EXTENDED RELEASE ORAL at 21:16

## 2018-03-27 RX ADMIN — INSULIN LISPRO 2 UNITS: 100 INJECTION, SOLUTION INTRAVENOUS; SUBCUTANEOUS at 12:09

## 2018-03-27 RX ADMIN — CALCIUM ACETATE 667 MG: 667 CAPSULE ORAL at 12:09

## 2018-03-27 RX ADMIN — Medication 5 ML: at 22:00

## 2018-03-27 RX ADMIN — CLONIDINE HYDROCHLORIDE 0.1 MG: 0.1 TABLET ORAL at 08:13

## 2018-03-27 RX ADMIN — CALCITRIOL 0.25 MCG: 0.25 CAPSULE, LIQUID FILLED ORAL at 08:12

## 2018-03-27 NOTE — DIALYSIS
[facility]  [phone]    Vitals   Pre   Post   Assessment   Pre   Post     Temp  Temp: 97.4 °F (36.3 °C) (03/27/18 1650)  98.7 oral  LOC  A&Ox4; primarily speaks Sami  A&Ox4; primarily speaks Sami    HR   Pulse (Heart Rate): 77 (03/27/18 1650) 91  Lungs   Clear, diminished   Clear, diminished    B/P   BP: 134/64 (03/27/18 1650) 150/62  Cardiac   HRR   HRR   Resp   Resp Rate: 16 (03/27/18 1650) 18  Skin   Warm, Dry   Warm, Dry    Pain level  Patient grimacing. Primary nurse administered pain medication. No distress noted. Edema  Generalized, +3 pitting BLE      Generalized, +3 pitting BLE    Orders:    Duration:   Start:   0893 End:   2020 Total:   3.5 Hours    Dialyzer:   Dialyzer/Set Up Inspection: Sunita Davis (03/27/18 1650)   K Bath:   Dialysate K (mEq/L): 3 (03/27/18 1650)   Ca Bath:   Dialysate CA (mEq/L): 2.5 (03/27/18 1650)   Na/Bicarb:   Dialysate NA (mEq/L): 140 (03/27/18 1650)   Target Fluid Removal:   Goal/Amount of Fluid to Remove (mL): 2500 mL (03/27/18 1650)   Access     Type & Location:   Right Subclavian Catheter- Placed prior to treatment start. X-ray verified placement. Occlusive dressing in place; dry and intact. No drainage noted. Ports disinfected per protocol, aspirated (5ml each port, discarded)  and flushed with NS 0.9%. Lines connected and treatment initiated.     Labs     Obtained/Reviewed   Critical Results Called   Date when labs were drawn-3/25/18  Hgb-    HGB   Date Value Ref Range Status   03/25/2018 8.3 (L) 11.5 - 16.0 g/dL Final     K-    Potassium   Date Value Ref Range Status   03/25/2018 3.7 3.5 - 5.1 mmol/L Final     Ca-   Calcium   Date Value Ref Range Status   03/25/2018 8.5 8.5 - 10.1 MG/DL Final     Bun-   BUN   Date Value Ref Range Status   03/25/2018 22 (H) 6 - 20 MG/DL Final     Creat-   Creatinine   Date Value Ref Range Status   03/25/2018 3.87 (H) 0.55 - 1.02 MG/DL Final     Comment:     INVESTIGATED PER DELTA CHECK PROTOCOL Medications/ Blood Products Given     Name   Dose   Route and Time     Procrit   10,000units/1mL  IVP @1812   Heparin  3,800units/ 3.8mL   1.9 mL arterial  1.49 mL venous  Intra-catheter @ 2020        Blood Volume Processed (BVP):    77.7 L  Net Fluid   Removed:  2500 mL    Comments   Time Out Done: Yes   Primary Nurse Rpt Pre: Micha Villaseñor, RN   Primary Nurse Rpt Post: Micha Villaseñor, RN   Pt Education: Procedural   Care Plan: Continue with dialysis as ordered; Possible D/C on Friday 3/30 after dialysis. Tx Summary: Treatment completed per order. Ports disinfected per protocol, all possible blood rinsed back; lines disconnected, ports flushed with NS 0.9%, and capped. VSS. Bed in lowest position, call button, phone and personal belongings are within reach. Report given to primary nurse. Admiting Diagnosis: CKD 5/MELVA ON CKD  Pt's previous clinic-NA   Consent signed - Informed Consent Verified: Yes (03/27/18 1650)  Amaita Consent - Yes  Hepatitis Status- Negative 3/13/18   Machine #- Machine Number: [id number] (03/27/18 1650)  Telemetry status- Remotely monitored   Pre-dialysis wt.- 70.7 kg   Post-dialysis wt.  - 67.4 kg

## 2018-03-27 NOTE — ANESTHESIA POSTPROCEDURE EVALUATION
Post-Anesthesia Evaluation and Assessment    Patient: Janyce Cowden MRN: [id number]  SSN: [id number]    YOB: 1955  Age: 59 y.o. Sex: female       Cardiovascular Function/Vital Signs  Visit Vitals    /68 (BP 1 Location: Right arm, BP Patient Position: At rest)    Pulse 75    Temp 36.8 °C (98.3 °F)    Resp 18    Ht 5' 3\" (1.6 m)    Wt 64.8 kg (142 lb 13.7 oz)    SpO2 96%    BMI 25.31 kg/m2       Patient is status post general anesthesia for Procedure(s):  LEFT BRACHIAL CEPHALIC FISTULA. Nausea/Vomiting: None    Postoperative hydration reviewed and adequate. Pain:  Pain Scale 1: Numeric (0 - 10) (03/27/18 0001)  Pain Intensity 1: 2 (03/27/18 0001)   Managed    Neurological Status:   Neuro (WDL): Exceptions to WDL (03/26/18 5445)  Neuro  Neurologic State: Alert (03/27/18 0001)  Orientation Level: Oriented X4 (03/27/18 0001)  Cognition: Appropriate decision making; Appropriate for age attention/concentration; Appropriate safety awareness; Follows commands (03/27/18 0001)  Speech: Appropriate for age;Clear (03/27/18 0001)  LUE Motor Response: Weak (03/27/18 0001)  LLE Motor Response: Purposeful (03/27/18 0001)  RUE Motor Response: Purposeful (03/27/18 0001)  RLE Motor Response: Purposeful (03/27/18 0001)   At baseline    Mental Status and Level of Consciousness: Arousable    Pulmonary Status:   O2 Device: Nasal cannula (03/26/18 1719)   Adequate oxygenation and airway patent    Complications related to anesthesia: None    Post-anesthesia assessment completed.  No concerns    Signed By: So Honeycutt MD     March 27, 2018

## 2018-03-27 NOTE — PROGRESS NOTES
Placed call to Carlos Jackson [phone] patient expecting dialysis this evening, tommy unable to do it tonight and will be in tomorrow.

## 2018-03-27 NOTE — PROGRESS NOTES
Leo Carter [address]  [address] [facility], [address] [place] [address]  [phone]      Medical Progress Note      NAME:         Juan A Hoang   :        1955  MRM:        [id number]    Date:          3/27/2018      Subjective: Patient has been seen and examined as a follow up for pneumonia, renal failure. Chart, labs, diagnostics reviewed. She remains stable with much less coughing. Afebrile. Objective:    Vital Signs:    Visit Vitals    /67 (BP 1 Location: Left arm, BP Patient Position: At rest)    Pulse 77    Temp 98.2 °F (36.8 °C)    Resp 16    Ht 5' 3\" (1.6 m)    Wt 64.8 kg (142 lb 13.7 oz)    SpO2 93%    BMI 25.31 kg/m2          Intake/Output Summary (Last 24 hours) at 18 1048  Last data filed at 18 1438   Gross per 24 hour   Intake              300 ml   Output                0 ml   Net              300 ml        Physical Examination:    General:   Well looking and nourished patient in no acute distress  Eyes:   pink conjunctivae, PERRLA with no discharge. Pulm:  clear breath sounds without crackles or wheezes  Card:  has regular and normal S1, S2 without thrills, bruits or peripheral edema  Abd:  Soft, non-tender, non-distended, normoactive bowel sounds   Musc:  No cyanosis, clubbing, atrophy or deformities. Skin:  No rashes, bruising or ulcers. Neuro: Awake and alert.  Generally a non focal exam.   Psych:  Has a fair insight and is oriented x 3    Current Facility-Administered Medications   Medication Dose Route Frequency    guaiFENesin ER (MUCINEX) tablet 600 mg  600 mg Oral Q12H    cloNIDine HCl (CATAPRES) tablet 0.1 mg  0.1 mg Oral BID    hydrALAZINE (APRESOLINE) tablet 100 mg  100 mg Oral TID    hydrALAZINE (APRESOLINE) 20 mg/mL injection 20 mg  20 mg IntraVENous Q6H PRN    calcitRIOL (ROCALTROL) capsule 0.25 mcg  0.25 mcg Oral DAILY    calcium acetate (PHOSLO) capsule 667 mg  1 Cap Oral TID WITH MEALS    epoetin gonzalo (EPOGEN;PROCRIT) injection 10,000 Units  10,000 Units IntraVENous DIALYSIS MON, WED & FRI    heparin (porcine) 1,000 unit/mL injection 2,500 Units  2,500 Units Hemodialysis DIALYSIS PRN    heparin (porcine) injection 5,000 Units  5,000 Units SubCUTAneous Q12H    oxyCODONE-acetaminophen (PERCOCET) 5-325 mg per tablet 1 Tab  1 Tab Oral Q4H PRN    oxyCODONE-acetaminophen (PERCOCET 10)  mg per tablet 1 Tab  1 Tab Oral Q4H PRN    amLODIPine (NORVASC) tablet 10 mg  10 mg Oral DAILY    ferrous sulfate tablet 325 mg  325 mg Oral DAILY    levothyroxine (SYNTHROID) tablet 100 mcg  100 mcg Oral ACB    metoprolol tartrate (LOPRESSOR) tablet 100 mg  100 mg Oral QHS    sodium chloride (NS) flush 5-10 mL  5-10 mL IntraVENous Q8H    acetaminophen (TYLENOL) tablet 650 mg  650 mg Oral Q4H PRN    HYDROcodone-acetaminophen (NORCO) 5-325 mg per tablet 1 Tab  1 Tab Oral Q4H PRN    HYDROmorphone (PF) (DILAUDID) injection 0.2 mg  0.2 mg IntraVENous Q4H PRN    naloxone (NARCAN) injection 0.4 mg  0.4 mg IntraVENous PRN    diphenhydrAMINE (BENADRYL) injection 12.5 mg  12.5 mg IntraVENous Q4H PRN    ondansetron (ZOFRAN) injection 4 mg  4 mg IntraVENous Q6H PRN    insulin lispro (HUMALOG) injection   SubCUTAneous AC&HS    glucose chewable tablet 16 g  4 Tab Oral PRN    dextrose (D50W) injection syrg 12.5-25 g  12.5-25 g IntraVENous PRN    glucagon (GLUCAGEN) injection 1 mg  1 mg IntraMUSCular PRN    0.9% sodium chloride infusion 250 mL  250 mL IntraVENous PRN    prochlorperazine (COMPAZINE) injection 5 mg  5 mg IntraVENous Q6H PRN    albuterol-ipratropium (DUO-NEB) 2.5 MG-0.5 MG/3 ML  3 mL Nebulization Q4H PRN        Laboratory data and review:    Recent Labs      03/25/18   0405   WBC  7.8   HGB  8.3*   HCT  26.4*   PLT  248     Recent Labs      03/25/18   0405   NA  133*   K  3.7   CL  97   CO2  28   GLU  172*   BUN  22*   CREA  3.87*   CA  8.5   MG  1.8   PHOS  2.5*     No components found for: [place]    Assessment and Plan:    ESRD (end stage renal disease) ([facility] (3/12/2018): on HD. Renal and vascular following. She is sp AVF . Continue HD as scheduled. Pain control. Patient plans to return to [place] this weekend     [facility] complication [facility] (9/32/9759): A1c 5.7. Continue SSI per protocol    HTN (hypertension) (6/12/2014): Continue Amlodipine, Hydralazine and Metoprolol. Anemia (6/13/2014): Hgb stable. Monitor    Pneumonia (3/12/2018): she remains afebrile. Has completed 5 days of azithromycin. No hypoxia. Stable.      Total time spent for the patient's care: [facility] discussed with: Patient and Nursing Staff    Discussed:  Care Plan and D/C Planning    Prophylaxis:  Hep SQ    Anticipated Disposition:  Home w/Family           ___________________________________________________    Attending Physician:   Riky Taylor MD

## 2018-03-27 NOTE — OP NOTES
[address] REPORT    Imtiaz Rodriguez  MR#: [id number]  : 1955  ACCOUNT #: [de-identified]   DATE OF SERVICE: 2018    PREOPERATIVE DIAGNOSIS:  End-stage renal disease. POSTOPERATIVE DIAGNOSIS:  End-stage renal disease. Tricia Bety PROCEDURE:  Left brachiocephalic fistula. ANESTHESIA:  General endotracheal anesthesia. SURGEON:  Pato Albrecht MD    COMPLICATIONS:  None. SPECIMENS REMOVED:  None. IMPLANTS:  None. ASSISTANTS:  None. ESTIMATED BLOOD LOSS:  Minimal.    INDICATION FOR PROCEDURE:  The patient is a 59-year-old female with end-stage renal disease. Decision was made to perform a fistula. TECHNICAL DETAILS:  The patient was taken to the operating room and a suitable level of anesthesia was induced, prepped and draped in typical sterile fashion. Oblique incision was made in the left arm and dissection carried out down to the fascia, which was dissected free of its soft tissue attachments. Cephalic vein was swung over to the brachial artery and sewed end-to-side using 6-0 Prolene suture. Flow was restored through the fistula. There was an excellent palpable thrill. Tolerated the procedure well. There were no obvious complications. Was awakened and transferred to the recovery room in good condition.       MD Yony Jiang / Mara  D: 2018 07:36     T: 2018 11:22  JOB #: [id number]

## 2018-03-27 NOTE — PROGRESS NOTES
Rounded on patient. Continued cultural assessment. Patient stated that her  and her were traveling back to [place] on Saturday and that they had everything arranged to start dialysis there on Monday.

## 2018-03-27 NOTE — PROGRESS NOTES
Problem: Falls - Risk of  Goal: *Absence of Falls  Document Zuly Fall Risk and appropriate interventions in the flowsheet.    Outcome: Progressing Towards Goal  Fall Risk Interventions:  Mobility Interventions: Patient to call before getting OOB         Medication Interventions: Bed/chair exit alarm, Patient to call before getting OOB, Teach patient to arise slowly    Elimination Interventions: Call light in reach, Toileting schedule/hourly rounds, Patient to call for help with toileting needs    History of Falls Interventions: Bed/chair exit alarm, Door open when patient unattended, Investigate reason for fall, Room close to nurse's station

## 2018-03-27 NOTE — PROGRESS NOTES
TRANSFER - OUT REPORT:    Verbal report given to [facility] on [address]  being transferred to [address] [place] [address] for routine progression of care       Report consisted of patients Situation, Background, Assessment and   Recommendations(SBAR). Information from the following report(s) Procedure Summary was reviewed with the receiving nurse. Lines:   Peripheral IV 03/26/18 Right Hand (Active)   Site Assessment Clean, dry, & intact 3/27/2018  8:16 AM   Phlebitis Assessment 0 3/27/2018  8:16 AM   Infiltration Assessment 0 3/27/2018  8:16 AM   Dressing Status Clean, dry, & intact 3/27/2018  8:16 AM   Dressing Type Transparent 3/27/2018  8:16 AM   Hub Color/Line Status Blue;Capped 3/27/2018  8:16 AM   Action Taken Open ports on tubing capped 3/26/2018 11:32 AM   Alcohol Cap Used Yes 3/26/2018  3:14 PM        Opportunity for questions and clarification was provided.       Patient transported with:   Registered Nurse

## 2018-03-27 NOTE — PROGRESS NOTES
[address] [facility]  YOB: 1955          Assessment & Plan:   ESRD  · AVF placed yesterday  · She has a plane ticket home to [place] on Saturday and has arrangements to be seen by a dialysis clinic there on arrival  · Will HD today, Wed, and Fri  · Change temp IJ to PC    HTN  · OK    Anemia  · EPO    SHPT  · Calcitriol and Ca acetate       Subjective:   CC: f/u ESRD  HPI: AVF yest.   ROS: No n/v/sob  Current Facility-Administered Medications   Medication Dose Route Frequency    guaiFENesin ER (MUCINEX) tablet 600 mg  600 mg Oral Q12H    cloNIDine HCl (CATAPRES) tablet 0.1 mg  0.1 mg Oral BID    hydrALAZINE (APRESOLINE) tablet 100 mg  100 mg Oral TID    hydrALAZINE (APRESOLINE) 20 mg/mL injection 20 mg  20 mg IntraVENous Q6H PRN    calcitRIOL (ROCALTROL) capsule 0.25 mcg  0.25 mcg Oral DAILY    calcium acetate (PHOSLO) capsule 667 mg  1 Cap Oral TID WITH MEALS    epoetin gonzalo (EPOGEN;PROCRIT) injection 10,000 Units  10,000 Units IntraVENous DIALYSIS MON, WED & FRI    heparin (porcine) 1,000 unit/mL injection 2,500 Units  2,500 Units Hemodialysis DIALYSIS PRN    heparin (porcine) injection 5,000 Units  5,000 Units SubCUTAneous Q12H    oxyCODONE-acetaminophen (PERCOCET) 5-325 mg per tablet 1 Tab  1 Tab Oral Q4H PRN    oxyCODONE-acetaminophen (PERCOCET 10)  mg per tablet 1 Tab  1 Tab Oral Q4H PRN    amLODIPine (NORVASC) tablet 10 mg  10 mg Oral DAILY    ferrous sulfate tablet 325 mg  325 mg Oral DAILY    levothyroxine (SYNTHROID) tablet 100 mcg  100 mcg Oral ACB    metoprolol tartrate (LOPRESSOR) tablet 100 mg  100 mg Oral QHS    sodium chloride (NS) flush 5-10 mL  5-10 mL IntraVENous Q8H    acetaminophen (TYLENOL) tablet 650 mg  650 mg Oral Q4H PRN    HYDROcodone-acetaminophen (NORCO) 5-325 mg per tablet 1 Tab  1 Tab Oral Q4H PRN    HYDROmorphone (PF) (DILAUDID) injection 0.2 mg  0.2 mg IntraVENous Q4H PRN    naloxone (NARCAN) injection 0.4 mg  0.4 mg IntraVENous PRN    diphenhydrAMINE (BENADRYL) injection 12.5 mg  12.5 mg IntraVENous Q4H PRN    ondansetron (ZOFRAN) injection 4 mg  4 mg IntraVENous Q6H PRN    insulin lispro (HUMALOG) injection   SubCUTAneous AC&HS    glucose chewable tablet 16 g  4 Tab Oral PRN    dextrose (D50W) injection syrg 12.5-25 g  12.5-25 g IntraVENous PRN    glucagon (GLUCAGEN) injection 1 mg  1 mg IntraMUSCular PRN    0.9% sodium chloride infusion 250 mL  250 mL IntraVENous PRN    prochlorperazine (COMPAZINE) injection 5 mg  5 mg IntraVENous Q6H PRN    albuterol-ipratropium (DUO-NEB) 2.5 MG-0.5 MG/3 ML  3 mL Nebulization Q4H PRN          Objective:     Vitals:  Blood pressure 118/67, pulse 67, temperature 97.5 °F (36.4 °C), resp. rate 16, height 5' 3\" (1.6 m), weight 64.8 kg (142 lb 13.7 oz), SpO2 93 %. Temp (24hrs), Av.7 °F (36.5 °C), Min:97.4 °F (36.3 °C), Max:98.3 °F (36.8 °C)      Intake and Output:     1901 -  0700  In: 300 [I.V.:300]  Out: -     Physical Exam:               GENERAL ASSESSMENT: NAD  CHEST: Symmetrical expansion, no distress  HEART: RRR  ABDOMEN: Nondistended  EXTREMITY: less EDEMA; AVF LUE +t/b          ECG/rhythm:    Data Review      No results for input(s): TNIPOC in the last 72 hours. No lab exists for component: ITNL   No results for input(s): CPK, CKMB, TROIQ in the last 72 hours. Recent Labs      18   0405   NA  133*   K  3.7   CL  97   CO2  28   BUN  22*   CREA  3.87*   GLU  172*   PHOS  2.5*   MG  1.8   CA  8.5   WBC  7.8   HGB  8.3*   HCT  26.4*   PLT  248      No results for input(s): INR, PTP, APTT in the last 72 hours.     No lab exists for component: INREXT, INREXT  Needs: urine analysis, urine sodium, protein and creatinine  Lab Results   Component Value Date/Time    Sodium urine, random 38 2017 01:54 PM    Creatinine, urine 61.11 2017 08:08 PM           : Verna Dallas MD  3/27/2018        [facility]:  [email or web address]. [email or web address]  Poppy Fajardo office:  [address] [place] [address], [address] [place] [address],[address]  [place], [address] [facility]  Phone: [phone]  Fax :     [phone]    [place] office:  [address] [facility], [address]  Phone - [phone]  Fax - [phone]

## 2018-03-27 NOTE — PROGRESS NOTES
Spiritual Care Partner Volunteer visited patient in post surgical on 3/27/2018. Documented by:  Visit by: Rev. Constanza Gusman.  Colette Roberts MA, [facility]    Lead  Profession Development & Advancement

## 2018-03-27 NOTE — PROGRESS NOTES
Bedside and Verbal shift change report given to Valerie Coello (oncoming nurse) by KETAN Barrett (offgoing nurse). Report given with SBAR, Kardex, OR Summary, Intake/Output, MAR and Recent Results.

## 2018-03-28 PROBLEM — J18.9 PNEUMONIA: Status: RESOLVED | Noted: 2018-03-12 | Resolved: 2018-03-28

## 2018-03-28 LAB
GLUCOSE BLD STRIP.AUTO-MCNC: 126 MG/DL (ref 65–100)
GLUCOSE BLD STRIP.AUTO-MCNC: 168 MG/DL (ref 65–100)
GLUCOSE BLD STRIP.AUTO-MCNC: 181 MG/DL (ref 65–100)
GLUCOSE BLD STRIP.AUTO-MCNC: 207 MG/DL (ref 65–100)
SERVICE CMNT-IMP: ABNORMAL

## 2018-03-28 PROCEDURE — 74011636637 HC RX REV CODE- 636/637: Performed by: INTERNAL MEDICINE

## 2018-03-28 PROCEDURE — 74011250637 HC RX REV CODE- 250/637: Performed by: INTERNAL MEDICINE

## 2018-03-28 PROCEDURE — 74011250636 HC RX REV CODE- 250/636: Performed by: INTERNAL MEDICINE

## 2018-03-28 PROCEDURE — 82962 GLUCOSE BLOOD TEST: CPT

## 2018-03-28 PROCEDURE — 65660000000 HC RM CCU STEPDOWN

## 2018-03-28 PROCEDURE — 77010033678 HC OXYGEN DAILY

## 2018-03-28 PROCEDURE — 90935 HEMODIALYSIS ONE EVALUATION: CPT

## 2018-03-28 RX ORDER — CLONIDINE HYDROCHLORIDE 0.1 MG/1
0.1 TABLET ORAL 2 TIMES DAILY
Qty: 60 TAB | Refills: 0 | Status: SHIPPED | OUTPATIENT
Start: 2018-03-28 | End: 2018-04-27

## 2018-03-28 RX ORDER — CALCIUM ACETATE 667 MG/1
1 CAPSULE ORAL
Qty: 90 CAP | Refills: 0 | Status: SHIPPED | OUTPATIENT
Start: 2018-03-28 | End: 2018-04-27

## 2018-03-28 RX ORDER — CALCITRIOL 0.25 UG/1
0.25 CAPSULE ORAL DAILY
Qty: 30 CAP | Refills: 0 | Status: SHIPPED | OUTPATIENT
Start: 2018-03-29 | End: 2018-04-28

## 2018-03-28 RX ORDER — HYDRALAZINE HYDROCHLORIDE 100 MG/1
100 TABLET, FILM COATED ORAL 3 TIMES DAILY
Qty: 90 TAB | Refills: 0 | Status: SHIPPED | OUTPATIENT
Start: 2018-03-28 | End: 2018-04-27

## 2018-03-28 RX ADMIN — CALCIUM ACETATE 667 MG: 667 CAPSULE ORAL at 11:13

## 2018-03-28 RX ADMIN — CLONIDINE HYDROCHLORIDE 0.1 MG: 0.1 TABLET ORAL at 08:43

## 2018-03-28 RX ADMIN — Medication 10 ML: at 21:33

## 2018-03-28 RX ADMIN — ERYTHROPOIETIN 10000 UNITS: 10000 INJECTION, SOLUTION INTRAVENOUS; SUBCUTANEOUS at 17:08

## 2018-03-28 RX ADMIN — Medication 10 ML: at 07:40

## 2018-03-28 RX ADMIN — GUAIFENESIN 600 MG: 600 TABLET, EXTENDED RELEASE ORAL at 08:43

## 2018-03-28 RX ADMIN — HEPARIN SODIUM 3800 UNITS: 1000 INJECTION, SOLUTION INTRAVENOUS; SUBCUTANEOUS at 18:42

## 2018-03-28 RX ADMIN — GUAIFENESIN 600 MG: 600 TABLET, EXTENDED RELEASE ORAL at 21:32

## 2018-03-28 RX ADMIN — CLONIDINE HYDROCHLORIDE 0.1 MG: 0.1 TABLET ORAL at 19:17

## 2018-03-28 RX ADMIN — HYDRALAZINE HYDROCHLORIDE 100 MG: 25 TABLET, FILM COATED ORAL at 08:43

## 2018-03-28 RX ADMIN — HEPARIN SODIUM 5000 UNITS: 5000 INJECTION, SOLUTION INTRAVENOUS; SUBCUTANEOUS at 21:32

## 2018-03-28 RX ADMIN — HYDRALAZINE HYDROCHLORIDE 100 MG: 25 TABLET, FILM COATED ORAL at 19:18

## 2018-03-28 RX ADMIN — CALCITRIOL 0.25 MCG: 0.25 CAPSULE, LIQUID FILLED ORAL at 08:43

## 2018-03-28 RX ADMIN — METOPROLOL TARTRATE 100 MG: 50 TABLET ORAL at 21:32

## 2018-03-28 RX ADMIN — HEPARIN SODIUM 5000 UNITS: 5000 INJECTION, SOLUTION INTRAVENOUS; SUBCUTANEOUS at 08:41

## 2018-03-28 RX ADMIN — CALCIUM ACETATE 667 MG: 667 CAPSULE ORAL at 19:17

## 2018-03-28 RX ADMIN — INSULIN LISPRO 3 UNITS: 100 INJECTION, SOLUTION INTRAVENOUS; SUBCUTANEOUS at 11:13

## 2018-03-28 RX ADMIN — INSULIN LISPRO 2 UNITS: 100 INJECTION, SOLUTION INTRAVENOUS; SUBCUTANEOUS at 07:40

## 2018-03-28 RX ADMIN — FERROUS SULFATE TAB 325 MG (65 MG ELEMENTAL FE) 325 MG: 325 (65 FE) TAB at 08:43

## 2018-03-28 RX ADMIN — Medication 10 ML: at 14:29

## 2018-03-28 RX ADMIN — AMLODIPINE BESYLATE 10 MG: 5 TABLET ORAL at 08:43

## 2018-03-28 RX ADMIN — CALCIUM ACETATE 667 MG: 667 CAPSULE ORAL at 07:39

## 2018-03-28 RX ADMIN — LEVOTHYROXINE SODIUM 100 MCG: 100 TABLET ORAL at 07:39

## 2018-03-28 NOTE — PROGRESS NOTES
Leo Alejoelsen [facility] [place] [facility]  [address] [facility], [address] [place] [address]  [phone]      Medical Progress Note      NAME:         Yun Brownlee   :        1955  MRM:        [id number]    Date:          3/28/2018      Subjective: Patient has been seen and examined as a follow up for ESRD. I have discussed with her through the Well Done Phone interpretor. No new symptoms today. Objective:    Vital Signs:    Visit Vitals    /65 (BP 1 Location: Right arm, BP Patient Position: Head of bed elevated (Comment degrees))    Pulse 86    Temp 98.9 °F (37.2 °C)    Resp 18    Ht 5' 3\" (1.6 m)    Wt 64.8 kg (142 lb 13.7 oz)    SpO2 95%    BMI 25.31 kg/m2          Intake/Output Summary (Last 24 hours) at 18 0911  Last data filed at 18   Gross per 24 hour   Intake                0 ml   Output             2500 ml   Net            -2500 ml        Physical Examination:    General:   Well looking and nourished patient in no acute distress  Eyes:   pink conjunctivae, PERRLA with no discharge. Pulm:  clear breath sounds without crackles or wheezes  Card:  has regular and normal S1, S2 without thrills, bruits or peripheral edema  Abd:  Soft, non-tender, non-distended, normoactive bowel sounds   Skin:  No rashes, bruising or ulcers. Neuro: Awake and alert.  Generally a non focal exam.   Psych:  Has a fair insight and is oriented x 3    Current Facility-Administered Medications   Medication Dose Route Frequency    heparin (porcine) 1,000 unit/mL injection 3,800 Units  3,800 Units Hemodialysis DIALYSIS PRN    guaiFENesin ER (MUCINEX) tablet 600 mg  600 mg Oral Q12H    cloNIDine HCl (CATAPRES) tablet 0.1 mg  0.1 mg Oral BID    hydrALAZINE (APRESOLINE) tablet 100 mg  100 mg Oral TID    hydrALAZINE (APRESOLINE) 20 mg/mL injection 20 mg  20 mg IntraVENous Q6H PRN    calcitRIOL (ROCALTROL) capsule 0.25 mcg  0.25 mcg Oral DAILY    calcium acetate (PHOSLO) capsule 667 mg  1 Cap Oral TID WITH MEALS    epoetin gonzalo (EPOGEN;PROCRIT) injection 10,000 Units  10,000 Units IntraVENous DIALYSIS MON, WED & FRI    heparin (porcine) injection 5,000 Units  5,000 Units SubCUTAneous Q12H    oxyCODONE-acetaminophen (PERCOCET) 5-325 mg per tablet 1 Tab  1 Tab Oral Q4H PRN    oxyCODONE-acetaminophen (PERCOCET 10)  mg per tablet 1 Tab  1 Tab Oral Q4H PRN    amLODIPine (NORVASC) tablet 10 mg  10 mg Oral DAILY    ferrous sulfate tablet 325 mg  325 mg Oral DAILY    levothyroxine (SYNTHROID) tablet 100 mcg  100 mcg Oral ACB    metoprolol tartrate (LOPRESSOR) tablet 100 mg  100 mg Oral QHS    sodium chloride (NS) flush 5-10 mL  5-10 mL IntraVENous Q8H    acetaminophen (TYLENOL) tablet 650 mg  650 mg Oral Q4H PRN    HYDROcodone-acetaminophen (NORCO) 5-325 mg per tablet 1 Tab  1 Tab Oral Q4H PRN    HYDROmorphone (PF) (DILAUDID) injection 0.2 mg  0.2 mg IntraVENous Q4H PRN    naloxone (NARCAN) injection 0.4 mg  0.4 mg IntraVENous PRN    diphenhydrAMINE (BENADRYL) injection 12.5 mg  12.5 mg IntraVENous Q4H PRN    ondansetron (ZOFRAN) injection 4 mg  4 mg IntraVENous Q6H PRN    insulin lispro (HUMALOG) injection   SubCUTAneous AC&HS    glucose chewable tablet 16 g  4 Tab Oral PRN    dextrose (D50W) injection syrg 12.5-25 g  12.5-25 g IntraVENous PRN    glucagon (GLUCAGEN) injection 1 mg  1 mg IntraMUSCular PRN    0.9% sodium chloride infusion 250 mL  250 mL IntraVENous PRN    prochlorperazine (COMPAZINE) injection 5 mg  5 mg IntraVENous Q6H PRN    albuterol-ipratropium (DUO-NEB) 2.5 MG-0.5 MG/3 ML  3 mL Nebulization Q4H PRN        Laboratory data and review:    No results for input(s): WBC, HGB, HCT, PLT, HGBEXT, HCTEXT, PLTEXT, HGBEXT, HCTEXT, PLTEXT in the last 72 hours. No results for input(s): NA, K, CL, CO2, GLU, BUN, CREA, CA, MG, PHOS, ALB, TBIL, SGOT, ALT, INR in the last 72 hours.     No lab exists for component: INREXT, INREXT  No components found for: [place]    Assessment and Plan:    ESRD (end stage renal disease) ([facility] (3/12/2018): on HD. Renal and vascular following. She is sp AVF and temporary HD catheter. Continue HD as scheduled. Pain control. Plan is to discharge home after HD Friday 3/30 for her to return to [place] the following day. Type 2 diabetes mellitus with renal complication (HCC) (5/82/9314): A1c 5.7. Blood glucose has remained stable, partly from poor appetite. Has been on SSI per protocol while here. She can resume home 70/30 at discharge    HTN (hypertension) (6/12/2014): Continue Amlodipine, Hydralazine, Clonidine and Metoprolol. Anemia (6/13/2014): Hgb stable. Monitor    Pneumonia (3/12/2018): she remains afebrile. Has completed 5 days of azithromycin. No hypoxia. Stable.      Total time spent for the patient's care: [address] [facility] discussed with: Patient and Nursing Staff    Discussed:  Care Plan and D/C Planning    Prophylaxis:  Hep SQ    Anticipated Disposition:  Home w/Family           ___________________________________________________    Attending Physician:   Maryan Gonzalez MD

## 2018-03-28 NOTE — DIALYSIS
[facility] Dialysis Team [facility]  [phone]    Vitals   Pre   Post   Assessment   Pre   Post     Temp  Temp: 98.2 °F (36.8 °C) (03/28/18 1512)  98.6 oral  LOC  A&Ox4; primarily speaks Sami  A&Ox4; primarily speaks Sami    HR   Pulse (Heart Rate): 76 (03/28/18 1512) 90  Lungs     Clear     Clear    B/P   BP: 142/63 (03/28/18 1512) 156/72  Cardiac   HRR  HRR    Resp   Resp Rate: 16 (03/28/18 1512) 18  Skin   Warm, Dry   Warm, Dry    Pain level  0/10 0/10  Edema    Generalized, +3 pitting BLE      Generalized, +3 pitting BLE    Orders:    Duration:   Start:   6590 End:   1842 Total:   3.5 Hours    Dialyzer:   Dialyzer/Set Up Inspection: Revaclear (03/28/18 1512)   K Bath:   Dialysate K (mEq/L): 3 (03/28/18 1512)   Ca Bath:   Dialysate CA (mEq/L): 2.5 (03/28/18 1512)   Na/Bicarb:   Dialysate NA (mEq/L): 140 (03/28/18 1512)   Target Fluid Removal:   Goal/Amount of Fluid to Remove (mL): 3000 mL (03/28/18 1512)   Access     Type & Location:   Right Chest Catheter - Occlusive dressing in place. Old bloody drainage noted. Dressing changed; Biopatch in place. Ports disinfected per protocol. Difficulty removing cap from venous port. Ports aspirated (5ml each port, discarded)  and flushed with NS 0.9%. Lines connected and treatment initiated.     Labs     Obtained/Reviewed   Critical Results Called   Date when labs were drawn- 3/25/18   Hgb-    HGB   Date Value Ref Range Status   03/25/2018 8.3 (L) 11.5 - 16.0 g/dL Final     K-    Potassium   Date Value Ref Range Status   03/25/2018 3.7 3.5 - 5.1 mmol/L Final     Ca-   Calcium   Date Value Ref Range Status   03/25/2018 8.5 8.5 - 10.1 MG/DL Final     Bun-   BUN   Date Value Ref Range Status   03/25/2018 22 (H) 6 - 20 MG/DL Final     Creat-   Creatinine   Date Value Ref Range Status   03/25/2018 3.87 (H) 0.55 - 1.02 MG/DL Final     Comment:     INVESTIGATED PER DELTA CHECK PROTOCOL        Medications/ Blood Products Given     Name   Dose   Route and Time Procrit   10,000units/ 1mL  IVP @ 1708   Heparin  3,800units/ 3.8mL   1.9 mL arterial  1.49 mL venous  Intra-catheter @1842         Blood Volume Processed (BVP):    76.1 L  Net Fluid   Removed:  3000 mL    Comments   Time Out Done: Yes   Primary Nurse Rpt Pre: Arlen Ku RN   Primary Nurse Rpt Post: Crispin Beach RN  Pt Education: Procedural   Care Plan: Continue with dialysis as ordered; Possible D/C on Friday 3/30 after dialysis. Tx Summary: Treatment completed per order. Ports disinfected per protocol, all possible blood rinsed back; lines disconnected, ports flushed with NS 0.9%, and capped. VSS. Bed in lowest position, call button, phone and personal belongings are within reach. Report given to primary nurse. Admiting Diagnosis: CKD 5/MELVA ON CKD   Pt's previous clinic- NA   Consent signed - Informed Consent Verified: Yes (03/28/18 1512)  [facility] Consent - Yes   Hepatitis Status- Negative 3/13/18   Machine #- Machine Number: [id number] (03/28/18 1512)  Telemetry status- Remotely monitored   Pre-dialysis wt. - 65.6 kg  Post-dialysis wt.  - NEGAR-- bed position

## 2018-03-28 NOTE — PROGRESS NOTES
[address] [facility]  YOB: 1955          Assessment & Plan:   ESRD  · AVF placed Monday  · IJ changed to PC yesterday  · For HD today and Friday then for d/c  · Has been accepted for dialysis in [place] after d/c    HTN  · OK    Anemia  · EPO    SHPT  · Calcitriol and Ca acetate       Subjective:   CC: f/u ESRD  HPI: For HD today. Some pain in arm and at site of PC placement.    ROS: No sob/n/v  Current Facility-Administered Medications   Medication Dose Route Frequency    heparin (porcine) 1,000 unit/mL injection 3,800 Units  3,800 Units Hemodialysis DIALYSIS PRN    guaiFENesin ER (MUCINEX) tablet 600 mg  600 mg Oral Q12H    cloNIDine HCl (CATAPRES) tablet 0.1 mg  0.1 mg Oral BID    hydrALAZINE (APRESOLINE) tablet 100 mg  100 mg Oral TID    hydrALAZINE (APRESOLINE) 20 mg/mL injection 20 mg  20 mg IntraVENous Q6H PRN    calcitRIOL (ROCALTROL) capsule 0.25 mcg  0.25 mcg Oral DAILY    calcium acetate (PHOSLO) capsule 667 mg  1 Cap Oral TID WITH MEALS    epoetin gonzalo (EPOGEN;PROCRIT) injection 10,000 Units  10,000 Units IntraVENous DIALYSIS MON, WED & FRI    heparin (porcine) injection 5,000 Units  5,000 Units SubCUTAneous Q12H    oxyCODONE-acetaminophen (PERCOCET) 5-325 mg per tablet 1 Tab  1 Tab Oral Q4H PRN    oxyCODONE-acetaminophen (PERCOCET 10)  mg per tablet 1 Tab  1 Tab Oral Q4H PRN    amLODIPine (NORVASC) tablet 10 mg  10 mg Oral DAILY    ferrous sulfate tablet 325 mg  325 mg Oral DAILY    levothyroxine (SYNTHROID) tablet 100 mcg  100 mcg Oral ACB    metoprolol tartrate (LOPRESSOR) tablet 100 mg  100 mg Oral QHS    sodium chloride (NS) flush 5-10 mL  5-10 mL IntraVENous Q8H    acetaminophen (TYLENOL) tablet 650 mg  650 mg Oral Q4H PRN    HYDROcodone-acetaminophen (NORCO) 5-325 mg per tablet 1 Tab  1 Tab Oral Q4H PRN    HYDROmorphone (PF) (DILAUDID) injection 0.2 mg  0.2 mg IntraVENous Q4H PRN    naloxone [facility]) injection 0.4 mg  0.4 mg IntraVENous PRN    diphenhydrAMINE (BENADRYL) injection 12.5 mg  12.5 mg IntraVENous Q4H PRN    ondansetron (ZOFRAN) injection 4 mg  4 mg IntraVENous Q6H PRN    insulin lispro (HUMALOG) injection   SubCUTAneous AC&HS    glucose chewable tablet 16 g  4 Tab Oral PRN    dextrose (D50W) injection syrg 12.5-25 g  12.5-25 g IntraVENous PRN    glucagon (GLUCAGEN) injection 1 mg  1 mg IntraMUSCular PRN    0.9% sodium chloride infusion 250 mL  250 mL IntraVENous PRN    prochlorperazine (COMPAZINE) injection 5 mg  5 mg IntraVENous Q6H PRN    albuterol-ipratropium (DUO-NEB) 2.5 MG-0.5 MG/3 ML  3 mL Nebulization Q4H PRN          Objective:     Vitals:  Blood pressure 139/65, pulse 86, temperature 98.9 °F (37.2 °C), resp. rate 18, height 5' 3\" (1.6 m), weight 64.8 kg (142 lb 13.7 oz), SpO2 95 %. Temp (24hrs), Av.4 °F (36.9 °C), Min:97.4 °F (36.3 °C), Max:98.9 °F (37.2 °C)      Intake and Output:   07 - 1900  In: -   Out: 100 [Urine:100]   1901 -  0700  In: -   Out: 2500     Physical Exam:               GENERAL ASSESSMENT: NAD  CHEST: Clear  HEART: Reg  ABDOMEN: Nondistended  EXTREMITY: less EDEMA; AVF LUE +t/b          ECG/rhythm:    Data Review      No results for input(s): TNIPOC in the last 72 hours. No lab exists for component: ITNL   No results for input(s): CPK, CKMB, TROIQ in the last 72 hours. No results for input(s): NA, K, CL, CO2, BUN, CREA, GLU, PHOS, MG, CA, ALB, WBC, HGB, HCT, PLT, HGBEXT, HCTEXT, PLTEXT, HGBEXT, HCTEXT, PLTEXT in the last 72 hours. No results for input(s): INR, PTP, APTT in the last 72 hours.     No lab exists for component: INREXT, INREXT  Needs: urine analysis, urine sodium, protein and creatinine  Lab Results   Component Value Date/Time    Sodium urine, random 38 2017 01:54 PM    Creatinine, urine 61.11 2017 08:08 PM           : Dasha Harper MD  3/28/2018        [facility]:  [email or web address]  [place] office:  [address] [place] [address], [address] [place] [address] [address]  [place], [address] [facility]  Phone: [phone]  Fax :     [phone]    [place] office:  [address] [facility], [address]  Phone - [phone]  Fax - [phone]

## 2018-03-28 NOTE — PROGRESS NOTES
3/28/2018 3:08 PM CM requested discharging medications to check costs. 4 medications received, called [facility]. Calcitriol . 25mcg $17.45  Calcium acetate 667mg $19.85  Clonidine HCl 0.1mg $9.45  Hydralazine 100mg $29.45  Total:$76.20  Spoke with pt through [employer] phone, pt unable to cover cost of medications. CM completed CareFund application and submitted to [employer]. CM will follow up.  MILLIE Rothman

## 2018-03-28 NOTE — PROGRESS NOTES
Served as  for [employer] during an update on plan of care. The plan is that the patient will receive dialysis on Friday, and then she will be discharged to fly home to [place] on Saturday. Patient and family had the opportunity to ask questions and expressed understanding. Patient stated that she was very grateful for the wonderful care she has been receiving at [facility] and that she can't thank us enough.

## 2018-03-28 NOTE — PROGRESS NOTES
Bedside and Verbal shift change report given to [address] [place] [address] (oncoming nurse) by KETAN Rivero (offgoing nurse). Report given with SBAR, Kardex, OR Summary, Intake/Output, MAR and Recent Results.

## 2018-03-29 LAB
GLUCOSE BLD STRIP.AUTO-MCNC: 162 MG/DL (ref 65–100)
GLUCOSE BLD STRIP.AUTO-MCNC: 173 MG/DL (ref 65–100)
GLUCOSE BLD STRIP.AUTO-MCNC: 222 MG/DL (ref 65–100)
GLUCOSE BLD STRIP.AUTO-MCNC: 227 MG/DL (ref 65–100)
SERVICE CMNT-IMP: ABNORMAL

## 2018-03-29 PROCEDURE — 82962 GLUCOSE BLOOD TEST: CPT

## 2018-03-29 PROCEDURE — 74011250637 HC RX REV CODE- 250/637: Performed by: INTERNAL MEDICINE

## 2018-03-29 PROCEDURE — 74011636637 HC RX REV CODE- 636/637: Performed by: INTERNAL MEDICINE

## 2018-03-29 PROCEDURE — 74011250636 HC RX REV CODE- 250/636: Performed by: INTERNAL MEDICINE

## 2018-03-29 PROCEDURE — 65660000000 HC RM CCU STEPDOWN

## 2018-03-29 RX ADMIN — LEVOTHYROXINE SODIUM 100 MCG: 100 TABLET ORAL at 06:53

## 2018-03-29 RX ADMIN — CALCIUM ACETATE 667 MG: 667 CAPSULE ORAL at 17:17

## 2018-03-29 RX ADMIN — AMLODIPINE BESYLATE 10 MG: 5 TABLET ORAL at 08:01

## 2018-03-29 RX ADMIN — HEPARIN SODIUM 5000 UNITS: 5000 INJECTION, SOLUTION INTRAVENOUS; SUBCUTANEOUS at 22:04

## 2018-03-29 RX ADMIN — CALCIUM ACETATE 667 MG: 667 CAPSULE ORAL at 08:01

## 2018-03-29 RX ADMIN — HYDRALAZINE HYDROCHLORIDE 100 MG: 25 TABLET, FILM COATED ORAL at 08:01

## 2018-03-29 RX ADMIN — Medication 10 ML: at 22:06

## 2018-03-29 RX ADMIN — HEPARIN SODIUM 5000 UNITS: 5000 INJECTION, SOLUTION INTRAVENOUS; SUBCUTANEOUS at 08:00

## 2018-03-29 RX ADMIN — CALCIUM ACETATE 667 MG: 667 CAPSULE ORAL at 12:04

## 2018-03-29 RX ADMIN — Medication 10 ML: at 14:00

## 2018-03-29 RX ADMIN — Medication 10 ML: at 06:53

## 2018-03-29 RX ADMIN — HYDRALAZINE HYDROCHLORIDE 100 MG: 25 TABLET, FILM COATED ORAL at 17:17

## 2018-03-29 RX ADMIN — GUAIFENESIN 600 MG: 600 TABLET, EXTENDED RELEASE ORAL at 22:03

## 2018-03-29 RX ADMIN — GUAIFENESIN 600 MG: 600 TABLET, EXTENDED RELEASE ORAL at 08:01

## 2018-03-29 RX ADMIN — CLONIDINE HYDROCHLORIDE 0.1 MG: 0.1 TABLET ORAL at 17:17

## 2018-03-29 RX ADMIN — INSULIN LISPRO 2 UNITS: 100 INJECTION, SOLUTION INTRAVENOUS; SUBCUTANEOUS at 22:05

## 2018-03-29 RX ADMIN — CALCITRIOL 0.25 MCG: 0.25 CAPSULE, LIQUID FILLED ORAL at 08:01

## 2018-03-29 RX ADMIN — FERROUS SULFATE TAB 325 MG (65 MG ELEMENTAL FE) 325 MG: 325 (65 FE) TAB at 08:01

## 2018-03-29 RX ADMIN — HYDRALAZINE HYDROCHLORIDE 100 MG: 25 TABLET, FILM COATED ORAL at 22:03

## 2018-03-29 RX ADMIN — INSULIN LISPRO 3 UNITS: 100 INJECTION, SOLUTION INTRAVENOUS; SUBCUTANEOUS at 17:16

## 2018-03-29 RX ADMIN — INSULIN LISPRO 2 UNITS: 100 INJECTION, SOLUTION INTRAVENOUS; SUBCUTANEOUS at 07:59

## 2018-03-29 RX ADMIN — CLONIDINE HYDROCHLORIDE 0.1 MG: 0.1 TABLET ORAL at 08:01

## 2018-03-29 RX ADMIN — METOPROLOL TARTRATE 100 MG: 50 TABLET ORAL at 22:03

## 2018-03-29 RX ADMIN — HYDRALAZINE HYDROCHLORIDE 100 MG: 25 TABLET, FILM COATED ORAL at 00:40

## 2018-03-29 RX ADMIN — INSULIN LISPRO 2 UNITS: 100 INJECTION, SOLUTION INTRAVENOUS; SUBCUTANEOUS at 11:30

## 2018-03-29 NOTE — PROGRESS NOTES
3/29/2018 4:33 PM Spoke with pt regarding discharge through [employer] phone. Planning for pt to discharge after dialysis treatment on 3/30, pt's daughter will transport her home. Pt plans to fly to [place] on 3/31 and will go to the dialysis center on 4/2. Pt is aware documents will be given at discharge for her to give to the dialysis center. Pt had no further questions. CM called and spoke with pt's daughter, Colotn Shahid and explained the above discharge plan. Pt's daughter was understanding and agreeable. 3/29/2018 4:11 PM Pt's rocaltrol, phoslo, catapres, and apresoline will be delivered to [facility] by 12PM on 3/30 by [facility]. 3/29/2018 10:43 AM Carefund approved pt's medications. Request faxed to [facility] (DP/S[facility]), requested delivery of medications on 3/30 to [facility]. CM made packet for pt to take to dialysis on 4/2 in [place] including, [address], h&p, dialysis treatments, hematology labs, urinalysis, immunology/serology labs, chest xray, permanent tunneled HD catheter procedure note, fistula operative report and nephrology progress notes. Pt will be provided packet on discharge. CM will continue to follow.  HAYDEN KoenigW

## 2018-03-29 NOTE — PROGRESS NOTES
Bedside and Verbal shift change report given to Lisa Bloom RN (oncoming nurse) by KETAN Almaraz (offgoing nurse). Report given with SBAR, Kardex, OR Summary, Intake/Output, MAR and Recent Results.

## 2018-03-29 NOTE — PROGRESS NOTES
Nutrition Assessment:    RECOMMENDATIONS/INTERVENTION(S):   Continue CCD diet   Monitor PO intakes, wt  Pt declined ONS- \"too sweet\"    ASSESSMENT:   3/29: Plans to D/C tomorrow. , 178, 160 mg/dL. Na 133. Cr 3.87. Phos 2.5. AV fistula placed. Pt states it's a little painful. CM has given her as much information for HD in [place]. Planned for 4/2. BM 3/29.     3/22: Follow up. Appetite is good. Pt eating well, no complaints. Change diet to CCD. Pt states she's having BM normally. Pt on Renal diet. Phos is low at 1.8. Cr 2.84. Protein requirements increased with HD.     3/19: Follow up. Pt appetite improved. Eating well. No n/v. Pt states she is going to the bathroom normally. Pt states she's just a little sore where she had her appendix taken out. Not painful but sore. Pt is worried about how she is going to make it back to [place] and get HD because in her country people usually have to wait a month to be seen for HD. Cr 5.62, GFR 8. K+ 3.3.     3/15: Follow up. Pt had HD 3/13, 3/14, will have it again 3/16. Pt does not want ONS, states they are too sweet which makes her nauseated. Pt states she had some diarrhea earlier today. States she ate ~50% of breakfast, not a lot of meat for lunch. Encouraged protein intakes for HD. Calcium 7.5. BUN 27. Cr 4.04. Phos and K WNL. Lipase slightly high 437 on admission. No new labs. GFR 11 slowly trending up. 3/13: 59 yr old female admitted for n/v/d. Pt states she hasn't eaten much of anything for the last 3 days. Any time she even drinks water she has to go to the bathroom and it's diarrhea. Pt complains of sweet taste in mouth, without having eaten anything. Ketoacidosis? Pt had central line placed for HD. Hgb low 6.7. Pt with PNA and possible appendicitis, no surgical intervention planned at this time. Phos 6.0, Calcium 6.4 low. , 207 mg/dL. Pt states she's lost about 5 lbs over the last few days. Chart indicates minor weight gain, likely fluids.  No edema noted.     SUBJECTIVE/OBJECTIVE:   Diet Order:  Renal  % Eaten:    Patient Vitals for the past 168 hrs:   % Diet Eaten   03/23/18 1832 100 %   03/23/18 0853 100 %     Pertinent Medications: [x] Reviewed    Past Medical History:   Diagnosis Date    Arrhythmia     fast heart rate and palpitations dec 2010    Arthritis     generalized    Asthma     Chronic pain     headaches and stomach pain x several months    Diabetes ([facility]     Diabetic foot ulcer associated with type 2 diabetes mellitus ([facility]     ESRD (end stage renal disease) ([facility]     GERD (gastroesophageal reflux disease)     Hypertension     Psychiatric disorder     depression        Chemistries:  Lab Results   Component Value Date/Time    Sodium 133 (L) 03/25/2018 04:05 AM    Potassium 3.7 03/25/2018 04:05 AM    Chloride 97 03/25/2018 04:05 AM    CO2 28 03/25/2018 04:05 AM    Anion gap 8 03/25/2018 04:05 AM    Glucose 172 (H) 03/25/2018 04:05 AM    BUN 22 (H) 03/25/2018 04:05 AM    Creatinine 3.87 (H) 03/25/2018 04:05 AM    BUN/Creatinine ratio 6 (L) 03/25/2018 04:05 AM    GFR est AA 14 (L) 03/25/2018 04:05 AM    GFR est non-AA 12 (L) 03/25/2018 04:05 AM    Calcium 8.5 03/25/2018 04:05 AM    AST (SGOT) 34 03/14/2018 01:03 AM    Alk. phosphatase 180 (H) 03/14/2018 01:03 AM    Protein, total 6.2 (L) 03/14/2018 01:03 AM    Albumin 2.4 (L) 03/14/2018 01:03 AM    Globulin 3.8 03/14/2018 01:03 AM    A-G Ratio 0.6 (L) 03/14/2018 01:03 AM    ALT (SGPT) 52 03/14/2018 01:03 AM      Anthropometrics: Height: 5' 3\" (160 cm) Weight: 63 kg (139 lb)    IBW (%IBW):   ( ) UBW (%UBW):   (  %)    BMI: Body mass index is 24.62 kg/(m^2). This BMI is indicative of:     [] Underweight    [] Normal    [x] Overweight    []  Obesity    []  Extreme Obesity (BMI>40)    Estimated Nutrition Needs (Based on): 1520 Kcals/day (BMR(1169x1.3)) , 82 g (-95g/day(1.2-1.4g/kg)) Protein  Carbohydrate:  At Least 130 g/day  Fluids: 1500 mL/day    Last BM: 3/18- documented [x]Active []Hyperactive  []Hypoactive       [] Absent   BS  Skin:    [x] Intact   [] Incision  [] Breakdown   [] DTI   [] Tears/Excoriation/Abrasion  []Edema [] Other: Wt Readings from Last 30 Encounters:   03/29/18 63 kg (139 lb)   02/08/18 60.3 kg (133 lb)   12/08/17 66.2 kg (146 lb)   11/28/17 63.5 kg (139 lb 15.9 oz)   09/11/14 64.4 kg (142 lb)   08/21/14 64.9 kg (143 lb)   08/07/14 64.9 kg (143 lb)   08/07/14 64.9 kg (143 lb)   07/10/14 67.1 kg (148 lb)   06/12/14 62.6 kg (138 lb)   06/02/14 63 kg (139 lb)   05/29/14 63.5 kg (140 lb)   05/08/14 61.2 kg (135 lb)   01/25/11 59 kg (130 lb)      NUTRITION DIAGNOSES:   Problem:  Inadequate energy intake     Etiology: related to decreased ability to consume sufficient energy     Signs/Symptoms: as evidenced by poor PO intakes, n/v/d    Initial diagnosis resolved. Intakes good. Increased protein needs 2/2 HD.      NUTRITION INTERVENTIONS:  Meals/Snacks: General/healthful diet   Supplements: Commercial supplement              GOAL:   pt will consume >50% of meals within 3-5 days    Cultural, Evangelical, or Ethnic Dietary Needs: None     LEARNING NEEDS (Diet, Food/Nutrient-Drug Interaction):    [x] None Identified   [] Identified and Education Provided/Documented   [] Identified and Pt declined/was not appropriate      [x] Interdisciplinary Care Plan Reviewed/Documented    [x] Discharge Needs:    TBD   [] No Nutrition Related Discharge Needs    NUTRITION RISK:   Pt Is At Nutrition Risk  [x]     No Nutrition Risk Identified  []       PT SEEN FOR:    []  MD Consult: []Calorie Count      []Diabetic Diet Education        []Diet Education     []Electrolyte Management     []General Nutrition Management and Supplements     []Management of Tube Feeding     []TPN Recommendations    []  RN Referral:  []MST score >=2     []Enteral/Parenteral Nutrition PTA     []Pregnant: Gestational DM or Multigestation                 [] Pressure Ulcer      []  Low BMI      []  Length of Stay       [] Dysphagia Diet     [] Ventilator      [x] Follow-Up        Previous Recommendations:   [x] Implemented          [] Not Implemented          [] Not Applicable    Previous Goal:   [x] Met              [x] Progressing Towards Goal              [] Not Progressing Towards Goal   [] Not Applicable              Zain Astudillo RD  Pager: [phone]  Office: [phone]

## 2018-03-29 NOTE — PROGRESS NOTES
[address] [place] [address], [address] [place] [address]  [phone]      Medical Progress Note      NAME: Jonathan Tucker   :  1955  MRM:  [id number]    Date/Time: 3/29/2018  9:24 AM            Subjective:     Chief Complaint:  Pain: left arm, mild, intermittent, at graft site    ROS:  (bold if positive, if negative)                        Tolerating PT  Tolerating Diet          Objective:       Vitals:          Last 24hrs VS reviewed since prior progress note.  Most recent are:    Visit Vitals    /76 (BP 1 Location: Right arm, BP Patient Position: Sitting)    Pulse 86    Temp 98.4 °F (36.9 °C)    Resp 17    Ht 5' 3\" (1.6 m)    Wt 63 kg (139 lb)    SpO2 95%    BMI 24.62 kg/m2     SpO2 Readings from Last 6 Encounters:   18 95%   17 100%   17 97%   14 98%   14 99%   11 94%    O2 Flow Rate (L/min): 2 l/min       Intake/Output Summary (Last 24 hours) at 18 0924  Last data filed at 18 1842   Gross per 24 hour   Intake                0 ml   Output             3100 ml   Net            -3100 ml          Exam:     Physical Exam:    Gen:  Well-developed, well-nourished, in no acute distress  HEENT:  Pink conjunctivae, PERRL, hearing intact to voice, moist mucous membranes  Neck:  Supple, without masses, thyroid non-tender  Resp:  No accessory muscle use, clear breath sounds without wheezes rales or rhonchi  Card:  No murmurs, normal S1, S2 without thrills, bruits or peripheral edema  Abd:  Soft, non-tender, non-distended, normoactive bowel sounds are present, no palpable organomegaly and no detectable hernias  Lymph:  No cervical or inguinal adenopathy  Musc:  No cyanosis or clubbing  Skin:  No rashes or ulcers, skin turgor is good  Neuro:  Cranial nerves are grossly intact, no focal motor weakness, follows commands appropriately  Psych:  Good insight, oriented to person, place and time, alert    Medications Reviewed: (see below)    Lab Data Reviewed: (see below)    ______________________________________________________________________    Medications:     Current Facility-Administered Medications   Medication Dose Route Frequency    heparin (porcine) 1,000 unit/mL injection 3,800 Units  3,800 Units Hemodialysis DIALYSIS PRN    guaiFENesin ER (MUCINEX) tablet 600 mg  600 mg Oral Q12H    cloNIDine HCl (CATAPRES) tablet 0.1 mg  0.1 mg Oral BID    hydrALAZINE (APRESOLINE) tablet 100 mg  100 mg Oral TID    hydrALAZINE (APRESOLINE) 20 mg/mL injection 20 mg  20 mg IntraVENous Q6H PRN    calcitRIOL (ROCALTROL) capsule 0.25 mcg  0.25 mcg Oral DAILY    calcium acetate (PHOSLO) capsule 667 mg  1 Cap Oral TID WITH MEALS    epoetin gonzalo (EPOGEN;PROCRIT) injection 10,000 Units  10,000 Units IntraVENous DIALYSIS MON, WED & FRI    heparin (porcine) injection 5,000 Units  5,000 Units SubCUTAneous Q12H    oxyCODONE-acetaminophen (PERCOCET) 5-325 mg per tablet 1 Tab  1 Tab Oral Q4H PRN    oxyCODONE-acetaminophen (PERCOCET 10)  mg per tablet 1 Tab  1 Tab Oral Q4H PRN    amLODIPine (NORVASC) tablet 10 mg  10 mg Oral DAILY    ferrous sulfate tablet 325 mg  325 mg Oral DAILY    levothyroxine (SYNTHROID) tablet 100 mcg  100 mcg Oral ACB    metoprolol tartrate (LOPRESSOR) tablet 100 mg  100 mg Oral QHS    sodium chloride (NS) flush 5-10 mL  5-10 mL IntraVENous Q8H    acetaminophen (TYLENOL) tablet 650 mg  650 mg Oral Q4H PRN    HYDROcodone-acetaminophen (NORCO) 5-325 mg per tablet 1 Tab  1 Tab Oral Q4H PRN    HYDROmorphone (PF) (DILAUDID) injection 0.2 mg  0.2 mg IntraVENous Q4H PRN    naloxone (NARCAN) injection 0.4 mg  0.4 mg IntraVENous PRN    diphenhydrAMINE (BENADRYL) injection 12.5 mg  12.5 mg IntraVENous Q4H PRN    ondansetron (ZOFRAN) injection 4 mg  4 mg IntraVENous Q6H PRN    insulin lispro (HUMALOG) injection   SubCUTAneous AC&HS    glucose chewable tablet 16 g  4 Tab Oral PRN    dextrose (D50W) injection syrg 12.5-25 g  12.5-25 g IntraVENous PRN    glucagon (GLUCAGEN) injection 1 mg  1 mg IntraMUSCular PRN    0.9% sodium chloride infusion 250 mL  250 mL IntraVENous PRN    prochlorperazine (COMPAZINE) injection 5 mg  5 mg IntraVENous Q6H PRN    albuterol-ipratropium (DUO-NEB) 2.5 MG-0.5 MG/3 ML  3 mL Nebulization Q4H PRN            Lab Review:     No results for input(s): WBC, HGB, HCT, PLT, HGBEXT, HCTEXT, PLTEXT, HGBEXT, HCTEXT, PLTEXT in the last 72 hours. No results for input(s): NA, K, CL, CO2, GLU, BUN, CREA, CA, MG, PHOS, ALB, TBIL, TBILI, SGOT, ALT, INR in the last 72 hours. No lab exists for component: INREXT, INREXT  Lab Results   Component Value Date/Time    Glucose (POC) 162 (H) 03/29/2018 07:00 AM    Glucose (POC) 168 (H) 03/28/2018 08:51 PM    Glucose (POC) 126 (H) 03/28/2018 04:08 PM    Glucose (POC) 207 (H) 03/28/2018 11:05 AM    Glucose (POC) 181 (H) 03/28/2018 06:49 AM     No results for input(s): PH, PCO2, PO2, HCO3, FIO2 in the last 72 hours. No results for input(s): INR in the last 72 hours.     No lab exists for component: Cali Mathews  Lab Results   Component Value Date/Time    Specimen Description: URINE 09/14/2010 02:20 PM     Lab Results   Component Value Date/Time    Culture result: MODERATE NORMAL RESPIRATORY CORINNE 03/13/2018 04:33 AM    Culture result: NO GROWTH 6 DAYS 03/12/2018 10:37 PM    Culture result: NO GROWTH 6 DAYS 03/12/2018 10:37 PM            Assessment:     Active Problems:    Type 2 diabetes mellitus with renal complication ([facility] (5/89/7716)      HTN (hypertension) (6/12/2014)      Anemia (6/13/2014)      ESRD (end stage renal disease) ([facility] (3/12/2018)           Plan:     Principal Problem:    Pneumonia (3/12/2018)   - completed 5 days of azithromycin, off all antibiotics    Active Problems:    Type 2 diabetes mellitus with renal complication ([facility] (6/01/5504)   - BS okay off 70/30, will NOT plan to resume, her ESRD has \"cured\" her DM for now, at high risk for hypoglycemia if she continues insulin at home    - follow on SSI      HTN (hypertension) (6/12/2014)   - BP much better overall, continue current meds      Anemia (6/13/2014)    - Hgb stable, follow      ESRD [facility] stage [facility]) ([facility] (3/12/2018)   - s/p AV graft and Permacath   - dialysis tomorrow and then home with planned return to [place] ASAP and resumption of dialysis there      Total time spent with patient: 25 minutes                  Care Plan discussed with: Patient and Nursing Staff    Discussed:  Code Status, Care Plan and D/C Planning    Prophylaxis:  Hep SQ    Disposition:  [facility] PT, OT, RN           ___________________________________________________    Attending Physician: Vanessa Domínguez MD

## 2018-03-30 VITALS
SYSTOLIC BLOOD PRESSURE: 161 MMHG | BODY MASS INDEX: 24.63 KG/M2 | HEIGHT: 63 IN | DIASTOLIC BLOOD PRESSURE: 71 MMHG | OXYGEN SATURATION: 92 % | HEART RATE: 89 BPM | TEMPERATURE: 98.3 F | WEIGHT: 139 LBS | RESPIRATION RATE: 18 BRPM

## 2018-03-30 LAB
ERYTHROCYTE [DISTWIDTH] IN BLOOD BY AUTOMATED COUNT: 18.1 % (ref 11.5–14.5)
GLUCOSE BLD STRIP.AUTO-MCNC: 117 MG/DL (ref 65–100)
GLUCOSE BLD STRIP.AUTO-MCNC: 176 MG/DL (ref 65–100)
HCT VFR BLD AUTO: 25.7 % (ref 35–47)
HGB BLD-MCNC: 8 G/DL (ref 11.5–16)
MCH RBC QN AUTO: 25.7 PG (ref 26–34)
MCHC RBC AUTO-ENTMCNC: 31.1 G/DL (ref 30–36.5)
MCV RBC AUTO: 82.6 FL (ref 80–99)
NRBC # BLD: 0.02 K/UL (ref 0–0.01)
NRBC BLD-RTO: 0.3 PER 100 WBC
PLATELET # BLD AUTO: 235 K/UL (ref 150–400)
PMV BLD AUTO: 8.7 FL (ref 8.9–12.9)
RBC # BLD AUTO: 3.11 M/UL (ref 3.8–5.2)
SERVICE CMNT-IMP: ABNORMAL
SERVICE CMNT-IMP: ABNORMAL
WBC # BLD AUTO: 6.1 K/UL (ref 3.6–11)

## 2018-03-30 PROCEDURE — 74011636637 HC RX REV CODE- 636/637: Performed by: INTERNAL MEDICINE

## 2018-03-30 PROCEDURE — 90935 HEMODIALYSIS ONE EVALUATION: CPT

## 2018-03-30 PROCEDURE — 82962 GLUCOSE BLOOD TEST: CPT

## 2018-03-30 PROCEDURE — 74011250636 HC RX REV CODE- 250/636: Performed by: INTERNAL MEDICINE

## 2018-03-30 PROCEDURE — 74011250637 HC RX REV CODE- 250/637: Performed by: INTERNAL MEDICINE

## 2018-03-30 PROCEDURE — 36415 COLL VENOUS BLD VENIPUNCTURE: CPT | Performed by: INTERNAL MEDICINE

## 2018-03-30 PROCEDURE — 85027 COMPLETE CBC AUTOMATED: CPT | Performed by: INTERNAL MEDICINE

## 2018-03-30 RX ADMIN — ERYTHROPOIETIN 10000 UNITS: 10000 INJECTION, SOLUTION INTRAVENOUS; SUBCUTANEOUS at 11:28

## 2018-03-30 RX ADMIN — HEPARIN SODIUM 3800 UNITS: 1000 INJECTION, SOLUTION INTRAVENOUS; SUBCUTANEOUS at 12:15

## 2018-03-30 RX ADMIN — FERROUS SULFATE TAB 325 MG (65 MG ELEMENTAL FE) 325 MG: 325 (65 FE) TAB at 13:16

## 2018-03-30 RX ADMIN — CALCIUM ACETATE 667 MG: 667 CAPSULE ORAL at 13:16

## 2018-03-30 RX ADMIN — CALCITRIOL 0.25 MCG: 0.25 CAPSULE, LIQUID FILLED ORAL at 13:16

## 2018-03-30 RX ADMIN — INSULIN LISPRO 2 UNITS: 100 INJECTION, SOLUTION INTRAVENOUS; SUBCUTANEOUS at 07:29

## 2018-03-30 RX ADMIN — AMLODIPINE BESYLATE 10 MG: 5 TABLET ORAL at 13:16

## 2018-03-30 RX ADMIN — CLONIDINE HYDROCHLORIDE 0.1 MG: 0.1 TABLET ORAL at 13:16

## 2018-03-30 RX ADMIN — Medication 10 ML: at 06:53

## 2018-03-30 RX ADMIN — HYDRALAZINE HYDROCHLORIDE 100 MG: 25 TABLET, FILM COATED ORAL at 13:16

## 2018-03-30 RX ADMIN — LEVOTHYROXINE SODIUM 100 MCG: 100 TABLET ORAL at 06:53

## 2018-03-30 NOTE — PROGRESS NOTES
Leo Carter Hooper 80  5[address] [facility], [address] [place] [address]  [phone]      Medical Progress Note      NAME: August Pain   :  1955  MRM:  [id number]    Date/Time: 3/30/2018  8:58 AM             Subjective:     Chief Complaint:  Pain: left arm, mild, intermittent, at graft site    ROS:  (bold if positive, if negative)                        Tolerating PT  Tolerating Diet          Objective:       Vitals:          Last 24hrs VS reviewed since prior progress note.  Most recent are:    Visit Vitals    /72    Pulse 81    Temp 98.1 °F (36.7 °C) (Oral)    Resp 16    Ht 5' 3\" (1.6 m)    Wt 63 kg (139 lb)    SpO2 92%    BMI 24.62 kg/m2     SpO2 Readings from Last 6 Encounters:   18 92%   17 100%   17 97%   14 98%   14 99%   11 94%    O2 Flow Rate (L/min): 2 l/min       Intake/Output Summary (Last 24 hours) at 18 0858  Last data filed at 18 0007   Gross per 24 hour   Intake              120 ml   Output                0 ml   Net              120 ml          Exam:     Physical Exam:    Gen:  Well-developed, well-nourished, in no acute distress  HEENT:  Pink conjunctivae, PERRL, hearing intact to voice, moist mucous membranes  Neck:  Supple, without masses, thyroid non-tender  Resp:  No accessory muscle use, clear breath sounds without wheezes rales or rhonchi  Card:  No murmurs, normal S1, S2 without thrills, bruits or peripheral edema  Abd:  Soft, non-tender, non-distended, normoactive bowel sounds are present, no palpable organomegaly and no detectable hernias  Lymph:  No cervical or inguinal adenopathy  Musc:  No cyanosis or clubbing  Skin:  No rashes or ulcers, skin turgor is good  Neuro:  Cranial nerves are grossly intact, no focal motor weakness, follows commands appropriately  Psych:  Good insight, oriented to person, place and time, alert    Medications Reviewed: (see below)    Lab Data Reviewed: (see below)    ______________________________________________________________________    Medications:     Current Facility-Administered Medications   Medication Dose Route Frequency    heparin (porcine) 1,000 unit/mL injection 3,800 Units  3,800 Units Hemodialysis DIALYSIS PRN    guaiFENesin ER (MUCINEX) tablet 600 mg  600 mg Oral Q12H    cloNIDine HCl (CATAPRES) tablet 0.1 mg  0.1 mg Oral BID    hydrALAZINE (APRESOLINE) tablet 100 mg  100 mg Oral TID    hydrALAZINE (APRESOLINE) 20 mg/mL injection 20 mg  20 mg IntraVENous Q6H PRN    calcitRIOL (ROCALTROL) capsule 0.25 mcg  0.25 mcg Oral DAILY    calcium acetate (PHOSLO) capsule 667 mg  1 Cap Oral TID WITH MEALS    epoetin gonzalo (EPOGEN;PROCRIT) injection 10,000 Units  10,000 Units IntraVENous DIALYSIS MON, WED & FRI    heparin (porcine) injection 5,000 Units  5,000 Units SubCUTAneous Q12H    oxyCODONE-acetaminophen (PERCOCET) 5-325 mg per tablet 1 Tab  1 Tab Oral Q4H PRN    oxyCODONE-acetaminophen (PERCOCET 10)  mg per tablet 1 Tab  1 Tab Oral Q4H PRN    amLODIPine (NORVASC) tablet 10 mg  10 mg Oral DAILY    ferrous sulfate tablet 325 mg  325 mg Oral DAILY    levothyroxine (SYNTHROID) tablet 100 mcg  100 mcg Oral ACB    metoprolol tartrate (LOPRESSOR) tablet 100 mg  100 mg Oral QHS    sodium chloride (NS) flush 5-10 mL  5-10 mL IntraVENous Q8H    acetaminophen (TYLENOL) tablet 650 mg  650 mg Oral Q4H PRN    HYDROcodone-acetaminophen (NORCO) 5-325 mg per tablet 1 Tab  1 Tab Oral Q4H PRN    HYDROmorphone (PF) (DILAUDID) injection 0.2 mg  0.2 mg IntraVENous Q4H PRN    naloxone (NARCAN) injection 0.4 mg  0.4 mg IntraVENous PRN    diphenhydrAMINE (BENADRYL) injection 12.5 mg  12.5 mg IntraVENous Q4H PRN    ondansetron (ZOFRAN) injection 4 mg  4 mg IntraVENous Q6H PRN    insulin lispro (HUMALOG) injection   SubCUTAneous AC&HS    glucose chewable tablet 16 g  4 Tab Oral PRN    dextrose (D50W) injection syrg 12.5-25 g  12.5-25 g IntraVENous PRN    glucagon (GLUCAGEN) injection 1 mg  1 mg IntraMUSCular PRN    0.9% sodium chloride infusion 250 mL  250 mL IntraVENous PRN    prochlorperazine (COMPAZINE) injection 5 mg  5 mg IntraVENous Q6H PRN    albuterol-ipratropium (DUO-NEB) 2.5 MG-0.5 MG/3 ML  3 mL Nebulization Q4H PRN            Lab Review:     Recent Labs      03/30/18   0254   WBC  6.1   HGB  8.0*   HCT  25.7*   PLT  235     No results for input(s): NA, K, CL, CO2, GLU, BUN, CREA, CA, MG, PHOS, ALB, TBIL, TBILI, SGOT, ALT, INR in the last 72 hours. No lab exists for component: INREXT, INREXT  Lab Results   Component Value Date/Time    Glucose (POC) 176 (H) 03/30/2018 07:00 AM    Glucose (POC) 222 (H) 03/29/2018 09:52 PM    Glucose (POC) 227 (H) 03/29/2018 04:45 PM    Glucose (POC) 173 (H) 03/29/2018 10:50 AM    Glucose (POC) 162 (H) 03/29/2018 07:00 AM     No results for input(s): PH, PCO2, PO2, HCO3, FIO2 in the last 72 hours. No results for input(s): INR in the last 72 hours.     No lab exists for component: Leora Landau  Lab Results   Component Value Date/Time    Specimen Description: URINE 09/14/2010 02:20 PM     Lab Results   Component Value Date/Time    Culture result: MODERATE NORMAL RESPIRATORY CORINNE 03/13/2018 04:33 AM    Culture result: NO GROWTH 6 DAYS 03/12/2018 10:37 PM    Culture result: NO GROWTH 6 DAYS 03/12/2018 10:37 PM            Assessment:     Active Problems:    Type 2 diabetes mellitus with renal complication ([facility] (9/14/3914)      HTN (hypertension) (6/12/2014)      Anemia (6/13/2014)      ESRD (end stage renal disease) ([facility].) (3/12/2018)           Plan:     Principal Problem:    Pneumonia (3/12/2018)   - completed 5 days of azithromycin, off all antibiotics    Active Problems:    Type 2 diabetes mellitus with renal complication ([facility].) (8/20/7928)   - BS okay off 70/30, will NOT plan to resume, her ESRD has \"cured\" her DM for now, at high risk for hypoglycemia if she continues insulin at home    - follow on SSI      HTN (hypertension) (6/12/2014)   - BP much better overall, continue current meds      Anemia (6/13/2014)    - Hgb stable, follow      ESRD (end stage renal disease) ([facility] (3/12/2018)   - s/p AV graft and Permacath   - dialysis today and then home with planned return to [place] ASAP and resumption of dialysis there      Total time spent with patient: 35 minutes                  Care Plan discussed with: Patient, Care Manager and Nursing Staff    Discussed:  Code Status, Care Plan and D/C Planning    Prophylaxis:  Hep SQ    Disposition:  HH PT, OT, RN           ___________________________________________________    Attending Physician: Marylene Police, MD

## 2018-03-30 NOTE — PROGRESS NOTES
[address] [facility]  YOB: 1955          Assessment & Plan:   ESRD  · S/E on HD. Sherri tx well. · Edema improved with UF  · Has been accepted for dialysis in [place] after d/c    HTN  · OK    Anemia  · EPO    SHPT  · Calcitriol and Ca acetate       Subjective:   CC: f/u ESRD  HPI: Seen on HD. Sherri tx well.    ROS: No n/v/sob  Current Facility-Administered Medications   Medication Dose Route Frequency    heparin (porcine) 1,000 unit/mL injection 3,800 Units  3,800 Units Hemodialysis DIALYSIS PRN    guaiFENesin ER (MUCINEX) tablet 600 mg  600 mg Oral Q12H    cloNIDine HCl (CATAPRES) tablet 0.1 mg  0.1 mg Oral BID    hydrALAZINE (APRESOLINE) tablet 100 mg  100 mg Oral TID    hydrALAZINE (APRESOLINE) 20 mg/mL injection 20 mg  20 mg IntraVENous Q6H PRN    calcitRIOL (ROCALTROL) capsule 0.25 mcg  0.25 mcg Oral DAILY    calcium acetate (PHOSLO) capsule 667 mg  1 Cap Oral TID WITH MEALS    epoetin gonazlo (EPOGEN;PROCRIT) injection 10,000 Units  10,000 Units IntraVENous DIALYSIS MON, WED & FRI    heparin (porcine) injection 5,000 Units  5,000 Units SubCUTAneous Q12H    oxyCODONE-acetaminophen (PERCOCET) 5-325 mg per tablet 1 Tab  1 Tab Oral Q4H PRN    oxyCODONE-acetaminophen (PERCOCET 10)  mg per tablet 1 Tab  1 Tab Oral Q4H PRN    amLODIPine (NORVASC) tablet 10 mg  10 mg Oral DAILY    ferrous sulfate tablet 325 mg  325 mg Oral DAILY    levothyroxine (SYNTHROID) tablet 100 mcg  100 mcg Oral ACB    metoprolol tartrate (LOPRESSOR) tablet 100 mg  100 mg Oral QHS    sodium chloride (NS) flush 5-10 mL  5-10 mL IntraVENous Q8H    acetaminophen (TYLENOL) tablet 650 mg  650 mg Oral Q4H PRN    HYDROcodone-acetaminophen (NORCO) 5-325 mg per tablet 1 Tab  1 Tab Oral Q4H PRN    HYDROmorphone (PF) (DILAUDID) injection 0.2 mg  0.2 mg IntraVENous Q4H PRN    naloxone (NARCAN) injection 0.4 mg  0.4 mg IntraVENous PRN    diphenhydrAMINE (BENADRYL) injection 12.5 mg  12.5 mg IntraVENous Q4H PRN    ondansetron (ZOFRAN) injection 4 mg  4 mg IntraVENous Q6H PRN    insulin lispro (HUMALOG) injection   SubCUTAneous AC&HS    glucose chewable tablet 16 g  4 Tab Oral PRN    dextrose (D50W) injection syrg 12.5-25 g  12.5-25 g IntraVENous PRN    glucagon (GLUCAGEN) injection 1 mg  1 mg IntraMUSCular PRN    0.9% sodium chloride infusion 250 mL  250 mL IntraVENous PRN    prochlorperazine (COMPAZINE) injection 5 mg  5 mg IntraVENous Q6H PRN    albuterol-ipratropium (DUO-NEB) 2.5 MG-0.5 MG/3 ML  3 mL Nebulization Q4H PRN          Objective:     Vitals:  Blood pressure 160/72, pulse 83, temperature 98.1 °F (36.7 °C), temperature source Oral, resp. rate 16, height 5' 3\" (1.6 m), weight 63 kg (139 lb), SpO2 92 %. Temp (24hrs), Av.9 °F (36.6 °C), Min:97.4 °F (36.3 °C), Max:98.6 °F (37 °C)      Intake and Output:      1901 -  0700  In: 120 [P.O.:120]  Out: -     Physical Exam:               GENERAL ASSESSMENT: NAD  CHEST: CTA  HEART: RRR  ABDOMEN: NTND  EXTREMITY: less EDEMA; AVF LUE           ECG/rhythm:    Data Review      No results for input(s): TNIPOC in the last 72 hours. No lab exists for component: ITNL   No results for input(s): CPK, CKMB, TROIQ in the last 72 hours. Recent Labs      18   0254   WBC  6.1   HGB  8.0*   HCT  25.7*   PLT  235      No results for input(s): INR, PTP, APTT in the last 72 hours. No lab exists for component: INREXT, INREXT  Needs: urine analysis, urine sodium, protein and creatinine  Lab Results   Component Value Date/Time    Sodium urine, random 38 2017 01:54 PM    Creatinine, urine 61.11 2017 08:08 PM           : Stanton Arevalo MD  3/30/2018        [facility]:  [email or web address] [employer]  Gisela Waters office:  [address] [place] [address], [address] [place] [address],[address] [place] [address]  Phone: [phone]  Fax :     [phone]    [place] office:  Greyson Rodríguez [address] [facility], [address]  Phone - [phone]  Fax - [phone]

## 2018-03-30 NOTE — DIALYSIS
[facility] Dialysis Team [facility]  [phone]    Vitals   Pre   Post   Assessment   Pre   Post     Temp  Temp: 98.1 °F (36.7 °C) (03/30/18 0845)  98.3 oral  LOC  A&Ox4; Primarily speaks Mohawk  A&Ox4; Primarily speaks [place]   HR   Pulse (Heart Rate): 81 (03/30/18 0845) 89 Lungs   Clear   Clear    B/P   BP: 150/72 (03/30/18 0845) 161/71  Cardiac   HRR  HRR    Resp   Resp Rate: 16 (03/30/18 0845) 18  Skin   Warm, Dry   Warm, Dry    Pain level  No distress noted  No distress noted  Edema    +3 pitting BLE edema      +3 pitting BLE edema    Orders:    Duration:   Start:   0845  End:   1215  Total:   3.5 Hours    Dialyzer:   Dialyzer/Set Up Inspection: Charan Leija (03/30/18 0845)   K Bath:   Dialysate K (mEq/L): 3 (03/30/18 0845)   Ca Bath:   Dialysate CA (mEq/L): 2.5 (03/30/18 0845)   Na/Bicarb:   Dialysate NA (mEq/L): 140 (03/30/18 0845)   Target Fluid Removal:   Goal/Amount of Fluid to Remove (mL): 3000 mL (03/30/18 0845)   Access     Type & Location:   Right Chest Catheter - No s/x of infection. Dried bloody drainage noted. Dressing changed; BioPatch in place. Ports disinfected per protocol, aspirated (5ml each port, discarded)  and flushed with NS 0.9%. Lines connected and treatment initiated.     Labs     Obtained/Reviewed   Critical Results Called   Date when labs were drawn- 3/30/18   Hgb-    HGB   Date Value Ref Range Status   03/30/2018 8.0 (L) 11.5 - 16.0 g/dL Final     K-    Potassium   Date Value Ref Range Status   03/25/2018 3.7 3.5 - 5.1 mmol/L Final     Ca-   Calcium   Date Value Ref Range Status   03/25/2018 8.5 8.5 - 10.1 MG/DL Final     Bun-   BUN   Date Value Ref Range Status   03/25/2018 22 (H) 6 - 20 MG/DL Final     Creat-   Creatinine   Date Value Ref Range Status   03/25/2018 3.87 (H) 0.55 - 1.02 MG/DL Final     Comment:     INVESTIGATED PER DELTA CHECK PROTOCOL        Medications/ Blood Products Given     Name   Dose   Route and Time     Procrit  10,000units/1mL   IVP @6969 Heparin  3,800 units/3.8 mL Intracatheter @1215    1. 9mL Venous  1.9mL Arterial         Blood Volume Processed (BVP):    77.6 L  Net Fluid   Removed:  3000 mL    Comments   Time Out Done: Yes   Primary Nurse Rpt Pre: Abigail John RN   Primary Nurse Rpt Post: Abigail John RN   Pt Education: Procedural   Care Plan: Discharge after dialysis   Tx Summary: Treatment completed per order. Ports disinfected per protocol, all possible blood rinsed back; lines disconnected, ports flushed with NS 0.9%, and capped. VSS. Bed in lowest position, call button, phone and personal belongings are within reach. Report given to primary nurse. Admiting Diagnosis: CKD 5/MELVA ON CKD   Pt's previous clinic- NA   Consent signed - Informed Consent Verified: Yes (03/30/18 0845)  Amaita Consent - Yes   Hepatitis Status- Negative 3/13/18   Machine #- Machine Number: [id number] (03/30/18 0845)  Telemetry status- Remotely monitored   Pre-dialysis wt. - 65.5 kg  Post-dialysis wt. - 62.2 kg

## 2018-03-30 NOTE — PROGRESS NOTES
Bedside and Verbal shift change report given to [address] Kala Yao RN (oncoming nurse) by Yamilex Harry RN (offgoing nurse). Report included the following information SBAR and Kardex.

## 2018-03-30 NOTE — DISCHARGE SUMMARY
Physician Discharge Summary     Patient ID:  Vanessa Baeza  [id number]  59 y.o.  1955    Admit date: 3/12/2018    Discharge date: 3/30/2018    Admission Diagnoses: Pneumonia  Appendicitis  END STAGE RENAL DISEASE    Discharge Diagnoses:  Principal Diagnosis Pneumonia                                            Active Problems:    Type 2 diabetes mellitus with renal complication ([facility] (6/20/5556)      HTN (hypertension) (6/12/2014)      Anemia (6/13/2014)      ESRD (end stage renal disease) ([facility].) (3/12/2018)         Resolved Problems:  Problem List as of 3/30/2018  Date Reviewed: 3/12/2018          Codes Class Noted - Resolved    ESRD (end stage renal disease) ([facility] (Chronic) ICD-10-CM: N18.6  ICD-9-CM: 585.6  3/12/2018 - Present        CKD (chronic kidney disease) stage 5, GFR less than 15 ml/min ([facility] ICD-10-CM: N18.5  ICD-9-CM: 585.5  11/30/2017 - Present        Diabetic foot infection ([facility] ICD-10-CM: E11.69, L08.9  ICD-9-CM: 250.80, 686.9  11/30/2017 - Present        MELVA (acute kidney injury) ([facility] ICD-10-CM: N17.9  ICD-9-CM: 584.9  11/22/2017 - Present        Foot fracture ICD-10-CM: S92.909A  ICD-9-CM: 825.20  11/22/2017 - Present        Hyperlipidemia ICD-10-CM: E78.5  ICD-9-CM: 272.4  6/13/2014 - Present        Anemia ICD-10-CM: D64.9  ICD-9-CM: 285.9  6/13/2014 - Present        Type 2 diabetes mellitus with renal complication ([facility]) (Chronic) ICD-10-CM: E11.29  ICD-9-CM: 250.40  6/12/2014 - Present        HTN (hypertension) (Chronic) ICD-10-CM: I10  ICD-9-CM: 401.9  6/12/2014 - Present        Edema ICD-10-CM: R60.9  ICD-9-CM: 782.3  6/12/2014 - Present        Diabetic foot ulcer ([facility] ICD-10-CM: E11.621, L97.509  ICD-9-CM: 250.80, 707.15  6/12/2014 - Present        * (Principal)RESOLVED: Pneumonia ICD-10-CM: J18.9  ICD-9-CM: 486  3/12/2018 - 3/28/2018        RESOLVED: Nausea & vomiting ICD-10-CM: R11.2  ICD-9-CM: 787.01  3/12/2018 - 3/18/2018        RESOLVED: Diarrhea ICD-10-CM: R19.7  ICD-9-CM: 787.91  3/12/2018 - 3/20/2018        RESOLVED: Hyponatremia ICD-10-CM: E87.1  ICD-9-CM: 276.1  3/12/2018 - 3/17/2018        RESOLVED: Metabolic acidosis RFZ-28-MX: L86.0  ICD-9-CM: 276.2  11/22/2017 - 3/18/2018                Hospital Course: This is an interim summary and covers the hospitalization from 3/25/2018 to 3/30/2018. For any preceding portion of the patient's hospitalization, please see my partner's notes. Ms. Genna Parry was maintained on dialysis without further issues. Outpatient dialysis could not be arranged in the [address]. After discussion with her family, Renal contacted a nephrologist in [facility] and made arrangements for dialysis there once the patient is discharged. Permacath and AV graft were placed. She plans to return to [place] ASAP after discharge. She was discharged home on 3/30/2018 in improved condition. PCP: Julieta Aguayo MD    Consults: Nephrology    Discharge Exam:  See my Progress Note from today. Disposition: home    Patient Instructions:   Current Discharge Medication List      START taking these medications    Details   calcitRIOL (ROCALTROL) 0.25 mcg capsule Take 1 Cap by mouth daily for 30 days. Indications: HYPERPARATHYROIDISM SECONDARY TO CHRONIC RENAL FAILURE  Qty: 30 Cap, Refills: 0      calcium acetate (PHOSLO) 667 mg cap Take 1 Cap by mouth three (3) times daily (with meals) for 30 days. Indications: Renal Osteodystrophy with Hyperphosphatemia  Qty: 90 Cap, Refills: 0      hydrALAZINE (APRESOLINE) 100 mg tablet Take 1 Tab by mouth three (3) times daily for 30 days. Qty: 90 Tab, Refills: 0      cloNIDine HCl (CATAPRES) 0.1 mg tablet Take 1 Tab by mouth two (2) times a day for 30 days. Qty: 60 Tab, Refills: 0         CONTINUE these medications which have NOT CHANGED    Details   amLODIPine (NORVASC) 10 mg tablet Take 10 mg by mouth daily. metoprolol tartrate (LOPRESSOR) 100 mg IR tablet Take 1 Tab by mouth nightly.   Qty: 30 Tab, Refills: 2 Associated Diagnoses: Renovascular hypertension      levothyroxine (SYNTHROID) 100 mcg tablet Take 1 Tab by mouth Daily (before breakfast). Qty: 90 Tab, Refills: 0    Associated Diagnoses: Other specified hypothyroidism      ferrous sulfate 325 mg (65 mg iron) tablet Take 325 mg by mouth daily. STOP taking these medications       insulin NPH/insulin regular (NOVOLIN 70/30 U-100 INSULIN) 100 unit/mL (70-30) injection Comments:   Reason for Stopping:         furosemide (LASIX) 40 mg tablet Comments:   Reason for Stopping:              Activity: Activity as tolerated  Diet: Renal Diet  Wound Care: None needed    Follow-up Information     Follow up With Details Comments Contact Info    Char Monsalve MD In 2 weeks  [address]  [phone]      Phys MD Dede   Patient can only remember the practice name and not the physician            35 minutes were spent on this discharge.     Signed:  Neto Gray MD  3/30/2018  10:46 AM

## 2018-03-30 NOTE — PROGRESS NOTES
Problem: Falls - Risk of  Goal: *Absence of Falls  Document Zuly Fall Risk and appropriate interventions in the flowsheet.    Outcome: Progressing Towards Goal  Fall Risk Interventions:  Mobility Interventions: Patient to call before getting OOB         Medication Interventions: Teach patient to arise slowly    Elimination Interventions: Call light in reach    History of Falls Interventions: Bed/chair exit alarm, Door open when patient unattended, Investigate reason for fall, Room close to nurse's station

## 2018-03-30 NOTE — PROGRESS NOTES
Discharge instructions reviewed with patient using blue  phone. Patient verbalizes understanding of instructions. Prescriptions filled by pharmacy per case management and given to patient.  Daughter and  present for instructions and also verify understanding

## 2018-03-30 NOTE — PROGRESS NOTES
3/30/2018 12:55 PM Pt's medications have been delivered and provided to pt's RN to give to pt at discharge. 3/30/2018  8:27 AM Pt will discharge today after dialysis. Packet has been made to include all needed documentation for dialysis center in [place]. Pt's new discharging medications will be delivered to [address] prior to 12PM to pt. Pt's daughter will transport pt home.  MILLIE Livingston

## 2023-12-18 NOTE — PROGRESS NOTES
Orthopaedic Progress Note      2017 11:20 AM     Patient: Whitley Booth MRN: [id number]  SSN: [id number]    YOB: 1955  Age: 65 y.o. Sex: female      Admit date:  2017  Procedures:    Admitting Physician:  Andreea Meraz MD   Consulting Physician(s): Treatment Team: Attending Provider: Doris Rodas DO; Consulting Provider: Andreea Meraz MD; Consulting Provider: Eveline Del Valle MD; Consulting Provider: Haily Mills PA-C    SUBJECTIVE:     Whitley Booth is a 65 y.o. female is resting in bed currently with no complaints. She denies increased pain in her right foot. OBJECTIVE:       Physical Exam:  General: Alert, cooperative, no distress. Respiratory: Respirations unlabored  Neurological:  Neurovascular exam within normal limits. Motor: + DF/PF. Musculoskeletal: Calves soft, supple, non-tender upon palpation. Right foot without swelling, erythema, drainage, or lesions. There is no induration or fluctuance of the right foot. There is no pain to palpation of the midfoot. There is 3 second cap refill of digits 1-5. Light touch sensation is diminished in the 3-5 toes.       Vital Signs:      Patient Vitals for the past 8 hrs:   BP Temp Pulse Resp SpO2   17 1117 131/63 98.2 °F (36.8 °C) 84 15 97 %   17 0916 180/90 - 75 - -   17 0914 180/90 - 67 - -   17 0900 156/85 97.7 °F (36.5 °C) 90 12 96 %   17 0514 149/69 98.5 °F (36.9 °C) 94 18 97 %                                          Temp (24hrs), Av.2 °F (36.8 °C), Min:97.7 °F (36.5 °C), Max:98.5 °F (36.9 °C)      Labs:        Recent Labs      17   05   HCT  25.9*   HGB  8.5*     Lab Results   Component Value Date/Time    Sodium 143 2017 05:19 AM    Potassium 4.7 2017 05:19 AM    Chloride 109 2017 05:19 AM    CO2 22 2017 05:19 AM    Glucose 187 2017 05:19 AM    BUN 74 2017 05:19 AM    Creatinine 4.75 2017 05:19 AM Calcium 8.3 11/24/2017 05:19 AM       PT/OT:                Patient mobility                         ASSESSMENT / PLAN:   Active Problems:    Diabetes ([facility] (6/12/2014)      HTN (hypertension) (6/12/2014)      Hyperlipidemia (6/13/2014)      MELVA (acute kidney injury) ([facility] (11/22/2017)      Foot fracture (11/22/2017)      High anion gap metabolic acidosis (30/57/6596)            A: 3 and 4th proximal phalanx toe fractures    P: 1. Remigio taped the 4-5th toe as this seemed to be the area that was most sensitive. 2.  There is no ulceration or lesion present that is concerning for infectious cause of the fractures. 3. If not improving, recommend a postoperative shoe or CAM walking boot. 4. She may WBAT on the right LE. 5. If worsening symptoms please reconsult orthopedics. Discussed case with Dr. Yoselin Mike who agrees with plan.       Signed By:  Ayse Tomas, [address] [facility] 114 Normal for race

## (undated) DEVICE — Device

## (undated) DEVICE — STERILE POLYISOPRENE POWDER-FREE SURGICAL GLOVES WITH EMOLLIENT COATING: Brand: PROTEXIS

## (undated) DEVICE — DEVICE TRNSF SPIK STL 2008S] MICROTEK MEDICAL INC]

## (undated) DEVICE — (D)PREP SKN CHLRAPRP APPL 26ML -- CONVERT TO ITEM 371833

## (undated) DEVICE — KENDALL SCD EXPRESS SLEEVES, KNEE LENGTH, MEDIUM: Brand: KENDALL SCD

## (undated) DEVICE — DRAPE,REIN 53X77,STERILE: Brand: MEDLINE

## (undated) DEVICE — SYR 10ML CTRL LR LCK NSAF LF --

## (undated) DEVICE — TUBING INSUFLTN 10FT LUER -- CONVERT TO ITEM 368568

## (undated) DEVICE — BLADELESS OPTICAL TROCAR WITH FIXATION CANNULA: Brand: VERSAPORT

## (undated) DEVICE — E-Z CLEAN, PTFE COATED, ELECTROSURGICAL LAPAROSCOPIC ELECTRODE, L-HOOK, 33 CM., SINGLE-USE, FOR USE WITH HAND CONTROL PENCIL: Brand: MEGADYNE

## (undated) DEVICE — DEVON™ KNEE AND BODY STRAP 60" X 3" (1.5 M X 7.6 CM): Brand: DEVON

## (undated) DEVICE — SUTURE PDS II SZ 0 L27IN ABSRB VLT L26MM CT-2 1/2 CIR Z334H

## (undated) DEVICE — STERILE POLYISOPRENE POWDER-FREE SURGICAL GLOVES: Brand: PROTEXIS

## (undated) DEVICE — DERMABOND SKIN ADH 0.7ML -- DERMABOND ADVANCED 12/BX

## (undated) DEVICE — UNIVERSAL STAPLER: Brand: ENDO GIA ULTRA

## (undated) DEVICE — UNIVERSAL FIXATION CANNULA: Brand: VERSAONE

## (undated) DEVICE — SUTURE VCRL SZ 3-0 L27IN ABSRB UD L26MM SH 1/2 CIR J416H

## (undated) DEVICE — SUTURE MCRYL SZ 4-0 L27IN ABSRB UD L19MM PS-2 1/2 CIR PRIM Y426H

## (undated) DEVICE — NEEDLE HYPO 22GA L1.5IN BLK S STL HUB POLYPR SHLD REG BVL

## (undated) DEVICE — SUT PROL 6-0 24IN BV1 DA BLU --

## (undated) DEVICE — SYR 3ML LL TIP 1/10ML GRAD --

## (undated) DEVICE — 3000CC GUARDIAN II: Brand: GUARDIAN

## (undated) DEVICE — SURGICAL PROCEDURE KIT GEN LAPAROSCOPY LF

## (undated) DEVICE — REM POLYHESIVE ADULT PATIENT RETURN ELECTRODE: Brand: VALLEYLAB

## (undated) DEVICE — SUTURE VCRL SZ 0 L18IN ABSRB UD POLYGLACTIN 910 COAT BRAID J646H

## (undated) DEVICE — ARTICULATING RELOAD WITH TRI-STAPLE TECHNOLOGY: Brand: ENDO GIA

## (undated) DEVICE — CLICKLINE SCISSORS INSERT: Brand: CLICKLINE

## (undated) DEVICE — INSTRMT SET WND CLSR SUT PASS --

## (undated) DEVICE — BAG SPEC REM 224ML W4XL6IN DIA10MM 1 HND GYN DISP ENDOPCH

## (undated) DEVICE — SUT ETHLN 3-0 18IN PS2 BLK --

## (undated) DEVICE — SUTURE PERMAHAND SZ 4-0 L12X30IN NONABSORBABLE BLK SILK A303H

## (undated) DEVICE — INFECTION CONTROL KIT SYS

## (undated) DEVICE — SUTURE NONABSORBABLE MONOFILAMENT 6-0 BV-1 1X30 IN PROLENE 8709H

## (undated) DEVICE — SOL IRRIGATION INJ NACL 0.9% 500ML BTL

## (undated) DEVICE — SUTURE PROL SZ 5-0 L30IN NONABSORBABLE BLU L13MM RB-2 1/2 8710H

## (undated) DEVICE — COVER LT HNDL BLU PLAS

## (undated) DEVICE — LIGHT HANDLE: Brand: DEVON

## (undated) DEVICE — RELOAD STPL 45MM THCK TISS GRN W/ GRIPPING SURF TECHNOLOGY

## (undated) DEVICE — BLUNT TIP TROCAR: Brand: AUTO SUTURE